# Patient Record
Sex: FEMALE | Race: WHITE | NOT HISPANIC OR LATINO | URBAN - METROPOLITAN AREA
[De-identification: names, ages, dates, MRNs, and addresses within clinical notes are randomized per-mention and may not be internally consistent; named-entity substitution may affect disease eponyms.]

---

## 2017-06-04 ENCOUNTER — INPATIENT (INPATIENT)
Facility: HOSPITAL | Age: 57
LOS: 23 days | Discharge: HOME HEALTH | DRG: 464 | End: 2017-06-28
Attending: ORTHOPAEDIC SURGERY | Admitting: SURGERY
Payer: COMMERCIAL

## 2017-06-04 ENCOUNTER — TRANSCRIPTION ENCOUNTER (OUTPATIENT)
Age: 57
End: 2017-06-04

## 2017-06-04 VITALS
HEART RATE: 93 BPM | SYSTOLIC BLOOD PRESSURE: 122 MMHG | RESPIRATION RATE: 22 BRPM | DIASTOLIC BLOOD PRESSURE: 88 MMHG | TEMPERATURE: 98 F | OXYGEN SATURATION: 100 %

## 2017-06-04 DIAGNOSIS — E11.9 TYPE 2 DIABETES MELLITUS WITHOUT COMPLICATIONS: ICD-10-CM

## 2017-06-04 DIAGNOSIS — I10 ESSENTIAL (PRIMARY) HYPERTENSION: ICD-10-CM

## 2017-06-04 DIAGNOSIS — N18.4 CHRONIC KIDNEY DISEASE, STAGE 4 (SEVERE): ICD-10-CM

## 2017-06-04 DIAGNOSIS — S41.111A LACERATION WITHOUT FOREIGN BODY OF RIGHT UPPER ARM, INITIAL ENCOUNTER: ICD-10-CM

## 2017-06-04 DIAGNOSIS — S52.032C: ICD-10-CM
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
## 2017-06-04 LAB
ALBUMIN SERPL ELPH-MCNC: 3.7 G/DL — SIGNIFICANT CHANGE UP (ref 3.3–5)
ALBUMIN SERPL ELPH-MCNC: 4 G/DL — SIGNIFICANT CHANGE UP (ref 3.3–5)
ALP SERPL-CCNC: 78 U/L — SIGNIFICANT CHANGE UP (ref 40–120)
ALP SERPL-CCNC: 82 U/L — SIGNIFICANT CHANGE UP (ref 40–120)
ALT FLD-CCNC: 20 U/L RC — SIGNIFICANT CHANGE UP (ref 10–45)
ALT FLD-CCNC: 24 U/L RC — SIGNIFICANT CHANGE UP (ref 10–45)
AMYLASE P1 CFR SERPL: 70 U/L — SIGNIFICANT CHANGE UP (ref 25–125)
ANION GAP SERPL CALC-SCNC: 14 MMOL/L — SIGNIFICANT CHANGE UP (ref 5–17)
ANION GAP SERPL CALC-SCNC: 15 MMOL/L — SIGNIFICANT CHANGE UP (ref 5–17)
ANION GAP SERPL CALC-SCNC: 16 MMOL/L — SIGNIFICANT CHANGE UP (ref 5–17)
APPEARANCE UR: CLEAR — SIGNIFICANT CHANGE UP
APTT BLD: 29.3 SEC — SIGNIFICANT CHANGE UP (ref 27.5–37.4)
AST SERPL-CCNC: 28 U/L — SIGNIFICANT CHANGE UP (ref 10–40)
AST SERPL-CCNC: 28 U/L — SIGNIFICANT CHANGE UP (ref 10–40)
BASE EXCESS BLDV CALC-SCNC: -2.7 MMOL/L — LOW (ref -2–2)
BASOPHILS # BLD AUTO: 0 K/UL — SIGNIFICANT CHANGE UP (ref 0–0.2)
BASOPHILS NFR BLD AUTO: 0.2 % — SIGNIFICANT CHANGE UP (ref 0–2)
BILIRUB SERPL-MCNC: 0.3 MG/DL — SIGNIFICANT CHANGE UP (ref 0.2–1.2)
BILIRUB SERPL-MCNC: 0.3 MG/DL — SIGNIFICANT CHANGE UP (ref 0.2–1.2)
BILIRUB UR-MCNC: NEGATIVE — SIGNIFICANT CHANGE UP
BLD GP AB SCN SERPL QL: POSITIVE — SIGNIFICANT CHANGE UP
BUN SERPL-MCNC: 30 MG/DL — HIGH (ref 7–23)
BUN SERPL-MCNC: 32 MG/DL — HIGH (ref 7–23)
BUN SERPL-MCNC: 32 MG/DL — HIGH (ref 7–23)
CALCIUM SERPL-MCNC: 8.4 MG/DL — SIGNIFICANT CHANGE UP (ref 8.4–10.5)
CALCIUM SERPL-MCNC: 8.7 MG/DL — SIGNIFICANT CHANGE UP (ref 8.4–10.5)
CALCIUM SERPL-MCNC: 9 MG/DL — SIGNIFICANT CHANGE UP (ref 8.4–10.5)
CHLORIDE SERPL-SCNC: 101 MMOL/L — SIGNIFICANT CHANGE UP (ref 96–108)
CHLORIDE SERPL-SCNC: 102 MMOL/L — SIGNIFICANT CHANGE UP (ref 96–108)
CHLORIDE SERPL-SCNC: 102 MMOL/L — SIGNIFICANT CHANGE UP (ref 96–108)
CK SERPL-CCNC: 310 U/L — HIGH (ref 25–170)
CK SERPL-CCNC: 354 U/L — HIGH (ref 25–170)
CK SERPL-CCNC: 400 U/L — HIGH (ref 25–170)
CO2 BLDV-SCNC: 25 MMOL/L — SIGNIFICANT CHANGE UP (ref 22–30)
CO2 SERPL-SCNC: 20 MMOL/L — LOW (ref 22–31)
CO2 SERPL-SCNC: 22 MMOL/L — SIGNIFICANT CHANGE UP (ref 22–31)
CO2 SERPL-SCNC: 22 MMOL/L — SIGNIFICANT CHANGE UP (ref 22–31)
COLOR SPEC: SIGNIFICANT CHANGE UP
CREAT SERPL-MCNC: 1.9 MG/DL — HIGH (ref 0.5–1.3)
CREAT SERPL-MCNC: 1.95 MG/DL — HIGH (ref 0.5–1.3)
CREAT SERPL-MCNC: 2.17 MG/DL — HIGH (ref 0.5–1.3)
DAT POLY-SP REAG RBC QL: NEGATIVE — SIGNIFICANT CHANGE UP
DIFF PNL FLD: ABNORMAL
EOSINOPHIL # BLD AUTO: 0.4 K/UL — SIGNIFICANT CHANGE UP (ref 0–0.5)
EOSINOPHIL NFR BLD AUTO: 3 % — SIGNIFICANT CHANGE UP (ref 0–6)
ETHANOL SERPL-MCNC: SIGNIFICANT CHANGE UP MG/DL (ref 0–10)
GAS PNL BLDA: SIGNIFICANT CHANGE UP
GAS PNL BLDV: SIGNIFICANT CHANGE UP
GLUCOSE SERPL-MCNC: 243 MG/DL — HIGH (ref 70–99)
GLUCOSE SERPL-MCNC: 388 MG/DL — HIGH (ref 70–99)
GLUCOSE SERPL-MCNC: 460 MG/DL — CRITICAL HIGH (ref 70–99)
GLUCOSE UR QL: >1000
HBA1C BLD-MCNC: 10.3 % — HIGH (ref 4–5.6)
HCG SERPL-ACNC: <2 MIU/ML — SIGNIFICANT CHANGE UP
HCO3 BLDV-SCNC: 24 MMOL/L — SIGNIFICANT CHANGE UP (ref 21–29)
HCT VFR BLD CALC: 32.6 % — LOW (ref 34.5–45)
HCT VFR BLD CALC: 34.8 % — SIGNIFICANT CHANGE UP (ref 34.5–45)
HGB BLD-MCNC: 10.9 G/DL — LOW (ref 11.5–15.5)
HGB BLD-MCNC: 11.7 G/DL — SIGNIFICANT CHANGE UP (ref 11.5–15.5)
HOROWITZ INDEX BLDV+IHG-RTO: SIGNIFICANT CHANGE UP
INR BLD: 1.04 RATIO — SIGNIFICANT CHANGE UP (ref 0.88–1.16)
INR BLD: 1.11 RATIO — SIGNIFICANT CHANGE UP (ref 0.88–1.16)
KETONES UR-MCNC: NEGATIVE — SIGNIFICANT CHANGE UP
LEUKOCYTE ESTERASE UR-ACNC: NEGATIVE — SIGNIFICANT CHANGE UP
LIDOCAIN IGE QN: 97 U/L — HIGH (ref 7–60)
LYMPHOCYTES # BLD AUTO: 29.4 % — SIGNIFICANT CHANGE UP (ref 13–44)
LYMPHOCYTES # BLD AUTO: 3.8 K/UL — HIGH (ref 1–3.3)
MAGNESIUM SERPL-MCNC: 1.9 MG/DL — SIGNIFICANT CHANGE UP (ref 1.6–2.6)
MAGNESIUM SERPL-MCNC: 2.1 MG/DL — SIGNIFICANT CHANGE UP (ref 1.6–2.6)
MCHC RBC-ENTMCNC: 27.1 PG — SIGNIFICANT CHANGE UP (ref 27–34)
MCHC RBC-ENTMCNC: 27.4 PG — SIGNIFICANT CHANGE UP (ref 27–34)
MCHC RBC-ENTMCNC: 33.5 GM/DL — SIGNIFICANT CHANGE UP (ref 32–36)
MCHC RBC-ENTMCNC: 33.7 GM/DL — SIGNIFICANT CHANGE UP (ref 32–36)
MCV RBC AUTO: 81 FL — SIGNIFICANT CHANGE UP (ref 80–100)
MCV RBC AUTO: 81.4 FL — SIGNIFICANT CHANGE UP (ref 80–100)
MONOCYTES # BLD AUTO: 0.6 K/UL — SIGNIFICANT CHANGE UP (ref 0–0.9)
MONOCYTES NFR BLD AUTO: 4.5 % — SIGNIFICANT CHANGE UP (ref 2–14)
MYOGLOBIN SERPL-MCNC: 327 NG/ML — HIGH (ref 9–82)
MYOGLOBIN SERPL-MCNC: 566 NG/ML — HIGH (ref 9–82)
NEUTROPHILS # BLD AUTO: 8.1 K/UL — HIGH (ref 1.8–7.4)
NEUTROPHILS NFR BLD AUTO: 62.9 % — SIGNIFICANT CHANGE UP (ref 43–77)
NITRITE UR-MCNC: NEGATIVE — SIGNIFICANT CHANGE UP
PCO2 BLDV: 50 MMHG — SIGNIFICANT CHANGE UP (ref 35–50)
PH BLDV: 7.3 — LOW (ref 7.35–7.45)
PH UR: 6 — SIGNIFICANT CHANGE UP (ref 5–8)
PHOSPHATE SERPL-MCNC: 3 MG/DL — SIGNIFICANT CHANGE UP (ref 2.5–4.5)
PHOSPHATE SERPL-MCNC: 3.9 MG/DL — SIGNIFICANT CHANGE UP (ref 2.5–4.5)
PLATELET # BLD AUTO: 303 K/UL — SIGNIFICANT CHANGE UP (ref 150–400)
PLATELET # BLD AUTO: 347 K/UL — SIGNIFICANT CHANGE UP (ref 150–400)
PO2 BLDV: 35 MMHG — SIGNIFICANT CHANGE UP (ref 25–45)
POTASSIUM SERPL-MCNC: 4.2 MMOL/L — SIGNIFICANT CHANGE UP (ref 3.5–5.3)
POTASSIUM SERPL-MCNC: 4.3 MMOL/L — SIGNIFICANT CHANGE UP (ref 3.5–5.3)
POTASSIUM SERPL-MCNC: 4.5 MMOL/L — SIGNIFICANT CHANGE UP (ref 3.5–5.3)
POTASSIUM SERPL-SCNC: 4.2 MMOL/L — SIGNIFICANT CHANGE UP (ref 3.5–5.3)
POTASSIUM SERPL-SCNC: 4.3 MMOL/L — SIGNIFICANT CHANGE UP (ref 3.5–5.3)
POTASSIUM SERPL-SCNC: 4.5 MMOL/L — SIGNIFICANT CHANGE UP (ref 3.5–5.3)
PROT SERPL-MCNC: 6.6 G/DL — SIGNIFICANT CHANGE UP (ref 6–8.3)
PROT SERPL-MCNC: 6.8 G/DL — SIGNIFICANT CHANGE UP (ref 6–8.3)
PROT UR-MCNC: SIGNIFICANT CHANGE UP
PROTHROM AB SERPL-ACNC: 11.2 SEC — SIGNIFICANT CHANGE UP (ref 9.8–12.7)
PROTHROM AB SERPL-ACNC: 12 SEC — SIGNIFICANT CHANGE UP (ref 9.8–12.7)
RBC # BLD: 4.02 M/UL — SIGNIFICANT CHANGE UP (ref 3.8–5.2)
RBC # BLD: 4.27 M/UL — SIGNIFICANT CHANGE UP (ref 3.8–5.2)
RBC # FLD: 14.5 % — SIGNIFICANT CHANGE UP (ref 10.3–14.5)
RBC # FLD: 14.6 % — HIGH (ref 10.3–14.5)
RH IG SCN BLD-IMP: NEGATIVE — SIGNIFICANT CHANGE UP
RH IG SCN BLD-IMP: NEGATIVE — SIGNIFICANT CHANGE UP
SAO2 % BLDV: 56 % — LOW (ref 67–88)
SODIUM SERPL-SCNC: 137 MMOL/L — SIGNIFICANT CHANGE UP (ref 135–145)
SODIUM SERPL-SCNC: 138 MMOL/L — SIGNIFICANT CHANGE UP (ref 135–145)
SODIUM SERPL-SCNC: 139 MMOL/L — SIGNIFICANT CHANGE UP (ref 135–145)
SP GR SPEC: >1.03 — HIGH (ref 1.01–1.02)
UROBILINOGEN FLD QL: NEGATIVE — SIGNIFICANT CHANGE UP
WBC # BLD: 12.8 K/UL — HIGH (ref 3.8–10.5)
WBC # BLD: 17.8 K/UL — HIGH (ref 3.8–10.5)
WBC # FLD AUTO: 12.8 K/UL — HIGH (ref 3.8–10.5)
WBC # FLD AUTO: 17.8 K/UL — HIGH (ref 3.8–10.5)

## 2017-06-04 PROCEDURE — 70450 CT HEAD/BRAIN W/O DYE: CPT | Mod: 26

## 2017-06-04 PROCEDURE — 99254 IP/OBS CNSLTJ NEW/EST MOD 60: CPT | Mod: GC

## 2017-06-04 PROCEDURE — 86077 PHYS BLOOD BANK SERV XMATCH: CPT

## 2017-06-04 PROCEDURE — 73200 CT UPPER EXTREMITY W/O DYE: CPT | Mod: 26,RT

## 2017-06-04 PROCEDURE — 99285 EMERGENCY DEPT VISIT HI MDM: CPT | Mod: 25

## 2017-06-04 PROCEDURE — 20103 EXPL PENTRG WOUND EXTREMITY: CPT

## 2017-06-04 PROCEDURE — 99223 1ST HOSP IP/OBS HIGH 75: CPT | Mod: 57,25,AI

## 2017-06-04 PROCEDURE — 71010: CPT | Mod: 26

## 2017-06-04 PROCEDURE — 35761: CPT | Mod: GC

## 2017-06-04 PROCEDURE — 72125 CT NECK SPINE W/O DYE: CPT | Mod: 26

## 2017-06-04 PROCEDURE — 99053 MED SERV 10PM-8AM 24 HR FAC: CPT

## 2017-06-04 PROCEDURE — 72170 X-RAY EXAM OF PELVIS: CPT | Mod: 26

## 2017-06-04 PROCEDURE — 73090 X-RAY EXAM OF FOREARM: CPT | Mod: 26,LT

## 2017-06-04 PROCEDURE — 73030 X-RAY EXAM OF SHOULDER: CPT | Mod: 26,RT

## 2017-06-04 PROCEDURE — 73060 X-RAY EXAM OF HUMERUS: CPT | Mod: 26,RT

## 2017-06-04 PROCEDURE — 71260 CT THORAX DX C+: CPT | Mod: 26

## 2017-06-04 PROCEDURE — 12005 RPR S/N/A/GEN/TRK12.6-20.0CM: CPT | Mod: 59

## 2017-06-04 PROCEDURE — 74177 CT ABD & PELVIS W/CONTRAST: CPT | Mod: 26

## 2017-06-04 PROCEDURE — 73070 X-RAY EXAM OF ELBOW: CPT | Mod: 26,RT

## 2017-06-04 PROCEDURE — 76376 3D RENDER W/INTRP POSTPROCES: CPT | Mod: 26

## 2017-06-04 PROCEDURE — 99291 CRITICAL CARE FIRST HOUR: CPT

## 2017-06-04 PROCEDURE — 73130 X-RAY EXAM OF HAND: CPT | Mod: 26,RT

## 2017-06-04 RX ORDER — SODIUM CHLORIDE 9 MG/ML
1000 INJECTION INTRAMUSCULAR; INTRAVENOUS; SUBCUTANEOUS ONCE
Qty: 0 | Refills: 0 | Status: COMPLETED | OUTPATIENT
Start: 2017-06-04 | End: 2017-06-04

## 2017-06-04 RX ORDER — INSULIN LISPRO 100/ML
VIAL (ML) SUBCUTANEOUS EVERY 6 HOURS
Qty: 0 | Refills: 0 | Status: DISCONTINUED | OUTPATIENT
Start: 2017-06-04 | End: 2017-06-04

## 2017-06-04 RX ORDER — SODIUM CHLORIDE 9 MG/ML
1000 INJECTION, SOLUTION INTRAVENOUS
Qty: 0 | Refills: 0 | Status: DISCONTINUED | OUTPATIENT
Start: 2017-06-04 | End: 2017-06-04

## 2017-06-04 RX ORDER — CEFAZOLIN SODIUM 1 G
2000 VIAL (EA) INJECTION ONCE
Qty: 0 | Refills: 0 | Status: COMPLETED | OUTPATIENT
Start: 2017-06-04 | End: 2017-06-04

## 2017-06-04 RX ORDER — INSULIN HUMAN 100 [IU]/ML
1 INJECTION, SOLUTION SUBCUTANEOUS
Qty: 100 | Refills: 0 | Status: DISCONTINUED | OUTPATIENT
Start: 2017-06-04 | End: 2017-06-06

## 2017-06-04 RX ORDER — ATORVASTATIN CALCIUM 80 MG/1
40 TABLET, FILM COATED ORAL AT BEDTIME
Qty: 0 | Refills: 0 | Status: DISCONTINUED | OUTPATIENT
Start: 2017-06-04 | End: 2017-06-06

## 2017-06-04 RX ORDER — OXYCODONE HYDROCHLORIDE 5 MG/1
5 TABLET ORAL EVERY 4 HOURS
Qty: 0 | Refills: 0 | Status: DISCONTINUED | OUTPATIENT
Start: 2017-06-04 | End: 2017-06-06

## 2017-06-04 RX ORDER — HYDROMORPHONE HYDROCHLORIDE 2 MG/ML
0.5 INJECTION INTRAMUSCULAR; INTRAVENOUS; SUBCUTANEOUS
Qty: 0 | Refills: 0 | Status: DISCONTINUED | OUTPATIENT
Start: 2017-06-04 | End: 2017-06-04

## 2017-06-04 RX ORDER — CEFAZOLIN SODIUM 1 G
2000 VIAL (EA) INJECTION EVERY 8 HOURS
Qty: 0 | Refills: 0 | Status: DISCONTINUED | OUTPATIENT
Start: 2017-06-04 | End: 2017-06-04

## 2017-06-04 RX ORDER — METOCLOPRAMIDE HCL 10 MG
10 TABLET ORAL ONCE
Qty: 0 | Refills: 0 | Status: COMPLETED | OUTPATIENT
Start: 2017-06-04 | End: 2017-06-04

## 2017-06-04 RX ORDER — HEPARIN SODIUM 5000 [USP'U]/ML
5000 INJECTION INTRAVENOUS; SUBCUTANEOUS EVERY 8 HOURS
Qty: 0 | Refills: 0 | Status: DISCONTINUED | OUTPATIENT
Start: 2017-06-04 | End: 2017-06-05

## 2017-06-04 RX ORDER — OXYCODONE HYDROCHLORIDE 5 MG/1
10 TABLET ORAL EVERY 4 HOURS
Qty: 0 | Refills: 0 | Status: DISCONTINUED | OUTPATIENT
Start: 2017-06-04 | End: 2017-06-06

## 2017-06-04 RX ORDER — SODIUM CHLORIDE 9 MG/ML
1000 INJECTION, SOLUTION INTRAVENOUS
Qty: 0 | Refills: 0 | Status: DISCONTINUED | OUTPATIENT
Start: 2017-06-04 | End: 2017-06-05

## 2017-06-04 RX ORDER — HYDROMORPHONE HYDROCHLORIDE 2 MG/ML
1 INJECTION INTRAMUSCULAR; INTRAVENOUS; SUBCUTANEOUS
Qty: 0 | Refills: 0 | Status: DISCONTINUED | OUTPATIENT
Start: 2017-06-09 | End: 2017-06-12

## 2017-06-04 RX ORDER — ONDANSETRON 8 MG/1
4 TABLET, FILM COATED ORAL ONCE
Qty: 0 | Refills: 0 | Status: COMPLETED | OUTPATIENT
Start: 2017-06-04 | End: 2017-06-04

## 2017-06-04 RX ORDER — ACETAMINOPHEN 500 MG
1000 TABLET ORAL ONCE
Qty: 0 | Refills: 0 | Status: COMPLETED | OUTPATIENT
Start: 2017-06-04 | End: 2017-06-04

## 2017-06-04 RX ORDER — ONDANSETRON 8 MG/1
4 TABLET, FILM COATED ORAL ONCE
Qty: 0 | Refills: 0 | Status: DISCONTINUED | OUTPATIENT
Start: 2017-06-09 | End: 2017-06-12

## 2017-06-04 RX ORDER — HYDROMORPHONE HYDROCHLORIDE 2 MG/ML
0.5 INJECTION INTRAMUSCULAR; INTRAVENOUS; SUBCUTANEOUS
Qty: 0 | Refills: 0 | Status: DISCONTINUED | OUTPATIENT
Start: 2017-06-04 | End: 2017-06-06

## 2017-06-04 RX ORDER — ACETAMINOPHEN 500 MG
650 TABLET ORAL EVERY 6 HOURS
Qty: 0 | Refills: 0 | Status: DISCONTINUED | OUTPATIENT
Start: 2017-06-04 | End: 2017-06-04

## 2017-06-04 RX ORDER — PIPERACILLIN AND TAZOBACTAM 4; .5 G/20ML; G/20ML
3.38 INJECTION, POWDER, LYOPHILIZED, FOR SOLUTION INTRAVENOUS EVERY 12 HOURS
Qty: 0 | Refills: 0 | Status: DISCONTINUED | OUTPATIENT
Start: 2017-06-04 | End: 2017-06-05

## 2017-06-04 RX ORDER — HYDROMORPHONE HYDROCHLORIDE 2 MG/ML
0.25 INJECTION INTRAMUSCULAR; INTRAVENOUS; SUBCUTANEOUS ONCE
Qty: 0 | Refills: 0 | Status: DISCONTINUED | OUTPATIENT
Start: 2017-06-04 | End: 2017-06-04

## 2017-06-04 RX ORDER — TETANUS TOXOID, REDUCED DIPHTHERIA TOXOID AND ACELLULAR PERTUSSIS VACCINE, ADSORBED 5; 2.5; 8; 8; 2.5 [IU]/.5ML; [IU]/.5ML; UG/.5ML; UG/.5ML; UG/.5ML
0.5 SUSPENSION INTRAMUSCULAR ONCE
Qty: 0 | Refills: 0 | Status: COMPLETED | OUTPATIENT
Start: 2017-06-04 | End: 2017-06-04

## 2017-06-04 RX ORDER — PANTOPRAZOLE SODIUM 20 MG/1
40 TABLET, DELAYED RELEASE ORAL
Qty: 0 | Refills: 0 | Status: DISCONTINUED | OUTPATIENT
Start: 2017-06-04 | End: 2017-06-06

## 2017-06-04 RX ORDER — FENTANYL CITRATE 50 UG/ML
50 INJECTION INTRAVENOUS ONCE
Qty: 0 | Refills: 0 | Status: DISCONTINUED | OUTPATIENT
Start: 2017-06-04 | End: 2017-06-04

## 2017-06-04 RX ORDER — DOCUSATE SODIUM 100 MG
100 CAPSULE ORAL THREE TIMES A DAY
Qty: 0 | Refills: 0 | Status: DISCONTINUED | OUTPATIENT
Start: 2017-06-04 | End: 2017-06-06

## 2017-06-04 RX ORDER — SENNA PLUS 8.6 MG/1
2 TABLET ORAL AT BEDTIME
Qty: 0 | Refills: 0 | Status: DISCONTINUED | OUTPATIENT
Start: 2017-06-04 | End: 2017-06-06

## 2017-06-04 RX ORDER — PIPERACILLIN AND TAZOBACTAM 4; .5 G/20ML; G/20ML
3.38 INJECTION, POWDER, LYOPHILIZED, FOR SOLUTION INTRAVENOUS ONCE
Qty: 0 | Refills: 0 | Status: COMPLETED | OUTPATIENT
Start: 2017-06-04 | End: 2017-06-04

## 2017-06-04 RX ORDER — MAGNESIUM SULFATE 500 MG/ML
2 VIAL (ML) INJECTION ONCE
Qty: 0 | Refills: 0 | Status: COMPLETED | OUTPATIENT
Start: 2017-06-04 | End: 2017-06-04

## 2017-06-04 RX ORDER — ACETAMINOPHEN 500 MG
1000 TABLET ORAL ONCE
Qty: 0 | Refills: 0 | Status: COMPLETED | OUTPATIENT
Start: 2017-06-05 | End: 2017-06-05

## 2017-06-04 RX ORDER — HYDROMORPHONE HYDROCHLORIDE 2 MG/ML
0.5 INJECTION INTRAMUSCULAR; INTRAVENOUS; SUBCUTANEOUS
Qty: 0 | Refills: 0 | Status: DISCONTINUED | OUTPATIENT
Start: 2017-06-09 | End: 2017-06-12

## 2017-06-04 RX ORDER — INSULIN HUMAN 100 [IU]/ML
10 INJECTION, SOLUTION SUBCUTANEOUS ONCE
Qty: 0 | Refills: 0 | Status: COMPLETED | OUTPATIENT
Start: 2017-06-04 | End: 2017-06-04

## 2017-06-04 RX ADMIN — Medication 1000 MILLIGRAM(S): at 12:30

## 2017-06-04 RX ADMIN — PANTOPRAZOLE SODIUM 40 MILLIGRAM(S): 20 TABLET, DELAYED RELEASE ORAL at 13:19

## 2017-06-04 RX ADMIN — Medication 100 MILLIGRAM(S): at 22:18

## 2017-06-04 RX ADMIN — PIPERACILLIN AND TAZOBACTAM 200 GRAM(S): 4; .5 INJECTION, POWDER, LYOPHILIZED, FOR SOLUTION INTRAVENOUS at 17:40

## 2017-06-04 RX ADMIN — HYDROMORPHONE HYDROCHLORIDE 0.5 MILLIGRAM(S): 2 INJECTION INTRAMUSCULAR; INTRAVENOUS; SUBCUTANEOUS at 15:00

## 2017-06-04 RX ADMIN — HEPARIN SODIUM 5000 UNIT(S): 5000 INJECTION INTRAVENOUS; SUBCUTANEOUS at 07:20

## 2017-06-04 RX ADMIN — Medication 50 GRAM(S): at 16:00

## 2017-06-04 RX ADMIN — SODIUM CHLORIDE 75 MILLILITER(S): 9 INJECTION, SOLUTION INTRAVENOUS at 05:58

## 2017-06-04 RX ADMIN — SENNA PLUS 2 TABLET(S): 8.6 TABLET ORAL at 22:18

## 2017-06-04 RX ADMIN — HYDROMORPHONE HYDROCHLORIDE 0.5 MILLIGRAM(S): 2 INJECTION INTRAMUSCULAR; INTRAVENOUS; SUBCUTANEOUS at 10:00

## 2017-06-04 RX ADMIN — FENTANYL CITRATE 50 MICROGRAM(S): 50 INJECTION INTRAVENOUS at 01:03

## 2017-06-04 RX ADMIN — HYDROMORPHONE HYDROCHLORIDE 0.5 MILLIGRAM(S): 2 INJECTION INTRAMUSCULAR; INTRAVENOUS; SUBCUTANEOUS at 06:26

## 2017-06-04 RX ADMIN — HYDROMORPHONE HYDROCHLORIDE 0.25 MILLIGRAM(S): 2 INJECTION INTRAMUSCULAR; INTRAVENOUS; SUBCUTANEOUS at 15:45

## 2017-06-04 RX ADMIN — PIPERACILLIN AND TAZOBACTAM 25 GRAM(S): 4; .5 INJECTION, POWDER, LYOPHILIZED, FOR SOLUTION INTRAVENOUS at 22:18

## 2017-06-04 RX ADMIN — ONDANSETRON 4 MILLIGRAM(S): 8 TABLET, FILM COATED ORAL at 12:30

## 2017-06-04 RX ADMIN — HYDROMORPHONE HYDROCHLORIDE 0.5 MILLIGRAM(S): 2 INJECTION INTRAMUSCULAR; INTRAVENOUS; SUBCUTANEOUS at 13:45

## 2017-06-04 RX ADMIN — Medication 10 MILLIGRAM(S): at 06:02

## 2017-06-04 RX ADMIN — ATORVASTATIN CALCIUM 40 MILLIGRAM(S): 80 TABLET, FILM COATED ORAL at 22:18

## 2017-06-04 RX ADMIN — HYDROMORPHONE HYDROCHLORIDE 0.5 MILLIGRAM(S): 2 INJECTION INTRAMUSCULAR; INTRAVENOUS; SUBCUTANEOUS at 06:45

## 2017-06-04 RX ADMIN — TETANUS TOXOID, REDUCED DIPHTHERIA TOXOID AND ACELLULAR PERTUSSIS VACCINE, ADSORBED 0.5 MILLILITER(S): 5; 2.5; 8; 8; 2.5 SUSPENSION INTRAMUSCULAR at 01:10

## 2017-06-04 RX ADMIN — HEPARIN SODIUM 5000 UNIT(S): 5000 INJECTION INTRAVENOUS; SUBCUTANEOUS at 22:18

## 2017-06-04 RX ADMIN — Medication 400 MILLIGRAM(S): at 12:15

## 2017-06-04 RX ADMIN — HEPARIN SODIUM 5000 UNIT(S): 5000 INJECTION INTRAVENOUS; SUBCUTANEOUS at 13:18

## 2017-06-04 RX ADMIN — HYDROMORPHONE HYDROCHLORIDE 0.25 MILLIGRAM(S): 2 INJECTION INTRAMUSCULAR; INTRAVENOUS; SUBCUTANEOUS at 16:00

## 2017-06-04 RX ADMIN — HYDROMORPHONE HYDROCHLORIDE 0.25 MILLIGRAM(S): 2 INJECTION INTRAMUSCULAR; INTRAVENOUS; SUBCUTANEOUS at 07:15

## 2017-06-04 RX ADMIN — Medication 1000 MILLIGRAM(S): at 06:30

## 2017-06-04 RX ADMIN — HYDROMORPHONE HYDROCHLORIDE 0.25 MILLIGRAM(S): 2 INJECTION INTRAMUSCULAR; INTRAVENOUS; SUBCUTANEOUS at 07:20

## 2017-06-04 RX ADMIN — INSULIN HUMAN 1 UNIT(S)/HR: 100 INJECTION, SOLUTION SUBCUTANEOUS at 06:48

## 2017-06-04 RX ADMIN — SODIUM CHLORIDE 2000 MILLILITER(S): 9 INJECTION INTRAMUSCULAR; INTRAVENOUS; SUBCUTANEOUS at 01:00

## 2017-06-04 RX ADMIN — Medication 400 MILLIGRAM(S): at 17:30

## 2017-06-04 RX ADMIN — INSULIN HUMAN 10 UNIT(S): 100 INJECTION, SOLUTION SUBCUTANEOUS at 06:48

## 2017-06-04 RX ADMIN — Medication 100 MILLIGRAM(S): at 01:20

## 2017-06-04 RX ADMIN — ONDANSETRON 4 MILLIGRAM(S): 8 TABLET, FILM COATED ORAL at 05:40

## 2017-06-04 RX ADMIN — Medication 100 MILLIGRAM(S): at 15:30

## 2017-06-04 RX ADMIN — Medication 100 MILLIGRAM(S): at 09:05

## 2017-06-04 RX ADMIN — Medication 400 MILLIGRAM(S): at 06:02

## 2017-06-04 RX ADMIN — Medication 10 MILLIGRAM(S): at 16:19

## 2017-06-04 RX ADMIN — Medication 1000 MILLIGRAM(S): at 17:45

## 2017-06-04 RX ADMIN — HYDROMORPHONE HYDROCHLORIDE 0.5 MILLIGRAM(S): 2 INJECTION INTRAMUSCULAR; INTRAVENOUS; SUBCUTANEOUS at 10:15

## 2017-06-04 NOTE — BRIEF OPERATIVE NOTE - POST-OP DX
Humeral head fracture, right, open, initial encounter  06/04/2017    Emilie Stoll  Laceration of scalp, initial encounter  06/04/2017    Emilie Stoll  Open wound of upper arm, right, initial encounter  06/04/2017    Emilie Stoll  Ulnar artery injury  06/04/2017    Emilie Stoll Laceration of scalp, initial encounter  06/04/2017    Emilie Stoll  Open wound of upper arm, right, initial encounter  06/04/2017    Active  Emilie Ramirez  Other open displaced fracture of distal end of right humerus, initial encounter  06/04/2017    Emilie Stoll  Ulnar artery injury  06/04/2017    Emilie Stoll
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
## 2017-06-04 NOTE — ED PROVIDER NOTE - MUSCULOSKELETAL, MLM
Spine appears normal, no spine ttp. R arm with large wound open to upper arm, oozing blood, pulsatile artery w/o bleeding noted despite tourniquet. tourniquet removed by surg. sensory grossly intact to fingers and can move fingers 2+ radial pulse.
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
## 2017-06-04 NOTE — BRIEF OPERATIVE NOTE - PROCEDURE
Repair, laceration, scalp  06/04/2017    Active  [id number]  Debridement of right upper extremity  06/04/2017    Active  [id number] Debridement of right upper extremity  06/04/2017    Active  Emilie Ramirez  Repair, laceration, scalp  06/04/2017    Active  Emilie Ramirez Application of arm splint  06/04/2017    Active  [id number]  Debridement of right upper extremity  06/04/2017    Active  Emilie Ramirez  Repair, laceration, scalp  06/04/2017    Active  Emilie Ramirez

## 2017-06-04 NOTE — H&P ADULT. - EXTREMITIES COMMENTS
R upper extremity with deformity of humerus and elbow, deep lacerations over the arm with obvious tissue loss and exposed bone. Hand appears viable with good motor and sensory exam.

## 2017-06-04 NOTE — ED PROVIDER NOTE - HEAD, MLM
Head is traumatic. very large laceration that goes to skull. btwn eyebrows to mid post L side. c collar in place.
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
## 2017-06-04 NOTE — H&P ADULT. - PMH
Essential hypertension    Type 2 diabetes mellitus without complication, with long-term current use of insulin
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
## 2017-06-04 NOTE — ED PROVIDER NOTE - MEDICAL DECISION MAKING DETAILS
to OR for washout. pan-scan. type and screen. labs. likely posterior / interior shoulder dislocation to be treated by ortho.

## 2017-06-05 LAB
ANION GAP SERPL CALC-SCNC: 12 MMOL/L — SIGNIFICANT CHANGE UP (ref 5–17)
BLD GP AB SCN SERPL QL: POSITIVE — SIGNIFICANT CHANGE UP
BUN SERPL-MCNC: 20 MG/DL — SIGNIFICANT CHANGE UP (ref 7–23)
CALCIUM SERPL-MCNC: 8.1 MG/DL — LOW (ref 8.4–10.5)
CHLORIDE SERPL-SCNC: 101 MMOL/L — SIGNIFICANT CHANGE UP (ref 96–108)
CO2 SERPL-SCNC: 24 MMOL/L — SIGNIFICANT CHANGE UP (ref 22–31)
CREAT SERPL-MCNC: 1.63 MG/DL — HIGH (ref 0.5–1.3)
GLUCOSE SERPL-MCNC: 155 MG/DL — HIGH (ref 70–99)
HCT VFR BLD CALC: 23.9 % — LOW (ref 34.5–45)
HCT VFR BLD CALC: 24.4 % — LOW (ref 34.5–45)
HCT VFR BLD CALC: 26.4 % — LOW (ref 34.5–45)
HGB BLD-MCNC: 8.1 G/DL — LOW (ref 11.5–15.5)
HGB BLD-MCNC: 8.1 G/DL — LOW (ref 11.5–15.5)
HGB BLD-MCNC: 8.7 G/DL — LOW (ref 11.5–15.5)
MAGNESIUM SERPL-MCNC: 2.2 MG/DL — SIGNIFICANT CHANGE UP (ref 1.6–2.6)
MCHC RBC-ENTMCNC: 26.6 PG — LOW (ref 27–34)
MCHC RBC-ENTMCNC: 27 PG — SIGNIFICANT CHANGE UP (ref 27–34)
MCHC RBC-ENTMCNC: 27.3 PG — SIGNIFICANT CHANGE UP (ref 27–34)
MCHC RBC-ENTMCNC: 32.8 GM/DL — SIGNIFICANT CHANGE UP (ref 32–36)
MCHC RBC-ENTMCNC: 33.3 GM/DL — SIGNIFICANT CHANGE UP (ref 32–36)
MCHC RBC-ENTMCNC: 33.8 GM/DL — SIGNIFICANT CHANGE UP (ref 32–36)
MCV RBC AUTO: 80.9 FL — SIGNIFICANT CHANGE UP (ref 80–100)
MCV RBC AUTO: 81 FL — SIGNIFICANT CHANGE UP (ref 80–100)
MCV RBC AUTO: 81 FL — SIGNIFICANT CHANGE UP (ref 80–100)
PHOSPHATE SERPL-MCNC: 3.2 MG/DL — SIGNIFICANT CHANGE UP (ref 2.5–4.5)
PLATELET # BLD AUTO: 240 K/UL — SIGNIFICANT CHANGE UP (ref 150–400)
PLATELET # BLD AUTO: 244 K/UL — SIGNIFICANT CHANGE UP (ref 150–400)
PLATELET # BLD AUTO: 276 K/UL — SIGNIFICANT CHANGE UP (ref 150–400)
POTASSIUM SERPL-MCNC: 3.5 MMOL/L — SIGNIFICANT CHANGE UP (ref 3.5–5.3)
POTASSIUM SERPL-SCNC: 3.5 MMOL/L — SIGNIFICANT CHANGE UP (ref 3.5–5.3)
RBC # BLD: 2.95 M/UL — LOW (ref 3.8–5.2)
RBC # BLD: 3.02 M/UL — LOW (ref 3.8–5.2)
RBC # BLD: 3.26 M/UL — LOW (ref 3.8–5.2)
RBC # FLD: 14.6 % — HIGH (ref 10.3–14.5)
RBC # FLD: 14.7 % — HIGH (ref 10.3–14.5)
RBC # FLD: 14.7 % — HIGH (ref 10.3–14.5)
RH IG SCN BLD-IMP: NEGATIVE — SIGNIFICANT CHANGE UP
SODIUM SERPL-SCNC: 137 MMOL/L — SIGNIFICANT CHANGE UP (ref 135–145)
WBC # BLD: 11.2 K/UL — HIGH (ref 3.8–10.5)
WBC # BLD: 11.2 K/UL — HIGH (ref 3.8–10.5)
WBC # BLD: 12.2 K/UL — HIGH (ref 3.8–10.5)
WBC # FLD AUTO: 11.2 K/UL — HIGH (ref 3.8–10.5)
WBC # FLD AUTO: 11.2 K/UL — HIGH (ref 3.8–10.5)
WBC # FLD AUTO: 12.2 K/UL — HIGH (ref 3.8–10.5)

## 2017-06-05 PROCEDURE — 71010: CPT | Mod: 26

## 2017-06-05 PROCEDURE — 99291 CRITICAL CARE FIRST HOUR: CPT

## 2017-06-05 RX ORDER — ENOXAPARIN SODIUM 100 MG/ML
40 INJECTION SUBCUTANEOUS EVERY 24 HOURS
Qty: 0 | Refills: 0 | Status: DISCONTINUED | OUTPATIENT
Start: 2017-06-05 | End: 2017-06-06

## 2017-06-05 RX ORDER — PIPERACILLIN AND TAZOBACTAM 4; .5 G/20ML; G/20ML
3.38 INJECTION, POWDER, LYOPHILIZED, FOR SOLUTION INTRAVENOUS EVERY 8 HOURS
Qty: 0 | Refills: 0 | Status: DISCONTINUED | OUTPATIENT
Start: 2017-06-05 | End: 2017-06-06

## 2017-06-05 RX ORDER — SODIUM CHLORIDE 9 MG/ML
1000 INJECTION, SOLUTION INTRAVENOUS
Qty: 0 | Refills: 0 | Status: DISCONTINUED | OUTPATIENT
Start: 2017-06-06 | End: 2017-06-06

## 2017-06-05 RX ORDER — POTASSIUM CHLORIDE 20 MEQ
10 PACKET (EA) ORAL
Qty: 0 | Refills: 0 | Status: COMPLETED | OUTPATIENT
Start: 2017-06-05 | End: 2017-06-05

## 2017-06-05 RX ORDER — DEXTROSE 50 % IN WATER 50 %
25 SYRINGE (ML) INTRAVENOUS ONCE
Qty: 0 | Refills: 0 | Status: COMPLETED | OUTPATIENT
Start: 2017-06-05 | End: 2017-06-05

## 2017-06-05 RX ORDER — ACETAMINOPHEN 500 MG
1000 TABLET ORAL ONCE
Qty: 0 | Refills: 0 | Status: COMPLETED | OUTPATIENT
Start: 2017-06-05 | End: 2017-06-05

## 2017-06-05 RX ADMIN — Medication 100 MILLIGRAM(S): at 13:15

## 2017-06-05 RX ADMIN — SENNA PLUS 2 TABLET(S): 8.6 TABLET ORAL at 21:18

## 2017-06-05 RX ADMIN — OXYCODONE HYDROCHLORIDE 5 MILLIGRAM(S): 5 TABLET ORAL at 11:15

## 2017-06-05 RX ADMIN — OXYCODONE HYDROCHLORIDE 5 MILLIGRAM(S): 5 TABLET ORAL at 11:00

## 2017-06-05 RX ADMIN — Medication 400 MILLIGRAM(S): at 11:00

## 2017-06-05 RX ADMIN — OXYCODONE HYDROCHLORIDE 10 MILLIGRAM(S): 5 TABLET ORAL at 18:30

## 2017-06-05 RX ADMIN — Medication 100 MILLIGRAM(S): at 21:18

## 2017-06-05 RX ADMIN — Medication 100 MILLIEQUIVALENT(S): at 07:30

## 2017-06-05 RX ADMIN — OXYCODONE HYDROCHLORIDE 10 MILLIGRAM(S): 5 TABLET ORAL at 18:00

## 2017-06-05 RX ADMIN — Medication 100 MILLIGRAM(S): at 05:15

## 2017-06-05 RX ADMIN — Medication 400 MILLIGRAM(S): at 03:30

## 2017-06-05 RX ADMIN — OXYCODONE HYDROCHLORIDE 5 MILLIGRAM(S): 5 TABLET ORAL at 15:00

## 2017-06-05 RX ADMIN — Medication 100 MILLIEQUIVALENT(S): at 05:14

## 2017-06-05 RX ADMIN — HEPARIN SODIUM 5000 UNIT(S): 5000 INJECTION INTRAVENOUS; SUBCUTANEOUS at 06:22

## 2017-06-05 RX ADMIN — HYDROMORPHONE HYDROCHLORIDE 0.5 MILLIGRAM(S): 2 INJECTION INTRAMUSCULAR; INTRAVENOUS; SUBCUTANEOUS at 09:15

## 2017-06-05 RX ADMIN — OXYCODONE HYDROCHLORIDE 10 MILLIGRAM(S): 5 TABLET ORAL at 01:01

## 2017-06-05 RX ADMIN — Medication 1000 MILLIGRAM(S): at 03:45

## 2017-06-05 RX ADMIN — ENOXAPARIN SODIUM 40 MILLIGRAM(S): 100 INJECTION SUBCUTANEOUS at 12:32

## 2017-06-05 RX ADMIN — PIPERACILLIN AND TAZOBACTAM 25 GRAM(S): 4; .5 INJECTION, POWDER, LYOPHILIZED, FOR SOLUTION INTRAVENOUS at 05:15

## 2017-06-05 RX ADMIN — PANTOPRAZOLE SODIUM 40 MILLIGRAM(S): 20 TABLET, DELAYED RELEASE ORAL at 06:22

## 2017-06-05 RX ADMIN — PIPERACILLIN AND TAZOBACTAM 25 GRAM(S): 4; .5 INJECTION, POWDER, LYOPHILIZED, FOR SOLUTION INTRAVENOUS at 21:18

## 2017-06-05 RX ADMIN — OXYCODONE HYDROCHLORIDE 10 MILLIGRAM(S): 5 TABLET ORAL at 00:01

## 2017-06-05 RX ADMIN — OXYCODONE HYDROCHLORIDE 5 MILLIGRAM(S): 5 TABLET ORAL at 15:30

## 2017-06-05 RX ADMIN — Medication 100 MILLIEQUIVALENT(S): at 06:05

## 2017-06-05 RX ADMIN — HYDROMORPHONE HYDROCHLORIDE 0.5 MILLIGRAM(S): 2 INJECTION INTRAMUSCULAR; INTRAVENOUS; SUBCUTANEOUS at 09:30

## 2017-06-05 RX ADMIN — Medication 1000 MILLIGRAM(S): at 11:15

## 2017-06-05 RX ADMIN — PIPERACILLIN AND TAZOBACTAM 25 GRAM(S): 4; .5 INJECTION, POWDER, LYOPHILIZED, FOR SOLUTION INTRAVENOUS at 13:15

## 2017-06-05 RX ADMIN — Medication 25 MILLILITER(S): at 02:50

## 2017-06-05 RX ADMIN — ATORVASTATIN CALCIUM 40 MILLIGRAM(S): 80 TABLET, FILM COATED ORAL at 21:18

## 2017-06-05 RX ADMIN — INSULIN HUMAN 1 UNIT(S)/HR: 100 INJECTION, SOLUTION SUBCUTANEOUS at 07:30
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
## 2017-06-05 NOTE — DIETITIAN INITIAL EVALUATION ADULT. - NS AS NUTRI DX KNOWLEDGE BELIEFS
Food - and nutrition - related knowledge deficit/Limited adherence to nutrition - related recommendations

## 2017-06-05 NOTE — DIETITIAN INITIAL EVALUATION ADULT. - NS AS NUTRI INTERV COLLABORAT
Nutrition Alert sticker for Morbid Obesity placed in chart./Collaboration with other nutrition professionals

## 2017-06-05 NOTE — DIETITIAN INITIAL EVALUATION ADULT. - ORAL INTAKE PTA
Pt reports she ate well at home PTA with no nutrition issues, no GI distress or wt fluctuations. Pt takes no nutrition supplements, reports NKFA./good

## 2017-06-05 NOTE — DIETITIAN INITIAL EVALUATION ADULT. - PHYSICAL APPEARANCE
well nourished/morbid obesity BMI 43.4 Kg/m2. Nutrition Alert sticker for Morbid Obesity placed in chart./obese

## 2017-06-05 NOTE — DIETITIAN INITIAL EVALUATION ADULT. - ADHERENCE
Pt reports she manages DM2 with insulin, and checks fingersticks daily with typical values 140-150mg/dL (question accuracy given HbA1c=10.3%). Pt reports she avoids sweets, soda and juice, and drinks only seltzer. However pt reports high intake of carb foods including Italian dishes. Per chart review, pt reports daily use of marijuana and drinks 1-2 alcoholic beverages daily./fair

## 2017-06-05 NOTE — DIETITIAN INITIAL EVALUATION ADULT. - NS AS NUTRI INTERV ED CONTENT
Nutrition relationship to health/disease/reviewed Consistent Carbohydrate diet and recommendations for balanced meals; RD will follow up with further diet education when pt is more receptive

## 2017-06-05 NOTE — DIETITIAN INITIAL EVALUATION ADULT. - ENERGY NEEDS
Estimated energy needs: 0301-9640 tad/day (11-14Kcal/Kg based on admit wt 101.8Kg)    ht: 5 feet 0 inches, wt: 222 pounds, BMI: 43.4 Kg/m2, IBW: 100 pounds (+/- 10%), 222% IBW. Edema: 2+ R hand, head (6/4); Skin: no pressure injuries.

## 2017-06-06 LAB
ANION GAP SERPL CALC-SCNC: 12 MMOL/L — SIGNIFICANT CHANGE UP (ref 5–17)
ANION GAP SERPL CALC-SCNC: 16 MMOL/L — SIGNIFICANT CHANGE UP (ref 5–17)
APTT BLD: 30.6 SEC — SIGNIFICANT CHANGE UP (ref 27.5–37.4)
BUN SERPL-MCNC: 17 MG/DL — SIGNIFICANT CHANGE UP (ref 7–23)
BUN SERPL-MCNC: 18 MG/DL — SIGNIFICANT CHANGE UP (ref 7–23)
CALCIUM SERPL-MCNC: 8.6 MG/DL — SIGNIFICANT CHANGE UP (ref 8.4–10.5)
CALCIUM SERPL-MCNC: 8.6 MG/DL — SIGNIFICANT CHANGE UP (ref 8.4–10.5)
CHLORIDE SERPL-SCNC: 101 MMOL/L — SIGNIFICANT CHANGE UP (ref 96–108)
CHLORIDE SERPL-SCNC: 102 MMOL/L — SIGNIFICANT CHANGE UP (ref 96–108)
CO2 SERPL-SCNC: 21 MMOL/L — LOW (ref 22–31)
CO2 SERPL-SCNC: 24 MMOL/L — SIGNIFICANT CHANGE UP (ref 22–31)
CREAT SERPL-MCNC: 1.5 MG/DL — HIGH (ref 0.5–1.3)
CREAT SERPL-MCNC: 1.6 MG/DL — HIGH (ref 0.5–1.3)
GAS PNL BLDA: SIGNIFICANT CHANGE UP
GLUCOSE SERPL-MCNC: 127 MG/DL — HIGH (ref 70–99)
GLUCOSE SERPL-MCNC: 223 MG/DL — HIGH (ref 70–99)
HCT VFR BLD CALC: 23.7 % — LOW (ref 34.5–45)
HCT VFR BLD CALC: 29.8 % — LOW (ref 34.5–45)
HCT VFR BLD CALC: 35.5 % — SIGNIFICANT CHANGE UP (ref 34.5–45)
HGB BLD-MCNC: 11.8 G/DL — SIGNIFICANT CHANGE UP (ref 11.5–15.5)
HGB BLD-MCNC: 7.9 G/DL — LOW (ref 11.5–15.5)
HGB BLD-MCNC: 9.9 G/DL — LOW (ref 11.5–15.5)
INR BLD: 1.05 RATIO — SIGNIFICANT CHANGE UP (ref 0.88–1.16)
MAGNESIUM SERPL-MCNC: 2 MG/DL — SIGNIFICANT CHANGE UP (ref 1.6–2.6)
MAGNESIUM SERPL-MCNC: 2 MG/DL — SIGNIFICANT CHANGE UP (ref 1.6–2.6)
MCHC RBC-ENTMCNC: 27.2 PG — SIGNIFICANT CHANGE UP (ref 27–34)
MCHC RBC-ENTMCNC: 27.4 PG — SIGNIFICANT CHANGE UP (ref 27–34)
MCHC RBC-ENTMCNC: 30.3 PG — SIGNIFICANT CHANGE UP (ref 27–34)
MCHC RBC-ENTMCNC: 33.2 GM/DL — SIGNIFICANT CHANGE UP (ref 32–36)
MCHC RBC-ENTMCNC: 33.3 GM/DL — SIGNIFICANT CHANGE UP (ref 32–36)
MCHC RBC-ENTMCNC: 33.4 GM/DL — SIGNIFICANT CHANGE UP (ref 32–36)
MCV RBC AUTO: 81.4 FL — SIGNIFICANT CHANGE UP (ref 80–100)
MCV RBC AUTO: 82.6 FL — SIGNIFICANT CHANGE UP (ref 80–100)
MCV RBC AUTO: 90.9 FL — SIGNIFICANT CHANGE UP (ref 80–100)
PHOSPHATE SERPL-MCNC: 2.7 MG/DL — SIGNIFICANT CHANGE UP (ref 2.5–4.5)
PHOSPHATE SERPL-MCNC: 4.2 MG/DL — SIGNIFICANT CHANGE UP (ref 2.5–4.5)
PLATELET # BLD AUTO: 238 K/UL — SIGNIFICANT CHANGE UP (ref 150–400)
PLATELET # BLD AUTO: 270 K/UL — SIGNIFICANT CHANGE UP (ref 150–400)
PLATELET # BLD AUTO: 93 K/UL — LOW (ref 150–400)
POTASSIUM SERPL-MCNC: 4.1 MMOL/L — SIGNIFICANT CHANGE UP (ref 3.5–5.3)
POTASSIUM SERPL-MCNC: 4.7 MMOL/L — SIGNIFICANT CHANGE UP (ref 3.5–5.3)
POTASSIUM SERPL-SCNC: 4.1 MMOL/L — SIGNIFICANT CHANGE UP (ref 3.5–5.3)
POTASSIUM SERPL-SCNC: 4.7 MMOL/L — SIGNIFICANT CHANGE UP (ref 3.5–5.3)
PROTHROM AB SERPL-ACNC: 11.3 SEC — SIGNIFICANT CHANGE UP (ref 9.8–12.7)
RBC # BLD: 2.91 M/UL — LOW (ref 3.8–5.2)
RBC # BLD: 3.6 M/UL — LOW (ref 3.8–5.2)
RBC # BLD: 3.9 M/UL — SIGNIFICANT CHANGE UP (ref 3.8–5.2)
RBC # FLD: 14.3 % — SIGNIFICANT CHANGE UP (ref 10.3–14.5)
RBC # FLD: 14.4 % — SIGNIFICANT CHANGE UP (ref 10.3–14.5)
RBC # FLD: 16.8 % — HIGH (ref 10.3–14.5)
SODIUM SERPL-SCNC: 138 MMOL/L — SIGNIFICANT CHANGE UP (ref 135–145)
SODIUM SERPL-SCNC: 138 MMOL/L — SIGNIFICANT CHANGE UP (ref 135–145)
WBC # BLD: 10.8 K/UL — HIGH (ref 3.8–10.5)
WBC # BLD: 5.8 K/UL — SIGNIFICANT CHANGE UP (ref 3.8–10.5)
WBC # BLD: 9.5 K/UL — SIGNIFICANT CHANGE UP (ref 3.8–10.5)
WBC # FLD AUTO: 10.8 K/UL — HIGH (ref 3.8–10.5)
WBC # FLD AUTO: 5.8 K/UL — SIGNIFICANT CHANGE UP (ref 3.8–10.5)
WBC # FLD AUTO: 9.5 K/UL — SIGNIFICANT CHANGE UP (ref 3.8–10.5)

## 2017-06-06 PROCEDURE — 73080 X-RAY EXAM OF ELBOW: CPT | Mod: 26,RT

## 2017-06-06 PROCEDURE — 99233 SBSQ HOSP IP/OBS HIGH 50: CPT | Mod: GC

## 2017-06-06 PROCEDURE — 73070 X-RAY EXAM OF ELBOW: CPT | Mod: 26,59,RT

## 2017-06-06 RX ORDER — HYDROMORPHONE HYDROCHLORIDE 2 MG/ML
0.5 INJECTION INTRAMUSCULAR; INTRAVENOUS; SUBCUTANEOUS
Qty: 0 | Refills: 0 | Status: DISCONTINUED | OUTPATIENT
Start: 2017-06-06 | End: 2017-06-07

## 2017-06-06 RX ORDER — PANTOPRAZOLE SODIUM 20 MG/1
40 TABLET, DELAYED RELEASE ORAL
Qty: 0 | Refills: 0 | Status: DISCONTINUED | OUTPATIENT
Start: 2017-06-06 | End: 2017-06-09

## 2017-06-06 RX ORDER — SENNA PLUS 8.6 MG/1
2 TABLET ORAL AT BEDTIME
Qty: 0 | Refills: 0 | Status: DISCONTINUED | OUTPATIENT
Start: 2017-06-06 | End: 2017-06-09

## 2017-06-06 RX ORDER — PIPERACILLIN AND TAZOBACTAM 4; .5 G/20ML; G/20ML
3.38 INJECTION, POWDER, LYOPHILIZED, FOR SOLUTION INTRAVENOUS EVERY 8 HOURS
Qty: 0 | Refills: 0 | Status: DISCONTINUED | OUTPATIENT
Start: 2017-06-06 | End: 2017-06-07

## 2017-06-06 RX ORDER — PIPERACILLIN AND TAZOBACTAM 4; .5 G/20ML; G/20ML
3.38 INJECTION, POWDER, LYOPHILIZED, FOR SOLUTION INTRAVENOUS EVERY 8 HOURS
Qty: 0 | Refills: 0 | Status: DISCONTINUED | OUTPATIENT
Start: 2017-06-06 | End: 2017-06-06

## 2017-06-06 RX ORDER — INSULIN HUMAN 100 [IU]/ML
1 INJECTION, SOLUTION SUBCUTANEOUS
Qty: 100 | Refills: 0 | Status: DISCONTINUED | OUTPATIENT
Start: 2017-06-06 | End: 2017-06-07

## 2017-06-06 RX ORDER — DOCUSATE SODIUM 100 MG
100 CAPSULE ORAL THREE TIMES A DAY
Qty: 0 | Refills: 0 | Status: DISCONTINUED | OUTPATIENT
Start: 2017-06-06 | End: 2017-06-09

## 2017-06-06 RX ORDER — OXYCODONE HYDROCHLORIDE 5 MG/1
5 TABLET ORAL EVERY 4 HOURS
Qty: 0 | Refills: 0 | Status: DISCONTINUED | OUTPATIENT
Start: 2017-06-06 | End: 2017-06-09

## 2017-06-06 RX ORDER — ENOXAPARIN SODIUM 100 MG/ML
40 INJECTION SUBCUTANEOUS EVERY 24 HOURS
Qty: 0 | Refills: 0 | Status: DISCONTINUED | OUTPATIENT
Start: 2017-06-06 | End: 2017-06-06

## 2017-06-06 RX ORDER — ATORVASTATIN CALCIUM 80 MG/1
40 TABLET, FILM COATED ORAL AT BEDTIME
Qty: 0 | Refills: 0 | Status: DISCONTINUED | OUTPATIENT
Start: 2017-06-06 | End: 2017-06-09

## 2017-06-06 RX ORDER — OXYCODONE HYDROCHLORIDE 5 MG/1
10 TABLET ORAL EVERY 4 HOURS
Qty: 0 | Refills: 0 | Status: DISCONTINUED | OUTPATIENT
Start: 2017-06-06 | End: 2017-06-09

## 2017-06-06 RX ORDER — ENOXAPARIN SODIUM 100 MG/ML
40 INJECTION SUBCUTANEOUS EVERY 24 HOURS
Qty: 0 | Refills: 0 | Status: COMPLETED | OUTPATIENT
Start: 2018-05-06 | End: 2017-06-08

## 2017-06-06 RX ADMIN — PIPERACILLIN AND TAZOBACTAM 25 GRAM(S): 4; .5 INJECTION, POWDER, LYOPHILIZED, FOR SOLUTION INTRAVENOUS at 06:08

## 2017-06-06 RX ADMIN — OXYCODONE HYDROCHLORIDE 10 MILLIGRAM(S): 5 TABLET ORAL at 23:46

## 2017-06-06 RX ADMIN — INSULIN HUMAN 1 UNIT(S)/HR: 100 INJECTION, SOLUTION SUBCUTANEOUS at 23:33

## 2017-06-06 RX ADMIN — INSULIN HUMAN 1 UNIT(S)/HR: 100 INJECTION, SOLUTION SUBCUTANEOUS at 23:35

## 2017-06-06 RX ADMIN — HYDROMORPHONE HYDROCHLORIDE 0.5 MILLIGRAM(S): 2 INJECTION INTRAMUSCULAR; INTRAVENOUS; SUBCUTANEOUS at 21:30

## 2017-06-06 RX ADMIN — ATORVASTATIN CALCIUM 40 MILLIGRAM(S): 80 TABLET, FILM COATED ORAL at 23:32

## 2017-06-06 RX ADMIN — HYDROMORPHONE HYDROCHLORIDE 0.5 MILLIGRAM(S): 2 INJECTION INTRAMUSCULAR; INTRAVENOUS; SUBCUTANEOUS at 11:15

## 2017-06-06 RX ADMIN — HYDROMORPHONE HYDROCHLORIDE 0.5 MILLIGRAM(S): 2 INJECTION INTRAMUSCULAR; INTRAVENOUS; SUBCUTANEOUS at 06:15

## 2017-06-06 RX ADMIN — Medication 63.75 MILLIMOLE(S): at 04:17

## 2017-06-06 RX ADMIN — SENNA PLUS 2 TABLET(S): 8.6 TABLET ORAL at 23:32

## 2017-06-06 RX ADMIN — OXYCODONE HYDROCHLORIDE 10 MILLIGRAM(S): 5 TABLET ORAL at 02:16

## 2017-06-06 RX ADMIN — PANTOPRAZOLE SODIUM 40 MILLIGRAM(S): 20 TABLET, DELAYED RELEASE ORAL at 06:08

## 2017-06-06 RX ADMIN — HYDROMORPHONE HYDROCHLORIDE 0.5 MILLIGRAM(S): 2 INJECTION INTRAMUSCULAR; INTRAVENOUS; SUBCUTANEOUS at 11:01

## 2017-06-06 RX ADMIN — Medication 100 MILLIGRAM(S): at 05:33

## 2017-06-06 RX ADMIN — HYDROMORPHONE HYDROCHLORIDE 0.5 MILLIGRAM(S): 2 INJECTION INTRAMUSCULAR; INTRAVENOUS; SUBCUTANEOUS at 06:30

## 2017-06-06 RX ADMIN — HYDROMORPHONE HYDROCHLORIDE 0.5 MILLIGRAM(S): 2 INJECTION INTRAMUSCULAR; INTRAVENOUS; SUBCUTANEOUS at 21:15

## 2017-06-06 RX ADMIN — PIPERACILLIN AND TAZOBACTAM 25 GRAM(S): 4; .5 INJECTION, POWDER, LYOPHILIZED, FOR SOLUTION INTRAVENOUS at 13:28

## 2017-06-06 RX ADMIN — OXYCODONE HYDROCHLORIDE 10 MILLIGRAM(S): 5 TABLET ORAL at 01:16

## 2017-06-06 RX ADMIN — Medication 100 MILLIGRAM(S): at 23:33

## 2017-06-07 ENCOUNTER — TRANSCRIPTION ENCOUNTER (OUTPATIENT)
Age: 57
End: 2017-06-07

## 2017-06-07 LAB
ANION GAP SERPL CALC-SCNC: 15 MMOL/L — SIGNIFICANT CHANGE UP (ref 5–17)
APTT BLD: 30.1 SEC — SIGNIFICANT CHANGE UP (ref 27.5–37.4)
BUN SERPL-MCNC: 19 MG/DL — SIGNIFICANT CHANGE UP (ref 7–23)
CA-I BLD-SCNC: 1.12 MMOL/L — SIGNIFICANT CHANGE UP (ref 1.05–1.34)
CALCIUM SERPL-MCNC: 8.4 MG/DL — SIGNIFICANT CHANGE UP (ref 8.4–10.5)
CHLORIDE SERPL-SCNC: 101 MMOL/L — SIGNIFICANT CHANGE UP (ref 96–108)
CO2 SERPL-SCNC: 21 MMOL/L — LOW (ref 22–31)
CREAT SERPL-MCNC: 1.57 MG/DL — HIGH (ref 0.5–1.3)
GLUCOSE SERPL-MCNC: 192 MG/DL — HIGH (ref 70–99)
HCT VFR BLD CALC: 27.7 % — LOW (ref 34.5–45)
HGB BLD-MCNC: 9.1 G/DL — LOW (ref 11.5–15.5)
INR BLD: 1.04 RATIO — SIGNIFICANT CHANGE UP (ref 0.88–1.16)
MAGNESIUM SERPL-MCNC: 1.9 MG/DL — SIGNIFICANT CHANGE UP (ref 1.6–2.6)
MCHC RBC-ENTMCNC: 27.1 PG — SIGNIFICANT CHANGE UP (ref 27–34)
MCHC RBC-ENTMCNC: 32.8 GM/DL — SIGNIFICANT CHANGE UP (ref 32–36)
MCV RBC AUTO: 82.8 FL — SIGNIFICANT CHANGE UP (ref 80–100)
PHOSPHATE SERPL-MCNC: 4.6 MG/DL — HIGH (ref 2.5–4.5)
PLATELET # BLD AUTO: 271 K/UL — SIGNIFICANT CHANGE UP (ref 150–400)
POTASSIUM SERPL-MCNC: 4.5 MMOL/L — SIGNIFICANT CHANGE UP (ref 3.5–5.3)
POTASSIUM SERPL-SCNC: 4.5 MMOL/L — SIGNIFICANT CHANGE UP (ref 3.5–5.3)
PROTHROM AB SERPL-ACNC: 11.4 SEC — SIGNIFICANT CHANGE UP (ref 9.8–12.7)
RBC # BLD: 3.35 M/UL — LOW (ref 3.8–5.2)
RBC # FLD: 14.4 % — SIGNIFICANT CHANGE UP (ref 10.3–14.5)
SODIUM SERPL-SCNC: 137 MMOL/L — SIGNIFICANT CHANGE UP (ref 135–145)
WBC # BLD: 11.9 K/UL — HIGH (ref 3.8–10.5)
WBC # FLD AUTO: 11.9 K/UL — HIGH (ref 3.8–10.5)

## 2017-06-07 PROCEDURE — 99233 SBSQ HOSP IP/OBS HIGH 50: CPT | Mod: GC

## 2017-06-07 PROCEDURE — 71010: CPT | Mod: 26

## 2017-06-07 PROCEDURE — 88305 TISSUE EXAM BY PATHOLOGIST: CPT | Mod: 26

## 2017-06-07 PROCEDURE — 99232 SBSQ HOSP IP/OBS MODERATE 35: CPT

## 2017-06-07 RX ORDER — BISOPROLOL FUMARATE 10 MG/1
5 TABLET, FILM COATED ORAL DAILY
Qty: 0 | Refills: 0 | Status: DISCONTINUED | OUTPATIENT
Start: 2017-06-07 | End: 2017-06-09

## 2017-06-07 RX ORDER — ONDANSETRON 8 MG/1
4 TABLET, FILM COATED ORAL EVERY 6 HOURS
Qty: 0 | Refills: 0 | Status: DISCONTINUED | OUTPATIENT
Start: 2017-06-07 | End: 2017-06-09

## 2017-06-07 RX ORDER — NALOXONE HYDROCHLORIDE 4 MG/.1ML
0.1 SPRAY NASAL
Qty: 0 | Refills: 0 | Status: DISCONTINUED | OUTPATIENT
Start: 2017-06-07 | End: 2017-06-09

## 2017-06-07 RX ORDER — INSULIN GLARGINE 100 [IU]/ML
35 INJECTION, SOLUTION SUBCUTANEOUS
Qty: 0 | Refills: 0 | Status: DISCONTINUED | OUTPATIENT
Start: 2017-06-07 | End: 2017-06-08

## 2017-06-07 RX ORDER — HYDROMORPHONE HYDROCHLORIDE 2 MG/ML
0.5 INJECTION INTRAMUSCULAR; INTRAVENOUS; SUBCUTANEOUS
Qty: 0 | Refills: 0 | Status: DISCONTINUED | OUTPATIENT
Start: 2017-06-07 | End: 2017-06-09

## 2017-06-07 RX ORDER — INSULIN LISPRO 100/ML
10 VIAL (ML) SUBCUTANEOUS
Qty: 0 | Refills: 0 | Status: DISCONTINUED | OUTPATIENT
Start: 2017-06-07 | End: 2017-06-08

## 2017-06-07 RX ORDER — INSULIN LISPRO 100/ML
VIAL (ML) SUBCUTANEOUS
Qty: 0 | Refills: 0 | Status: DISCONTINUED | OUTPATIENT
Start: 2017-06-07 | End: 2017-06-09

## 2017-06-07 RX ORDER — PIPERACILLIN AND TAZOBACTAM 4; .5 G/20ML; G/20ML
3.38 INJECTION, POWDER, LYOPHILIZED, FOR SOLUTION INTRAVENOUS EVERY 8 HOURS
Qty: 0 | Refills: 0 | Status: DISCONTINUED | OUTPATIENT
Start: 2017-06-07 | End: 2017-06-08

## 2017-06-07 RX ORDER — INSULIN LISPRO 100/ML
VIAL (ML) SUBCUTANEOUS AT BEDTIME
Qty: 0 | Refills: 0 | Status: DISCONTINUED | OUTPATIENT
Start: 2017-06-07 | End: 2017-06-09

## 2017-06-07 RX ORDER — ACETAMINOPHEN 500 MG
650 TABLET ORAL EVERY 6 HOURS
Qty: 0 | Refills: 0 | Status: DISCONTINUED | OUTPATIENT
Start: 2017-06-07 | End: 2017-06-09

## 2017-06-07 RX ORDER — HYDROMORPHONE HYDROCHLORIDE 2 MG/ML
30 INJECTION INTRAMUSCULAR; INTRAVENOUS; SUBCUTANEOUS
Qty: 0 | Refills: 0 | Status: DISCONTINUED | OUTPATIENT
Start: 2017-06-07 | End: 2017-06-09

## 2017-06-07 RX ORDER — MAGNESIUM SULFATE 500 MG/ML
2 VIAL (ML) INJECTION ONCE
Qty: 0 | Refills: 0 | Status: COMPLETED | OUTPATIENT
Start: 2017-06-07 | End: 2017-06-07

## 2017-06-07 RX ADMIN — Medication 100 MILLIGRAM(S): at 22:00

## 2017-06-07 RX ADMIN — OXYCODONE HYDROCHLORIDE 5 MILLIGRAM(S): 5 TABLET ORAL at 04:55

## 2017-06-07 RX ADMIN — Medication 100 MILLIGRAM(S): at 13:18

## 2017-06-07 RX ADMIN — OXYCODONE HYDROCHLORIDE 10 MILLIGRAM(S): 5 TABLET ORAL at 00:15

## 2017-06-07 RX ADMIN — HYDROMORPHONE HYDROCHLORIDE 0.5 MILLIGRAM(S): 2 INJECTION INTRAMUSCULAR; INTRAVENOUS; SUBCUTANEOUS at 12:05

## 2017-06-07 RX ADMIN — OXYCODONE HYDROCHLORIDE 10 MILLIGRAM(S): 5 TABLET ORAL at 13:17

## 2017-06-07 RX ADMIN — HYDROMORPHONE HYDROCHLORIDE 0.5 MILLIGRAM(S): 2 INJECTION INTRAMUSCULAR; INTRAVENOUS; SUBCUTANEOUS at 03:12

## 2017-06-07 RX ADMIN — HYDROMORPHONE HYDROCHLORIDE 0.5 MILLIGRAM(S): 2 INJECTION INTRAMUSCULAR; INTRAVENOUS; SUBCUTANEOUS at 06:40

## 2017-06-07 RX ADMIN — Medication 50 GRAM(S): at 05:36

## 2017-06-07 RX ADMIN — ATORVASTATIN CALCIUM 40 MILLIGRAM(S): 80 TABLET, FILM COATED ORAL at 23:39

## 2017-06-07 RX ADMIN — OXYCODONE HYDROCHLORIDE 10 MILLIGRAM(S): 5 TABLET ORAL at 05:25

## 2017-06-07 RX ADMIN — OXYCODONE HYDROCHLORIDE 10 MILLIGRAM(S): 5 TABLET ORAL at 09:15

## 2017-06-07 RX ADMIN — Medication 100 MILLIGRAM(S): at 05:37

## 2017-06-07 RX ADMIN — PIPERACILLIN AND TAZOBACTAM 25 GRAM(S): 4; .5 INJECTION, POWDER, LYOPHILIZED, FOR SOLUTION INTRAVENOUS at 05:37

## 2017-06-07 RX ADMIN — Medication 6: at 21:11

## 2017-06-07 RX ADMIN — HYDROMORPHONE HYDROCHLORIDE 30 MILLILITER(S): 2 INJECTION INTRAMUSCULAR; INTRAVENOUS; SUBCUTANEOUS at 15:49

## 2017-06-07 RX ADMIN — INSULIN GLARGINE 35 UNIT(S): 100 INJECTION, SOLUTION SUBCUTANEOUS at 18:06

## 2017-06-07 RX ADMIN — SENNA PLUS 2 TABLET(S): 8.6 TABLET ORAL at 23:38

## 2017-06-07 RX ADMIN — PIPERACILLIN AND TAZOBACTAM 25 GRAM(S): 4; .5 INJECTION, POWDER, LYOPHILIZED, FOR SOLUTION INTRAVENOUS at 22:00

## 2017-06-07 RX ADMIN — Medication 10 UNIT(S): at 18:09

## 2017-06-07 RX ADMIN — HYDROMORPHONE HYDROCHLORIDE 0.5 MILLIGRAM(S): 2 INJECTION INTRAMUSCULAR; INTRAVENOUS; SUBCUTANEOUS at 02:48

## 2017-06-07 RX ADMIN — HYDROMORPHONE HYDROCHLORIDE 0.5 MILLIGRAM(S): 2 INJECTION INTRAMUSCULAR; INTRAVENOUS; SUBCUTANEOUS at 11:48

## 2017-06-07 RX ADMIN — BISOPROLOL FUMARATE 5 MILLIGRAM(S): 10 TABLET, FILM COATED ORAL at 23:41

## 2017-06-07 RX ADMIN — OXYCODONE HYDROCHLORIDE 10 MILLIGRAM(S): 5 TABLET ORAL at 08:47

## 2017-06-07 RX ADMIN — HYDROMORPHONE HYDROCHLORIDE 30 MILLILITER(S): 2 INJECTION INTRAMUSCULAR; INTRAVENOUS; SUBCUTANEOUS at 19:22

## 2017-06-07 RX ADMIN — PANTOPRAZOLE SODIUM 40 MILLIGRAM(S): 20 TABLET, DELAYED RELEASE ORAL at 06:40

## 2017-06-07 RX ADMIN — PIPERACILLIN AND TAZOBACTAM 25 GRAM(S): 4; .5 INJECTION, POWDER, LYOPHILIZED, FOR SOLUTION INTRAVENOUS at 15:13

## 2017-06-07 RX ADMIN — HYDROMORPHONE HYDROCHLORIDE 0.5 MILLIGRAM(S): 2 INJECTION INTRAMUSCULAR; INTRAVENOUS; SUBCUTANEOUS at 06:43

## 2017-06-07 RX ADMIN — OXYCODONE HYDROCHLORIDE 10 MILLIGRAM(S): 5 TABLET ORAL at 13:32
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
## 2017-06-07 NOTE — PHYSICAL THERAPY INITIAL EVALUATION ADULT - ADDITIONAL COMMENTS
Pt lives with spouse and 2 sons in private home with 5 stairs to enter (+) HR, full flight to basement and full flight to bedroom (+) HR. Pt does not own any ADs, states family can provide assist as needed.

## 2017-06-07 NOTE — PHYSICAL THERAPY INITIAL EVALUATION ADULT - PASSIVE RANGE OF MOTION EXAMINATION, REHAB EVAL
deficits as listed below/Left UE Passive ROM was WFL (within functional limits)/Decr. R shoulder flex to ~35 deg, R shoulder abd to 80 deg./bilateral lower extremity Passive ROM was WFL (within functional limits)
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
## 2017-06-07 NOTE — PHYSICAL THERAPY INITIAL EVALUATION ADULT - DISCHARGE DISPOSITION, PT EVAL
home w/ outpatient services/Home with outpatient PT at MD discretion for R UE strengthening/ROM. No AD recommendation. Supervision/assist with mobility skills at this time (pt states family can provide).
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
## 2017-06-07 NOTE — PROGRESS NOTE ADULT - SUBJECTIVE AND OBJECTIVE BOX
Pt seen & examined. No acute events. S/p ex-fix and washout by ortho. Exploration and tagging of ulnar nerve by PRS.    ICU Vital Signs Last 24 Hrs  T(C): 36.3, Max: 37.1 (06-06 @ 11:00)  T(F): 97.3, Max: 98.8 (06-06 @ 11:00)  HR: 90 (75 - 97)  BP: 159/71 (108/56 - 174/81)  BP(mean): 102 (72 - 117)  ABP: --  ABP(mean): --  RR: 12 (12 - 18)  SpO2: 97% (97% - 100%)    I&O's Summary  I & Os for 24h ending 06 Jun 2017 07:00  =============================================  IN: 2338 ml / OUT: 2860 ml / NET: -522 ml    I & Os for current day (as of 07 Jun 2017 06:49)  =============================================  IN: 1477 ml / OUT: 1620 ml / NET: -143 ml    Exam:  SANJAY VYAS  Ex-fix in place  Median & radial nerve fxn and SLT intact.  Ulnar nerve fxn and SLT absent.   VAC in place, holding sxn    Plan  - ex-fix per ortho  - recommend OT for wrist/finger ROM  - strict elevation  - will d/w ortho for second stage
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
## 2017-06-07 NOTE — PROGRESS NOTE ADULT - SUBJECTIVE AND OBJECTIVE BOX
[facility] SICU Progress Note    24 HOUR EVENTS: OR last night for right humerus/radius washout with external-fixation and placement of wound vac.    SUBJECTIVE/ROS:  No acute issues    CODE STATUS:  Full code    ALLERGIES:  No Known Allergies    VITAL SIGNS:  ICU Vital Signs Last 24 Hrs  T(C): 36.8, Max: 37.1 (06-06 @ 21:00)  T(F): 98.2, Max: 98.8 (06-06 @ 21:00)  HR: 104 (82 - 104)  BP: 146/77 (127/90 - 174/81)  BP(mean): 104 (89 - 117)  ABP: --  ABP(mean): --  RR: 23 (12 - 24)  SpO2: 99% (96% - 100%)      NEUROLOGY:  HYDROmorphone  Injectable 0.5milliGRAM(s) IV Push every 3 hours PRN Severe Breakthrough Pain  oxyCODONE IR 5milliGRAM(s) Oral every 4 hours PRN Moderate Pain (4 - 6)  oxyCODONE IR 10milliGRAM(s) Oral every 4 hours PRN Severe Pain (7 - 10)  acetaminophen   Tablet. 650milliGRAM(s) Oral every 6 hours PRN Mild Pain (1 - 3)  HYDROmorphone PCA (1 mG/mL) 30milliLiter(s) PCA Continuous PCA Continuous  HYDROmorphone PCA (1 mG/mL) Rescue Clinician Bolus 0.5milliGRAM(s) IV Push every 15 minutes PRN for Pain Scale GREATER THAN 6  ondansetron Injectable 4milliGRAM(s) IV Push every 6 hours PRN Nausea    Exam: AAOx3, NAD  [ X ] Adequacy of sedation and pain control has been assessed and adjusted  Comments:    RESPIRATORY:    Exam: Non-labored, CTA BL    ABG - ( 06 Jun 2017 21:35 )  pH: 7.36  /  pCO2: 41    /  pO2: 92    / HCO3: 23    / Base Excess: -1.9  /  SaO2: 98      Lactate: x        CARDIOVASCULAR  bisoprolol   Tablet 5milliGRAM(s) Oral daily      Exam: RRR    Cardiac Rhythm: NSR  ECHO:  Comments:    GI:  pantoprazole    Tablet 40milliGRAM(s) Oral before breakfast  docusate sodium 100milliGRAM(s) Oral three times a day  senna 2Tablet(s) Oral at bedtime    Exam: Soft, NTND  Diet:Reg  Comments:    :    I&O's Detail  I & Os for 24h ending 06-07 @ 07:00  =============================================  IN:    dextrose 5% + lactated ringers.: 600 ml    Oral Fluid: 600 ml    Packed Red Blood Cells: 350 ml    IV PiggyBack: 200 ml    Solution: 50 ml    insulin Infusion: 38 ml    insulin Infusion: 28 ml    Total IN: 1866 ml  ---------------------------------------------  OUT:    Indwelling Catheter - Urethral: 1670 ml    Total OUT: 1670 ml  ---------------------------------------------  Total NET: 196 ml    I & Os for current day (as of 06-07 @ 16:30)  =============================================  IN:    Solution: 50 ml    insulin Infusion: 29 ml    Total IN: 79 ml  ---------------------------------------------  OUT:    Indwelling Catheter - Urethral: 585 ml    Total OUT: 585 ml  ---------------------------------------------  Total NET: -506 ml      06-07    137  |  101  |  19  ----------------------------<  192<H>  4.5   |  21<L>  |  1.57<H>    Ca    8.4      07 Jun 2017 03:09  Phos  4.6     06-07  Mg     1.9     06-07      Wilde catheter indication:     HEMATOLOGIC:    [ X ] DVT Prophylaxis: Lovenox                        9.1    11.9  )-----------( 271      ( 07 Jun 2017 03:09 )             27.7     PT/INR - ( 07 Jun 2017 03:09 )   PT: 11.4 sec;   INR: 1.04 ratio         PTT - ( 07 Jun 2017 03:09 )  PTT:30.1 sec  Transfusions:	[ ] PRBC	[ ] Platelets	[ ] FFP		[ ] Cryoprecipitate  Comments:    INFECTIOUS DISEASE:  piperacillin/tazobactam IVPB. 3.375Gram(s) IV Intermittent every 8 hours    RECENT CULTURES:      ENDOCRINE:  insulin Infusion 1Unit(s)/Hr IV Continuous <Continuous>  atorvastatin 40milliGRAM(s) Oral at bedtime    CAPILLARY BLOOD GLUCOSE  210 (07 Jun 2017 15:00)  199 (07 Jun 2017 14:00)  237 (07 Jun 2017 13:00)  296 (07 Jun 2017 12:00)  259 (07 Jun 2017 11:00)  255 (07 Jun 2017 10:00)  152 (07 Jun 2017 09:00)  175 (07 Jun 2017 08:00)  153 (07 Jun 2017 07:00)  157 (07 Jun 2017 06:00)  161 (07 Jun 2017 05:00)  173 (07 Jun 2017 04:00)  173 (07 Jun 2017 03:00)  205 (07 Jun 2017 02:00)  193 (07 Jun 2017 01:00)  221 (07 Jun 2017 00:00)  233 (06 Jun 2017 23:00)  221 (06 Jun 2017 22:00)  216 (06 Jun 2017 21:00)      PATIENT CARE ACCESS DEVICES:  [ X ] Peripheral IV  [ ] Central Venous Line	[ ] R	[ ] L	[ ] IJ	[ ] Fem	[ ] SC	Placed:   [ ] Arterial Line		[ ] R	[ ] L	[ ] Fem	[ ] Rad	[ ] Ax	Placed:   [ ] PICC:					[ ] Mediport  [ X ] Urinary Catheter   [ X ] Necessity of urinary, arterial, and venous catheters discussed    OTHER MEDICATIONS:  naloxone Injectable 0.1milliGRAM(s) IV Push every 3 minutes PRN      IMAGING STUDIES: CXR: IMPRESSION:  No acute pulmonary disease.

## 2017-06-07 NOTE — PHYSICAL THERAPY INITIAL EVALUATION ADULT - GENERAL OBSERVATIONS, REHAB EVAL
Pt received semi-supine in bed, (+) external fixator R UE supported on foam, singleton, wound VAC, IVs, B venodyne sleeves, SICU monitoring. Pt alert and agreeable to PT eval. Pt's spouse and sons at bedside.
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
## 2017-06-07 NOTE — PHYSICAL THERAPY INITIAL EVALUATION ADULT - PLANNED THERAPY INTERVENTIONS, PT EVAL
bed mobility training/balance training/postural re-education/strengthening/gait training/transfer training/ROM

## 2017-06-08 DIAGNOSIS — S01.01XA LACERATION WITHOUT FOREIGN BODY OF SCALP, INITIAL ENCOUNTER: ICD-10-CM

## 2017-06-08 DIAGNOSIS — E78.5 HYPERLIPIDEMIA, UNSPECIFIED: ICD-10-CM

## 2017-06-08 LAB
ANION GAP SERPL CALC-SCNC: 13 MMOL/L — SIGNIFICANT CHANGE UP (ref 5–17)
BLD GP AB SCN SERPL QL: POSITIVE — SIGNIFICANT CHANGE UP
BUN SERPL-MCNC: 18 MG/DL — SIGNIFICANT CHANGE UP (ref 7–23)
CA-I BLD-SCNC: 1.13 MMOL/L — SIGNIFICANT CHANGE UP (ref 1.12–1.3)
CALCIUM SERPL-MCNC: 8.7 MG/DL — SIGNIFICANT CHANGE UP (ref 8.4–10.5)
CHLORIDE SERPL-SCNC: 97 MMOL/L — SIGNIFICANT CHANGE UP (ref 96–108)
CO2 SERPL-SCNC: 24 MMOL/L — SIGNIFICANT CHANGE UP (ref 22–31)
CREAT SERPL-MCNC: 1.5 MG/DL — HIGH (ref 0.5–1.3)
DAT POLY-SP REAG RBC QL: NEGATIVE — SIGNIFICANT CHANGE UP
GLUCOSE SERPL-MCNC: 174 MG/DL — HIGH (ref 70–99)
HCT VFR BLD CALC: 26.8 % — LOW (ref 34.5–45)
HGB BLD-MCNC: 8.8 G/DL — LOW (ref 11.5–15.5)
MAGNESIUM SERPL-MCNC: 2 MG/DL — SIGNIFICANT CHANGE UP (ref 1.6–2.6)
MCHC RBC-ENTMCNC: 27 PG — SIGNIFICANT CHANGE UP (ref 27–34)
MCHC RBC-ENTMCNC: 32.9 GM/DL — SIGNIFICANT CHANGE UP (ref 32–36)
MCV RBC AUTO: 82.2 FL — SIGNIFICANT CHANGE UP (ref 80–100)
PHOSPHATE SERPL-MCNC: 3.7 MG/DL — SIGNIFICANT CHANGE UP (ref 2.5–4.5)
PLATELET # BLD AUTO: 287 K/UL — SIGNIFICANT CHANGE UP (ref 150–400)
POTASSIUM SERPL-MCNC: 4.1 MMOL/L — SIGNIFICANT CHANGE UP (ref 3.5–5.3)
POTASSIUM SERPL-SCNC: 4.1 MMOL/L — SIGNIFICANT CHANGE UP (ref 3.5–5.3)
RBC # BLD: 3.26 M/UL — LOW (ref 3.8–5.2)
RBC # FLD: 14.6 % — HIGH (ref 10.3–14.5)
RH IG SCN BLD-IMP: NEGATIVE — SIGNIFICANT CHANGE UP
SODIUM SERPL-SCNC: 134 MMOL/L — LOW (ref 135–145)
WBC # BLD: 12.1 K/UL — HIGH (ref 3.8–10.5)
WBC # FLD AUTO: 12.1 K/UL — HIGH (ref 3.8–10.5)

## 2017-06-08 PROCEDURE — 86077 PHYS BLOOD BANK SERV XMATCH: CPT

## 2017-06-08 PROCEDURE — 99233 SBSQ HOSP IP/OBS HIGH 50: CPT | Mod: GC

## 2017-06-08 PROCEDURE — 71010: CPT | Mod: 26

## 2017-06-08 PROCEDURE — 99232 SBSQ HOSP IP/OBS MODERATE 35: CPT | Mod: GC

## 2017-06-08 RX ORDER — INSULIN LISPRO 100/ML
14 VIAL (ML) SUBCUTANEOUS
Qty: 0 | Refills: 0 | Status: DISCONTINUED | OUTPATIENT
Start: 2017-06-08 | End: 2017-06-09

## 2017-06-08 RX ORDER — INSULIN GLARGINE 100 [IU]/ML
35 INJECTION, SOLUTION SUBCUTANEOUS ONCE
Qty: 0 | Refills: 0 | Status: DISCONTINUED | OUTPATIENT
Start: 2017-06-08 | End: 2017-06-08

## 2017-06-08 RX ORDER — FAMOTIDINE 10 MG/ML
20 INJECTION INTRAVENOUS ONCE
Qty: 0 | Refills: 0 | Status: COMPLETED | OUTPATIENT
Start: 2017-06-08 | End: 2017-06-08

## 2017-06-08 RX ORDER — ENOXAPARIN SODIUM 100 MG/ML
40 INJECTION SUBCUTANEOUS ONCE
Qty: 0 | Refills: 0 | Status: COMPLETED | OUTPATIENT
Start: 2017-06-08 | End: 2017-06-08

## 2017-06-08 RX ORDER — PIPERACILLIN AND TAZOBACTAM 4; .5 G/20ML; G/20ML
3.38 INJECTION, POWDER, LYOPHILIZED, FOR SOLUTION INTRAVENOUS EVERY 8 HOURS
Qty: 0 | Refills: 0 | Status: DISCONTINUED | OUTPATIENT
Start: 2017-06-08 | End: 2017-06-09

## 2017-06-08 RX ORDER — SODIUM CHLORIDE 9 MG/ML
1000 INJECTION INTRAMUSCULAR; INTRAVENOUS; SUBCUTANEOUS
Qty: 0 | Refills: 0 | Status: DISCONTINUED | OUTPATIENT
Start: 2017-06-08 | End: 2017-06-09

## 2017-06-08 RX ORDER — CALCIUM CARBONATE 500(1250)
2 TABLET ORAL ONCE
Qty: 0 | Refills: 0 | Status: COMPLETED | OUTPATIENT
Start: 2017-06-08 | End: 2017-06-08

## 2017-06-08 RX ORDER — INSULIN GLARGINE 100 [IU]/ML
28 INJECTION, SOLUTION SUBCUTANEOUS ONCE
Qty: 0 | Refills: 0 | Status: COMPLETED | OUTPATIENT
Start: 2017-06-08 | End: 2017-06-08

## 2017-06-08 RX ORDER — ONDANSETRON 8 MG/1
4 TABLET, FILM COATED ORAL ONCE
Qty: 0 | Refills: 0 | Status: COMPLETED | OUTPATIENT
Start: 2017-06-08 | End: 2017-06-08

## 2017-06-08 RX ADMIN — PIPERACILLIN AND TAZOBACTAM 25 GRAM(S): 4; .5 INJECTION, POWDER, LYOPHILIZED, FOR SOLUTION INTRAVENOUS at 13:22

## 2017-06-08 RX ADMIN — PANTOPRAZOLE SODIUM 40 MILLIGRAM(S): 20 TABLET, DELAYED RELEASE ORAL at 06:44

## 2017-06-08 RX ADMIN — Medication 100 MILLIGRAM(S): at 13:22

## 2017-06-08 RX ADMIN — PIPERACILLIN AND TAZOBACTAM 25 GRAM(S): 4; .5 INJECTION, POWDER, LYOPHILIZED, FOR SOLUTION INTRAVENOUS at 21:28

## 2017-06-08 RX ADMIN — Medication 4: at 21:56

## 2017-06-08 RX ADMIN — HYDROMORPHONE HYDROCHLORIDE 30 MILLILITER(S): 2 INJECTION INTRAMUSCULAR; INTRAVENOUS; SUBCUTANEOUS at 19:07

## 2017-06-08 RX ADMIN — Medication 10 UNIT(S): at 09:43

## 2017-06-08 RX ADMIN — INSULIN GLARGINE 35 UNIT(S): 100 INJECTION, SOLUTION SUBCUTANEOUS at 06:41

## 2017-06-08 RX ADMIN — Medication 4: at 17:18

## 2017-06-08 RX ADMIN — INSULIN GLARGINE 28 UNIT(S): 100 INJECTION, SOLUTION SUBCUTANEOUS at 17:34

## 2017-06-08 RX ADMIN — ATORVASTATIN CALCIUM 40 MILLIGRAM(S): 80 TABLET, FILM COATED ORAL at 21:28

## 2017-06-08 RX ADMIN — Medication 2 TABLET(S): at 17:17

## 2017-06-08 RX ADMIN — ONDANSETRON 4 MILLIGRAM(S): 8 TABLET, FILM COATED ORAL at 06:53

## 2017-06-08 RX ADMIN — Medication 100 MILLIGRAM(S): at 21:28

## 2017-06-08 RX ADMIN — Medication 100 MILLIGRAM(S): at 06:42

## 2017-06-08 RX ADMIN — PIPERACILLIN AND TAZOBACTAM 25 GRAM(S): 4; .5 INJECTION, POWDER, LYOPHILIZED, FOR SOLUTION INTRAVENOUS at 06:44

## 2017-06-08 RX ADMIN — Medication 2: at 06:43

## 2017-06-08 RX ADMIN — ENOXAPARIN SODIUM 40 MILLIGRAM(S): 100 INJECTION SUBCUTANEOUS at 08:45

## 2017-06-08 RX ADMIN — SODIUM CHLORIDE 20 MILLILITER(S): 9 INJECTION INTRAMUSCULAR; INTRAVENOUS; SUBCUTANEOUS at 18:12

## 2017-06-08 RX ADMIN — Medication 10 UNIT(S): at 13:23

## 2017-06-08 RX ADMIN — Medication 14 UNIT(S): at 17:17

## 2017-06-08 RX ADMIN — SENNA PLUS 2 TABLET(S): 8.6 TABLET ORAL at 21:28

## 2017-06-08 RX ADMIN — HYDROMORPHONE HYDROCHLORIDE 30 MILLILITER(S): 2 INJECTION INTRAMUSCULAR; INTRAVENOUS; SUBCUTANEOUS at 07:17

## 2017-06-08 RX ADMIN — BISOPROLOL FUMARATE 5 MILLIGRAM(S): 10 TABLET, FILM COATED ORAL at 06:41

## 2017-06-08 RX ADMIN — Medication 4: at 13:23

## 2017-06-08 RX ADMIN — FAMOTIDINE 20 MILLIGRAM(S): 10 INJECTION INTRAVENOUS at 22:25
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
## 2017-06-08 NOTE — PROGRESS NOTE ADULT - SUBJECTIVE AND OBJECTIVE BOX
S: Patient feels improved this morning, with decreased pain of right upper extremity.  Insulin drip transition to subcutaneous insulin.      VITALS:    T(C): 37.4, Max: 37.4 (06-08 @ 03:00)  HR: 82 (82 - 105)  BP: 108/59 (108/59 - 166/79)  BP(mean): 80 (77 - 113)  RR: 17 (14 - 24)  SpO2: 96% (92% - 100%)  CAPILLARY BLOOD GLUCOSE  185 (08 Jun 2017 06:00)  238 (07 Jun 2017 23:00)  246 (07 Jun 2017 20:00)  171 (07 Jun 2017 18:00)  171 (07 Jun 2017 17:00)  215 (07 Jun 2017 16:00)  210 (07 Jun 2017 15:00)  199 (07 Jun 2017 14:00)  237 (07 Jun 2017 13:00)  296 (07 Jun 2017 12:00)  259 (07 Jun 2017 11:00)  255 (07 Jun 2017 10:00)  152 (07 Jun 2017 09:00)  175 (07 Jun 2017 08:00)    I & Os for current day (as of 06-08 @ 07:35)  =============================================  IN:    IV PiggyBack: 100 ml    insulin Infusion: 93.5 ml    Solution: 50 ml    Total IN: 243.5 ml  ---------------------------------------------  OUT:    Indwelling Catheter - Urethral: 2820 ml    VAC (Vacuum Assisted Closure) System: 250 ml    Total OUT: 3070 ml  ---------------------------------------------  Total NET: -2826.5 ml    General: NAD, Lying in bed comfortably  Neuro: A+Ox3  HEENT: Scalp lac with sutures in place, no active bleeding, minimally tender.    Extremities: Right upper extremity with ex-fix in place, elevated.  Distal motor+sensory grossly intact, weakness with  strength.  Wound vac in place.

## 2017-06-08 NOTE — PROGRESS NOTE ADULT - SUBJECTIVE AND OBJECTIVE BOX
[facility] SICU Progress Note    24 HOUR EVENTS: No acute issues overnight. Restarted b-blocker yesterday. Transitioned from insulin gtt to subcutaneous.    SUBJECTIVE/ROS: No significant complaints. Pain controlled.    CODE STATUS:  Full code    ALLERGIES:  No Known Allergies      VITAL SIGNS:  ICU Vital Signs Last 24 Hrs  T(C): 37.4, Max: 37.4 (06-08 @ 03:00)  T(F): 99.3, Max: 99.3 (06-08 @ 03:00)  HR: 82 (82 - 105)  BP: 108/59 (108/59 - 166/79)  BP(mean): 80 (77 - 113)  ABP: --  ABP(mean): --  RR: 17 (14 - 24)  SpO2: 96% (92% - 100%)      NEUROLOGY:  oxyCODONE IR 5milliGRAM(s) Oral every 4 hours PRN Moderate Pain (4 - 6)  oxyCODONE IR 10milliGRAM(s) Oral every 4 hours PRN Severe Pain (7 - 10)  acetaminophen   Tablet. 650milliGRAM(s) Oral every 6 hours PRN Mild Pain (1 - 3)  HYDROmorphone PCA (1 mG/mL) 30milliLiter(s) PCA Continuous PCA Continuous  HYDROmorphone PCA (1 mG/mL) Rescue Clinician Bolus 0.5milliGRAM(s) IV Push every 15 minutes PRN for Pain Scale GREATER THAN 6  ondansetron Injectable 4milliGRAM(s) IV Push every 6 hours PRN Nausea    Exam: AAOx4, NAD  [X] Adequacy of sedation and pain control has been assessed and adjusted  Comments:    RESPIRATORY:    Exam: Non-labored  Mechanical Ventilation:   ABG - ( 06 Jun 2017 21:35 )  pH: 7.36  /  pCO2: 41    /  pO2: 92    / HCO3: 23    / Base Excess: -1.9  /  SaO2: 98      Lactate: x          CARDIOVASCULAR  bisoprolol   Tablet 5milliGRAM(s) Oral daily      Exam: RRR    Cardiac Rhythm:  ECHO:  Comments:    GI:  pantoprazole    Tablet 40milliGRAM(s) Oral before breakfast  docusate sodium 100milliGRAM(s) Oral three times a day  senna 2Tablet(s) Oral at bedtime    Exam: Soft, NTND  Diet: CC Regular  Comments:    :    I&O's Detail    I & Os for current day (as of 06-08 @ 07:14)  =============================================  IN:    IV PiggyBack: 100 ml    insulin Infusion: 93.5 ml    Solution: 50 ml    Total IN: 243.5 ml  ---------------------------------------------  OUT:    Indwelling Catheter - Urethral: 2820 ml    VAC (Vacuum Assisted Closure) System: 250 ml    Total OUT: 3070 ml  ---------------------------------------------  Total NET: -2826.5 ml      06-08    134<L>  |  97  |  18  ----------------------------<  174<H>  4.1   |  24  |  1.50<H>    Ca    8.7      08 Jun 2017 03:24  Phos  3.7     06-08  Mg     2.0     06-08      Wilde catheter indication:    HEMATOLOGIC:  enoxaparin Injectable 40milliGRAM(s) SubCutaneous once    [ ] DVT Prophylaxis: Lovenox                        8.8    12.1  )-----------( 287      ( 08 Jun 2017 03:24 )             26.8     PT/INR - ( 07 Jun 2017 03:09 )   PT: 11.4 sec;   INR: 1.04 ratio         PTT - ( 07 Jun 2017 03:09 )  PTT:30.1 sec  Transfusions:	[ ] PRBC	[ ] Platelets	[ ] FFP		[ ] Cryoprecipitate  Comments:    INFECTIOUS DISEASE:  piperacillin/tazobactam IVPB. 3.375Gram(s) IV Intermittent every 8 hours    RECENT CULTURES:      ENDOCRINE:  atorvastatin 40milliGRAM(s) Oral at bedtime  insulin lispro Injectable (HumaLOG) 10Unit(s) SubCutaneous three times a day before meals  insulin glargine Injectable (LANTUS) 35Unit(s) SubCutaneous <User Schedule>  insulin lispro (HumaLOG) corrective regimen sliding scale  SubCutaneous three times a day before meals  insulin lispro (HumaLOG) corrective regimen sliding scale  SubCutaneous at bedtime    CAPILLARY BLOOD GLUCOSE  185 (08 Jun 2017 06:00)  238 (07 Jun 2017 23:00)  246 (07 Jun 2017 20:00)  171 (07 Jun 2017 18:00)  171 (07 Jun 2017 17:00)  215 (07 Jun 2017 16:00)  210 (07 Jun 2017 15:00)  199 (07 Jun 2017 14:00)  237 (07 Jun 2017 13:00)  296 (07 Jun 2017 12:00)  259 (07 Jun 2017 11:00)  255 (07 Jun 2017 10:00)  152 (07 Jun 2017 09:00)  175 (07 Jun 2017 08:00)      PATIENT CARE ACCESS DEVICES:  [X ] Peripheral IV  [ ] Central Venous Line	[ ] R	[ ] L	[ ] IJ	[ ] Fem	[ ] SC	Placed:   [ ] Arterial Line		[ ] R	[ ] L	[ ] Fem	[ ] Rad	[ ] Ax	Placed:   [ ] PICC:					[ ] Mediport  [ ] Urinary Catheter, Date Placed:   [ ] Necessity of urinary, arterial, and venous catheters discussed    OTHER MEDICATIONS:  naloxone Injectable 0.1milliGRAM(s) IV Push every 3 minutes PRN      IMAGING STUDIES: [facility] SICU Progress Note    24 HOUR EVENTS: No acute issues overnight. Restarted b-blocker yesterday. Transitioned from insulin gtt to subcutaneous.    SUBJECTIVE/ROS: No significant complaints. Pain controlled.    CODE STATUS:  Full code    ALLERGIES:  No Known Allergies      VITAL SIGNS:  ICU Vital Signs Last 24 Hrs  T(C): 37.4, Max: 37.4 (06-08 @ 03:00)  T(F): 99.3, Max: 99.3 (06-08 @ 03:00)  HR: 82 (82 - 105)  BP: 108/59 (108/59 - 166/79)  BP(mean): 80 (77 - 113)  ABP: --  ABP(mean): --  RR: 17 (14 - 24)  SpO2: 96% (92% - 100%)      NEUROLOGY:  oxyCODONE IR 5milliGRAM(s) Oral every 4 hours PRN Moderate Pain (4 - 6)  oxyCODONE IR 10milliGRAM(s) Oral every 4 hours PRN Severe Pain (7 - 10)  acetaminophen   Tablet. 650milliGRAM(s) Oral every 6 hours PRN Mild Pain (1 - 3)  HYDROmorphone PCA (1 mG/mL) 30milliLiter(s) PCA Continuous PCA Continuous  HYDROmorphone PCA (1 mG/mL) Rescue Clinician Bolus 0.5milliGRAM(s) IV Push every 15 minutes PRN for Pain Scale GREATER THAN 6  ondansetron Injectable 4milliGRAM(s) IV Push every 6 hours PRN Nausea    Exam: AAOx4, NAD  [X] Adequacy of sedation and pain control has been assessed and adjusted  Comments:    RESPIRATORY:    Exam: Non-labored  Mechanical Ventilation:   ABG - ( 06 Jun 2017 21:35 )  pH: 7.36  /  pCO2: 41    /  pO2: 92    / HCO3: 23    / Base Excess: -1.9  /  SaO2: 98      Lactate: x          CARDIOVASCULAR  bisoprolol   Tablet 5milliGRAM(s) Oral daily      Exam: RRR    Cardiac Rhythm:  ECHO:  Comments:    GI:  pantoprazole    Tablet 40milliGRAM(s) Oral before breakfast  docusate sodium 100milliGRAM(s) Oral three times a day  senna 2Tablet(s) Oral at bedtime    Exam: Soft, NTND  Diet: CC Regular  Comments:    :    I&O's Detail    I & Os for current day (as of 06-08 @ 07:14)  =============================================  IN:    IV PiggyBack: 100 ml    insulin Infusion: 93.5 ml    Solution: 50 ml    Total IN: 243.5 ml  ---------------------------------------------  OUT:    Indwelling Catheter - Urethral: 2820 ml    VAC (Vacuum Assisted Closure) System: 250 ml    Total OUT: 3070 ml  ---------------------------------------------  Total NET: -2826.5 ml      06-08    134<L>  |  97  |  18  ----------------------------<  174<H>  4.1   |  24  |  1.50<H>    Ca    8.7      08 Jun 2017 03:24  Phos  3.7     06-08  Mg     2.0     06-08      Wilde catheter indication:    HEMATOLOGIC:  enoxaparin Injectable 40milliGRAM(s) SubCutaneous once    [ ] DVT Prophylaxis: Lovenox                        8.8    12.1  )-----------( 287      ( 08 Jun 2017 03:24 )             26.8     PT/INR - ( 07 Jun 2017 03:09 )   PT: 11.4 sec;   INR: 1.04 ratio         PTT - ( 07 Jun 2017 03:09 )  PTT:30.1 sec  Transfusions:	[ ] PRBC	[ ] Platelets	[ ] FFP		[ ] Cryoprecipitate  Comments:    INFECTIOUS DISEASE:  piperacillin/tazobactam IVPB. 3.375Gram(s) IV Intermittent every 8 hours    RECENT CULTURES:      ENDOCRINE:  atorvastatin 40milliGRAM(s) Oral at bedtime  insulin lispro Injectable (HumaLOG) 10Unit(s) SubCutaneous three times a day before meals  insulin glargine Injectable (LANTUS) 35Unit(s) SubCutaneous <User Schedule>  insulin lispro (HumaLOG) corrective regimen sliding scale  SubCutaneous three times a day before meals  insulin lispro (HumaLOG) corrective regimen sliding scale  SubCutaneous at bedtime    CAPILLARY BLOOD GLUCOSE  185 (08 Jun 2017 06:00)  238 (07 Jun 2017 23:00)  246 (07 Jun 2017 20:00)  171 (07 Jun 2017 18:00)  171 (07 Jun 2017 17:00)  215 (07 Jun 2017 16:00)  210 (07 Jun 2017 15:00)  199 (07 Jun 2017 14:00)  237 (07 Jun 2017 13:00)  296 (07 Jun 2017 12:00)  259 (07 Jun 2017 11:00)  255 (07 Jun 2017 10:00)  152 (07 Jun 2017 09:00)  175 (07 Jun 2017 08:00)      PATIENT CARE ACCESS DEVICES:  [X ] Peripheral IV  [ ] Central Venous Line	[ ] R	[ ] L	[ ] IJ	[ ] Fem	[ ] SC	Placed:   [ ] Arterial Line		[ ] R	[ ] L	[ ] Fem	[ ] Rad	[ ] Ax	Placed:   [ ] PICC:					[ ] Mediport  [ ] Urinary Catheter, Date Placed:   [ ] Necessity of urinary, arterial, and venous catheters discussed    OTHER MEDICATIONS:  naloxone Injectable 0.1milliGRAM(s) IV Push every 3 minutes PRN      IMAGING STUDIES:  x-ray 6/6/17  Interval placement of an external fixator. Large soft tissue defect is   again noted. Several of the foreign bodies have been removed. There may   be some residual foreign bodies in the soft tissues of the dorsal forearm.

## 2017-06-08 NOTE — PROGRESS NOTE ADULT - PROBLEM SELECTOR PLAN 1
scheduled for washout  No contraindication to scheduled procedure  pain meds as needed  exfix as per ortho  continue zosyn

## 2017-06-08 NOTE — PROGRESS NOTE ADULT - PROBLEM SELECTOR PLAN 2
continue to trend glucose levels  adjust insulin as needed
BP stable. Goal is <140/80. Continue Bisoprolol 5mg QD. Consider ACE/ARB for nephro-protection if renal function is at baseline and patient is well hydrated. Cr 1.50 today.
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
## 2017-06-08 NOTE — PROGRESS NOTE ADULT - SUBJECTIVE AND OBJECTIVE BOX
Day _2/1__ of Anesthesia Pain Management Service    SUBJECTIVE:    Pain Scale Score	At rest: ___ 	With Activity: ___ 	[ x] Refer to charted pain scores    THERAPY:    [ ] IV PCA Morphine		[ ] 5 mg/mL	[ ] 1 mg/mL  [x ] IV PCA Hydromorphone	[ ] 5 mg/mL	[x ] 1 mg/mL  [ ] IV PCA Fentanyl		[ ] 50 micrograms/mL    Demand dose  .2  lockout  6  (minutes)       MEDICATIONS  (STANDING):  pantoprazole    Tablet 40milliGRAM(s) Oral before breakfast  atorvastatin 40milliGRAM(s) Oral at bedtime  docusate sodium 100milliGRAM(s) Oral three times a day  senna 2Tablet(s) Oral at bedtime  piperacillin/tazobactam IVPB. 3.375Gram(s) IV Intermittent every 8 hours  bisoprolol   Tablet 5milliGRAM(s) Oral daily  HYDROmorphone PCA (1 mG/mL) 30milliLiter(s) PCA Continuous PCA Continuous  insulin lispro Injectable (HumaLOG) 10Unit(s) SubCutaneous three times a day before meals  insulin glargine Injectable (LANTUS) 35Unit(s) SubCutaneous <User Schedule>  insulin lispro (HumaLOG) corrective regimen sliding scale  SubCutaneous three times a day before meals  insulin lispro (HumaLOG) corrective regimen sliding scale  SubCutaneous at bedtime    MEDICATIONS  (PRN):  oxyCODONE IR 5milliGRAM(s) Oral every 4 hours PRN Moderate Pain (4 - 6)  oxyCODONE IR 10milliGRAM(s) Oral every 4 hours PRN Severe Pain (7 - 10)  acetaminophen   Tablet. 650milliGRAM(s) Oral every 6 hours PRN Mild Pain (1 - 3)  HYDROmorphone PCA (1 mG/mL) Rescue Clinician Bolus 0.5milliGRAM(s) IV Push every 15 minutes PRN for Pain Scale GREATER THAN 6  naloxone Injectable 0.1milliGRAM(s) IV Push every 3 minutes PRN For ANY of the following changes in patient status:  A. RR LESS THAN 10 breaths per minute, B. Oxygen saturation LESS THAN 90%, C. Sedation score of 6  ondansetron Injectable 4milliGRAM(s) IV Push every 6 hours PRN Nausea      OBJECTIVE:    Sedation Score:	[x ] Alert	[ ] Drowsy 	[ ] Arousable	[ ] Asleep	[ ] Unresponsive    Side Effects:	[ x] None	[ ] Nausea	[ ] Vomiting	[ ] Pruritus  		[ ] Other:    Vital Signs Last 24 Hrs  T(C): 37.1, Max: 37.4 (06-08 @ 03:00)  T(F): 98.8, Max: 99.3 (06-08 @ 03:00)  HR: 73 (73 - 105)  BP: 111/56 (108/59 - 150/78)  BP(mean): 81 (77 - 107)  RR: 18 (14 - 24)  SpO2: 98% (92% - 100%)    ASSESSMENT/ PLAN    Therapy to  be:	[ ] Continue   [ ] Discontinued   [ ] Change to prn Analgesics    Documentation and Verification of current medications:   [X] Done	[ ] Not done, not elligible    Comments:

## 2017-06-08 NOTE — PROGRESS NOTE ADULT - SUBJECTIVE AND OBJECTIVE BOX
Patient is a 56y old  Female who presents with a chief complaint of R arm near amputation (04 Jun 2017 07:15)      INTERVAL HPI/OVERNIGHT EVENTS:  T(C): 37.2, Max: 37.4 (06-08 @ 03:00)  HR: 87 (73 - 97)  BP: 124/65 (108/59 - 139/63)  RR: 22 (14 - 26)  SpO2: 99% (92% - 100%)  Wt(kg): --  I&O's Summary  I & Os for 24h ending 08 Jun 2017 07:00  =============================================  IN: 243.5 ml / OUT: 3070 ml / NET: -2826.5 ml    I & Os for current day (as of 08 Jun 2017 20:27)  =============================================  IN: 1170 ml / OUT: 1100 ml / NET: 70 ml      PAST MEDICAL & SURGICAL HISTORY:  Essential hypertension  Type 2 diabetes mellitus without complication, with long-term current use of insulin  No significant past surgical history  No significant past surgical history          LABS:                        8.8    12.1  )-----------( 287      ( 08 Jun 2017 03:24 )             26.8     06-08    134<L>  |  97  |  18  ----------------------------<  174<H>  4.1   |  24  |  1.50<H>    Ca    8.7      08 Jun 2017 03:24  Phos  3.7     06-08  Mg     2.0     06-08      PT/INR - ( 07 Jun 2017 03:09 )   PT: 11.4 sec;   INR: 1.04 ratio         PTT - ( 07 Jun 2017 03:09 )  PTT:30.1 sec    CAPILLARY BLOOD GLUCOSE  229 (08 Jun 2017 16:00)  232 (08 Jun 2017 11:00)  185 (08 Jun 2017 06:00)  238 (07 Jun 2017 23:00)    ABG - ( 06 Jun 2017 21:35 )  pH: 7.36  /  pCO2: 41    /  pO2: 92    / HCO3: 23    / Base Excess: -1.9  /  SaO2: 98                      Radiology reports:    MEDICATIONS  (STANDING):  pantoprazole    Tablet 40milliGRAM(s) Oral before breakfast  atorvastatin 40milliGRAM(s) Oral at bedtime  docusate sodium 100milliGRAM(s) Oral three times a day  senna 2Tablet(s) Oral at bedtime  bisoprolol   Tablet 5milliGRAM(s) Oral daily  HYDROmorphone PCA (1 mG/mL) 30milliLiter(s) PCA Continuous PCA Continuous  insulin lispro (HumaLOG) corrective regimen sliding scale  SubCutaneous three times a day before meals  insulin lispro (HumaLOG) corrective regimen sliding scale  SubCutaneous at bedtime  sodium chloride 0.9%. 1000milliLiter(s) IV Continuous <Continuous>  piperacillin/tazobactam IVPB. 3.375Gram(s) IV Intermittent every 8 hours  insulin lispro Injectable (HumaLOG) 14Unit(s) SubCutaneous three times a day before meals    MEDICATIONS  (PRN):  oxyCODONE IR 5milliGRAM(s) Oral every 4 hours PRN Moderate Pain (4 - 6)  oxyCODONE IR 10milliGRAM(s) Oral every 4 hours PRN Severe Pain (7 - 10)  acetaminophen   Tablet. 650milliGRAM(s) Oral every 6 hours PRN Mild Pain (1 - 3)  HYDROmorphone PCA (1 mG/mL) Rescue Clinician Bolus 0.5milliGRAM(s) IV Push every 15 minutes PRN for Pain Scale GREATER THAN 6  naloxone Injectable 0.1milliGRAM(s) IV Push every 3 minutes PRN For ANY of the following changes in patient status:  A. RR LESS THAN 10 breaths per minute, B. Oxygen saturation LESS THAN 90%, C. Sedation score of 6  ondansetron Injectable 4milliGRAM(s) IV Push every 6 hours PRN Nausea            PHYSICAL EXAM:  GENERAL: NAD, well-groomed, well-developed  HEAD:  Atraumatic, Normocephalic  EYES: EOMI, PERRLA, conjunctiva and sclera clear  ENMT: No tonsillar erythema, exudates, or enlargement; Moist mucous membranes, Good dentition, No lesions  NECK: Supple, No JVD, Normal thyroid  NERVOUS SYSTEM:  Alert & Oriented X3, Good concentration; Motor Strength 5/5 B/L upper and lower extremities; DTRs 2+ intact and symmetric  CHEST/LUNG: Clear to percussion bilaterally; No rales, rhonchi, wheezing, or rubs  HEART: Regular rate and rhythm; No murmurs, rubs, or gallops  ABDOMEN: Soft, Nontender, Nondistended; Bowel sounds present  EXTREMITIES:  2+ Peripheral Pulses, No clubbing, cyanosis, or edema right arm in external fixation  LYMPH: No lymphadenopathy noted  SKIN: No rashes or lesions

## 2017-06-08 NOTE — PROGRESS NOTE ADULT - ATTENDING COMMENTS
Agree with assessment and plan as above by Dr. Andrade. Evaluating this 57 y/o F for uncontrolled Type 2 DM awaiting OR tomorrow. Given glucose of  174 on BMP this morning without BID Lantus in effect would recommend Lantus 28 tonight and hold on am dose of Lantus tomorrow as pt. will be NPO for the OR. Plan to resume Lantus 35 BID once eating post-op. If hyperglycemic may consider insulin gtt temporarily right after surgery. Would increase humalog to 13 units qac, to be held if NPO. Goal glucose 100-180. Plan to d/c on basal bolus. Agree with assessment and plan as above by Dr. Andrade. Evaluating this 55 y/o F for uncontrolled Type 2 DM awaiting OR tomorrow. Given glucose of  174 on BMP this morning without BID Lantus in effect would recommend Lantus 28 tonight and hold on am dose of Lantus tomorrow as pt. will be NPO for the OR. Plan to resume Lantus 35 BID once eating post-op. If hyperglycemic may consider insulin gtt temporarily right after surgery. Would increase humalog to 14 units qac, to be held if NPO. Goal glucose 100-180. Plan to d/c on basal bolus.

## 2017-06-08 NOTE — PROGRESS NOTE ADULT - SUBJECTIVE AND OBJECTIVE BOX
Pt seen & examined. No acute events. S/p ex-fix and washout by ortho. Doing well overnight. No Issues.   ICU Vital Signs Last 24 Hrs  T(C): 36.3, Max: 37.1 (06-06 @ 11:00)  T(F): 97.3, Max: 98.8 (06-06 @ 11:00)  HR: 90 (75 - 97)  BP: 159/71 (108/56 - 174/81)  BP(mean): 102 (72 - 117)  ABP: --  ABP(mean): --  RR: 12 (12 - 18)  SpO2: 97% (97% - 100%)      Exam:  RUE   Ex-fix in place  Median & radial nerve fxn and SLT intact.  Ulnar nerve fxn and SLT absent.   VAC in place, holding sxn  compartments soft    56yFemale s/p I&D and Vac Placement  - Pain control  - DVT ppx  - NWB  - Elevation/Ice  - Plan for return to OR for I&D, washout and soft tissue repair on 6/9 with Dr. Maloney

## 2017-06-08 NOTE — PROGRESS NOTE ADULT - SUBJECTIVE AND OBJECTIVE BOX
Pain Management Attending Addendum    SUBJECTIVE:    Therapy:	  [x ] IV PCA	   [ ] Epidural           [ ] s/p Spinal Opoid              [ ] Postpartum infusion	  [ ] Patient controlled regional anesthesia (PCRA)    [ ] prn Analgesics    OBJECTIVE:   [ x] No new signs     [ ] Other:    Side Effects:  [x ] None			[ ] Other:    Assessment of Catheter Site:		[ ] Intact		[ ] Other:    ASSESSMENT/PLAN  [x ] Continue current therapy    [ ] Therapy changed to:    [ ] IV PCA       [ ] Epidural     [ ] prn Analgesics     [ ] post partum infusion    Comments:

## 2017-06-08 NOTE — PROGRESS NOTE ADULT - ATTENDING COMMENTS
Right arm ex-fix but wound still open. C/w Zosyn for Grade IIIc open fracture.  CKD cr improving.  Anemia stable. DM started lantus yesterday with improved glucose control. D/C Andry.

## 2017-06-08 NOTE — PROGRESS NOTE ADULT - SUBJECTIVE AND OBJECTIVE BOX
Patient is a 56y old  Female who presents with a chief complaint of R arm near amputation (04 Jun 2017 07:15)      SUBJECTIVE / OVERNIGHT EVENTS:   Feels better.  Denies CP/SOB/Palpitation/HA.    MEDICATIONS  (STANDING):  pantoprazole    Tablet 40milliGRAM(s) Oral before breakfast  atorvastatin 40milliGRAM(s) Oral at bedtime  docusate sodium 100milliGRAM(s) Oral three times a day  senna 2Tablet(s) Oral at bedtime  piperacillin/tazobactam IVPB. 3.375Gram(s) IV Intermittent every 8 hours  bisoprolol   Tablet 5milliGRAM(s) Oral daily  HYDROmorphone PCA (1 mG/mL) 30milliLiter(s) PCA Continuous PCA Continuous  insulin lispro Injectable (HumaLOG) 10Unit(s) SubCutaneous three times a day before meals  insulin glargine Injectable (LANTUS) 35Unit(s) SubCutaneous <User Schedule>  insulin lispro (HumaLOG) corrective regimen sliding scale  SubCutaneous three times a day before meals  insulin lispro (HumaLOG) corrective regimen sliding scale  SubCutaneous at bedtime  sodium chloride 0.9%. 1000milliLiter(s) IV Continuous <Continuous>    MEDICATIONS  (PRN):  oxyCODONE IR 5milliGRAM(s) Oral every 4 hours PRN Moderate Pain (4 - 6)  oxyCODONE IR 10milliGRAM(s) Oral every 4 hours PRN Severe Pain (7 - 10)  acetaminophen   Tablet. 650milliGRAM(s) Oral every 6 hours PRN Mild Pain (1 - 3)  HYDROmorphone PCA (1 mG/mL) Rescue Clinician Bolus 0.5milliGRAM(s) IV Push every 15 minutes PRN for Pain Scale GREATER THAN 6  naloxone Injectable 0.1milliGRAM(s) IV Push every 3 minutes PRN For ANY of the following changes in patient status:  A. RR LESS THAN 10 breaths per minute, B. Oxygen saturation LESS THAN 90%, C. Sedation score of 6  ondansetron Injectable 4milliGRAM(s) IV Push every 6 hours PRN Nausea      Vital Signs Last 24 Hrs  T(C): 37, Max: 37.4 (06-08 @ 03:00)  HR: 81 (73 - 105)  BP: 109/63 (108/59 - 150/78)  RR: 26 (14 - 26)  SpO2: 99% (92% - 100%)  Wt(kg): --  CAPILLARY BLOOD GLUCOSE  232 (08 Jun 2017 11:00)  185 (08 Jun 2017 06:00)  238 (07 Jun 2017 23:00)  246 (07 Jun 2017 20:00)  171 (07 Jun 2017 18:00)  171 (07 Jun 2017 17:00)  215 (07 Jun 2017 16:00)  210 (07 Jun 2017 15:00)    I&O's Summary  I & Os for 24h ending 08 Jun 2017 07:00  =============================================  IN: 243.5 ml / OUT: 3070 ml / NET: -2826.5 ml    I & Os for current day (as of 08 Jun 2017 14:22)  =============================================  IN: 830 ml / OUT: 800 ml / NET: 30 ml      PHYSICAL EXAM:  GENERAL: NAD, well-developed  HEAD:  Atraumatic, Normocephalic  EYES: EOMI, PERRLA, conjunctiva and sclera clear  NECK: Supple, No JVD  CHEST/LUNG: Clear to auscultation bilaterally; No wheeze  HEART: Regular rate and rhythm; No murmurs, rubs, or gallops  ABDOMEN: Soft, Nontender, Nondistended; Bowel sounds present  EXTREMITIES:  Rt elbow ext hardware. Ambulates.  PSYCH: AAOx3  NEUROLOGY: non-focal  SKIN: No rashes or lesions    LABS:                        8.8    12.1  )-----------( 287      ( 08 Jun 2017 03:24 )             26.8     06-08    134<L>  |  97  |  18  ----------------------------<  174<H>  4.1   |  24  |  1.50<H>    Ca    8.7      08 Jun 2017 03:24  Phos  3.7     06-08  Mg     2.0     06-08      PT/INR - ( 07 Jun 2017 03:09 )   PT: 11.4 sec;   INR: 1.04 ratio         PTT - ( 07 Jun 2017 03:09 )  PTT:30.1 sec        CAPILLARY BLOOD GLUCOSE  232 (08 Jun 2017 11:00)  185 (08 Jun 2017 06:00)  238 (07 Jun 2017 23:00)  246 (07 Jun 2017 20:00)  171 (07 Jun 2017 18:00)  171 (07 Jun 2017 17:00)  215 (07 Jun 2017 16:00)  210 (07 Jun 2017 15:00)                RADIOLOGY & ADDITIONAL TESTS:    Imaging Personally Reviewed:    Consultant(s) Notes Reviewed:      Care Discussed with Consultants/Other Providers: Patient is a 56y old  Female who presents with a chief complaint of R arm near amputation.       SUBJECTIVE / OVERNIGHT EVENTS:   Feels better.  Denies CP/SOB/Palpitation/HA.  Ambulates.     MEDICATIONS  (STANDING):  pantoprazole    Tablet 40milliGRAM(s) Oral before breakfast  atorvastatin 40milliGRAM(s) Oral at bedtime  docusate sodium 100milliGRAM(s) Oral three times a day  senna 2Tablet(s) Oral at bedtime  piperacillin/tazobactam IVPB. 3.375Gram(s) IV Intermittent every 8 hours  bisoprolol   Tablet 5milliGRAM(s) Oral daily  HYDROmorphone PCA (1 mG/mL) 30milliLiter(s) PCA Continuous PCA Continuous  insulin lispro Injectable (HumaLOG) 10Unit(s) SubCutaneous three times a day before meals  insulin glargine Injectable (LANTUS) 35Unit(s) SubCutaneous <User Schedule>  insulin lispro (HumaLOG) corrective regimen sliding scale  SubCutaneous three times a day before meals  insulin lispro (HumaLOG) corrective regimen sliding scale  SubCutaneous at bedtime  sodium chloride 0.9%. 1000milliLiter(s) IV Continuous <Continuous>    MEDICATIONS  (PRN):  oxyCODONE IR 5milliGRAM(s) Oral every 4 hours PRN Moderate Pain (4 - 6)  oxyCODONE IR 10milliGRAM(s) Oral every 4 hours PRN Severe Pain (7 - 10)  acetaminophen   Tablet. 650milliGRAM(s) Oral every 6 hours PRN Mild Pain (1 - 3)  HYDROmorphone PCA (1 mG/mL) Rescue Clinician Bolus 0.5milliGRAM(s) IV Push every 15 minutes PRN for Pain Scale GREATER THAN 6  naloxone Injectable 0.1milliGRAM(s) IV Push every 3 minutes PRN For ANY of the following changes in patient status:  A. RR LESS THAN 10 breaths per minute, B. Oxygen saturation LESS THAN 90%, C. Sedation score of 6  ondansetron Injectable 4milliGRAM(s) IV Push every 6 hours PRN Nausea      Vital Signs Last 24 Hrs  T(C): 37, Max: 37.4 (06-08 @ 03:00)  HR: 81 (73 - 105)  BP: 109/63 (108/59 - 150/78)  RR: 26 (14 - 26)  SpO2: 99% (92% - 100%)  Wt(kg): --  CAPILLARY BLOOD GLUCOSE  232 (08 Jun 2017 11:00)  185 (08 Jun 2017 06:00)  238 (07 Jun 2017 23:00)  246 (07 Jun 2017 20:00)  171 (07 Jun 2017 18:00)  171 (07 Jun 2017 17:00)  215 (07 Jun 2017 16:00)  210 (07 Jun 2017 15:00)    I&O's Summary  I & Os for 24h ending 08 Jun 2017 07:00  =============================================  IN: 243.5 ml / OUT: 3070 ml / NET: -2826.5 ml    I & Os for current day (as of 08 Jun 2017 14:22)  =============================================  IN: 830 ml / OUT: 800 ml / NET: 30 ml      PHYSICAL EXAM:  GENERAL: NAD, well-developed  HEAD:  Atraumatic, Normocephalic  EYES: EOMI, PERRLA, conjunctiva and sclera clear  NECK: Supple, No JVD  CHEST/LUNG: Clear to auscultation bilaterally; No wheeze  HEART: Regular rate and rhythm; No murmurs, rubs, or gallops  ABDOMEN: Soft, Nontender, Nondistended; Bowel sounds present  EXTREMITIES:  Rt elbow ext hardware. Ambulates.  PSYCH: AAOx3  NEUROLOGY: non-focal  SKIN: No rashes or lesions    LABS:                        8.8    12.1  )-----------( 287      ( 08 Jun 2017 03:24 )             26.8     06-08    134<L>  |  97  |  18  ----------------------------<  174<H>  4.1   |  24  |  1.50<H>    Ca    8.7      08 Jun 2017 03:24  Phos  3.7     06-08  Mg     2.0     06-08      PT/INR - ( 07 Jun 2017 03:09 )   PT: 11.4 sec;   INR: 1.04 ratio         PTT - ( 07 Jun 2017 03:09 )  PTT:30.1 sec        CAPILLARY BLOOD GLUCOSE  232 (08 Jun 2017 11:00)  185 (08 Jun 2017 06:00)  238 (07 Jun 2017 23:00)  246 (07 Jun 2017 20:00)  171 (07 Jun 2017 18:00)  171 (07 Jun 2017 17:00)  215 (07 Jun 2017 16:00)  210 (07 Jun 2017 15:00)                RADIOLOGY & ADDITIONAL TESTS:    Imaging Personally Reviewed:    Consultant(s) Notes Reviewed:      Care Discussed with Consultants/Other Providers:

## 2017-06-08 NOTE — PROGRESS NOTE ADULT - PROBLEM SELECTOR PLAN 1
-Test BG ac and hs  -Continue Lantus 35u BID but recommend Lantus 28u tonight only while NPO  -Recommend Humalog 14u TID AC, continue moderate SSI AC and HS  -Consider antibiotic infusion in NS instead of D5  -Will adjust regime as needed. Plan discussed with pt/staff and team -Test BG ac and hs  -Continue Lantus 35u BID starting tomorrow night but recommend Lantus 28u tonight only while NPO. No Lantus tomorrow morning while NPO, recommend Humalog mod SSi q6hrs while NPO.   -Recommend Humalog 14u TID AC, continue moderate SSI AC and HS  -Consider antibiotic infusion in NS instead of D5  -Will adjust regime as needed. Plan discussed with pt/staff and team

## 2017-06-09 LAB
ANION GAP SERPL CALC-SCNC: 12 MMOL/L — SIGNIFICANT CHANGE UP (ref 5–17)
APTT BLD: 29.6 SEC — SIGNIFICANT CHANGE UP (ref 27.5–37.4)
BUN SERPL-MCNC: 21 MG/DL — SIGNIFICANT CHANGE UP (ref 7–23)
CALCIUM SERPL-MCNC: 8.9 MG/DL — SIGNIFICANT CHANGE UP (ref 8.4–10.5)
CHLORIDE SERPL-SCNC: 100 MMOL/L — SIGNIFICANT CHANGE UP (ref 96–108)
CO2 SERPL-SCNC: 25 MMOL/L — SIGNIFICANT CHANGE UP (ref 22–31)
CREAT SERPL-MCNC: 1.36 MG/DL — HIGH (ref 0.5–1.3)
GLUCOSE SERPL-MCNC: 161 MG/DL — HIGH (ref 70–99)
HCT VFR BLD CALC: 25.3 % — LOW (ref 34.5–45)
HGB BLD-MCNC: 8.5 G/DL — LOW (ref 11.5–15.5)
INR BLD: 1.03 RATIO — SIGNIFICANT CHANGE UP (ref 0.88–1.16)
MAGNESIUM SERPL-MCNC: 2.1 MG/DL — SIGNIFICANT CHANGE UP (ref 1.6–2.6)
MCHC RBC-ENTMCNC: 27.9 PG — SIGNIFICANT CHANGE UP (ref 27–34)
MCHC RBC-ENTMCNC: 33.8 GM/DL — SIGNIFICANT CHANGE UP (ref 32–36)
MCV RBC AUTO: 82.7 FL — SIGNIFICANT CHANGE UP (ref 80–100)
PHOSPHATE SERPL-MCNC: 3 MG/DL — SIGNIFICANT CHANGE UP (ref 2.5–4.5)
PLATELET # BLD AUTO: 289 K/UL — SIGNIFICANT CHANGE UP (ref 150–400)
POTASSIUM SERPL-MCNC: 4 MMOL/L — SIGNIFICANT CHANGE UP (ref 3.5–5.3)
POTASSIUM SERPL-SCNC: 4 MMOL/L — SIGNIFICANT CHANGE UP (ref 3.5–5.3)
PROTHROM AB SERPL-ACNC: 11.2 SEC — SIGNIFICANT CHANGE UP (ref 9.8–12.7)
RBC # BLD: 3.06 M/UL — LOW (ref 3.8–5.2)
RBC # FLD: 15.2 % — HIGH (ref 10.3–14.5)
SODIUM SERPL-SCNC: 137 MMOL/L — SIGNIFICANT CHANGE UP (ref 135–145)
WBC # BLD: 10.3 K/UL — SIGNIFICANT CHANGE UP (ref 3.8–10.5)
WBC # FLD AUTO: 10.3 K/UL — SIGNIFICANT CHANGE UP (ref 3.8–10.5)

## 2017-06-09 PROCEDURE — 88304 TISSUE EXAM BY PATHOLOGIST: CPT | Mod: 26

## 2017-06-09 PROCEDURE — 99232 SBSQ HOSP IP/OBS MODERATE 35: CPT

## 2017-06-09 PROCEDURE — 73200 CT UPPER EXTREMITY W/O DYE: CPT | Mod: 26,50

## 2017-06-09 PROCEDURE — 73080 X-RAY EXAM OF ELBOW: CPT | Mod: 26,RT

## 2017-06-09 PROCEDURE — 73206 CT ANGIO UPR EXTRM W/O&W/DYE: CPT | Mod: 26,RT

## 2017-06-09 RX ORDER — SODIUM CHLORIDE 9 MG/ML
1000 INJECTION, SOLUTION INTRAVENOUS
Qty: 0 | Refills: 0 | Status: DISCONTINUED | OUTPATIENT
Start: 2017-06-09 | End: 2017-06-13

## 2017-06-09 RX ORDER — ENOXAPARIN SODIUM 100 MG/ML
40 INJECTION SUBCUTANEOUS EVERY 24 HOURS
Qty: 0 | Refills: 0 | Status: COMPLETED | OUTPATIENT
Start: 2017-06-09 | End: 2017-06-12

## 2017-06-09 RX ORDER — INSULIN LISPRO 100/ML
VIAL (ML) SUBCUTANEOUS
Qty: 0 | Refills: 0 | Status: DISCONTINUED | OUTPATIENT
Start: 2017-06-09 | End: 2017-06-13

## 2017-06-09 RX ORDER — HYDROMORPHONE HYDROCHLORIDE 2 MG/ML
0.5 INJECTION INTRAMUSCULAR; INTRAVENOUS; SUBCUTANEOUS
Qty: 0 | Refills: 0 | Status: DISCONTINUED | OUTPATIENT
Start: 2017-06-09 | End: 2017-06-09

## 2017-06-09 RX ORDER — SENNA PLUS 8.6 MG/1
2 TABLET ORAL AT BEDTIME
Qty: 0 | Refills: 0 | Status: DISCONTINUED | OUTPATIENT
Start: 2017-06-09 | End: 2017-06-13

## 2017-06-09 RX ORDER — ONDANSETRON 8 MG/1
4 TABLET, FILM COATED ORAL EVERY 6 HOURS
Qty: 0 | Refills: 0 | Status: DISCONTINUED | OUTPATIENT
Start: 2017-06-09 | End: 2017-06-13

## 2017-06-09 RX ORDER — PIPERACILLIN AND TAZOBACTAM 4; .5 G/20ML; G/20ML
3.38 INJECTION, POWDER, LYOPHILIZED, FOR SOLUTION INTRAVENOUS EVERY 8 HOURS
Qty: 0 | Refills: 0 | Status: DISCONTINUED | OUTPATIENT
Start: 2017-06-09 | End: 2017-06-13

## 2017-06-09 RX ORDER — ATORVASTATIN CALCIUM 80 MG/1
40 TABLET, FILM COATED ORAL AT BEDTIME
Qty: 0 | Refills: 0 | Status: DISCONTINUED | OUTPATIENT
Start: 2017-06-09 | End: 2017-06-13

## 2017-06-09 RX ORDER — HYDROMORPHONE HYDROCHLORIDE 2 MG/ML
30 INJECTION INTRAMUSCULAR; INTRAVENOUS; SUBCUTANEOUS
Qty: 0 | Refills: 0 | Status: DISCONTINUED | OUTPATIENT
Start: 2017-06-09 | End: 2017-06-12

## 2017-06-09 RX ORDER — OXYCODONE HYDROCHLORIDE 5 MG/1
10 TABLET ORAL EVERY 4 HOURS
Qty: 0 | Refills: 0 | Status: DISCONTINUED | OUTPATIENT
Start: 2017-06-09 | End: 2017-06-09

## 2017-06-09 RX ORDER — INSULIN LISPRO 100/ML
VIAL (ML) SUBCUTANEOUS AT BEDTIME
Qty: 0 | Refills: 0 | Status: DISCONTINUED | OUTPATIENT
Start: 2017-06-09 | End: 2017-06-13

## 2017-06-09 RX ORDER — CALCIUM CARBONATE 500(1250)
1 TABLET ORAL ONCE
Qty: 0 | Refills: 0 | Status: COMPLETED | OUTPATIENT
Start: 2017-06-09 | End: 2017-06-11

## 2017-06-09 RX ORDER — DEXTROSE 50 % IN WATER 50 %
1 SYRINGE (ML) INTRAVENOUS ONCE
Qty: 0 | Refills: 0 | Status: DISCONTINUED | OUTPATIENT
Start: 2017-06-09 | End: 2017-06-13

## 2017-06-09 RX ORDER — PANTOPRAZOLE SODIUM 20 MG/1
40 TABLET, DELAYED RELEASE ORAL
Qty: 0 | Refills: 0 | Status: DISCONTINUED | OUTPATIENT
Start: 2017-06-09 | End: 2017-06-13

## 2017-06-09 RX ORDER — PIPERACILLIN AND TAZOBACTAM 4; .5 G/20ML; G/20ML
3.38 INJECTION, POWDER, LYOPHILIZED, FOR SOLUTION INTRAVENOUS EVERY 8 HOURS
Qty: 0 | Refills: 0 | Status: DISCONTINUED | OUTPATIENT
Start: 2017-06-09 | End: 2017-06-09

## 2017-06-09 RX ORDER — INSULIN LISPRO 100/ML
VIAL (ML) SUBCUTANEOUS AT BEDTIME
Qty: 0 | Refills: 0 | Status: DISCONTINUED | OUTPATIENT
Start: 2017-06-09 | End: 2017-06-09

## 2017-06-09 RX ORDER — INSULIN LISPRO 100/ML
20 VIAL (ML) SUBCUTANEOUS
Qty: 0 | Refills: 0 | Status: DISCONTINUED | OUTPATIENT
Start: 2017-06-09 | End: 2017-06-13

## 2017-06-09 RX ORDER — HYDROMORPHONE HYDROCHLORIDE 2 MG/ML
1 INJECTION INTRAMUSCULAR; INTRAVENOUS; SUBCUTANEOUS
Qty: 0 | Refills: 0 | Status: DISCONTINUED | OUTPATIENT
Start: 2017-06-09 | End: 2017-06-09

## 2017-06-09 RX ORDER — GLUCAGON INJECTION, SOLUTION 0.5 MG/.1ML
1 INJECTION, SOLUTION SUBCUTANEOUS ONCE
Qty: 0 | Refills: 0 | Status: DISCONTINUED | OUTPATIENT
Start: 2017-06-09 | End: 2017-06-13

## 2017-06-09 RX ORDER — CALCIUM CARBONATE 500(1250)
1 TABLET ORAL ONCE
Qty: 0 | Refills: 0 | Status: DISCONTINUED | OUTPATIENT
Start: 2017-06-09 | End: 2017-06-09

## 2017-06-09 RX ORDER — DEXTROSE 50 % IN WATER 50 %
25 SYRINGE (ML) INTRAVENOUS ONCE
Qty: 0 | Refills: 0 | Status: DISCONTINUED | OUTPATIENT
Start: 2017-06-09 | End: 2017-06-13

## 2017-06-09 RX ORDER — HYDROMORPHONE HYDROCHLORIDE 2 MG/ML
0.5 INJECTION INTRAMUSCULAR; INTRAVENOUS; SUBCUTANEOUS
Qty: 0 | Refills: 0 | Status: DISCONTINUED | OUTPATIENT
Start: 2017-06-09 | End: 2017-06-12

## 2017-06-09 RX ORDER — DOCUSATE SODIUM 100 MG
100 CAPSULE ORAL THREE TIMES A DAY
Qty: 0 | Refills: 0 | Status: DISCONTINUED | OUTPATIENT
Start: 2017-06-09 | End: 2017-06-13

## 2017-06-09 RX ORDER — SODIUM CHLORIDE 9 MG/ML
1000 INJECTION INTRAMUSCULAR; INTRAVENOUS; SUBCUTANEOUS
Qty: 0 | Refills: 0 | Status: DISCONTINUED | OUTPATIENT
Start: 2017-06-09 | End: 2017-06-13

## 2017-06-09 RX ORDER — OXYCODONE HYDROCHLORIDE 5 MG/1
5 TABLET ORAL EVERY 4 HOURS
Qty: 0 | Refills: 0 | Status: DISCONTINUED | OUTPATIENT
Start: 2017-06-09 | End: 2017-06-09

## 2017-06-09 RX ORDER — INSULIN GLARGINE 100 [IU]/ML
35 INJECTION, SOLUTION SUBCUTANEOUS
Qty: 0 | Refills: 0 | Status: DISCONTINUED | OUTPATIENT
Start: 2017-06-09 | End: 2017-06-12

## 2017-06-09 RX ORDER — DEXTROSE 50 % IN WATER 50 %
12.5 SYRINGE (ML) INTRAVENOUS ONCE
Qty: 0 | Refills: 0 | Status: DISCONTINUED | OUTPATIENT
Start: 2017-06-09 | End: 2017-06-13

## 2017-06-09 RX ORDER — NALOXONE HYDROCHLORIDE 4 MG/.1ML
0.1 SPRAY NASAL
Qty: 0 | Refills: 0 | Status: DISCONTINUED | OUTPATIENT
Start: 2017-06-09 | End: 2017-06-12

## 2017-06-09 RX ADMIN — Medication 100 MILLIGRAM(S): at 05:31

## 2017-06-09 RX ADMIN — SENNA PLUS 2 TABLET(S): 8.6 TABLET ORAL at 21:53

## 2017-06-09 RX ADMIN — Medication 2: at 05:34

## 2017-06-09 RX ADMIN — HYDROMORPHONE HYDROCHLORIDE 0.5 MILLIGRAM(S): 2 INJECTION INTRAMUSCULAR; INTRAVENOUS; SUBCUTANEOUS at 15:16

## 2017-06-09 RX ADMIN — HYDROMORPHONE HYDROCHLORIDE 30 MILLILITER(S): 2 INJECTION INTRAMUSCULAR; INTRAVENOUS; SUBCUTANEOUS at 19:34

## 2017-06-09 RX ADMIN — HYDROMORPHONE HYDROCHLORIDE 30 MILLILITER(S): 2 INJECTION INTRAMUSCULAR; INTRAVENOUS; SUBCUTANEOUS at 22:02

## 2017-06-09 RX ADMIN — Medication 4: at 16:30

## 2017-06-09 RX ADMIN — HYDROMORPHONE HYDROCHLORIDE 0.5 MILLIGRAM(S): 2 INJECTION INTRAMUSCULAR; INTRAVENOUS; SUBCUTANEOUS at 15:15

## 2017-06-09 RX ADMIN — HYDROMORPHONE HYDROCHLORIDE 30 MILLILITER(S): 2 INJECTION INTRAMUSCULAR; INTRAVENOUS; SUBCUTANEOUS at 07:23

## 2017-06-09 RX ADMIN — SODIUM CHLORIDE 80 MILLILITER(S): 9 INJECTION INTRAMUSCULAR; INTRAVENOUS; SUBCUTANEOUS at 15:09

## 2017-06-09 RX ADMIN — HYDROMORPHONE HYDROCHLORIDE 30 MILLILITER(S): 2 INJECTION INTRAMUSCULAR; INTRAVENOUS; SUBCUTANEOUS at 15:48

## 2017-06-09 RX ADMIN — PANTOPRAZOLE SODIUM 40 MILLIGRAM(S): 20 TABLET, DELAYED RELEASE ORAL at 05:32

## 2017-06-09 RX ADMIN — HYDROMORPHONE HYDROCHLORIDE 30 MILLILITER(S): 2 INJECTION INTRAMUSCULAR; INTRAVENOUS; SUBCUTANEOUS at 16:57

## 2017-06-09 RX ADMIN — BISOPROLOL FUMARATE 5 MILLIGRAM(S): 10 TABLET, FILM COATED ORAL at 05:31

## 2017-06-09 RX ADMIN — HYDROMORPHONE HYDROCHLORIDE 30 MILLILITER(S): 2 INJECTION INTRAMUSCULAR; INTRAVENOUS; SUBCUTANEOUS at 16:38

## 2017-06-09 RX ADMIN — PIPERACILLIN AND TAZOBACTAM 25 GRAM(S): 4; .5 INJECTION, POWDER, LYOPHILIZED, FOR SOLUTION INTRAVENOUS at 05:31

## 2017-06-09 RX ADMIN — Medication 100 MILLIGRAM(S): at 21:53

## 2017-06-09 RX ADMIN — HYDROMORPHONE HYDROCHLORIDE 0.5 MILLIGRAM(S): 2 INJECTION INTRAMUSCULAR; INTRAVENOUS; SUBCUTANEOUS at 14:50

## 2017-06-09 RX ADMIN — INSULIN GLARGINE 35 UNIT(S): 100 INJECTION, SOLUTION SUBCUTANEOUS at 18:30

## 2017-06-09 RX ADMIN — HYDROMORPHONE HYDROCHLORIDE 0.5 MILLIGRAM(S): 2 INJECTION INTRAMUSCULAR; INTRAVENOUS; SUBCUTANEOUS at 15:05

## 2017-06-09 RX ADMIN — ATORVASTATIN CALCIUM 40 MILLIGRAM(S): 80 TABLET, FILM COATED ORAL at 21:53

## 2017-06-09 NOTE — BRIEF OPERATIVE NOTE - PROCEDURE
Wound VAC placement  06/09/2017    Active  RSTOCKTON  Debridement, wound  06/09/2017    Active  RSTOCKTON  External fixator device (invasive)  06/09/2017  adjustment of external fixator  Active  RSTOCKTON  Debridement of right upper extremity  06/04/2017    Active  Emilie Ramirez

## 2017-06-09 NOTE — PROGRESS NOTE ADULT - SUBJECTIVE AND OBJECTIVE BOX
Plastic Surgery Progress Note    S: Patient seen and examined.  doing well.  plan for OR with ortho today.     Vital Signs Last 24 Hrs  T(C): 36.7, Max: 37.2 (06-08 @ 19:00)  T(F): 98.1, Max: 98.9 (06-08 @ 19:00)  HR: 74 (71 - 87)  BP: 107/61 (103/57 - 126/60)  BP(mean): 78 (75 - 87)  RR: 38 (14 - 38)  SpO2: 100% (94% - 100%)  I&O's Detail    I & Os for current day (as of 09 Jun 2017 07:11)  =============================================  IN:    Oral Fluid: 900 ml    sodium chloride 0.9%.: 440 ml    Solution: 175 ml    Solution: 50 ml    Total IN: 1565 ml  ---------------------------------------------  OUT:    Voided: 1900 ml    Indwelling Catheter - Urethral: 600 ml    VAC (Vacuum Assisted Closure) System: 40 ml    Total OUT: 2540 ml  ---------------------------------------------  Total NET: -975 ml      Physical Exam:  General: Awake and alert, NAD  RUE: ex-fix in place, vac in place.  hand warm.

## 2017-06-09 NOTE — PROGRESS NOTE ADULT - SUBJECTIVE AND OBJECTIVE BOX
Orthopedic Surgery Progress note  S; Pain controlled.  No nausea, vomiting, fevers, chills, dizziness, lightheadedness, or chest pain.      O:              Vitals: T(C): 36.7, Max: 37.2 (06-08 @ 19:00)  HR: 77 (71 - 87)  BP: 126/60 (103/57 - 126/60)  RR: 14 (14 - 26)  SpO2: 100% (94% - 100%)  Wt(kg): --                        8.5    10.3  )-----------( 289      ( 09 Jun 2017 02:34 )             25.3                         8.8    12.1  )-----------( 287      ( 08 Jun 2017 03:24 )             26.8       06-09    137  |  100  |  21  ----------------------------<  161<H>  4.0   |  25  |  1.36<H>    Ca    8.9      09 Jun 2017 02:34  Phos  3.0     06-09  Mg     2.1     06-09      I/O :  I & Os for 24h ending 06-08 @ 07:00  =============================================  IN:    Solution: 100 ml    insulin Infusion: 93.5 ml    Solution: 50 ml    Total IN: 243.5 ml  ---------------------------------------------  OUT:    Indwelling Catheter - Urethral: 2820 ml    VAC (Vacuum Assisted Closure) System: 250 ml    Total OUT: 3070 ml  ---------------------------------------------  Total NET: -2826.5 ml    I & Os for current day (as of 06-09 @ 06:32)  =============================================  IN:    Oral Fluid: 900 ml    sodium chloride 0.9%.: 420 ml    Solution: 150 ml    Solution: 50 ml    Total IN: 1520 ml  ---------------------------------------------  OUT:    Voided: 1900 ml    Indwelling Catheter - Urethral: 600 ml    Total OUT: 2500 ml  ---------------------------------------------  Total NET: -980 ml          Gen: Saniya  RUE: Ex-fix in place.  Vac in place good seal no leak.    Decreased sensation in ulnar nerve distribution, median/ulnar intact  Finger flexion/Extension/Abduction intact.
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
## 2017-06-09 NOTE — PROGRESS NOTE ADULT - SUBJECTIVE AND OBJECTIVE BOX
Day 3/2 of Anesthesia Pain Management Service    SUBJECTIVE:    Pain Scale Score	At rest: ___ 	With Activity: ___ 	[X ] Refer to charted pain scores    THERAPY:    [ ] IV PCA Morphine		[ ] 5 mg/mL	[ ] 1 mg/mL  [X ] IV PCA Hydromorphone	[ ] 5 mg/mL	[X ] 1 mg/mL  [ ] IV PCA Fentanyl		[ ] 50 micrograms/mL    Demand dose  0.2mg  lockout   6 (minutes) 0 Continuous Rate        MEDICATIONS  (STANDING):  pantoprazole    Tablet 40milliGRAM(s) Oral before breakfast  atorvastatin 40milliGRAM(s) Oral at bedtime  docusate sodium 100milliGRAM(s) Oral three times a day  senna 2Tablet(s) Oral at bedtime  bisoprolol   Tablet 5milliGRAM(s) Oral daily  HYDROmorphone PCA (1 mG/mL) 30milliLiter(s) PCA Continuous PCA Continuous  insulin lispro (HumaLOG) corrective regimen sliding scale  SubCutaneous three times a day before meals  insulin lispro (HumaLOG) corrective regimen sliding scale  SubCutaneous at bedtime  sodium chloride 0.9%. 1000milliLiter(s) IV Continuous <Continuous>  piperacillin/tazobactam IVPB. 3.375Gram(s) IV Intermittent every 8 hours  insulin lispro Injectable (HumaLOG) 14Unit(s) SubCutaneous three times a day before meals  calcium carbonate 500 mG (Tums) Chewable 1Tablet(s) Chew once    MEDICATIONS  (PRN):  oxyCODONE IR 5milliGRAM(s) Oral every 4 hours PRN Moderate Pain (4 - 6)  oxyCODONE IR 10milliGRAM(s) Oral every 4 hours PRN Severe Pain (7 - 10)  acetaminophen   Tablet. 650milliGRAM(s) Oral every 6 hours PRN Mild Pain (1 - 3)  HYDROmorphone PCA (1 mG/mL) Rescue Clinician Bolus 0.5milliGRAM(s) IV Push every 15 minutes PRN for Pain Scale GREATER THAN 6  naloxone Injectable 0.1milliGRAM(s) IV Push every 3 minutes PRN For ANY of the following changes in patient status:  A. RR LESS THAN 10 breaths per minute, B. Oxygen saturation LESS THAN 90%, C. Sedation score of 6  ondansetron Injectable 4milliGRAM(s) IV Push every 6 hours PRN Nausea      OBJECTIVE:    Sedation Score:	[x ] Alert	[ ] Drowsy 	[ ] Arousable	[ ] Asleep	[ ] Unresponsive    Side Effects:	[x ] None	[ ] Nausea	[ ] Vomiting	[ ] Pruritus  		[ ] Other:    Vital Signs Last 24 Hrs  T(C): 36.7, Max: 37.2 (06-08 @ 19:00)  T(F): 98.1, Max: 98.9 (06-08 @ 19:00)  HR: 64 (64 - 87)  BP: 101/51 (101/51 - 126/60)  BP(mean): 73 (73 - 87)  RR: 14 (14 - 38)  SpO2: 98% (94% - 100%)    ASSESSMENT/ PLAN    Therapy to  be:	[x ] Continue   [ ] Discontinued   [ ] Change to prn Analgesics    Documentation and Verification of current medications:   [X] Done	[ ] Not done, not elligible    Comments:
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
## 2017-06-09 NOTE — CHART NOTE - NSCHARTNOTEFT_GEN_A_CORE
No complaints; Denies SOB/CP/N/V; Pain controlled    T(C): 36.6  T(F): 97.9  HR: 72  BP: 111/69  RR: 16  SpO2: 96% 2L NC    Physical Exam  Gen: NAD    RUE:   Ex-fix & sutures CDI  Sensation/R/U/M/AIN/PIN grossly intact  + Radial pulse                           8.5    10.3  )-----------( 289      ( 09 Jun 2017 02:34 )             25.3     137  |  100  |  21  ----------------------------<  161<H>  4.0   |  25  |  1.36<H>      A/P: 56y Female s/p R Wound VAC change, I&D & adjustment of external fixator; Stable  -Pain control; Ice prn; Elevate  -DVT ppx; IS  -Am labs  -PT eval: NWB in sling/immobilizer  -Dispo planning    Mariangel Nunez PA-C  Pager [phone] No complaints; Denies SOB/CP/N/V; Pain controlled    T(C): 36.6  T(F): 97.9  HR: 72  BP: 111/69  RR: 16  SpO2: 96% 2L NC    Physical Exam  Gen: NAD    RUE:   Ex-fix & sutures CDI  Sensation/R/U/M/AIN/PIN grossly intact  + Radial pulse                           8.5    10.3  )-----------( 289      ( 09 Jun 2017 02:34 )             25.3     137  |  100  |  21  ----------------------------<  161<H>  4.0   |  25  |  1.36<H>      A/P: 56y Female s/p R Wound VAC change, I&D & adjustment of external fixator; Stable  - Pain control; Ice prn; Elevate  - DVT ppx; IS  - Am labs  - PT eval: NWB in sling/immobilizer  - Consider 1U PRBC if sx    - Dispo planning    Mariangel Nunez PA-C  Pager [phone]
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
## 2017-06-09 NOTE — PROGRESS NOTE ADULT - PROBLEM SELECTOR PLAN 1
-Test BG ac and hs  -Re-start lantus 35 units bid (6pm and 6am). 70% of home dose.  -Increase Humalog 20 units ac meals (requiring 18 units last night with dinner and f/u bg 306)  -Re-start Humalog moderate correction scales ac and hs  -Consider antibiotic infusion in NS instead of D5  -Will adjust regime as needed. Plan discussed with pt/staff and team  Beeper [phone] or
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
## 2017-06-09 NOTE — PROGRESS NOTE ADULT - SUBJECTIVE AND OBJECTIVE BOX
Patient is a 56y old  Female who presents with a chief complaint of R arm near amputation (04 Jun 2017 07:15)      SUBJECTIVE / OVERNIGHT EVENTS:   S/p Sx; Debridement of Rt arm wound and adjustment of Ext fixator with wound vac.   Denies CP/SOB/Palpitation/HA.    MEDICATIONS  (STANDING):  pantoprazole    Tablet 40milliGRAM(s) Oral before breakfast  atorvastatin 40milliGRAM(s) Oral at bedtime  docusate sodium 100milliGRAM(s) Oral three times a day  senna 2Tablet(s) Oral at bedtime  calcium carbonate 500 mG (Tums) Chewable 1Tablet(s) Chew once  insulin lispro (HumaLOG) corrective regimen sliding scale  SubCutaneous three times a day before meals  dextrose 5%. 1000milliLiter(s) IV Continuous <Continuous>  dextrose 50% Injectable 12.5Gram(s) IV Push once  dextrose 50% Injectable 25Gram(s) IV Push once  dextrose 50% Injectable 25Gram(s) IV Push once  enoxaparin Injectable 40milliGRAM(s) SubCutaneous every 24 hours  HYDROmorphone PCA (1 mG/mL) 30milliLiter(s) PCA Continuous PCA Continuous  sodium chloride 0.9%. 1000milliLiter(s) IV Continuous <Continuous>  piperacillin/tazobactam IVPB. 3.375Gram(s) IV Intermittent every 8 hours  insulin glargine Injectable (LANTUS) 35Unit(s) SubCutaneous two times a day  insulin lispro Injectable (HumaLOG) 20Unit(s) SubCutaneous three times a day before meals  insulin lispro (HumaLOG) corrective regimen sliding scale  SubCutaneous at bedtime    MEDICATIONS  (PRN):  HYDROmorphone  Injectable 0.5milliGRAM(s) IV Push every 10 minutes PRN Moderate Pain  HYDROmorphone  Injectable 1milliGRAM(s) IV Push every 10 minutes PRN Severe Pain (7 - 10)  ondansetron Injectable 4milliGRAM(s) IV Push once PRN Nausea and/or Vomiting  ondansetron Injectable 4milliGRAM(s) IV Push every 6 hours PRN Nausea  dextrose Gel 1Dose(s) Oral once PRN Blood Glucose LESS THAN 70 milliGRAM(s)/deciliter  glucagon  Injectable 1milliGRAM(s) IntraMuscular once PRN Glucose LESS THAN 70 milligrams/deciliter  HYDROmorphone PCA (1 mG/mL) Rescue Clinician Bolus 0.5milliGRAM(s) IV Push every 15 minutes PRN for Pain Scale GREATER THAN 6  naloxone Injectable 0.1milliGRAM(s) IV Push every 3 minutes PRN For ANY of the following changes in patient status:  A. RR LESS THAN 10 breaths per minute, B. Oxygen saturation LESS THAN 90%, C. Sedation score of 6      Vital Signs Last 24 Hrs  T(C): 36.7, Max: 37.1 (06-08 @ 23:00)  HR: 69 (63 - 78)  BP: 122/78 (101/51 - 146/64)  RR: 16 (14 - 38)  SpO2: 95% (95% - 100%)  Wt(kg): --  CAPILLARY BLOOD GLUCOSE  229 (09 Jun 2017 16:00)  161 (09 Jun 2017 05:39)  306 (08 Jun 2017 22:00)    I&O's Summary  I & Os for 24h ending 09 Jun 2017 07:00  =============================================  IN: 1565 ml / OUT: 2540 ml / NET: -975 ml    I & Os for current day (as of 09 Jun 2017 21:43)  =============================================  IN: 285 ml / OUT: 400 ml / NET: -115 ml      PHYSICAL EXAM:  GENERAL: NAD, well-developed  HEAD:  Atraumatic, Normocephalic  EYES: EOMI, PERRLA, conjunctiva and sclera clear  NECK: Supple, No JVD  CHEST/LUNG: Clear to auscultation bilaterally; No wheeze  HEART: Regular rate and rhythm; No murmurs, rubs, or gallops  ABDOMEN: Soft, Nontender, Nondistended; Bowel sounds present  EXTREMITIES:  Rt arm external fixator.   PSYCH: AAOx3  NEUROLOGY: non-focal  SKIN: No rashes or lesions    LABS:                        8.5    10.3  )-----------( 289      ( 09 Jun 2017 02:34 )             25.3     06-09    137  |  100  |  21  ----------------------------<  161<H>  4.0   |  25  |  1.36<H>    Ca    8.9      09 Jun 2017 02:34  Phos  3.0     06-09  Mg     2.1     06-09      PT/INR - ( 09 Jun 2017 02:34 )   PT: 11.2 sec;   INR: 1.03 ratio         PTT - ( 09 Jun 2017 02:34 )  PTT:29.6 sec        CAPILLARY BLOOD GLUCOSE  229 (09 Jun 2017 16:00)  161 (09 Jun 2017 05:39)  306 (08 Jun 2017 22:00)                RADIOLOGY & ADDITIONAL TESTS:    Imaging Personally Reviewed:    Consultant(s) Notes Reviewed:      Care Discussed with Consultants/Other Providers:

## 2017-06-09 NOTE — PROGRESS NOTE ADULT - ASSESSMENT
Plan:  -OR with ortho today for continued debridement / vac change  -plastics following for eventual soft tissue coverage and potential nerve / tendon transfers  -appreciate SICU care

## 2017-06-09 NOTE — PROGRESS NOTE ADULT - SUBJECTIVE AND OBJECTIVE BOX
Pain Management Attending Addendum    SUBJECTIVE:    Therapy:	  [x ] IV PCA	   [ ] Epidural           [ ] s/p Spinal Opoid              [ ] Postpartum infusion	  [ ] Patient controlled regional anesthesia (PCRA)    [ ] prn Analgesics    OBJECTIVE:   [x ] No new signs     [ ] Other:    Side Effects:  [x ] None			[ ] Other:    Assessment of Catheter Site:		[ ] Intact		[ ] Other:    ASSESSMENT/PLAN  [x ] Continue current therapy    [ ] Therapy changed to:    [ ] IV PCA       [ ] Epidural     [ ] prn Analgesics     [ ] post partum infusion    Comments:
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
## 2017-06-10 DIAGNOSIS — S52.021B DISPLACED FRACTURE OF OLECRANON PROCESS WITHOUT INTRAARTICULAR EXTENSION OF RIGHT ULNA, INITIAL ENCOUNTER FOR OPEN FRACTURE TYPE I OR II: ICD-10-CM

## 2017-06-10 PROCEDURE — 99231 SBSQ HOSP IP/OBS SF/LOW 25: CPT

## 2017-06-10 RX ADMIN — Medication 20 UNIT(S): at 12:55

## 2017-06-10 RX ADMIN — HYDROMORPHONE HYDROCHLORIDE 0.5 MILLIGRAM(S): 2 INJECTION INTRAMUSCULAR; INTRAVENOUS; SUBCUTANEOUS at 00:08

## 2017-06-10 RX ADMIN — PIPERACILLIN AND TAZOBACTAM 25 GRAM(S): 4; .5 INJECTION, POWDER, LYOPHILIZED, FOR SOLUTION INTRAVENOUS at 06:21

## 2017-06-10 RX ADMIN — INSULIN GLARGINE 35 UNIT(S): 100 INJECTION, SOLUTION SUBCUTANEOUS at 20:12

## 2017-06-10 RX ADMIN — HYDROMORPHONE HYDROCHLORIDE 30 MILLILITER(S): 2 INJECTION INTRAMUSCULAR; INTRAVENOUS; SUBCUTANEOUS at 22:14

## 2017-06-10 RX ADMIN — ATORVASTATIN CALCIUM 40 MILLIGRAM(S): 80 TABLET, FILM COATED ORAL at 22:17

## 2017-06-10 RX ADMIN — HYDROMORPHONE HYDROCHLORIDE 30 MILLILITER(S): 2 INJECTION INTRAMUSCULAR; INTRAVENOUS; SUBCUTANEOUS at 14:05

## 2017-06-10 RX ADMIN — ONDANSETRON 4 MILLIGRAM(S): 8 TABLET, FILM COATED ORAL at 22:18

## 2017-06-10 RX ADMIN — ONDANSETRON 4 MILLIGRAM(S): 8 TABLET, FILM COATED ORAL at 14:03

## 2017-06-10 RX ADMIN — Medication 4: at 13:02

## 2017-06-10 RX ADMIN — INSULIN GLARGINE 35 UNIT(S): 100 INJECTION, SOLUTION SUBCUTANEOUS at 07:48

## 2017-06-10 RX ADMIN — PIPERACILLIN AND TAZOBACTAM 25 GRAM(S): 4; .5 INJECTION, POWDER, LYOPHILIZED, FOR SOLUTION INTRAVENOUS at 23:46

## 2017-06-10 RX ADMIN — HYDROMORPHONE HYDROCHLORIDE 30 MILLILITER(S): 2 INJECTION INTRAMUSCULAR; INTRAVENOUS; SUBCUTANEOUS at 07:29

## 2017-06-10 RX ADMIN — Medication 100 MILLIGRAM(S): at 06:29

## 2017-06-10 RX ADMIN — Medication 100 MILLIGRAM(S): at 14:03

## 2017-06-10 RX ADMIN — Medication 20 UNIT(S): at 18:14

## 2017-06-10 RX ADMIN — PIPERACILLIN AND TAZOBACTAM 25 GRAM(S): 4; .5 INJECTION, POWDER, LYOPHILIZED, FOR SOLUTION INTRAVENOUS at 15:14

## 2017-06-10 RX ADMIN — ENOXAPARIN SODIUM 40 MILLIGRAM(S): 100 INJECTION SUBCUTANEOUS at 06:20

## 2017-06-10 RX ADMIN — PANTOPRAZOLE SODIUM 40 MILLIGRAM(S): 20 TABLET, DELAYED RELEASE ORAL at 06:32

## 2017-06-10 RX ADMIN — SENNA PLUS 2 TABLET(S): 8.6 TABLET ORAL at 22:17

## 2017-06-10 RX ADMIN — HYDROMORPHONE HYDROCHLORIDE 30 MILLILITER(S): 2 INJECTION INTRAMUSCULAR; INTRAVENOUS; SUBCUTANEOUS at 19:04

## 2017-06-10 RX ADMIN — HYDROMORPHONE HYDROCHLORIDE 30 MILLILITER(S): 2 INJECTION INTRAMUSCULAR; INTRAVENOUS; SUBCUTANEOUS at 10:25

## 2017-06-10 RX ADMIN — PIPERACILLIN AND TAZOBACTAM 25 GRAM(S): 4; .5 INJECTION, POWDER, LYOPHILIZED, FOR SOLUTION INTRAVENOUS at 00:06

## 2017-06-10 RX ADMIN — Medication 20 UNIT(S): at 08:13

## 2017-06-10 RX ADMIN — HYDROMORPHONE HYDROCHLORIDE 30 MILLILITER(S): 2 INJECTION INTRAMUSCULAR; INTRAVENOUS; SUBCUTANEOUS at 06:13

## 2017-06-10 RX ADMIN — HYDROMORPHONE HYDROCHLORIDE 30 MILLILITER(S): 2 INJECTION INTRAMUSCULAR; INTRAVENOUS; SUBCUTANEOUS at 18:14

## 2017-06-10 RX ADMIN — Medication 100 MILLIGRAM(S): at 22:17

## 2017-06-10 RX ADMIN — HYDROMORPHONE HYDROCHLORIDE 30 MILLILITER(S): 2 INJECTION INTRAMUSCULAR; INTRAVENOUS; SUBCUTANEOUS at 02:04

## 2017-06-10 NOTE — PROGRESS NOTE ADULT - ASSESSMENT
Plan:  -plastics following for eventual soft tissue coverage and potential nerve / tendon transfers  -F/u CTA for preop planning 56F w/ RUE wound, ulnar nerve transection, and elbow fractures/p ex-fix  -Eventual soft tissue coverage w/ free flap in future  -F/u CTA official read  -Pain control  -OOB/DVT ppx  -IV abx

## 2017-06-10 NOTE — PROGRESS NOTE ADULT - SUBJECTIVE AND OBJECTIVE BOX
Post op Day [1 ]    Patient resting without complaints.  No chest pain, SOB, N/V.    T(C): 36.4, Max: 36.7 (06-09 @ 20:00)  HR: 80 (63 - 98)  BP: 111/67 (100/64 - 146/64)  RR: 16 (16 - 16)  SpO2: 100% (95% - 100%)  Wt(kg): --    Exam:  Alert and Oriented, No Acute Distress  CARDS: +S1/S2, RRR  PULM: CTAB  ABD: soft benign            Wilde Yes [ ]  No [ ]    RUE:   Ex-fix & sutures CDI  Pin site dressings c/d  (+) swelling  VAC in place  Sensation/R/U/M/AIN/PIN grossly intact  + Radial pulse     Xray:     EXAM:  ELBOW RIGHT (3 VIEWS)                            PROCEDURE DATE:  06/09/2017    IMPRESSION:     Intraoperative fixation of the right elbow with residual radiopaque   foreign bodies noted in the dorsal forearm.                          8.5    10.3  )-----------( 289      ( 09 Jun 2017 02:34 )             25.3    06-09    137  |  100  |  21  ----------------------------<  161<H>  4.0   |  25  |  1.36<H>    Ca    8.9      09 Jun 2017 02:34  Phos  3.0     06-09  Mg     2.1     06-09
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
## 2017-06-10 NOTE — PROGRESS NOTE ADULT - SUBJECTIVE AND OBJECTIVE BOX
RICARDO GILES  56y  Female      Patient is a 56y old  Female who presents with a chief complaint of R arm near amputation (04 Jun 2017 07:15)  pt.seen and examined.covering .chart reviewed.pt.denies any acute pain in rt.arm.no sob,no cp,no fever    REVIEW OF SYSTEMS:as above      INTERVAL HPI/OVERNIGHT EVENTS:  T(C): 36.4, Max: 36.7 (06-10 @ 00:00)  HR: 96 (63 - 98)  BP: 115/72 (95/68 - 120/81)  RR: 18 (16 - 18)  SpO2: 98% (95% - 100%)  Wt(kg): --  I&O's Summary  I & Os for 24h ending 10 Domenico 2017 07:00  =============================================  IN: 1875 ml / OUT: 1250 ml / NET: 625 ml    I & Os for current day (as of 10 Domenico 2017 21:21)  =============================================  IN: 2140 ml / OUT: 100 ml / NET: 2040 ml    T(C): 36.4, Max: 36.7 (06-10 @ 00:00)  HR: 96 (63 - 98)  BP: 115/72 (95/68 - 120/81)  RR: 18 (16 - 18)  SpO2: 98% (95% - 100%)  Wt(kg): --Vital Signs Last 24 Hrs  T(C): 36.4, Max: 36.7 (06-10 @ 00:00)  T(F): 97.6, Max: 98.1 (06-10 @ 00:00)  HR: 96 (63 - 98)  BP: 115/72 (95/68 - 120/81)  BP(mean): --  RR: 18 (16 - 18)  SpO2: 98% (95% - 100%)    LABS:                        8.5    10.3  )-----------( 289      ( 09 Jun 2017 02:34 )             25.3     06-09    137  |  100  |  21  ----------------------------<  161<H>  4.0   |  25  |  1.36<H>    Ca    8.9      09 Jun 2017 02:34  Phos  3.0     06-09  Mg     2.1     06-09      PT/INR - ( 09 Jun 2017 02:34 )   PT: 11.2 sec;   INR: 1.03 ratio         PTT - ( 09 Jun 2017 02:34 )  PTT:29.6 sec    CAPILLARY BLOOD GLUCOSE  115 (10 Domenico 2017 17:31)  203 (10 Domenico 2017 12:01)  135 (10 Domenico 2017 07:56)  245 (09 Jun 2017 22:24)            PAST MEDICAL & SURGICAL HISTORY:  Essential hypertension  Type 2 diabetes mellitus without complication, with long-term current use of insulin  No significant past surgical history  No significant past surgical history      MEDICATIONS  (STANDING):  pantoprazole    Tablet 40milliGRAM(s) Oral before breakfast  atorvastatin 40milliGRAM(s) Oral at bedtime  docusate sodium 100milliGRAM(s) Oral three times a day  senna 2Tablet(s) Oral at bedtime  calcium carbonate 500 mG (Tums) Chewable 1Tablet(s) Chew once  insulin lispro (HumaLOG) corrective regimen sliding scale  SubCutaneous three times a day before meals  dextrose 5%. 1000milliLiter(s) IV Continuous <Continuous>  dextrose 50% Injectable 12.5Gram(s) IV Push once  dextrose 50% Injectable 25Gram(s) IV Push once  dextrose 50% Injectable 25Gram(s) IV Push once  enoxaparin Injectable 40milliGRAM(s) SubCutaneous every 24 hours  HYDROmorphone PCA (1 mG/mL) 30milliLiter(s) PCA Continuous PCA Continuous  sodium chloride 0.9%. 1000milliLiter(s) IV Continuous <Continuous>  piperacillin/tazobactam IVPB. 3.375Gram(s) IV Intermittent every 8 hours  insulin glargine Injectable (LANTUS) 35Unit(s) SubCutaneous two times a day  insulin lispro Injectable (HumaLOG) 20Unit(s) SubCutaneous three times a day before meals  insulin lispro (HumaLOG) corrective regimen sliding scale  SubCutaneous at bedtime    MEDICATIONS  (PRN):  HYDROmorphone  Injectable 0.5milliGRAM(s) IV Push every 10 minutes PRN Moderate Pain  HYDROmorphone  Injectable 1milliGRAM(s) IV Push every 10 minutes PRN Severe Pain (7 - 10)  ondansetron Injectable 4milliGRAM(s) IV Push once PRN Nausea and/or Vomiting  ondansetron Injectable 4milliGRAM(s) IV Push every 6 hours PRN Nausea  dextrose Gel 1Dose(s) Oral once PRN Blood Glucose LESS THAN 70 milliGRAM(s)/deciliter  glucagon  Injectable 1milliGRAM(s) IntraMuscular once PRN Glucose LESS THAN 70 milligrams/deciliter  HYDROmorphone PCA (1 mG/mL) Rescue Clinician Bolus 0.5milliGRAM(s) IV Push every 15 minutes PRN for Pain Scale GREATER THAN 6  naloxone Injectable 0.1milliGRAM(s) IV Push every 3 minutes PRN For ANY of the following changes in patient status:  A. RR LESS THAN 10 breaths per minute, B. Oxygen saturation LESS THAN 90%, C. Sedation score of 6        RADIOLOGY & ADDITIONAL TESTS:    Imaging Personally Reviewed:  [ ] YES  [ ] NO    Consultant(s) Notes Reviewed:  [ ] YES  [ ] NO    PHYSICAL EXAM:  GENERAL: NAD, well-groomed, well-developed  HEAD:  Atraumatic, Normocephalic  EYES: EOMI, PERRLA, conjunctiva and sclera clear  ENMT: No tonsillar erythema, exudates, or enlargement; Moist mucous membranes, Good dentition, No lesions  NECK: Supple, No JVD, Normal thyroid  NERVOUS SYSTEM:  Alert & Oriented X3, Good concentration; Motor Strength 5/5 B/L upper and lower extremities; DTRs 2+ intact and symmetric  CHEST/LUNG: Clear to percussion bilaterally; No rales, rhonchi, wheezing, or rubs  HEART: Regular rate and rhythm; No murmurs, rubs, or gallops  ABDOMEN: Soft, Nontender, Nondistended; Bowel sounds present  EXTREMITIES: RUE-partial amputation,debridement  LYMPH: No lymphadenopathy noted  SKIN: No rashes or lesions    Care Discussed with Consultants/Other Providers [ ] YES  [ ] NO    I:   Social/Family:   Discharge planning:     Code Status: [] Full Code [] DNR [] DNI [] Goals of Care:   Disposition: [] ICU [] Stroke Unit [] RCU []PCU []Floor [] Discharge Home         REAL [email or web address].FACP

## 2017-06-10 NOTE — PROGRESS NOTE ADULT - SUBJECTIVE AND OBJECTIVE BOX
Day _1__ of Anesthesia Pain Management Service    SUBJECTIVE:    Pain Scale Score	At rest: _2__ 	With Activity: __2_ 	[ ] Refer to charted pain scores    THERAPY:    [ ] IV PCA Morphine		[ ] 5 mg/mL	[ ] 1 mg/mL  [ x] IV PCA Hydromorphone	[ ] 5 mg/mL	[ ] 1 mg/mL  [ ] IV PCA Fentanyl		[ ] 50 micrograms/mL    Demand dose  .2  lockout  6  (minutes) Continuous Rate 0   Total:     Daily      MEDICATIONS  (STANDING):  pantoprazole    Tablet 40milliGRAM(s) Oral before breakfast  atorvastatin 40milliGRAM(s) Oral at bedtime  docusate sodium 100milliGRAM(s) Oral three times a day  senna 2Tablet(s) Oral at bedtime  calcium carbonate 500 mG (Tums) Chewable 1Tablet(s) Chew once  insulin lispro (HumaLOG) corrective regimen sliding scale  SubCutaneous three times a day before meals  dextrose 5%. 1000milliLiter(s) IV Continuous <Continuous>  dextrose 50% Injectable 12.5Gram(s) IV Push once  dextrose 50% Injectable 25Gram(s) IV Push once  dextrose 50% Injectable 25Gram(s) IV Push once  enoxaparin Injectable 40milliGRAM(s) SubCutaneous every 24 hours  HYDROmorphone PCA (1 mG/mL) 30milliLiter(s) PCA Continuous PCA Continuous  sodium chloride 0.9%. 1000milliLiter(s) IV Continuous <Continuous>  piperacillin/tazobactam IVPB. 3.375Gram(s) IV Intermittent every 8 hours  insulin glargine Injectable (LANTUS) 35Unit(s) SubCutaneous two times a day  insulin lispro Injectable (HumaLOG) 20Unit(s) SubCutaneous three times a day before meals  insulin lispro (HumaLOG) corrective regimen sliding scale  SubCutaneous at bedtime    MEDICATIONS  (PRN):  HYDROmorphone  Injectable 0.5milliGRAM(s) IV Push every 10 minutes PRN Moderate Pain  HYDROmorphone  Injectable 1milliGRAM(s) IV Push every 10 minutes PRN Severe Pain (7 - 10)  ondansetron Injectable 4milliGRAM(s) IV Push once PRN Nausea and/or Vomiting  ondansetron Injectable 4milliGRAM(s) IV Push every 6 hours PRN Nausea  dextrose Gel 1Dose(s) Oral once PRN Blood Glucose LESS THAN 70 milliGRAM(s)/deciliter  glucagon  Injectable 1milliGRAM(s) IntraMuscular once PRN Glucose LESS THAN 70 milligrams/deciliter  HYDROmorphone PCA (1 mG/mL) Rescue Clinician Bolus 0.5milliGRAM(s) IV Push every 15 minutes PRN for Pain Scale GREATER THAN 6  naloxone Injectable 0.1milliGRAM(s) IV Push every 3 minutes PRN For ANY of the following changes in patient status:  A. RR LESS THAN 10 breaths per minute, B. Oxygen saturation LESS THAN 90%, C. Sedation score of 6      OBJECTIVE:    Sedation Score:	[ x] Alert	[ ] Drowsy 	[ ] Arousable	[ ] Asleep	[ ] Unresponsive    Side Effects:	[ x] None	[ ] Nausea	[ ] Vomiting	[ ] Pruritus  		[ ] Other:    Vital Signs Last 24 Hrs  T(C): 36.4, Max: 36.7 (06-09 @ 20:00)  T(F): 97.6, Max: 98.1 (06-09 @ 20:00)  HR: 80 (63 - 98)  BP: 111/67 (100/64 - 146/64)  BP(mean): 90 (88 - 97)  RR: 16 (16 - 16)  SpO2: 100% (95% - 100%)    ASSESSMENT/ PLAN    Therapy to  be:	[ x] Continue   [ ] Discontinued   [ ] Change to prn Analgesics    Documentation and Verification of current medications:   [X] Done	[ ] Not done, not elligible    Comments:

## 2017-06-10 NOTE — PROGRESS NOTE ADULT - ASSESSMENT
Assessment: s/p I&D  w/ VAC placement    Plan: ice / elevate           ck labs            Cont VAC           f/u CT elbow            OT:  HAMILTON GRACE            will follow w/ you

## 2017-06-10 NOTE — PROGRESS NOTE ADULT - SUBJECTIVE AND OBJECTIVE BOX
Plastic Surgery Progress Note    S: Patient seen and examined.  doing well.  plan for OR with ortho today.     Vital Signs Last 24 Hrs  T(C): 36.4, Max: 36.7 (06-09 @ 20:00)  T(F): 97.6, Max: 98.1 (06-09 @ 20:00)  HR: 80 (63 - 98)  BP: 111/67 (100/64 - 146/64)  BP(mean): 90 (73 - 97)  RR: 16 (14 - 16)  SpO2: 100% (95% - 100%)    I&O's Detail    I & Os for current day (as of 10 Domenico 2017 07:15)  =============================================  IN:    Oral Fluid: 240 ml    sodium chloride 0.9%.: 240 ml    Solution: 25 ml    sodium chloride 0.9%: 20 ml    Total IN: 525 ml  ---------------------------------------------  OUT:    Voided: 1200 ml    Total OUT: 1200 ml  ---------------------------------------------  Total NET: -675 ml        Physical Exam:  General: Awake and alert, NAD  RUE: ex-fix in place, vac in place.  hand warm. Plastic Surgery Progress Note    S: Pt doing well; no acute events overnight; tolerated procedure well. Pain well controlled; no PONV.    Vital Signs Last 24 Hrs  T(C): 36.4, Max: 36.7 (06-09 @ 20:00)  T(F): 97.6, Max: 98.1 (06-09 @ 20:00)  HR: 80 (63 - 98)  BP: 111/67 (100/64 - 146/64)  BP(mean): 90 (88 - 97)  RR: 16 (16 - 16)  SpO2: 100% (95% - 100%)    I&O's Detail    I & Os for current day (as of 10 Domenico 2017 08:08)  =============================================  IN:    sodium chloride 0.9%.: 1040 ml    Oral Fluid: 790 ml    Solution: 25 ml    sodium chloride 0.9%: 20 ml    Total IN: 1875 ml  ---------------------------------------------  OUT:    Voided: 1200 ml    Total OUT: 1200 ml  ---------------------------------------------  Total NET: 675 ml        Physical Exam:  General: Awake and alert, NAD  RUE: ex-fix in place, vac in place; hand is warm, well-perfused; motor/sensory exam unchanged (complete absence in ulnar m/s nerve distribution).

## 2017-06-10 NOTE — CHART NOTE - NSCHARTNOTEFT_GEN_A_CORE
ATP SURGERY    STATUS POST:  Adjustment of external fixator by ortho    POST OPERATIVE DAY 0      SUBJECTIVE:   Reports some numbness in fourth and fifth finger of R hand, unchanged since before surgery. Required rescue bolus from PCA. Tolerating diet.     OBJECTIVE:   T(C): 36.7, Max: 37.1 (06-09 @ 01:44)  HR: 98 (63 - 98)  BP: 120/52 (101/51 - 146/64)  RR: 16 (14 - 38)  SpO2: 95% (95% - 100%)  Wt(kg): --      I&O's Detail  I & Os for 24h ending 09 Jun 2017 07:00  =============================================  IN:    Oral Fluid: 900 ml    sodium chloride 0.9%: 440 ml    Solution: 175 ml    Solution: 50 ml    Total IN: 1565 ml  ---------------------------------------------  OUT:    Voided: 1900 ml    Indwelling Catheter - Urethral: 600 ml    VAC (Vacuum Assisted Closure) System: 40 ml    Total OUT: 2540 ml  ---------------------------------------------  Total NET: -975 ml    I & Os for current day (as of 10 Domenico 2017 01:10)  =============================================  IN:    Oral Fluid: 240 ml    sodium chloride 0.9%.: 240 ml    Solution: 25 ml    sodium chloride 0.9%: 20 ml    Total IN: 525 ml  ---------------------------------------------  OUT:    Voided: 400 ml    Total OUT: 400 ml  ---------------------------------------------  Total NET: 125 ml      Physical exam:  General: NAD  R external fixator in place  Fingers warm, motor function grossly intact     MEDICATIONS  (STANDING):  pantoprazole    Tablet 40milliGRAM(s) Oral before breakfast  atorvastatin 40milliGRAM(s) Oral at bedtime  docusate sodium 100milliGRAM(s) Oral three times a day  senna 2Tablet(s) Oral at bedtime  calcium carbonate 500 mG (Tums) Chewable 1Tablet(s) Chew once  insulin lispro (HumaLOG) corrective regimen sliding scale  SubCutaneous three times a day before meals  dextrose 5%. 1000milliLiter(s) IV Continuous <Continuous>  dextrose 50% Injectable 12.5Gram(s) IV Push once  dextrose 50% Injectable 25Gram(s) IV Push once  dextrose 50% Injectable 25Gram(s) IV Push once  enoxaparin Injectable 40milliGRAM(s) SubCutaneous every 24 hours  HYDROmorphone PCA (1 mG/mL) 30milliLiter(s) PCA Continuous PCA Continuous  sodium chloride 0.9%. 1000milliLiter(s) IV Continuous <Continuous>  piperacillin/tazobactam IVPB. 3.375Gram(s) IV Intermittent every 8 hours  insulin glargine Injectable (LANTUS) 35Unit(s) SubCutaneous two times a day  insulin lispro Injectable (HumaLOG) 20Unit(s) SubCutaneous three times a day before meals  insulin lispro (HumaLOG) corrective regimen sliding scale  SubCutaneous at bedtime    MEDICATIONS  (PRN):  HYDROmorphone  Injectable 0.5milliGRAM(s) IV Push every 10 minutes PRN Moderate Pain  HYDROmorphone  Injectable 1milliGRAM(s) IV Push every 10 minutes PRN Severe Pain (7 - 10)  ondansetron Injectable 4milliGRAM(s) IV Push once PRN Nausea and/or Vomiting  ondansetron Injectable 4milliGRAM(s) IV Push every 6 hours PRN Nausea  dextrose Gel 1Dose(s) Oral once PRN Blood Glucose LESS THAN 70 milliGRAM(s)/deciliter  glucagon  Injectable 1milliGRAM(s) IntraMuscular once PRN Glucose LESS THAN 70 milligrams/deciliter  HYDROmorphone PCA (1 mG/mL) Rescue Clinician Bolus 0.5milliGRAM(s) IV Push every 15 minutes PRN for Pain Scale GREATER THAN 6  naloxone Injectable 0.1milliGRAM(s) IV Push every 3 minutes PRN For ANY of the following changes in patient status:  A. RR LESS THAN 10 breaths per minute, B. Oxygen saturation LESS THAN 90%, C. Sedation score of 6      LABS:                        8.5    10.3  )-----------( 289      ( 09 Jun 2017 02:34 )             25.3     06-09    137  |  100  |  21  ----------------------------<  161<H>  4.0   |  25  |  1.36<H>    Ca    8.9      09 Jun 2017 02:34  Phos  3.0     06-09  Mg     2.1     06-09      PT/INR - ( 09 Jun 2017 02:34 )   PT: 11.2 sec;   INR: 1.03 ratio         PTT - ( 09 Jun 2017 02:34 )  PTT:29.6 sec      Assessment/Plan: 56y Female R proximal humeral head fracture s/p adjustment of RUE external fixator   - Pain control  - C/w regular diet  - C/w Zosyn  - AM labs ATP SURGERY    STATUS POST:  Adjustment of external fixator by ortho    POST OPERATIVE DAY 0      SUBJECTIVE:   Reports some numbness in fourth and fifth finger of R hand, unchanged since before surgery. Required rescue bolus from PCA. Tolerating diet.     OBJECTIVE:   T(C): 36.7, Max: 37.1 (06-09 @ 01:44)  HR: 98 (63 - 98)  BP: 120/52 (101/51 - 146/64)  RR: 16 (14 - 38)  SpO2: 95% (95% - 100%)  Wt(kg): --      I&O's Detail  I & Os for 24h ending 09 Jun 2017 07:00  =============================================  IN:    Oral Fluid: 900 ml    sodium chloride 0.9%: 440 ml    Solution: 175 ml    Solution: 50 ml    Total IN: 1565 ml  ---------------------------------------------  OUT:    Voided: 1900 ml    Indwelling Catheter - Urethral: 600 ml    VAC (Vacuum Assisted Closure) System: 40 ml    Total OUT: 2540 ml  ---------------------------------------------  Total NET: -975 ml    I & Os for current day (as of 10 Domenico 2017 01:10)  =============================================  IN:    Oral Fluid: 240 ml    sodium chloride 0.9%.: 240 ml    Solution: 25 ml    sodium chloride 0.9%: 20 ml    Total IN: 525 ml  ---------------------------------------------  OUT:    Voided: 400 ml    Total OUT: 400 ml  ---------------------------------------------  Total NET: 125 ml      Physical exam:  General: NAD  R external fixator in place  Fingers warm, motor function grossly intact     MEDICATIONS  (STANDING):  pantoprazole    Tablet 40milliGRAM(s) Oral before breakfast  atorvastatin 40milliGRAM(s) Oral at bedtime  docusate sodium 100milliGRAM(s) Oral three times a day  senna 2Tablet(s) Oral at bedtime  calcium carbonate 500 mG (Tums) Chewable 1Tablet(s) Chew once  insulin lispro (HumaLOG) corrective regimen sliding scale  SubCutaneous three times a day before meals  dextrose 5%. 1000milliLiter(s) IV Continuous <Continuous>  dextrose 50% Injectable 12.5Gram(s) IV Push once  dextrose 50% Injectable 25Gram(s) IV Push once  dextrose 50% Injectable 25Gram(s) IV Push once  enoxaparin Injectable 40milliGRAM(s) SubCutaneous every 24 hours  HYDROmorphone PCA (1 mG/mL) 30milliLiter(s) PCA Continuous PCA Continuous  sodium chloride 0.9%. 1000milliLiter(s) IV Continuous <Continuous>  piperacillin/tazobactam IVPB. 3.375Gram(s) IV Intermittent every 8 hours  insulin glargine Injectable (LANTUS) 35Unit(s) SubCutaneous two times a day  insulin lispro Injectable (HumaLOG) 20Unit(s) SubCutaneous three times a day before meals  insulin lispro (HumaLOG) corrective regimen sliding scale  SubCutaneous at bedtime    MEDICATIONS  (PRN):  HYDROmorphone  Injectable 0.5milliGRAM(s) IV Push every 10 minutes PRN Moderate Pain  HYDROmorphone  Injectable 1milliGRAM(s) IV Push every 10 minutes PRN Severe Pain (7 - 10)  ondansetron Injectable 4milliGRAM(s) IV Push once PRN Nausea and/or Vomiting  ondansetron Injectable 4milliGRAM(s) IV Push every 6 hours PRN Nausea  dextrose Gel 1Dose(s) Oral once PRN Blood Glucose LESS THAN 70 milliGRAM(s)/deciliter  glucagon  Injectable 1milliGRAM(s) IntraMuscular once PRN Glucose LESS THAN 70 milligrams/deciliter  HYDROmorphone PCA (1 mG/mL) Rescue Clinician Bolus 0.5milliGRAM(s) IV Push every 15 minutes PRN for Pain Scale GREATER THAN 6  naloxone Injectable 0.1milliGRAM(s) IV Push every 3 minutes PRN For ANY of the following changes in patient status:  A. RR LESS THAN 10 breaths per minute, B. Oxygen saturation LESS THAN 90%, C. Sedation score of 6      LABS:                        8.5    10.3  )-----------( 289      ( 09 Jun 2017 02:34 )             25.3     06-09    137  |  100  |  21  ----------------------------<  161<H>  4.0   |  25  |  1.36<H>    Ca    8.9      09 Jun 2017 02:34  Phos  3.0     06-09  Mg     2.1     06-09      PT/INR - ( 09 Jun 2017 02:34 )   PT: 11.2 sec;   INR: 1.03 ratio         PTT - ( 09 Jun 2017 02:34 )  PTT:29.6 sec      Assessment/Plan: 56y Female R proximal humeral head fracture s/p adjustment of RUE external fixator   - Pain control  - C/w regular diet  - C/w Zosyn  - F/u RUE CTA. F/u ortho plan.  - AM labs

## 2017-06-10 NOTE — PROGRESS NOTE ADULT - ATTENDING COMMENTS
Patient seen and examined.  In no distress. Pain controlled with meds.  Non-toxic appearing.  Scalp wound with out signs of infection.  Right arm pin sites clean without signs of infection.  VAC intact and functional.  Still on antibiotics given Grade IIIB injury and no soft tissue coverage of bony injuries yet.  Soft tissue coverage likely requiring complex free flap combined with orthopedic reconstruction, may not be able to have complete soft tissue coverage within one week of injury given complexity of injury.  Care discussed with orthopedic team by myself.

## 2017-06-10 NOTE — PROGRESS NOTE ADULT - SUBJECTIVE AND OBJECTIVE BOX
ATP SURGERY      SUBJECTIVE:       OBJECTIVE:   T(C): 36.5, Max: 36.7 (06-09 @ 07:00)  HR: 63 (63 - 98)  BP: 100/64 (100/64 - 146/64)  RR: 16 (14 - 38)  SpO2: 96% (95% - 100%)  Wt(kg): --      I&O's Detail  I & Os for 24h ending 09 Jun 2017 07:00  =============================================  IN:    Oral Fluid: 900 ml    sodium chloride 0.9%: 440 ml    Solution: 175 ml    Solution: 50 ml    Total IN: 1565 ml  ---------------------------------------------  OUT:    Voided: 1900 ml    Indwelling Catheter - Urethral: 600 ml    VAC (Vacuum Assisted Closure) System: 40 ml    Total OUT: 2540 ml  ---------------------------------------------  Total NET: -975 ml    I & Os for current day (as of 10 Domenico 2017 05:11)  =============================================  IN:    Oral Fluid: 240 ml    sodium chloride 0.9%.: 240 ml    Solution: 25 ml    sodium chloride 0.9%: 20 ml    Total IN: 525 ml  ---------------------------------------------  OUT:    Voided: 400 ml    Total OUT: 400 ml  ---------------------------------------------  Total NET: 125 ml      Physical exam:  General:      MEDICATIONS  (STANDING):  pantoprazole    Tablet 40milliGRAM(s) Oral before breakfast  atorvastatin 40milliGRAM(s) Oral at bedtime  docusate sodium 100milliGRAM(s) Oral three times a day  senna 2Tablet(s) Oral at bedtime  calcium carbonate 500 mG (Tums) Chewable 1Tablet(s) Chew once  insulin lispro (HumaLOG) corrective regimen sliding scale  SubCutaneous three times a day before meals  dextrose 5%. 1000milliLiter(s) IV Continuous <Continuous>  dextrose 50% Injectable 12.5Gram(s) IV Push once  dextrose 50% Injectable 25Gram(s) IV Push once  dextrose 50% Injectable 25Gram(s) IV Push once  enoxaparin Injectable 40milliGRAM(s) SubCutaneous every 24 hours  HYDROmorphone PCA (1 mG/mL) 30milliLiter(s) PCA Continuous PCA Continuous  sodium chloride 0.9%. 1000milliLiter(s) IV Continuous <Continuous>  piperacillin/tazobactam IVPB. 3.375Gram(s) IV Intermittent every 8 hours  insulin glargine Injectable (LANTUS) 35Unit(s) SubCutaneous two times a day  insulin lispro Injectable (HumaLOG) 20Unit(s) SubCutaneous three times a day before meals  insulin lispro (HumaLOG) corrective regimen sliding scale  SubCutaneous at bedtime    MEDICATIONS  (PRN):  HYDROmorphone  Injectable 0.5milliGRAM(s) IV Push every 10 minutes PRN Moderate Pain  HYDROmorphone  Injectable 1milliGRAM(s) IV Push every 10 minutes PRN Severe Pain (7 - 10)  ondansetron Injectable 4milliGRAM(s) IV Push once PRN Nausea and/or Vomiting  ondansetron Injectable 4milliGRAM(s) IV Push every 6 hours PRN Nausea  dextrose Gel 1Dose(s) Oral once PRN Blood Glucose LESS THAN 70 milliGRAM(s)/deciliter  glucagon  Injectable 1milliGRAM(s) IntraMuscular once PRN Glucose LESS THAN 70 milligrams/deciliter  HYDROmorphone PCA (1 mG/mL) Rescue Clinician Bolus 0.5milliGRAM(s) IV Push every 15 minutes PRN for Pain Scale GREATER THAN 6  naloxone Injectable 0.1milliGRAM(s) IV Push every 3 minutes PRN For ANY of the following changes in patient status:  A. RR LESS THAN 10 breaths per minute, B. Oxygen saturation LESS THAN 90%, C. Sedation score ATP SURGERY      SUBJECTIVE:   denies n/v/f/c, denies SOB or CP     OBJECTIVE:   T(C): 36.5, Max: 36.7 (06-09 @ 07:00)  HR: 63 (63 - 98)  BP: 100/64 (100/64 - 146/64)  RR: 16 (14 - 38)  SpO2: 96% (95% - 100%)  Wt(kg): --    General: NAD, Lying in bed comfortably  Neuro: A+Ox3  HEENT: Scalp lac with sutures in place, no active bleeding, minimally tender.    Extremities: Right upper extremity with ex-fix in place, elevated.  Distal motor+sensory grossly intact, weakness with  strength.  Wound vac in place.        I&O's Detail  I & Os for 24h ending 09 Jun 2017 07:00  =============================================  IN:    Oral Fluid: 900 ml    sodium chloride 0.9%: 440 ml    Solution: 175 ml    Solution: 50 ml    Total IN: 1565 ml  ---------------------------------------------  OUT:    Voided: 1900 ml    Indwelling Catheter - Urethral: 600 ml    VAC (Vacuum Assisted Closure) System: 40 ml    Total OUT: 2540 ml  ---------------------------------------------  Total NET: -975 ml    I & Os for current day (as of 10 Domenico 2017 05:11)  =============================================  IN:    Oral Fluid: 240 ml    sodium chloride 0.9%.: 240 ml    Solution: 25 ml    sodium chloride 0.9%: 20 ml    Total IN: 525 ml  ---------------------------------------------  OUT:    Voided: 400 ml    Total OUT: 400 ml  ---------------------------------------------  Total NET: 125 ml      Physical exam:  General:      MEDICATIONS  (STANDING):  pantoprazole    Tablet 40milliGRAM(s) Oral before breakfast  atorvastatin 40milliGRAM(s) Oral at bedtime  docusate sodium 100milliGRAM(s) Oral three times a day  senna 2Tablet(s) Oral at bedtime  calcium carbonate 500 mG (Tums) Chewable 1Tablet(s) Chew once  insulin lispro (HumaLOG) corrective regimen sliding scale  SubCutaneous three times a day before meals  dextrose 5%. 1000milliLiter(s) IV Continuous <Continuous>  dextrose 50% Injectable 12.5Gram(s) IV Push once  dextrose 50% Injectable 25Gram(s) IV Push once  dextrose 50% Injectable 25Gram(s) IV Push once  enoxaparin Injectable 40milliGRAM(s) SubCutaneous every 24 hours  HYDROmorphone PCA (1 mG/mL) 30milliLiter(s) PCA Continuous PCA Continuous  sodium chloride 0.9%. 1000milliLiter(s) IV Continuous <Continuous>  piperacillin/tazobactam IVPB. 3.375Gram(s) IV Intermittent every 8 hours  insulin glargine Injectable (LANTUS) 35Unit(s) SubCutaneous two times a day  insulin lispro Injectable (HumaLOG) 20Unit(s) SubCutaneous three times a day before meals  insulin lispro (HumaLOG) corrective regimen sliding scale  SubCutaneous at bedtime    MEDICATIONS  (PRN):  HYDROmorphone  Injectable 0.5milliGRAM(s) IV Push every 10 minutes PRN Moderate Pain  HYDROmorphone  Injectable 1milliGRAM(s) IV Push every 10 minutes PRN Severe Pain (7 - 10)  ondansetron Injectable 4milliGRAM(s) IV Push once PRN Nausea and/or Vomiting  ondansetron Injectable 4milliGRAM(s) IV Push every 6 hours PRN Nausea  dextrose Gel 1Dose(s) Oral once PRN Blood Glucose LESS THAN 70 milliGRAM(s)/deciliter  glucagon  Injectable 1milliGRAM(s) IntraMuscular once PRN Glucose LESS THAN 70 milligrams/deciliter  HYDROmorphone PCA (1 mG/mL) Rescue Clinician Bolus 0.5milliGRAM(s) IV Push every 15 minutes PRN for Pain Scale GREATER THAN 6  naloxone Injectable 0.1milliGRAM(s) IV Push every 3 minutes PRN For ANY of the following changes in patient status:  A. RR LESS THAN 10 breaths per minute, B. Oxygen saturation LESS THAN 90%, C. Sedation score

## 2017-06-11 DIAGNOSIS — S42.401B UNSPECIFIED FRACTURE OF LOWER END OF RIGHT HUMERUS, INITIAL ENCOUNTER FOR OPEN FRACTURE: ICD-10-CM

## 2017-06-11 LAB
ANION GAP SERPL CALC-SCNC: 14 MMOL/L — SIGNIFICANT CHANGE UP (ref 5–17)
BUN SERPL-MCNC: 16 MG/DL — SIGNIFICANT CHANGE UP (ref 7–23)
CALCIUM SERPL-MCNC: 9.1 MG/DL — SIGNIFICANT CHANGE UP (ref 8.4–10.5)
CHLORIDE SERPL-SCNC: 100 MMOL/L — SIGNIFICANT CHANGE UP (ref 96–108)
CO2 SERPL-SCNC: 25 MMOL/L — SIGNIFICANT CHANGE UP (ref 22–31)
CREAT SERPL-MCNC: 1.31 MG/DL — HIGH (ref 0.5–1.3)
GLUCOSE SERPL-MCNC: 112 MG/DL — HIGH (ref 70–99)
HCT VFR BLD CALC: 25.2 % — LOW (ref 34.5–45)
HGB BLD-MCNC: 8.6 G/DL — LOW (ref 11.5–15.5)
MCHC RBC-ENTMCNC: 28.5 PG — SIGNIFICANT CHANGE UP (ref 27–34)
MCHC RBC-ENTMCNC: 34.3 GM/DL — SIGNIFICANT CHANGE UP (ref 32–36)
MCV RBC AUTO: 83 FL — SIGNIFICANT CHANGE UP (ref 80–100)
PLATELET # BLD AUTO: 432 K/UL — HIGH (ref 150–400)
POTASSIUM SERPL-MCNC: 4.2 MMOL/L — SIGNIFICANT CHANGE UP (ref 3.5–5.3)
POTASSIUM SERPL-SCNC: 4.2 MMOL/L — SIGNIFICANT CHANGE UP (ref 3.5–5.3)
RBC # BLD: 3.03 M/UL — LOW (ref 3.8–5.2)
RBC # FLD: 15.3 % — HIGH (ref 10.3–14.5)
SODIUM SERPL-SCNC: 139 MMOL/L — SIGNIFICANT CHANGE UP (ref 135–145)
WBC # BLD: 11.3 K/UL — HIGH (ref 3.8–10.5)
WBC # FLD AUTO: 11.3 K/UL — HIGH (ref 3.8–10.5)

## 2017-06-11 RX ORDER — SODIUM CHLORIDE 9 MG/ML
1 INJECTION INTRAMUSCULAR; INTRAVENOUS; SUBCUTANEOUS
Qty: 0 | Refills: 0 | Status: DISCONTINUED | OUTPATIENT
Start: 2017-06-11 | End: 2017-06-11

## 2017-06-11 RX ADMIN — Medication 1 TABLET(S): at 05:55

## 2017-06-11 RX ADMIN — SODIUM CHLORIDE 80 MILLILITER(S): 9 INJECTION INTRAMUSCULAR; INTRAVENOUS; SUBCUTANEOUS at 23:19

## 2017-06-11 RX ADMIN — INSULIN GLARGINE 35 UNIT(S): 100 INJECTION, SOLUTION SUBCUTANEOUS at 05:56

## 2017-06-11 RX ADMIN — HYDROMORPHONE HYDROCHLORIDE 30 MILLILITER(S): 2 INJECTION INTRAMUSCULAR; INTRAVENOUS; SUBCUTANEOUS at 22:18

## 2017-06-11 RX ADMIN — PIPERACILLIN AND TAZOBACTAM 25 GRAM(S): 4; .5 INJECTION, POWDER, LYOPHILIZED, FOR SOLUTION INTRAVENOUS at 06:09

## 2017-06-11 RX ADMIN — Medication 2: at 18:48

## 2017-06-11 RX ADMIN — Medication 20 UNIT(S): at 12:42

## 2017-06-11 RX ADMIN — HYDROMORPHONE HYDROCHLORIDE 30 MILLILITER(S): 2 INJECTION INTRAMUSCULAR; INTRAVENOUS; SUBCUTANEOUS at 10:01

## 2017-06-11 RX ADMIN — Medication 100 MILLIGRAM(S): at 13:19

## 2017-06-11 RX ADMIN — ATORVASTATIN CALCIUM 40 MILLIGRAM(S): 80 TABLET, FILM COATED ORAL at 23:13

## 2017-06-11 RX ADMIN — HYDROMORPHONE HYDROCHLORIDE 30 MILLILITER(S): 2 INJECTION INTRAMUSCULAR; INTRAVENOUS; SUBCUTANEOUS at 19:25

## 2017-06-11 RX ADMIN — HYDROMORPHONE HYDROCHLORIDE 30 MILLILITER(S): 2 INJECTION INTRAMUSCULAR; INTRAVENOUS; SUBCUTANEOUS at 14:18

## 2017-06-11 RX ADMIN — SENNA PLUS 2 TABLET(S): 8.6 TABLET ORAL at 23:13

## 2017-06-11 RX ADMIN — Medication 20 UNIT(S): at 18:48

## 2017-06-11 RX ADMIN — HYDROMORPHONE HYDROCHLORIDE 30 MILLILITER(S): 2 INJECTION INTRAMUSCULAR; INTRAVENOUS; SUBCUTANEOUS at 06:06

## 2017-06-11 RX ADMIN — Medication 20 UNIT(S): at 09:02

## 2017-06-11 RX ADMIN — ENOXAPARIN SODIUM 40 MILLIGRAM(S): 100 INJECTION SUBCUTANEOUS at 05:56

## 2017-06-11 RX ADMIN — Medication 100 MILLIGRAM(S): at 23:13

## 2017-06-11 RX ADMIN — HYDROMORPHONE HYDROCHLORIDE 30 MILLILITER(S): 2 INJECTION INTRAMUSCULAR; INTRAVENOUS; SUBCUTANEOUS at 01:59

## 2017-06-11 RX ADMIN — HYDROMORPHONE HYDROCHLORIDE 30 MILLILITER(S): 2 INJECTION INTRAMUSCULAR; INTRAVENOUS; SUBCUTANEOUS at 07:16

## 2017-06-11 RX ADMIN — INSULIN GLARGINE 35 UNIT(S): 100 INJECTION, SOLUTION SUBCUTANEOUS at 19:30

## 2017-06-11 RX ADMIN — ONDANSETRON 4 MILLIGRAM(S): 8 TABLET, FILM COATED ORAL at 22:14

## 2017-06-11 RX ADMIN — PIPERACILLIN AND TAZOBACTAM 25 GRAM(S): 4; .5 INJECTION, POWDER, LYOPHILIZED, FOR SOLUTION INTRAVENOUS at 23:18

## 2017-06-11 RX ADMIN — HYDROMORPHONE HYDROCHLORIDE 30 MILLILITER(S): 2 INJECTION INTRAMUSCULAR; INTRAVENOUS; SUBCUTANEOUS at 18:17

## 2017-06-11 RX ADMIN — PANTOPRAZOLE SODIUM 40 MILLIGRAM(S): 20 TABLET, DELAYED RELEASE ORAL at 05:55

## 2017-06-11 RX ADMIN — PIPERACILLIN AND TAZOBACTAM 25 GRAM(S): 4; .5 INJECTION, POWDER, LYOPHILIZED, FOR SOLUTION INTRAVENOUS at 14:16

## 2017-06-11 RX ADMIN — Medication 4: at 12:46

## 2017-06-11 NOTE — PROGRESS NOTE ADULT - SUBJECTIVE AND OBJECTIVE BOX
Day __5/2_ of Anesthesia Pain Management Service    SUBJECTIVE:    Pain Scale Score	At rest: _2__ 	With Activity: __2_ 	[ ] Refer to charted pain scores    THERAPY:          IV PCA Morphine		[ ] 5 mg/mL	[ ] 1 mg/mL       x IV PCA Hydromorphone	   . 5 mg/mL	    1 mg/mL        IV PCA Fentanyl		[ ] 50 micrograms/mL    Demand dose    lockout    (minutes) Continuous Rate    Total:     Daily      MEDICATIONS  (STANDING):  pantoprazole    Tablet 40milliGRAM(s) Oral before breakfast  atorvastatin 40milliGRAM(s) Oral at bedtime  docusate sodium 100milliGRAM(s) Oral three times a day  senna 2Tablet(s) Oral at bedtime  insulin lispro (HumaLOG) corrective regimen sliding scale  SubCutaneous three times a day before meals  dextrose 5%. 1000milliLiter(s) IV Continuous <Continuous>  dextrose 50% Injectable 12.5Gram(s) IV Push once  dextrose 50% Injectable 25Gram(s) IV Push once  dextrose 50% Injectable 25Gram(s) IV Push once  enoxaparin Injectable 40milliGRAM(s) SubCutaneous every 24 hours  HYDROmorphone PCA (1 mG/mL) 30milliLiter(s) PCA Continuous PCA Continuous  sodium chloride 0.9%. 1000milliLiter(s) IV Continuous <Continuous>  piperacillin/tazobactam IVPB. 3.375Gram(s) IV Intermittent every 8 hours  insulin glargine Injectable (LANTUS) 35Unit(s) SubCutaneous two times a day  insulin lispro Injectable (HumaLOG) 20Unit(s) SubCutaneous three times a day before meals  insulin lispro (HumaLOG) corrective regimen sliding scale  SubCutaneous at bedtime    MEDICATIONS  (PRN):  HYDROmorphone  Injectable 0.5milliGRAM(s) IV Push every 10 minutes PRN Moderate Pain  HYDROmorphone  Injectable 1milliGRAM(s) IV Push every 10 minutes PRN Severe Pain (7 - 10)  ondansetron Injectable 4milliGRAM(s) IV Push once PRN Nausea and/or Vomiting  ondansetron Injectable 4milliGRAM(s) IV Push every 6 hours PRN Nausea  dextrose Gel 1Dose(s) Oral once PRN Blood Glucose LESS THAN 70 milliGRAM(s)/deciliter  glucagon  Injectable 1milliGRAM(s) IntraMuscular once PRN Glucose LESS THAN 70 milligrams/deciliter  HYDROmorphone PCA (1 mG/mL) Rescue Clinician Bolus 0.5milliGRAM(s) IV Push every 15 minutes PRN for Pain Scale GREATER THAN 6  naloxone Injectable 0.1milliGRAM(s) IV Push every 3 minutes PRN For ANY of the following changes in patient status:  A. RR LESS THAN 10 breaths per minute, B. Oxygen saturation LESS THAN 90%, C. Sedation score of 6      OBJECTIVE:    Sedation Score:	Alert   x      Drowsy 	[ ] Arousable	[ ] Asleep	[ ] Unresponsive    Side Effects:	xNone	[ ] Nausea	[ ] Vomiting	[ ] Pruritus  		[ ] Other:    Vital Signs Last 24 Hrs  T(C): 36.9, Max: 36.9 (06-11 @ 02:01)  T(F): 98.4, Max: 98.5 (06-11 @ 02:01)  HR: 75 (71 - 96)  BP: 133/76 (95/68 - 133/76)  BP(mean): --  RR: 18 (18 - 18)  SpO2: 95% (95% - 99%)    ASSESSMENT/ PLAN    Therapy to  be:	Continue x      Discontinued       Change to prn Analgesics    Documentation and Verification of current medications:   [X] Done	[ ] Not done, not elligible    Comments:
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
## 2017-06-11 NOTE — PROGRESS NOTE ADULT - ASSESSMENT
s/p exfix of Right Distal humerus/olecranon/radial head fracture.  S/p ID with vac changes x2. VSS. NAD

## 2017-06-11 NOTE — PROGRESS NOTE ADULT - SUBJECTIVE AND OBJECTIVE BOX
RICARDO GILES  56y  Female      Patient is a 56y old  Female who presents with a chief complaint of R arm near amputation (04 Jun 2017 07:15)  pt.has no new c/o,pain managable,no fever,no sob,no cp    REVIEW OF SYSTEMS:  as above    INTERVAL HPI/OVERNIGHT EVENTS:  T(C): 36.9, Max: 37.2 (06-11 @ 09:06)  HR: 88 (75 - 88)  BP: 125/75 (116/75 - 137/79)  RR: 18 (18 - 18)  SpO2: 98% (95% - 99%)  Wt(kg): --  I&O's Summary  I & Os for 24h ending 11 Jun 2017 07:00  =============================================  IN: 2910 ml / OUT: 750 ml / NET: 2160 ml    I & Os for current day (as of 11 Jun 2017 21:40)  =============================================  IN: 700 ml / OUT: 1400 ml / NET: -700 ml    T(C): 36.9, Max: 37.2 (06-11 @ 09:06)  HR: 88 (75 - 88)  BP: 125/75 (116/75 - 137/79)  RR: 18 (18 - 18)  SpO2: 98% (95% - 99%)  Wt(kg): --Vital Signs Last 24 Hrs  T(C): 36.9, Max: 37.2 (06-11 @ 09:06)  T(F): 98.4, Max: 98.9 (06-11 @ 09:06)  HR: 88 (75 - 88)  BP: 125/75 (116/75 - 137/79)  BP(mean): --  RR: 18 (18 - 18)  SpO2: 98% (95% - 99%)    LABS:                        8.6    11.3  )-----------( 432      ( 11 Jun 2017 16:00 )             25.2     06-11    139  |  100  |  16  ----------------------------<  112<H>  4.2   |  25  |  1.31<H>    Ca    9.1      11 Jun 2017 16:00          CAPILLARY BLOOD GLUCOSE  158 (11 Jun 2017 18:22)  201 (11 Jun 2017 12:35)  130 (11 Jun 2017 08:08)  120 (10 Domenico 2017 22:21)            PAST MEDICAL & SURGICAL HISTORY:  Essential hypertension  Type 2 diabetes mellitus without complication, with long-term current use of insulin  No significant past surgical history  No significant past surgical history      MEDICATIONS  (STANDING):  pantoprazole    Tablet 40milliGRAM(s) Oral before breakfast  atorvastatin 40milliGRAM(s) Oral at bedtime  docusate sodium 100milliGRAM(s) Oral three times a day  senna 2Tablet(s) Oral at bedtime  insulin lispro (HumaLOG) corrective regimen sliding scale  SubCutaneous three times a day before meals  dextrose 5%. 1000milliLiter(s) IV Continuous <Continuous>  dextrose 50% Injectable 12.5Gram(s) IV Push once  dextrose 50% Injectable 25Gram(s) IV Push once  dextrose 50% Injectable 25Gram(s) IV Push once  enoxaparin Injectable 40milliGRAM(s) SubCutaneous every 24 hours  HYDROmorphone PCA (1 mG/mL) 30milliLiter(s) PCA Continuous PCA Continuous  sodium chloride 0.9%. 1000milliLiter(s) IV Continuous <Continuous>  piperacillin/tazobactam IVPB. 3.375Gram(s) IV Intermittent every 8 hours  insulin glargine Injectable (LANTUS) 35Unit(s) SubCutaneous two times a day  insulin lispro Injectable (HumaLOG) 20Unit(s) SubCutaneous three times a day before meals  insulin lispro (HumaLOG) corrective regimen sliding scale  SubCutaneous at bedtime    MEDICATIONS  (PRN):  HYDROmorphone  Injectable 0.5milliGRAM(s) IV Push every 10 minutes PRN Moderate Pain  HYDROmorphone  Injectable 1milliGRAM(s) IV Push every 10 minutes PRN Severe Pain (7 - 10)  ondansetron Injectable 4milliGRAM(s) IV Push once PRN Nausea and/or Vomiting  ondansetron Injectable 4milliGRAM(s) IV Push every 6 hours PRN Nausea  dextrose Gel 1Dose(s) Oral once PRN Blood Glucose LESS THAN 70 milliGRAM(s)/deciliter  glucagon  Injectable 1milliGRAM(s) IntraMuscular once PRN Glucose LESS THAN 70 milligrams/deciliter  HYDROmorphone PCA (1 mG/mL) Rescue Clinician Bolus 0.5milliGRAM(s) IV Push every 15 minutes PRN for Pain Scale GREATER THAN 6  naloxone Injectable 0.1milliGRAM(s) IV Push every 3 minutes PRN For ANY of the following changes in patient status:  A. RR LESS THAN 10 breaths per minute, B. Oxygen saturation LESS THAN 90%, C. Sedation score of 6        RADIOLOGY & ADDITIONAL TESTS:    Imaging Personally Reviewed:  [ ] YES  [ ] NO    Consultant(s) Notes Reviewed:  [ ] YES  [ ] NO    PHYSICAL EXAM:  GENERAL: NAD, well-groomed, well-developed  HEAD:  Atraumatic, Normocephalic  EYES: EOMI, PERRLA, conjunctiva and sclera clear  ENMT: No tonsillar erythema, exudates, or enlargement; Moist mucous membranes, Good dentition, No lesions  NECK: Supple, No JVD, Normal thyroid  NERVOUS SYSTEM:  Alert & Oriented X3, Good concentration; Motor Strength 5/5 B/L upper and lower extremities; DTRs 2+ intact and symmetric  CHEST/LUNG: Clear to percussion bilaterally; No rales, rhonchi, wheezing, or rubs  HEART: Regular rate and rhythm; No murmurs, rubs, or gallops  ABDOMEN: Soft, Nontender, Nondistended; Bowel sounds present  EXTREMITIES:  2+ Peripheral Pulses, No clubbing, cyanosis, or edema.RUE-partial amputation,debridement  LYMPH: No lymphadenopathy noted  SKIN: No rashes or lesions    Care Discussed with Consultants/Other Providers [ ] YES  [ ] NO    :     Code Status: [] Full Code [] DNR [] DNI [] Goals of Care:   Disposition: [] ICU [] Stroke Unit [] RCU []PCU []Floor [] Discharge Home         MAXX CasianoP

## 2017-06-11 NOTE — PROGRESS NOTE ADULT - SUBJECTIVE AND OBJECTIVE BOX
ATP SURGERY      SUBJECTIVE:   denies n/v/f/c, denies SOB or CP.     OBJECTIVE:   T(C): 36.5, Max: 36.7 (06-09 @ 07:00)  HR: 63 (63 - 98)  BP: 100/64 (100/64 - 146/64)  RR: 16 (14 - 38)  SpO2: 96% (95% - 100%)  Wt(kg): --    General: NAD, Lying in bed comfortably  Neuro: A+Ox3  HEENT: Scalp lac with sutures in place, no active bleeding, minimally tender.    Extremities: Right upper extremity with ex-fix in place, elevated.  Distal motor+sensory grossly intact, weakness with  strength.  Wound vac in place. Strong radial pulse.       Vital Signs Last 24 Hrs  T(C): 36.8, Max: 37.2 (06-11 @ 09:06)  T(F): 98.3, Max: 98.9 (06-11 @ 09:06)  HR: 80 (71 - 96)  BP: 127/67 (102/70 - 137/79)  BP(mean): --  RR: 18 (18 - 18)  SpO2: 98% (95% - 99%)    I&O's Detail  I & Os for 24h ending 11 Jun 2017 07:00  =============================================  IN:    Oral Fluid: 1530 ml    sodium chloride 0.9%.: 1180 ml    Solution: 200 ml    Total IN: 2910 ml  ---------------------------------------------  OUT:    Voided: 600 ml    VAC (Vacuum Assisted Closure) System: 150 ml    Total OUT: 750 ml  ---------------------------------------------  Total NET: 2160 ml    I & Os for current day (as of 11 Jun 2017 12:36)  =============================================  IN:    Oral Fluid: 600 ml    Total IN: 600 ml  ---------------------------------------------  OUT:    Voided: 600 ml    Total OUT: 600 ml  ---------------------------------------------  Total NET: 0 ml
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
## 2017-06-11 NOTE — PROGRESS NOTE ADULT - SUBJECTIVE AND OBJECTIVE BOX
Post op Day [2]    Patient resting without complaints.  No chest pain, SOB, N/V.    T(C): 37.2, Max: 37.2 (06-11 @ 09:06)  HR: 80 (71 - 96)  BP: 137/79 (102/70 - 137/79)  RR: 18 (18 - 18)  SpO2: 95% (95% - 99%)  Wt(kg): --    Exam:  Alert and Oriented, No Acute Distress  Right Upper extremity in external fixator.  Sites clean, dry and intact.  Wrist and digits pink, warm, mobile with brisk cap refill. SILT.  Compartments soft.  Lower Extremities:  Calves Soft, Non-tender bilaterally

## 2017-06-11 NOTE — PROGRESS NOTE ADULT - SUBJECTIVE AND OBJECTIVE BOX
Plastic Surgery Progress Note    S: Pt doing well; no acute events overnight;resting comfortably this AM. Complains of "stomach ache", has not had BM.    Vital Signs Last 24 Hrs  T(C): 36.9, Max: 36.9 (06-11 @ 02:01)  T(F): 98.4, Max: 98.5 (06-11 @ 02:01)  HR: 75 (71 - 96)  BP: 133/76 (95/68 - 133/76)  BP(mean): --  RR: 18 (18 - 18)  SpO2: 95% (95% - 99%)    I&O's Detail    I & Os for current day (as of 11 Jun 2017 07:49)  =============================================  IN:    Oral Fluid: 1530 ml    sodium chloride 0.9%.: 1180 ml    Solution: 200 ml    Total IN: 2910 ml  ---------------------------------------------  OUT:    Voided: 600 ml    VAC (Vacuum Assisted Closure) System: 150 ml    Total OUT: 750 ml  ---------------------------------------------  Total NET: 2160 ml        Physical Exam:  General: Awake and alert, NAD  RUE: ex-fix in place, vac in place; hand is warm, well-perfused Plastic Surgery Progress Note    S: Pt doing well; no acute events overnight;resting comfortably this AM. Complains of "stomach ache", has not had BM, + flatus    Vital Signs Last 24 Hrs  T(C): 36.9, Max: 36.9 (06-11 @ 02:01)  T(F): 98.4, Max: 98.5 (06-11 @ 02:01)  HR: 75 (71 - 96)  BP: 133/76 (95/68 - 133/76)  BP(mean): --  RR: 18 (18 - 18)  SpO2: 95% (95% - 99%)    I&O's Detail    I & Os for current day (as of 11 Jun 2017 07:49)  =============================================  IN:    Oral Fluid: 1530 ml    sodium chloride 0.9%.: 1180 ml    Solution: 200 ml    Total IN: 2910 ml  ---------------------------------------------  OUT:    Voided: 600 ml    VAC (Vacuum Assisted Closure) System: 150 ml    Total OUT: 750 ml  ---------------------------------------------  Total NET: 2160 ml        Physical Exam:  General: Awake and alert, NAD  RUE: ex-fix in place, vac in place; hand is warm, well-perfused

## 2017-06-11 NOTE — PROGRESS NOTE ADULT - PROBLEM SELECTOR PLAN 1
NWB in exfix  IS  DVT PPx  Pain Control  Continue Current Tx.  Plan per primary team  awaiting surgical plan per Dr. Maloney  50/50 pincare to sites twice daily, no vac changes per Dr. Maloney.
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
## 2017-06-12 LAB
ANION GAP SERPL CALC-SCNC: 14 MMOL/L — SIGNIFICANT CHANGE UP (ref 5–17)
BUN SERPL-MCNC: 14 MG/DL — SIGNIFICANT CHANGE UP (ref 7–23)
CALCIUM SERPL-MCNC: 8.9 MG/DL — SIGNIFICANT CHANGE UP (ref 8.4–10.5)
CHLORIDE SERPL-SCNC: 99 MMOL/L — SIGNIFICANT CHANGE UP (ref 96–108)
CO2 SERPL-SCNC: 25 MMOL/L — SIGNIFICANT CHANGE UP (ref 22–31)
CREAT SERPL-MCNC: 1.22 MG/DL — SIGNIFICANT CHANGE UP (ref 0.5–1.3)
GLUCOSE SERPL-MCNC: 180 MG/DL — HIGH (ref 70–99)
HCT VFR BLD CALC: 24.9 % — LOW (ref 34.5–45)
HGB BLD-MCNC: 8.2 G/DL — LOW (ref 11.5–15.5)
MAGNESIUM SERPL-MCNC: 1.7 MG/DL — SIGNIFICANT CHANGE UP (ref 1.6–2.6)
MCHC RBC-ENTMCNC: 27.4 PG — SIGNIFICANT CHANGE UP (ref 27–34)
MCHC RBC-ENTMCNC: 32.8 GM/DL — SIGNIFICANT CHANGE UP (ref 32–36)
MCV RBC AUTO: 83.5 FL — SIGNIFICANT CHANGE UP (ref 80–100)
PHOSPHATE SERPL-MCNC: 2.7 MG/DL — SIGNIFICANT CHANGE UP (ref 2.5–4.5)
PLATELET # BLD AUTO: 430 K/UL — HIGH (ref 150–400)
POTASSIUM SERPL-MCNC: 4 MMOL/L — SIGNIFICANT CHANGE UP (ref 3.5–5.3)
POTASSIUM SERPL-SCNC: 4 MMOL/L — SIGNIFICANT CHANGE UP (ref 3.5–5.3)
RBC # BLD: 2.99 M/UL — LOW (ref 3.8–5.2)
RBC # FLD: 15.6 % — HIGH (ref 10.3–14.5)
SODIUM SERPL-SCNC: 138 MMOL/L — SIGNIFICANT CHANGE UP (ref 135–145)
WBC # BLD: 9.8 K/UL — SIGNIFICANT CHANGE UP (ref 3.8–10.5)
WBC # FLD AUTO: 9.8 K/UL — SIGNIFICANT CHANGE UP (ref 3.8–10.5)

## 2017-06-12 PROCEDURE — 99232 SBSQ HOSP IP/OBS MODERATE 35: CPT

## 2017-06-12 RX ORDER — DOCUSATE SODIUM 100 MG
100 CAPSULE ORAL THREE TIMES A DAY
Qty: 0 | Refills: 0 | Status: DISCONTINUED | OUTPATIENT
Start: 2017-06-12 | End: 2017-06-12

## 2017-06-12 RX ORDER — INSULIN LISPRO 100/ML
10 VIAL (ML) SUBCUTANEOUS ONCE
Qty: 0 | Refills: 0 | Status: COMPLETED | OUTPATIENT
Start: 2017-06-12 | End: 2017-06-12

## 2017-06-12 RX ORDER — HYDROMORPHONE HYDROCHLORIDE 2 MG/ML
1 INJECTION INTRAMUSCULAR; INTRAVENOUS; SUBCUTANEOUS EVERY 4 HOURS
Qty: 0 | Refills: 0 | Status: DISCONTINUED | OUTPATIENT
Start: 2017-06-12 | End: 2017-06-13

## 2017-06-12 RX ORDER — HYDROMORPHONE HYDROCHLORIDE 2 MG/ML
2 INJECTION INTRAMUSCULAR; INTRAVENOUS; SUBCUTANEOUS
Qty: 0 | Refills: 0 | Status: DISCONTINUED | OUTPATIENT
Start: 2017-06-12 | End: 2017-06-13

## 2017-06-12 RX ORDER — HYDROMORPHONE HYDROCHLORIDE 2 MG/ML
4 INJECTION INTRAMUSCULAR; INTRAVENOUS; SUBCUTANEOUS EVERY 4 HOURS
Qty: 0 | Refills: 0 | Status: DISCONTINUED | OUTPATIENT
Start: 2017-06-12 | End: 2017-06-13

## 2017-06-12 RX ORDER — OXYCODONE HYDROCHLORIDE 5 MG/1
5 TABLET ORAL
Qty: 0 | Refills: 0 | Status: DISCONTINUED | OUTPATIENT
Start: 2017-06-12 | End: 2017-06-12

## 2017-06-12 RX ORDER — POLYETHYLENE GLYCOL 3350 17 G/17G
17 POWDER, FOR SOLUTION ORAL DAILY
Qty: 0 | Refills: 0 | Status: DISCONTINUED | OUTPATIENT
Start: 2017-06-12 | End: 2017-06-13

## 2017-06-12 RX ORDER — OXYCODONE HYDROCHLORIDE 5 MG/1
10 TABLET ORAL EVERY 4 HOURS
Qty: 0 | Refills: 0 | Status: DISCONTINUED | OUTPATIENT
Start: 2017-06-12 | End: 2017-06-12

## 2017-06-12 RX ORDER — INSULIN GLARGINE 100 [IU]/ML
28 INJECTION, SOLUTION SUBCUTANEOUS ONCE
Qty: 0 | Refills: 0 | Status: COMPLETED | OUTPATIENT
Start: 2017-06-12 | End: 2017-06-12

## 2017-06-12 RX ADMIN — Medication 2: at 08:49

## 2017-06-12 RX ADMIN — ATORVASTATIN CALCIUM 40 MILLIGRAM(S): 80 TABLET, FILM COATED ORAL at 22:12

## 2017-06-12 RX ADMIN — HYDROMORPHONE HYDROCHLORIDE 4 MILLIGRAM(S): 2 INJECTION INTRAMUSCULAR; INTRAVENOUS; SUBCUTANEOUS at 15:10

## 2017-06-12 RX ADMIN — HYDROMORPHONE HYDROCHLORIDE 1 MILLIGRAM(S): 2 INJECTION INTRAMUSCULAR; INTRAVENOUS; SUBCUTANEOUS at 16:15

## 2017-06-12 RX ADMIN — HYDROMORPHONE HYDROCHLORIDE 4 MILLIGRAM(S): 2 INJECTION INTRAMUSCULAR; INTRAVENOUS; SUBCUTANEOUS at 21:30

## 2017-06-12 RX ADMIN — PIPERACILLIN AND TAZOBACTAM 25 GRAM(S): 4; .5 INJECTION, POWDER, LYOPHILIZED, FOR SOLUTION INTRAVENOUS at 15:58

## 2017-06-12 RX ADMIN — Medication 100 MILLIGRAM(S): at 06:19

## 2017-06-12 RX ADMIN — Medication 20 UNIT(S): at 08:50

## 2017-06-12 RX ADMIN — HYDROMORPHONE HYDROCHLORIDE 30 MILLILITER(S): 2 INJECTION INTRAMUSCULAR; INTRAVENOUS; SUBCUTANEOUS at 06:19

## 2017-06-12 RX ADMIN — SENNA PLUS 2 TABLET(S): 8.6 TABLET ORAL at 22:12

## 2017-06-12 RX ADMIN — OXYCODONE HYDROCHLORIDE 10 MILLIGRAM(S): 5 TABLET ORAL at 11:33

## 2017-06-12 RX ADMIN — Medication 20 UNIT(S): at 13:22

## 2017-06-12 RX ADMIN — ENOXAPARIN SODIUM 40 MILLIGRAM(S): 100 INJECTION SUBCUTANEOUS at 06:27

## 2017-06-12 RX ADMIN — HYDROMORPHONE HYDROCHLORIDE 1 MILLIGRAM(S): 2 INJECTION INTRAMUSCULAR; INTRAVENOUS; SUBCUTANEOUS at 22:55

## 2017-06-12 RX ADMIN — PANTOPRAZOLE SODIUM 40 MILLIGRAM(S): 20 TABLET, DELAYED RELEASE ORAL at 06:19

## 2017-06-12 RX ADMIN — HYDROMORPHONE HYDROCHLORIDE 30 MILLILITER(S): 2 INJECTION INTRAMUSCULAR; INTRAVENOUS; SUBCUTANEOUS at 02:30

## 2017-06-12 RX ADMIN — Medication 100 MILLIGRAM(S): at 22:12

## 2017-06-12 RX ADMIN — HYDROMORPHONE HYDROCHLORIDE 1 MILLIGRAM(S): 2 INJECTION INTRAMUSCULAR; INTRAVENOUS; SUBCUTANEOUS at 23:00

## 2017-06-12 RX ADMIN — HYDROMORPHONE HYDROCHLORIDE 30 MILLILITER(S): 2 INJECTION INTRAMUSCULAR; INTRAVENOUS; SUBCUTANEOUS at 07:55

## 2017-06-12 RX ADMIN — Medication 10 UNIT(S): at 18:54

## 2017-06-12 RX ADMIN — HYDROMORPHONE HYDROCHLORIDE 4 MILLIGRAM(S): 2 INJECTION INTRAMUSCULAR; INTRAVENOUS; SUBCUTANEOUS at 14:38

## 2017-06-12 RX ADMIN — HYDROMORPHONE HYDROCHLORIDE 1 MILLIGRAM(S): 2 INJECTION INTRAMUSCULAR; INTRAVENOUS; SUBCUTANEOUS at 15:58

## 2017-06-12 RX ADMIN — HYDROMORPHONE HYDROCHLORIDE 4 MILLIGRAM(S): 2 INJECTION INTRAMUSCULAR; INTRAVENOUS; SUBCUTANEOUS at 20:52

## 2017-06-12 RX ADMIN — PIPERACILLIN AND TAZOBACTAM 25 GRAM(S): 4; .5 INJECTION, POWDER, LYOPHILIZED, FOR SOLUTION INTRAVENOUS at 22:56

## 2017-06-12 RX ADMIN — Medication 100 MILLIGRAM(S): at 13:24

## 2017-06-12 RX ADMIN — INSULIN GLARGINE 35 UNIT(S): 100 INJECTION, SOLUTION SUBCUTANEOUS at 08:46

## 2017-06-12 RX ADMIN — PIPERACILLIN AND TAZOBACTAM 25 GRAM(S): 4; .5 INJECTION, POWDER, LYOPHILIZED, FOR SOLUTION INTRAVENOUS at 08:54

## 2017-06-12 RX ADMIN — Medication 2: at 13:22

## 2017-06-12 RX ADMIN — POLYETHYLENE GLYCOL 3350 17 GRAM(S): 17 POWDER, FOR SOLUTION ORAL at 06:27

## 2017-06-12 RX ADMIN — OXYCODONE HYDROCHLORIDE 10 MILLIGRAM(S): 5 TABLET ORAL at 12:00

## 2017-06-12 RX ADMIN — INSULIN GLARGINE 28 UNIT(S): 100 INJECTION, SOLUTION SUBCUTANEOUS at 22:12

## 2017-06-12 NOTE — PROGRESS NOTE ADULT - SUBJECTIVE AND OBJECTIVE BOX
Day __6/3 of Anesthesia Pain Management Service    SUBJECTIVE:    Pain Scale Score	At rest: ___ 	With Activity: ___ 	[x ] Refer to charted pain scores    THERAPY:    [ ] IV PCA Morphine		[ ] 5 mg/mL	[ ] 1 mg/mL  [x ] IV PCA Hydromorphone	[ ] 5 mg/mL	[x ] 1 mg/mL  [ ] IV PCA Fentanyl		[ ] 50 micrograms/mL    Demand dose  .2  lockout  6  (minutes)       MEDICATIONS  (STANDING):  pantoprazole    Tablet 40milliGRAM(s) Oral before breakfast  atorvastatin 40milliGRAM(s) Oral at bedtime  docusate sodium 100milliGRAM(s) Oral three times a day  senna 2Tablet(s) Oral at bedtime  insulin lispro (HumaLOG) corrective regimen sliding scale  SubCutaneous three times a day before meals  dextrose 5%. 1000milliLiter(s) IV Continuous <Continuous>  dextrose 50% Injectable 12.5Gram(s) IV Push once  dextrose 50% Injectable 25Gram(s) IV Push once  dextrose 50% Injectable 25Gram(s) IV Push once  sodium chloride 0.9%. 1000milliLiter(s) IV Continuous <Continuous>  piperacillin/tazobactam IVPB. 3.375Gram(s) IV Intermittent every 8 hours  insulin glargine Injectable (LANTUS) 35Unit(s) SubCutaneous two times a day  insulin lispro Injectable (HumaLOG) 20Unit(s) SubCutaneous three times a day before meals  insulin lispro (HumaLOG) corrective regimen sliding scale  SubCutaneous at bedtime  polyethylene glycol 3350 17Gram(s) Oral daily    MEDICATIONS  (PRN):  ondansetron Injectable 4milliGRAM(s) IV Push every 6 hours PRN Nausea  dextrose Gel 1Dose(s) Oral once PRN Blood Glucose LESS THAN 70 milliGRAM(s)/deciliter  glucagon  Injectable 1milliGRAM(s) IntraMuscular once PRN Glucose LESS THAN 70 milligrams/deciliter  oxyCODONE IR 5milliGRAM(s) Oral every 3 hours PRN Moderate Pain (4 - 6)  oxyCODONE IR 10milliGRAM(s) Oral every 4 hours PRN Severe Pain (7 - 10)      OBJECTIVE:    Sedation Score:	[x ] Alert	[ ] Drowsy 	[ ] Arousable	[ ] Asleep	[ ] Unresponsive    Side Effects:	[x ] None	[ ] Nausea	[ ] Vomiting	[ ] Pruritus  		[ ] Other:    Vital Signs Last 24 Hrs  T(C): 36.8, Max: 37.3 (06-12 @ 05:52)  T(F): 98.2, Max: 99.1 (06-12 @ 05:52)  HR: 78 (78 - 88)  BP: 132/76 (125/75 - 150/89)  BP(mean): --  RR: 18 (18 - 18)  SpO2: 96% (96% - 99%)    ASSESSMENT/ PLAN    Therapy to  be:	[ ] Continue   [x ] Discontinued   [x ] Change to prn Analgesics    Documentation and Verification of current medications:   [X] Done	[ ] Not done, not elligible    Comments:

## 2017-06-12 NOTE — PROGRESS NOTE ADULT - SUBJECTIVE AND OBJECTIVE BOX
Ortho Preop Note    Diagnosis: R elbow Grade 2B open fracture  Surgeon: Haider  Procedure: I&D, antibiotic cement spacer, wound vac    Labs:                         8.6    11.3  )-----------( 432      ( 11 Jun 2017 16:00 )             25.2     06-11    139  |  100  |  16  ----------------------------<  112<H>  4.2   |  25  |  1.31<H>    Ca    9.1      11 Jun 2017 16:00      T&S: Done  UA: Prev neg  ECG: in chart  CXR: done  Consent: done    Plan:  - NPOpMN, IVF when NPO  - Hold chemical DVT prophylaxis/anticoagulation pMN  - Pain control  - IS  - Plan for OR tomorrow    Cassandra Reza PGY2  [phone]
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
## 2017-06-12 NOTE — CHART NOTE - NSCHARTNOTEFT_GEN_A_CORE
Source: Patient [X]    Family [ ]     other [X]:chart review, RD initial note    Diet : Consistent CHO with evening snack      Patient reports [X] nausea  [ ] vomiting [ ] diarrhea [ ] constipation  [ ]chewing problems [ ] swallowing issues  [X] other: Nausea medically managed     PO intake:  < 50% [ ] 50-75% [X] % [ ]  other :     Source for PO intake [X] Patient [ ] family [ ] chart [ ] staff [ ] other     Enteral /Parenteral Nutrition: N/A      Current Weight: Weight (kg): 100.8 (06-09 @ 01:44)  % Weight Change: N/A    Pertinent Medications: MEDICATIONS  (STANDING):  pantoprazole    Tablet 40milliGRAM(s) Oral before breakfast  atorvastatin 40milliGRAM(s) Oral at bedtime  docusate sodium 100milliGRAM(s) Oral three times a day  senna 2Tablet(s) Oral at bedtime  insulin lispro (HumaLOG) corrective regimen sliding scale  SubCutaneous three times a day before meals  dextrose 5%. 1000milliLiter(s) IV Continuous <Continuous>  dextrose 50% Injectable 12.5Gram(s) IV Push once  dextrose 50% Injectable 25Gram(s) IV Push once  dextrose 50% Injectable 25Gram(s) IV Push once  sodium chloride 0.9%. 1000milliLiter(s) IV Continuous <Continuous>  piperacillin/tazobactam IVPB. 3.375Gram(s) IV Intermittent every 8 hours  insulin glargine Injectable (LANTUS) 35Unit(s) SubCutaneous two times a day  insulin lispro Injectable (HumaLOG) 20Unit(s) SubCutaneous three times a day before meals  insulin lispro (HumaLOG) corrective regimen sliding scale  SubCutaneous at bedtime  polyethylene glycol 3350 17Gram(s) Oral daily    MEDICATIONS  (PRN):  ondansetron Injectable 4milliGRAM(s) IV Push every 6 hours PRN Nausea  dextrose Gel 1Dose(s) Oral once PRN Blood Glucose LESS THAN 70 milliGRAM(s)/deciliter  glucagon  Injectable 1milliGRAM(s) IntraMuscular once PRN Glucose LESS THAN 70 milligrams/deciliter  HYDROmorphone   Tablet 2milliGRAM(s) Oral every 3 hours PRN Moderate Pain (4 - 6)  HYDROmorphone   Tablet 4milliGRAM(s) Oral every 4 hours PRN Severe Pain (7 - 10)  HYDROmorphone  Injectable 1milliGRAM(s) IV Push every 4 hours PRN breakthrough pain    Pertinent Labs:  06-12 Na138 mmol/L Glu 180 mg/dL<H> K+ 4.0 mmol/L Cr  1.22 mg/dL BUN 14 mg/dL Phos 2.7 mg/dL Alb n/a   PAB n/a         Skin: no pressure injuries  Edema: +2 R hand  BM: None since admission - pt started on bowel regimen. Pt aware of prunes offered in house and that prunes count towards CHO allotment.     Estimated Needs:   [X] no change since previous assessment  [ ] recalculated:       Previous Nutrition Diagnosis:     [ ] Inadequate Energy Intake [ ]Inadequate Oral Intake [ ] Excessive Energy Intake     [ ] Underweight [ ] Increased Nutrient Needs [ ] Overweight/Obesity     [ ] Altered GI Function [ ] Unintended Weight Loss [X] Food & Nutrition Related Knowledge Deficit [ ] Malnutrition          Nutrition Diagnosis is [X] ongoing  [ ] resolved [ ] not applicable - addressed, pt with no follow up questions regarding consistent CHO diet education provided.         New Nutrition Diagnosis: [X] not applicable    [ ] Inadequate Protein Energy Intake [ ]Inadequate Oral Intake [ ] Excessive Energy Intake     [ ] Underweight [ ] Increased Nutrient Needs [ ] Overweight/Obesity     [ ] Altered GI Function [ ] Unintended Weight Loss [ ] Food & Nutrition Related Knowledge Deficit[ ] Limited Adherence to nutrition related recommendations [ ] Malnutrition  [ ] other: Free text       Related to:      As evidenced by:      Interventions:     Recommend    [ ] Change Diet To:    [ ] Nutrition Supplement    [ ] Nutrition Support    [X] Other: Continue with Consistent CHO with evening snack diet.       Monitoring and Evaluation:     [X] PO intake [X] Tolerance to diet prescription [X] weights [X] follow up per protocol    [X] other: Pt may benefit from diet education review prior to discharge. At this time, pt remains acute with plans for OR visit tomorrow. Source: Patient [X]    Family [ ]     other [X]:chart review, RD initial note    Diet : Consistent CHO with evening snack      Patient reports [X] nausea  [ ] vomiting [ ] diarrhea [ ] constipation  [ ]chewing problems [ ] swallowing issues  [X] other: Nausea medically managed     PO intake:  < 50% [ ] 50-75% [X] % [ ]  other :     Source for PO intake [X] Patient [ ] family [ ] chart [ ] staff [ ] other     Enteral /Parenteral Nutrition: N/A      Current Weight: Weight (kg): 100.8 (06-09 @ 01:44)  % Weight Change: N/A    Pertinent Medications: MEDICATIONS  (STANDING):  pantoprazole    Tablet 40milliGRAM(s) Oral before breakfast  atorvastatin 40milliGRAM(s) Oral at bedtime  docusate sodium 100milliGRAM(s) Oral three times a day  senna 2Tablet(s) Oral at bedtime  insulin lispro (HumaLOG) corrective regimen sliding scale  SubCutaneous three times a day before meals  dextrose 5%. 1000milliLiter(s) IV Continuous <Continuous>  dextrose 50% Injectable 12.5Gram(s) IV Push once  dextrose 50% Injectable 25Gram(s) IV Push once  dextrose 50% Injectable 25Gram(s) IV Push once  sodium chloride 0.9%. 1000milliLiter(s) IV Continuous <Continuous>  piperacillin/tazobactam IVPB. 3.375Gram(s) IV Intermittent every 8 hours  insulin glargine Injectable (LANTUS) 35Unit(s) SubCutaneous two times a day  insulin lispro Injectable (HumaLOG) 20Unit(s) SubCutaneous three times a day before meals  insulin lispro (HumaLOG) corrective regimen sliding scale  SubCutaneous at bedtime  polyethylene glycol 3350 17Gram(s) Oral daily    MEDICATIONS  (PRN):  ondansetron Injectable 4milliGRAM(s) IV Push every 6 hours PRN Nausea  dextrose Gel 1Dose(s) Oral once PRN Blood Glucose LESS THAN 70 milliGRAM(s)/deciliter  glucagon  Injectable 1milliGRAM(s) IntraMuscular once PRN Glucose LESS THAN 70 milligrams/deciliter  HYDROmorphone   Tablet 2milliGRAM(s) Oral every 3 hours PRN Moderate Pain (4 - 6)  HYDROmorphone   Tablet 4milliGRAM(s) Oral every 4 hours PRN Severe Pain (7 - 10)  HYDROmorphone  Injectable 1milliGRAM(s) IV Push every 4 hours PRN breakthrough pain    Pertinent Labs:  06-12 Na138 mmol/L Glu 180 mg/dL<H> K+ 4.0 mmol/L Cr  1.22 mg/dL BUN 14 mg/dL Phos 2.7 mg/dL Alb n/a   PAB n/a         Skin: no pressure injuries  Edema: +2 R hand  BM: None since admission - pt started on bowel regimen. Pt aware of prunes offered in house and that prunes count towards CHO allotment.     Estimated Needs:   [X] no change since previous assessment  [ ] recalculated:       Previous Nutrition Diagnosis:     [ ] Inadequate Energy Intake [ ]Inadequate Oral Intake [ ] Excessive Energy Intake     [ ] Underweight [ ] Increased Nutrient Needs [ ] Overweight/Obesity     [ ] Altered GI Function [ ] Unintended Weight Loss [X] Food & Nutrition Related Knowledge Deficit [ ] Malnutrition          Nutrition Diagnosis is [X] ongoing  [ ] resolved [ ] not applicable - addressed, pt with no follow up questions regarding consistent CHO diet education provided.         New Nutrition Diagnosis: [X] not applicable    [ ] Inadequate Protein Energy Intake [ ]Inadequate Oral Intake [ ] Excessive Energy Intake     [ ] Underweight [ ] Increased Nutrient Needs [ ] Overweight/Obesity     [ ] Altered GI Function [ ] Unintended Weight Loss [ ] Food & Nutrition Related Knowledge Deficit[ ] Limited Adherence to nutrition related recommendations [ ] Malnutrition  [ ] other: Free text       Related to:      As evidenced by:      Interventions:     Recommend    [ ] Change Diet To:    [ ] Nutrition Supplement    [ ] Nutrition Support    [X] Other: Continue with Consistent CHO with evening snack diet.       Monitoring and Evaluation:     [X] PO intake [X] Tolerance to diet prescription [X] weights [X] follow up per protocol    [X] other: Pt may benefit from diet education review prior to discharge. At this time, pt remains acute with plans for OR visit tomorrow.    Sherita Hawley MBA RDN CDN Pager [phone] Source: Patient [X]    Family [ ]     other [X]:chart review, RD initial note    Diet : Consistent CHO with evening snack      Patient reports [X] nausea  [ ] vomiting [ ] diarrhea [ ] constipation  [ ]chewing problems [ ] swallowing issues  [X] other: Nausea medically managed     PO intake:  < 50% [ ] 50-75% [X] % [ ]  other :     Source for PO intake [X] Patient [ ] family [ ] chart [ ] staff [ ] other     Enteral /Parenteral Nutrition: N/A      Current Weight: Weight (kg): 100.8 (06-09 @ 01:44)  % Weight Change: N/A    Pertinent Medications: MEDICATIONS  (STANDING):  pantoprazole    Tablet 40milliGRAM(s) Oral before breakfast  atorvastatin 40milliGRAM(s) Oral at bedtime  docusate sodium 100milliGRAM(s) Oral three times a day  senna 2Tablet(s) Oral at bedtime  insulin lispro (HumaLOG) corrective regimen sliding scale  SubCutaneous three times a day before meals  dextrose 5%. 1000milliLiter(s) IV Continuous <Continuous>  dextrose 50% Injectable 12.5Gram(s) IV Push once  dextrose 50% Injectable 25Gram(s) IV Push once  dextrose 50% Injectable 25Gram(s) IV Push once  sodium chloride 0.9%. 1000milliLiter(s) IV Continuous <Continuous>  piperacillin/tazobactam IVPB. 3.375Gram(s) IV Intermittent every 8 hours  insulin glargine Injectable (LANTUS) 35Unit(s) SubCutaneous two times a day  insulin lispro Injectable (HumaLOG) 20Unit(s) SubCutaneous three times a day before meals  insulin lispro (HumaLOG) corrective regimen sliding scale  SubCutaneous at bedtime  polyethylene glycol 3350 17Gram(s) Oral daily    MEDICATIONS  (PRN):  ondansetron Injectable 4milliGRAM(s) IV Push every 6 hours PRN Nausea  dextrose Gel 1Dose(s) Oral once PRN Blood Glucose LESS THAN 70 milliGRAM(s)/deciliter  glucagon  Injectable 1milliGRAM(s) IntraMuscular once PRN Glucose LESS THAN 70 milligrams/deciliter  HYDROmorphone   Tablet 2milliGRAM(s) Oral every 3 hours PRN Moderate Pain (4 - 6)  HYDROmorphone   Tablet 4milliGRAM(s) Oral every 4 hours PRN Severe Pain (7 - 10)  HYDROmorphone  Injectable 1milliGRAM(s) IV Push every 4 hours PRN breakthrough pain    Pertinent Labs:  06-12 Na138 mmol/L Glu 180 mg/dL<H> K+ 4.0 mmol/L Cr  1.22 mg/dL BUN 14 mg/dL Phos 2.7 mg/dL Finger sticks 130-201 (6/11-12)      Skin: no pressure injuries  Edema: +2 R hand  BM: None since admission - pt started on bowel regimen. Pt aware of prunes offered in house and that prunes count towards CHO allotment.     Estimated Needs:   [X] no change since previous assessment  [ ] recalculated:       Previous Nutrition Diagnosis:     [ ] Inadequate Energy Intake [ ]Inadequate Oral Intake [ ] Excessive Energy Intake     [ ] Underweight [ ] Increased Nutrient Needs [ ] Overweight/Obesity     [ ] Altered GI Function [ ] Unintended Weight Loss [X] Food & Nutrition Related Knowledge Deficit [ ] Malnutrition          Nutrition Diagnosis is [X] ongoing  [ ] resolved [ ] not applicable - addressed, pt with no follow up questions regarding consistent CHO diet education provided.         New Nutrition Diagnosis: [X] not applicable    [ ] Inadequate Protein Energy Intake [ ]Inadequate Oral Intake [ ] Excessive Energy Intake     [ ] Underweight [ ] Increased Nutrient Needs [ ] Overweight/Obesity     [ ] Altered GI Function [ ] Unintended Weight Loss [ ] Food & Nutrition Related Knowledge Deficit[ ] Limited Adherence to nutrition related recommendations [ ] Malnutrition  [ ] other: Free text       Related to:      As evidenced by:      Interventions:     Recommend    [ ] Change Diet To:    [ ] Nutrition Supplement    [ ] Nutrition Support    [X] Other: Continue with Consistent CHO with evening snack diet.       Monitoring and Evaluation:     [X] PO intake [X] Tolerance to diet prescription [X] weights [X] follow up per protocol    [X] other: Pt may benefit from diet education review prior to discharge. At this time, pt remains acute with plans for OR visit tomorrow.    Sherita Hawley MBA RDN CDN Pager [phone]
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
## 2017-06-12 NOTE — PROGRESS NOTE ADULT - SUBJECTIVE AND OBJECTIVE BOX
ATP SURGERY      SUBJECTIVE:   denies n/v/f/c, denies SOB or CP.     OBJECTIVE:     General: NAD, Lying in bed comfortably  Neuro: A+Ox3  HEENT: Scalp lac with sutures in place, no active bleeding, minimally tender.    Extremities: Right upper extremity with ex-fix in place, elevated.  Distal motor+sensory grossly intact, weakness with  strength.  Wound vac in place. Strong radial pulse.     MEDICATIONS  (STANDING):  pantoprazole    Tablet 40milliGRAM(s) Oral before breakfast  atorvastatin 40milliGRAM(s) Oral at bedtime  docusate sodium 100milliGRAM(s) Oral three times a day  senna 2Tablet(s) Oral at bedtime  insulin lispro (HumaLOG) corrective regimen sliding scale  SubCutaneous three times a day before meals  dextrose 5%. 1000milliLiter(s) IV Continuous <Continuous>  dextrose 50% Injectable 12.5Gram(s) IV Push once  dextrose 50% Injectable 25Gram(s) IV Push once  dextrose 50% Injectable 25Gram(s) IV Push once  enoxaparin Injectable 40milliGRAM(s) SubCutaneous every 24 hours  HYDROmorphone PCA (1 mG/mL) 30milliLiter(s) PCA Continuous PCA Continuous  sodium chloride 0.9%. 1000milliLiter(s) IV Continuous <Continuous>  piperacillin/tazobactam IVPB. 3.375Gram(s) IV Intermittent every 8 hours  insulin glargine Injectable (LANTUS) 35Unit(s) SubCutaneous two times a day  insulin lispro Injectable (HumaLOG) 20Unit(s) SubCutaneous three times a day before meals  insulin lispro (HumaLOG) corrective regimen sliding scale  SubCutaneous at bedtime    MEDICATIONS  (PRN):  HYDROmorphone  Injectable 0.5milliGRAM(s) IV Push every 10 minutes PRN Moderate Pain  HYDROmorphone  Injectable 1milliGRAM(s) IV Push every 10 minutes PRN Severe Pain (7 - 10)  ondansetron Injectable 4milliGRAM(s) IV Push once PRN Nausea and/or Vomiting  ondansetron Injectable 4milliGRAM(s) IV Push every 6 hours PRN Nausea  dextrose Gel 1Dose(s) Oral once PRN Blood Glucose LESS THAN 70 milliGRAM(s)/deciliter  glucagon  Injectable 1milliGRAM(s) IntraMuscular once PRN Glucose LESS THAN 70 milligrams/deciliter  HYDROmorphone PCA (1 mG/mL) Rescue Clinician Bolus 0.5milliGRAM(s) IV Push every 15 minutes PRN for Pain Scale GREATER THAN 6  naloxone Injectable 0.1milliGRAM(s) IV Push every 3 minutes PRN For ANY of the following changes in patient status:  A. RR LESS THAN 10 breaths per minute, B. Oxygen saturation LESS THAN 90%, C. Sedation score of 6 ATP SURGERY      SUBJECTIVE:   denies n/v/f/c, denies SOB or CP.     OBJECTIVE:     General: NAD, Lying in bed comfortably  Neuro: A+Ox3  HEENT: Scalp lac with sutures in place, no active bleeding, minimally tender.    Extremities: Right upper extremity with ex-fix in place, elevated.  Distal motor+sensory grossly intact, weakness with  strength.  Wound vac in place. Strong radial pulse.     MEDICATIONS  (STANDING):  pantoprazole    Tablet 40milliGRAM(s) Oral before breakfast  atorvastatin 40milliGRAM(s) Oral at bedtime  docusate sodium 100milliGRAM(s) Oral three times a day  senna 2Tablet(s) Oral at bedtime  insulin lispro (HumaLOG) corrective regimen sliding scale  SubCutaneous three times a day before meals  dextrose 5%. 1000milliLiter(s) IV Continuous <Continuous>  dextrose 50% Injectable 12.5Gram(s) IV Push once  dextrose 50% Injectable 25Gram(s) IV Push once  dextrose 50% Injectable 25Gram(s) IV Push once  HYDROmorphone PCA (1 mG/mL) 30milliLiter(s) PCA Continuous PCA Continuous  sodium chloride 0.9%. 1000milliLiter(s) IV Continuous <Continuous>  piperacillin/tazobactam IVPB. 3.375Gram(s) IV Intermittent every 8 hours  insulin glargine Injectable (LANTUS) 35Unit(s) SubCutaneous two times a day  insulin lispro Injectable (HumaLOG) 20Unit(s) SubCutaneous three times a day before meals  insulin lispro (HumaLOG) corrective regimen sliding scale  SubCutaneous at bedtime  polyethylene glycol 3350 17Gram(s) Oral daily    MEDICATIONS  (PRN):  HYDROmorphone  Injectable 0.5milliGRAM(s) IV Push every 10 minutes PRN Moderate Pain  HYDROmorphone  Injectable 1milliGRAM(s) IV Push every 10 minutes PRN Severe Pain (7 - 10)  ondansetron Injectable 4milliGRAM(s) IV Push once PRN Nausea and/or Vomiting  ondansetron Injectable 4milliGRAM(s) IV Push every 6 hours PRN Nausea  dextrose Gel 1Dose(s) Oral once PRN Blood Glucose LESS THAN 70 milliGRAM(s)/deciliter  glucagon  Injectable 1milliGRAM(s) IntraMuscular once PRN Glucose LESS THAN 70 milligrams/deciliter  HYDROmorphone PCA (1 mG/mL) Rescue Clinician Bolus 0.5milliGRAM(s) IV Push every 15 minutes PRN for Pain Scale GREATER THAN 6  naloxone Injectable 0.1milliGRAM(s) IV Push every 3 minutes PRN For ANY of the following changes in patient status:  A. RR LESS THAN 10 breaths per minute, B. Oxygen saturation LESS THAN 90%, C. Sedation score of 6      Vital Signs Last 24 Hrs  T(C): 37.3, Max: 37.3 (06-12 @ 05:52)  T(F): 99.1, Max: 99.1 (06-12 @ 05:52)  HR: 86 (80 - 88)  BP: 150/89 (125/75 - 150/89)  BP(mean): --  RR: 18 (18 - 18)  SpO2: 97% (95% - 99%)    I&O's Detail    I & Os for current day (as of 12 Jun 2017 08:20)  =============================================  IN:    Oral Fluid: 600 ml    Solution: 300 ml    sodium chloride 0.9%.: 240 ml    Total IN: 1140 ml  ---------------------------------------------  OUT:    Voided: 2000 ml    VAC (Vacuum Assisted Closure) System: 40 ml    Total OUT: 2040 ml  ---------------------------------------------  Total NET: -900 ml      Daily     Daily     LABS:                        8.6    11.3  )-----------( 432      ( 11 Jun 2017 16:00 )             25.2     06-11    139  |  100  |  16  ----------------------------<  112<H>  4.2   |  25  |  1.31<H>    Ca    9.1      11 Jun 2017 16:00

## 2017-06-12 NOTE — PROGRESS NOTE ADULT - SUBJECTIVE AND OBJECTIVE BOX
Resting in bed.  VSS    Denies sig pain    PE:  VAC in place, no air leak  Hand well perfused, mild swelling  ROM limited due to swelling  Persistent paresthesias throughout ulnar distribution    s/p multiple washouts  Case disc'd with ortho along with ortho plan for abx spacer followed by possible eventual prosthesis.  Apparently will likely require greater soft tissue coverage than currently available locally.  Will review options re: flap and AIN transfer

## 2017-06-12 NOTE — PROGRESS NOTE ADULT - ASSESSMENT
56F w/ RUE wound, ulnar nerve transection, and elbow fractures/p ex-fix  -Eventual soft tissue coverage w/ free flap in future, likely next week  -Pain control  -OOB/DVT ppx, encourage ambulation  -consider suppository or enema for comfort  -IV abx
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
## 2017-06-12 NOTE — PROGRESS NOTE ADULT - SUBJECTIVE AND OBJECTIVE BOX
Patient is a 56y old  Female who presents with a chief complaint of R arm near amputation (04 Jun 2017 07:15)      SUBJECTIVE / OVERNIGHT EVENTS:   Feels better.  Denies CP/SOB/Palpitation/HA. Pain stable on meds.    MEDICATIONS  (STANDING):  pantoprazole    Tablet 40milliGRAM(s) Oral before breakfast  atorvastatin 40milliGRAM(s) Oral at bedtime  docusate sodium 100milliGRAM(s) Oral three times a day  senna 2Tablet(s) Oral at bedtime  insulin lispro (HumaLOG) corrective regimen sliding scale  SubCutaneous three times a day before meals  dextrose 5%. 1000milliLiter(s) IV Continuous <Continuous>  dextrose 50% Injectable 12.5Gram(s) IV Push once  dextrose 50% Injectable 25Gram(s) IV Push once  dextrose 50% Injectable 25Gram(s) IV Push once  sodium chloride 0.9%. 1000milliLiter(s) IV Continuous <Continuous>  piperacillin/tazobactam IVPB. 3.375Gram(s) IV Intermittent every 8 hours  insulin glargine Injectable (LANTUS) 35Unit(s) SubCutaneous two times a day  insulin lispro Injectable (HumaLOG) 20Unit(s) SubCutaneous three times a day before meals  insulin lispro (HumaLOG) corrective regimen sliding scale  SubCutaneous at bedtime  polyethylene glycol 3350 17Gram(s) Oral daily    MEDICATIONS  (PRN):  ondansetron Injectable 4milliGRAM(s) IV Push every 6 hours PRN Nausea  dextrose Gel 1Dose(s) Oral once PRN Blood Glucose LESS THAN 70 milliGRAM(s)/deciliter  glucagon  Injectable 1milliGRAM(s) IntraMuscular once PRN Glucose LESS THAN 70 milligrams/deciliter  HYDROmorphone   Tablet 2milliGRAM(s) Oral every 3 hours PRN Moderate Pain (4 - 6)  HYDROmorphone   Tablet 4milliGRAM(s) Oral every 4 hours PRN Severe Pain (7 - 10)  HYDROmorphone  Injectable 1milliGRAM(s) IV Push every 4 hours PRN breakthrough pain      Vital Signs Last 24 Hrs  T(C): 36.9, Max: 37.3 (06-12 @ 05:52)  HR: 80 (78 - 88)  BP: 138/72 (125/75 - 150/89)  RR: 18 (18 - 18)  SpO2: 96% (96% - 99%)  Wt(kg): --  CAPILLARY BLOOD GLUCOSE  174 (12 Jun 2017 13:18)  165 (12 Jun 2017 08:34)  166 (11 Jun 2017 22:32)  158 (11 Jun 2017 18:22)    I&O's Summary  I & Os for 24h ending 12 Jun 2017 07:00  =============================================  IN: 1140 ml / OUT: 2040 ml / NET: -900 ml    I & Os for current day (as of 12 Jun 2017 14:41)  =============================================  IN: 660 ml / OUT: 640 ml / NET: 20 ml      PHYSICAL EXAM:  GENERAL: NAD, well-developed  HEAD:  Atraumatic, Normocephalic  EYES: EOMI, PERRLA, conjunctiva and sclera clear  NECK: Supple, No JVD  CHEST/LUNG: Clear to auscultation bilaterally; No wheeze  HEART: Regular rate and rhythm; No murmurs, rubs, or gallops  ABDOMEN: Soft, Nontender, Nondistended; Bowel sounds present  EXTREMITIES:  2+ Peripheral Pulses, No clubbing, cyanosis, or edema  PSYCH: AAOx3  NEUROLOGY: non-focal  SKIN: No rashes or lesions    LABS:                        8.2    9.8   )-----------( 430      ( 12 Jun 2017 10:43 )             24.9     06-12    138  |  99  |  14  ----------------------------<  180<H>  4.0   |  25  |  1.22    Ca    8.9      12 Jun 2017 10:41  Phos  2.7     06-12  Mg     1.7     06-12              CAPILLARY BLOOD GLUCOSE  174 (12 Jun 2017 13:18)  165 (12 Jun 2017 08:34)  166 (11 Jun 2017 22:32)  158 (11 Jun 2017 18:22)                RADIOLOGY & ADDITIONAL TESTS:    Imaging Personally Reviewed:    Consultant(s) Notes Reviewed:      Care Discussed with Consultants/Other Providers:

## 2017-06-12 NOTE — PROGRESS NOTE ADULT - SUBJECTIVE AND OBJECTIVE BOX
No acute events overnight. Pain controlled.     PE:  Vital Signs Last 24 Hrs  T(C): 37.3, Max: 37.3 (06-12 @ 05:52)  T(F): 99.1, Max: 99.1 (06-12 @ 05:52)  HR: 86 (80 - 88)  BP: 150/89 (125/75 - 150/89)  BP(mean): --  RR: 18 (18 - 18)  SpO2: 97% (95% - 99%)      Gen: No acute distress  RUE: ex fix in place  motor AIN/PIN intact  SILT M/R  no Ulna motor or sens  fingers wwp    Labs:                        8.6    11.3  )-----------( 432      ( 11 Jun 2017 16:00 )             25.2   06-11    139  |  100  |  16  ----------------------------<  112<H>  4.2   |  25  |  1.31<H>    Assessment/Plan:  56yFemale s/p I and D, ex fix, (6/6), Vac change (6/9)  -pain control  -NWB RUE  -pain control  -oob  -dvt ppx  -50/50 pin care   -OR planning    Fidencio Retana MD Orthopaedic Surgery  Team pager [phone]

## 2017-06-12 NOTE — PROGRESS NOTE ADULT - SUBJECTIVE AND OBJECTIVE BOX
Plastic Surgery Progress Note    S: Pt doing well; no acute events overnight.    ICU Vital Signs Last 24 Hrs  T(C): 37.3, Max: 37.3 (06-12 @ 05:52)  T(F): 99.1, Max: 99.1 (06-12 @ 05:52)  HR: 86 (80 - 88)  BP: 150/89 (125/75 - 150/89)  BP(mean): --  ABP: --  ABP(mean): --  RR: 18 (18 - 18)  SpO2: 97% (95% - 99%)      I & Os for current day (as of 12 Jun 2017 06:37)  =============================================  IN:    Oral Fluid: 600 ml    Solution: 100 ml    Solution: 50 ml    Total IN: 750 ml  ---------------------------------------------  OUT:    Voided: 1400 ml    Total OUT: 1400 ml  ---------------------------------------------  Total NET: -650 ml        Physical Exam:  General: Sleeping comfortably  RUE: ex-fix in place, vac in place; hand is warm, well-perfused

## 2017-06-13 LAB
ANION GAP SERPL CALC-SCNC: 13 MMOL/L — SIGNIFICANT CHANGE UP (ref 5–17)
APTT BLD: 33 SEC — SIGNIFICANT CHANGE UP (ref 27.5–37.4)
BLD GP AB SCN SERPL QL: POSITIVE — SIGNIFICANT CHANGE UP
BUN SERPL-MCNC: 12 MG/DL — SIGNIFICANT CHANGE UP (ref 7–23)
CALCIUM SERPL-MCNC: 8.9 MG/DL — SIGNIFICANT CHANGE UP (ref 8.4–10.5)
CHLORIDE SERPL-SCNC: 101 MMOL/L — SIGNIFICANT CHANGE UP (ref 96–108)
CO2 SERPL-SCNC: 25 MMOL/L — SIGNIFICANT CHANGE UP (ref 22–31)
CREAT SERPL-MCNC: 1.17 MG/DL — SIGNIFICANT CHANGE UP (ref 0.5–1.3)
GLUCOSE SERPL-MCNC: 147 MG/DL — HIGH (ref 70–99)
HCT VFR BLD CALC: 24.4 % — LOW (ref 34.5–45)
HGB BLD-MCNC: 7.9 G/DL — LOW (ref 11.5–15.5)
INR BLD: 1.07 RATIO — SIGNIFICANT CHANGE UP (ref 0.88–1.16)
MCHC RBC-ENTMCNC: 26.7 PG — LOW (ref 27–34)
MCHC RBC-ENTMCNC: 32.2 GM/DL — SIGNIFICANT CHANGE UP (ref 32–36)
MCV RBC AUTO: 82.7 FL — SIGNIFICANT CHANGE UP (ref 80–100)
PLATELET # BLD AUTO: 448 K/UL — HIGH (ref 150–400)
POTASSIUM SERPL-MCNC: 3.9 MMOL/L — SIGNIFICANT CHANGE UP (ref 3.5–5.3)
POTASSIUM SERPL-SCNC: 3.9 MMOL/L — SIGNIFICANT CHANGE UP (ref 3.5–5.3)
PROTHROM AB SERPL-ACNC: 11.7 SEC — SIGNIFICANT CHANGE UP (ref 9.8–12.7)
RBC # BLD: 2.95 M/UL — LOW (ref 3.8–5.2)
RBC # FLD: 15.2 % — HIGH (ref 10.3–14.5)
RH IG SCN BLD-IMP: NEGATIVE — SIGNIFICANT CHANGE UP
SODIUM SERPL-SCNC: 139 MMOL/L — SIGNIFICANT CHANGE UP (ref 135–145)
SURGICAL PATHOLOGY STUDY: SIGNIFICANT CHANGE UP
WBC # BLD: 9.8 K/UL — SIGNIFICANT CHANGE UP (ref 3.8–10.5)
WBC # FLD AUTO: 9.8 K/UL — SIGNIFICANT CHANGE UP (ref 3.8–10.5)

## 2017-06-13 PROCEDURE — 73080 X-RAY EXAM OF ELBOW: CPT | Mod: 26,RT

## 2017-06-13 RX ORDER — POLYETHYLENE GLYCOL 3350 17 G/17G
17 POWDER, FOR SOLUTION ORAL DAILY
Qty: 0 | Refills: 0 | Status: DISCONTINUED | OUTPATIENT
Start: 2017-06-13 | End: 2017-06-16

## 2017-06-13 RX ORDER — INSULIN LISPRO 100/ML
VIAL (ML) SUBCUTANEOUS AT BEDTIME
Qty: 0 | Refills: 0 | Status: DISCONTINUED | OUTPATIENT
Start: 2017-06-13 | End: 2017-06-16

## 2017-06-13 RX ORDER — ENOXAPARIN SODIUM 100 MG/ML
40 INJECTION SUBCUTANEOUS EVERY 24 HOURS
Qty: 0 | Refills: 0 | Status: DISCONTINUED | OUTPATIENT
Start: 2017-06-13 | End: 2017-06-15

## 2017-06-13 RX ORDER — GLUCAGON INJECTION, SOLUTION 0.5 MG/.1ML
1 INJECTION, SOLUTION SUBCUTANEOUS ONCE
Qty: 0 | Refills: 0 | Status: DISCONTINUED | OUTPATIENT
Start: 2017-06-13 | End: 2017-06-16

## 2017-06-13 RX ORDER — INSULIN GLARGINE 100 [IU]/ML
35 INJECTION, SOLUTION SUBCUTANEOUS
Qty: 0 | Refills: 0 | Status: DISCONTINUED | OUTPATIENT
Start: 2017-06-13 | End: 2017-06-16

## 2017-06-13 RX ORDER — CALCIUM CARBONATE 500(1250)
1 TABLET ORAL THREE TIMES A DAY
Qty: 0 | Refills: 0 | Status: DISCONTINUED | OUTPATIENT
Start: 2017-06-13 | End: 2017-06-16

## 2017-06-13 RX ORDER — HYDROMORPHONE HYDROCHLORIDE 2 MG/ML
2 INJECTION INTRAMUSCULAR; INTRAVENOUS; SUBCUTANEOUS
Qty: 0 | Refills: 0 | Status: DISCONTINUED | OUTPATIENT
Start: 2017-06-13 | End: 2017-06-16

## 2017-06-13 RX ORDER — DEXTROSE 50 % IN WATER 50 %
25 SYRINGE (ML) INTRAVENOUS ONCE
Qty: 0 | Refills: 0 | Status: DISCONTINUED | OUTPATIENT
Start: 2017-06-13 | End: 2017-06-16

## 2017-06-13 RX ORDER — ATORVASTATIN CALCIUM 80 MG/1
40 TABLET, FILM COATED ORAL AT BEDTIME
Qty: 0 | Refills: 0 | Status: DISCONTINUED | OUTPATIENT
Start: 2017-06-13 | End: 2017-06-16

## 2017-06-13 RX ORDER — PIPERACILLIN AND TAZOBACTAM 4; .5 G/20ML; G/20ML
3.38 INJECTION, POWDER, LYOPHILIZED, FOR SOLUTION INTRAVENOUS EVERY 8 HOURS
Qty: 0 | Refills: 0 | Status: DISCONTINUED | OUTPATIENT
Start: 2017-06-13 | End: 2017-06-15

## 2017-06-13 RX ORDER — PANTOPRAZOLE SODIUM 20 MG/1
40 TABLET, DELAYED RELEASE ORAL
Qty: 0 | Refills: 0 | Status: DISCONTINUED | OUTPATIENT
Start: 2017-06-13 | End: 2017-06-16

## 2017-06-13 RX ORDER — INSULIN LISPRO 100/ML
VIAL (ML) SUBCUTANEOUS
Qty: 0 | Refills: 0 | Status: DISCONTINUED | OUTPATIENT
Start: 2017-06-13 | End: 2017-06-16

## 2017-06-13 RX ORDER — SENNA PLUS 8.6 MG/1
2 TABLET ORAL AT BEDTIME
Qty: 0 | Refills: 0 | Status: DISCONTINUED | OUTPATIENT
Start: 2017-06-13 | End: 2017-06-16

## 2017-06-13 RX ORDER — HYDROMORPHONE HYDROCHLORIDE 2 MG/ML
1 INJECTION INTRAMUSCULAR; INTRAVENOUS; SUBCUTANEOUS EVERY 4 HOURS
Qty: 0 | Refills: 0 | Status: DISCONTINUED | OUTPATIENT
Start: 2017-06-13 | End: 2017-06-16

## 2017-06-13 RX ORDER — DEXTROSE 50 % IN WATER 50 %
12.5 SYRINGE (ML) INTRAVENOUS ONCE
Qty: 0 | Refills: 0 | Status: DISCONTINUED | OUTPATIENT
Start: 2017-06-13 | End: 2017-06-16

## 2017-06-13 RX ORDER — HYDROMORPHONE HYDROCHLORIDE 2 MG/ML
0.5 INJECTION INTRAMUSCULAR; INTRAVENOUS; SUBCUTANEOUS ONCE
Qty: 0 | Refills: 0 | Status: DISCONTINUED | OUTPATIENT
Start: 2017-06-13 | End: 2017-06-13

## 2017-06-13 RX ORDER — ACETAMINOPHEN 500 MG
1000 TABLET ORAL ONCE
Qty: 0 | Refills: 0 | Status: COMPLETED | OUTPATIENT
Start: 2017-06-13 | End: 2017-06-13

## 2017-06-13 RX ORDER — ATENOLOL 25 MG/1
50 TABLET ORAL DAILY
Qty: 0 | Refills: 0 | Status: DISCONTINUED | OUTPATIENT
Start: 2017-06-13 | End: 2017-06-16

## 2017-06-13 RX ORDER — ONDANSETRON 8 MG/1
4 TABLET, FILM COATED ORAL EVERY 6 HOURS
Qty: 0 | Refills: 0 | Status: DISCONTINUED | OUTPATIENT
Start: 2017-06-13 | End: 2017-06-16

## 2017-06-13 RX ORDER — HYDROMORPHONE HYDROCHLORIDE 2 MG/ML
4 INJECTION INTRAMUSCULAR; INTRAVENOUS; SUBCUTANEOUS EVERY 4 HOURS
Qty: 0 | Refills: 0 | Status: DISCONTINUED | OUTPATIENT
Start: 2017-06-13 | End: 2017-06-16

## 2017-06-13 RX ORDER — SODIUM CHLORIDE 9 MG/ML
1000 INJECTION, SOLUTION INTRAVENOUS
Qty: 0 | Refills: 0 | Status: DISCONTINUED | OUTPATIENT
Start: 2017-06-13 | End: 2017-06-13

## 2017-06-13 RX ORDER — DOCUSATE SODIUM 100 MG
100 CAPSULE ORAL THREE TIMES A DAY
Qty: 0 | Refills: 0 | Status: DISCONTINUED | OUTPATIENT
Start: 2017-06-13 | End: 2017-06-16

## 2017-06-13 RX ORDER — INSULIN LISPRO 100/ML
20 VIAL (ML) SUBCUTANEOUS
Qty: 0 | Refills: 0 | Status: DISCONTINUED | OUTPATIENT
Start: 2017-06-13 | End: 2017-06-16

## 2017-06-13 RX ORDER — SODIUM CHLORIDE 9 MG/ML
1000 INJECTION INTRAMUSCULAR; INTRAVENOUS; SUBCUTANEOUS
Qty: 0 | Refills: 0 | Status: DISCONTINUED | OUTPATIENT
Start: 2017-06-13 | End: 2017-06-15

## 2017-06-13 RX ORDER — SODIUM CHLORIDE 9 MG/ML
1000 INJECTION, SOLUTION INTRAVENOUS
Qty: 0 | Refills: 0 | Status: DISCONTINUED | OUTPATIENT
Start: 2017-06-13 | End: 2017-06-16

## 2017-06-13 RX ORDER — DEXTROSE 50 % IN WATER 50 %
1 SYRINGE (ML) INTRAVENOUS ONCE
Qty: 0 | Refills: 0 | Status: DISCONTINUED | OUTPATIENT
Start: 2017-06-13 | End: 2017-06-16

## 2017-06-13 RX ORDER — HYDROMORPHONE HYDROCHLORIDE 2 MG/ML
0.5 INJECTION INTRAMUSCULAR; INTRAVENOUS; SUBCUTANEOUS
Qty: 0 | Refills: 0 | Status: DISCONTINUED | OUTPATIENT
Start: 2017-06-13 | End: 2017-06-16

## 2017-06-13 RX ADMIN — Medication 100 MILLIGRAM(S): at 23:24

## 2017-06-13 RX ADMIN — HYDROMORPHONE HYDROCHLORIDE 0.5 MILLIGRAM(S): 2 INJECTION INTRAMUSCULAR; INTRAVENOUS; SUBCUTANEOUS at 17:55

## 2017-06-13 RX ADMIN — HYDROMORPHONE HYDROCHLORIDE 4 MILLIGRAM(S): 2 INJECTION INTRAMUSCULAR; INTRAVENOUS; SUBCUTANEOUS at 03:22

## 2017-06-13 RX ADMIN — HYDROMORPHONE HYDROCHLORIDE 4 MILLIGRAM(S): 2 INJECTION INTRAMUSCULAR; INTRAVENOUS; SUBCUTANEOUS at 22:40

## 2017-06-13 RX ADMIN — HYDROMORPHONE HYDROCHLORIDE 4 MILLIGRAM(S): 2 INJECTION INTRAMUSCULAR; INTRAVENOUS; SUBCUTANEOUS at 06:54

## 2017-06-13 RX ADMIN — ONDANSETRON 4 MILLIGRAM(S): 8 TABLET, FILM COATED ORAL at 07:07

## 2017-06-13 RX ADMIN — HYDROMORPHONE HYDROCHLORIDE 4 MILLIGRAM(S): 2 INJECTION INTRAMUSCULAR; INTRAVENOUS; SUBCUTANEOUS at 22:14

## 2017-06-13 RX ADMIN — HYDROMORPHONE HYDROCHLORIDE 4 MILLIGRAM(S): 2 INJECTION INTRAMUSCULAR; INTRAVENOUS; SUBCUTANEOUS at 03:21

## 2017-06-13 RX ADMIN — Medication: at 18:12

## 2017-06-13 RX ADMIN — HYDROMORPHONE HYDROCHLORIDE 0.5 MILLIGRAM(S): 2 INJECTION INTRAMUSCULAR; INTRAVENOUS; SUBCUTANEOUS at 17:31

## 2017-06-13 RX ADMIN — INSULIN GLARGINE 35 UNIT(S): 100 INJECTION, SOLUTION SUBCUTANEOUS at 23:24

## 2017-06-13 RX ADMIN — ATORVASTATIN CALCIUM 40 MILLIGRAM(S): 80 TABLET, FILM COATED ORAL at 23:24

## 2017-06-13 RX ADMIN — HYDROMORPHONE HYDROCHLORIDE 4 MILLIGRAM(S): 2 INJECTION INTRAMUSCULAR; INTRAVENOUS; SUBCUTANEOUS at 07:30

## 2017-06-13 RX ADMIN — Medication 2: at 08:20

## 2017-06-13 RX ADMIN — HYDROMORPHONE HYDROCHLORIDE 1 MILLIGRAM(S): 2 INJECTION INTRAMUSCULAR; INTRAVENOUS; SUBCUTANEOUS at 23:23

## 2017-06-13 RX ADMIN — HYDROMORPHONE HYDROCHLORIDE 0.5 MILLIGRAM(S): 2 INJECTION INTRAMUSCULAR; INTRAVENOUS; SUBCUTANEOUS at 18:00

## 2017-06-13 RX ADMIN — PIPERACILLIN AND TAZOBACTAM 25 GRAM(S): 4; .5 INJECTION, POWDER, LYOPHILIZED, FOR SOLUTION INTRAVENOUS at 23:23

## 2017-06-13 RX ADMIN — PIPERACILLIN AND TAZOBACTAM 25 GRAM(S): 4; .5 INJECTION, POWDER, LYOPHILIZED, FOR SOLUTION INTRAVENOUS at 08:22

## 2017-06-13 RX ADMIN — SENNA PLUS 2 TABLET(S): 8.6 TABLET ORAL at 23:24

## 2017-06-13 RX ADMIN — SODIUM CHLORIDE 80 MILLILITER(S): 9 INJECTION INTRAMUSCULAR; INTRAVENOUS; SUBCUTANEOUS at 18:45

## 2017-06-13 RX ADMIN — Medication 400 MILLIGRAM(S): at 18:46
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
## 2017-06-13 NOTE — CHART NOTE - NSCHARTNOTEFT_GEN_A_CORE
Patient is a 56y old  Female who presents with a chief complaint of R arm near amputation (04 Jun 2017 07:15)    Resting without complaints.  No Chest Pain, SOB, N/V.    T(C): 36.9, Max: 37 (06-13 @ 10:25)  HR: 81 (75 - 96)  BP: 163/70 (120/75 - 163/70)  RR: 14 (14 - 20)  SpO2: 97% (96% - 100%)    Exam:   Alert and Oriented; No Acute Distress  RUE: dressing C/D/I. Sensation grossly intact to light touch. External fixation proximally.  decreased movement at 5th finger. Brisk capillary refill <2 sec.  Fingers pink and warm.  (+) Distal pulses. Posey block in place.  Calves soft, non-tender bilaterally                        7.9    9.8   )-----------( 448      ( 13 Jun 2017 07:10 )             24.4    06-13    139  |  101  |  12  ----------------------------<  147<H>  3.9   |  25  |  1.17      Patient is a 56y old  Female who presents with a chief complaint of R arm near amputation (04 Jun 2017 07:15) now   s/p incision and drainage, Vac placement, cement spacer.  Pt stable.    Plan:  - Pain Control   - DVT ppx  - NWB RUE  - Venodynes/IS  - Continue current tx    Christiano Esteves PA-C  Orthopedic Surgery  [phone]  [id number]

## 2017-06-13 NOTE — PROGRESS NOTE ADULT - SUBJECTIVE AND OBJECTIVE BOX
Pt comfortable, no complaints.  No acute events overnight    T(C): 36.7, Max: 36.9 (06-12 @ 14:00)  HR: 82 (77 - 84)  BP: 132/76 (129/72 - 138/72)  RR: 18 (18 - 18)  SpO2: 98% (96% - 99%)  Wt(kg): --    PHYSICAL EXAM:  NAD, awake and alert  RUE in ex-fix, hardware intact, dressing C/D/I  Compartments soft  No sensation in ulnar distribution, SILT m/r distr  AIN/PIN/radial n motor grossly intact, no intrinsics  WW    LABS:                        8.2    9.8   )-----------( 430      ( 12 Jun 2017 10:43 )             24.9     06-12    138  |  99  |  14  ----------------------------<  180<H>  4.0   |  25  |  1.22    Ca    8.9      12 Jun 2017 10:41  Phos  2.7     06-12  Mg     1.7     06-12
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
## 2017-06-13 NOTE — BRIEF OPERATIVE NOTE - OPERATION/FINDINGS
irrigation and debridement of right arm  placement of antibiotic spacer molded by [employer]  wound vac placement

## 2017-06-14 LAB
ANION GAP SERPL CALC-SCNC: 14 MMOL/L — SIGNIFICANT CHANGE UP (ref 5–17)
BUN SERPL-MCNC: 12 MG/DL — SIGNIFICANT CHANGE UP (ref 7–23)
CALCIUM SERPL-MCNC: 8.8 MG/DL — SIGNIFICANT CHANGE UP (ref 8.4–10.5)
CHLORIDE SERPL-SCNC: 100 MMOL/L — SIGNIFICANT CHANGE UP (ref 96–108)
CO2 SERPL-SCNC: 25 MMOL/L — SIGNIFICANT CHANGE UP (ref 22–31)
CREAT SERPL-MCNC: 1.08 MG/DL — SIGNIFICANT CHANGE UP (ref 0.5–1.3)
GLUCOSE SERPL-MCNC: 147 MG/DL — HIGH (ref 70–99)
HCT VFR BLD CALC: 26.2 % — LOW (ref 34.5–45)
HGB BLD-MCNC: 8.5 G/DL — LOW (ref 11.5–15.5)
MCHC RBC-ENTMCNC: 27 PG — SIGNIFICANT CHANGE UP (ref 27–34)
MCHC RBC-ENTMCNC: 32.5 GM/DL — SIGNIFICANT CHANGE UP (ref 32–36)
MCV RBC AUTO: 83.1 FL — SIGNIFICANT CHANGE UP (ref 80–100)
PLATELET # BLD AUTO: 540 K/UL — HIGH (ref 150–400)
POTASSIUM SERPL-MCNC: 3.9 MMOL/L — SIGNIFICANT CHANGE UP (ref 3.5–5.3)
POTASSIUM SERPL-SCNC: 3.9 MMOL/L — SIGNIFICANT CHANGE UP (ref 3.5–5.3)
RBC # BLD: 3.15 M/UL — LOW (ref 3.8–5.2)
RBC # FLD: 15.5 % — HIGH (ref 10.3–14.5)
SODIUM SERPL-SCNC: 139 MMOL/L — SIGNIFICANT CHANGE UP (ref 135–145)
WBC # BLD: 13.5 K/UL — HIGH (ref 3.8–10.5)
WBC # FLD AUTO: 13.5 K/UL — HIGH (ref 3.8–10.5)

## 2017-06-14 PROCEDURE — 99232 SBSQ HOSP IP/OBS MODERATE 35: CPT

## 2017-06-14 RX ADMIN — SODIUM CHLORIDE 80 MILLILITER(S): 9 INJECTION INTRAMUSCULAR; INTRAVENOUS; SUBCUTANEOUS at 22:00

## 2017-06-14 RX ADMIN — ENOXAPARIN SODIUM 40 MILLIGRAM(S): 100 INJECTION SUBCUTANEOUS at 07:15

## 2017-06-14 RX ADMIN — PANTOPRAZOLE SODIUM 40 MILLIGRAM(S): 20 TABLET, DELAYED RELEASE ORAL at 07:15

## 2017-06-14 RX ADMIN — POLYETHYLENE GLYCOL 3350 17 GRAM(S): 17 POWDER, FOR SOLUTION ORAL at 12:47

## 2017-06-14 RX ADMIN — HYDROMORPHONE HYDROCHLORIDE 1 MILLIGRAM(S): 2 INJECTION INTRAMUSCULAR; INTRAVENOUS; SUBCUTANEOUS at 00:45

## 2017-06-14 RX ADMIN — HYDROMORPHONE HYDROCHLORIDE 1 MILLIGRAM(S): 2 INJECTION INTRAMUSCULAR; INTRAVENOUS; SUBCUTANEOUS at 20:35

## 2017-06-14 RX ADMIN — HYDROMORPHONE HYDROCHLORIDE 4 MILLIGRAM(S): 2 INJECTION INTRAMUSCULAR; INTRAVENOUS; SUBCUTANEOUS at 13:17

## 2017-06-14 RX ADMIN — HYDROMORPHONE HYDROCHLORIDE 1 MILLIGRAM(S): 2 INJECTION INTRAMUSCULAR; INTRAVENOUS; SUBCUTANEOUS at 14:49

## 2017-06-14 RX ADMIN — SENNA PLUS 2 TABLET(S): 8.6 TABLET ORAL at 21:59

## 2017-06-14 RX ADMIN — HYDROMORPHONE HYDROCHLORIDE 4 MILLIGRAM(S): 2 INJECTION INTRAMUSCULAR; INTRAVENOUS; SUBCUTANEOUS at 12:47

## 2017-06-14 RX ADMIN — ATORVASTATIN CALCIUM 40 MILLIGRAM(S): 80 TABLET, FILM COATED ORAL at 21:58

## 2017-06-14 RX ADMIN — HYDROMORPHONE HYDROCHLORIDE 4 MILLIGRAM(S): 2 INJECTION INTRAMUSCULAR; INTRAVENOUS; SUBCUTANEOUS at 03:10

## 2017-06-14 RX ADMIN — Medication 6: at 12:47

## 2017-06-14 RX ADMIN — Medication 2: at 09:05

## 2017-06-14 RX ADMIN — Medication 20 UNIT(S): at 12:47

## 2017-06-14 RX ADMIN — HYDROMORPHONE HYDROCHLORIDE 4 MILLIGRAM(S): 2 INJECTION INTRAMUSCULAR; INTRAVENOUS; SUBCUTANEOUS at 02:40

## 2017-06-14 RX ADMIN — HYDROMORPHONE HYDROCHLORIDE 4 MILLIGRAM(S): 2 INJECTION INTRAMUSCULAR; INTRAVENOUS; SUBCUTANEOUS at 22:30

## 2017-06-14 RX ADMIN — HYDROMORPHONE HYDROCHLORIDE 1 MILLIGRAM(S): 2 INJECTION INTRAMUSCULAR; INTRAVENOUS; SUBCUTANEOUS at 04:32

## 2017-06-14 RX ADMIN — Medication 100 MILLIGRAM(S): at 21:59

## 2017-06-14 RX ADMIN — Medication 20 UNIT(S): at 19:03

## 2017-06-14 RX ADMIN — Medication 30 MILLILITER(S): at 23:40

## 2017-06-14 RX ADMIN — PIPERACILLIN AND TAZOBACTAM 25 GRAM(S): 4; .5 INJECTION, POWDER, LYOPHILIZED, FOR SOLUTION INTRAVENOUS at 13:49

## 2017-06-14 RX ADMIN — Medication 100 MILLIGRAM(S): at 12:47

## 2017-06-14 RX ADMIN — INSULIN GLARGINE 35 UNIT(S): 100 INJECTION, SOLUTION SUBCUTANEOUS at 21:59

## 2017-06-14 RX ADMIN — HYDROMORPHONE HYDROCHLORIDE 4 MILLIGRAM(S): 2 INJECTION INTRAMUSCULAR; INTRAVENOUS; SUBCUTANEOUS at 07:45

## 2017-06-14 RX ADMIN — Medication 100 MILLIGRAM(S): at 07:15

## 2017-06-14 RX ADMIN — ATENOLOL 50 MILLIGRAM(S): 25 TABLET ORAL at 07:15

## 2017-06-14 RX ADMIN — HYDROMORPHONE HYDROCHLORIDE 1 MILLIGRAM(S): 2 INJECTION INTRAMUSCULAR; INTRAVENOUS; SUBCUTANEOUS at 15:19

## 2017-06-14 RX ADMIN — HYDROMORPHONE HYDROCHLORIDE 1 MILLIGRAM(S): 2 INJECTION INTRAMUSCULAR; INTRAVENOUS; SUBCUTANEOUS at 20:17

## 2017-06-14 RX ADMIN — HYDROMORPHONE HYDROCHLORIDE 4 MILLIGRAM(S): 2 INJECTION INTRAMUSCULAR; INTRAVENOUS; SUBCUTANEOUS at 17:08

## 2017-06-14 RX ADMIN — HYDROMORPHONE HYDROCHLORIDE 1 MILLIGRAM(S): 2 INJECTION INTRAMUSCULAR; INTRAVENOUS; SUBCUTANEOUS at 09:05

## 2017-06-14 RX ADMIN — HYDROMORPHONE HYDROCHLORIDE 4 MILLIGRAM(S): 2 INJECTION INTRAMUSCULAR; INTRAVENOUS; SUBCUTANEOUS at 16:38

## 2017-06-14 RX ADMIN — HYDROMORPHONE HYDROCHLORIDE 1 MILLIGRAM(S): 2 INJECTION INTRAMUSCULAR; INTRAVENOUS; SUBCUTANEOUS at 09:20

## 2017-06-14 RX ADMIN — Medication 30 MILLILITER(S): at 13:48

## 2017-06-14 RX ADMIN — INSULIN GLARGINE 35 UNIT(S): 100 INJECTION, SOLUTION SUBCUTANEOUS at 09:05

## 2017-06-14 RX ADMIN — HYDROMORPHONE HYDROCHLORIDE 4 MILLIGRAM(S): 2 INJECTION INTRAMUSCULAR; INTRAVENOUS; SUBCUTANEOUS at 07:15

## 2017-06-14 RX ADMIN — HYDROMORPHONE HYDROCHLORIDE 4 MILLIGRAM(S): 2 INJECTION INTRAMUSCULAR; INTRAVENOUS; SUBCUTANEOUS at 21:58

## 2017-06-14 RX ADMIN — HYDROMORPHONE HYDROCHLORIDE 1 MILLIGRAM(S): 2 INJECTION INTRAMUSCULAR; INTRAVENOUS; SUBCUTANEOUS at 05:00

## 2017-06-14 RX ADMIN — PIPERACILLIN AND TAZOBACTAM 25 GRAM(S): 4; .5 INJECTION, POWDER, LYOPHILIZED, FOR SOLUTION INTRAVENOUS at 21:51

## 2017-06-14 RX ADMIN — PIPERACILLIN AND TAZOBACTAM 25 GRAM(S): 4; .5 INJECTION, POWDER, LYOPHILIZED, FOR SOLUTION INTRAVENOUS at 07:14

## 2017-06-14 RX ADMIN — Medication 20 UNIT(S): at 09:05
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
## 2017-06-14 NOTE — PROGRESS NOTE ADULT - ASSESSMENT
Patient is a 56y old  Female who presents with a chief complaint of R arm near amputation.    Rt arm crush injury:           S/p washout and antibiotics spacer yesterday         IV Zosyn.          Ortho/Plastics f/up noted.  DM II :  FSSS/Lantus/Humalog.

## 2017-06-14 NOTE — PROGRESS NOTE ADULT - PROBLEM SELECTOR PLAN 1
-Test BG ac and hs  -Continue lantus 35 units bid (6pm and 6am).   -Continue Humalog 20 units ac meals   -Continue Humalog moderate correction scales ac and hs  -IF NPO GIVE: 28 units of Lantus the night before and hold Lantus in am the day of procedure and change correction scale to moderate dose q6h.  -consider antibiotic on NS instead of D5.  -Plan discussed with pt and team  -Consider antibiotic infusion in NS instead of D5  -Will adjust regime as needed. Plan discussed with pt/staff and team  Beeper [phone] or

## 2017-06-14 NOTE — PROGRESS NOTE ADULT - SUBJECTIVE AND OBJECTIVE BOX
Pt resting comfortable this AM. s/p washout and placement of abx spacer yesterday by ortho team.     Vital Signs Last 24 Hrs  T(C): 36.7, Max: 37.3 (06-13 @ 22:30)  T(F): 98.1, Max: 99.1 (06-13 @ 22:30)  HR: 72 (72 - 96)  BP: 146/75 (120/75 - 163/83)  BP(mean): 115 (103 - 115)  RR: 18 (14 - 20)  SpO2: 97% (95% - 100%)    Gen - NAD  RUE with ex-fix in place, VAC in place without leak  hand is warm, slight swelling.

## 2017-06-14 NOTE — OCCUPATIONAL THERAPY INITIAL EVALUATION ADULT - ADDITIONAL COMMENTS
(Continued) The wound had had a tourniquet which had been applied for 50 mins but was obviously loose since we were able to palpate a distal radial pulse. The patient was able to move her fingers and had good capillary refill.  CT elbow 6/9 1.  Suspected ulnar fracture with mild radial rotation and dislocation Gas within the elbow joint space is nonspecific and may reflect a penetrating nature of the injury or postsurgical change.Marked soft tissue swelling with medial skin defect. Several foci of soft tissue gas likely represent a penetrating type injury. Findings are likely posttraumatic. Cannot exclude infection.The visualized arterial structures are intact.

## 2017-06-14 NOTE — PROGRESS NOTE PEDS - SUBJECTIVE AND OBJECTIVE BOX
Post op Day #1    Patient resting without complaints.  No chest pain, SOB, N/V.    T(C): 36.7, Max: 37.3 (06-13 @ 22:30)  HR: 72 (72 - 96)  BP: 146/75 (120/75 - 163/83)  RR: 18 (14 - 20)  SpO2: 97% (95% - 100%)  Wt(kg): --    Exam:  Alert and Oriented, No Acute Distress              EXT: LUE     Ex-fixator intact     vac intact     hand slightly swollen     sensation grossly intact, decrease sensation over 5th finger     good capillary refill                                             7.9    9.8   )-----------( 448      ( 13 Jun 2017 07:10 )             24.4    06-13    139  |  101  |  12  ----------------------------<  147<H>  3.9   |  25  |  1.17    Ca    8.9      13 Jun 2017 07:10  Phos  2.7     06-12  Mg     1.7     06-12                          Continue Current Tx.

## 2017-06-14 NOTE — PROGRESS NOTE ADULT - SUBJECTIVE AND OBJECTIVE BOX
Patient is a 56y old  Female who presents with a chief complaint of R arm near amputation.      SUBJECTIVE / OVERNIGHT EVENTS:   Feels better.  Denies CP/SOB/Palpitation/HA.    MEDICATIONS  (STANDING):  pantoprazole    Tablet 40milliGRAM(s) Oral before breakfast  atorvastatin 40milliGRAM(s) Oral at bedtime  docusate sodium 100milliGRAM(s) Oral three times a day  senna 2Tablet(s) Oral at bedtime  insulin lispro (HumaLOG) corrective regimen sliding scale  SubCutaneous three times a day before meals  dextrose 5%. 1000milliLiter(s) IV Continuous <Continuous>  dextrose 50% Injectable 12.5Gram(s) IV Push once  dextrose 50% Injectable 25Gram(s) IV Push once  dextrose 50% Injectable 25Gram(s) IV Push once  sodium chloride 0.9%. 1000milliLiter(s) IV Continuous <Continuous>  insulin glargine Injectable (LANTUS) 35Unit(s) SubCutaneous two times a day  insulin lispro Injectable (HumaLOG) 20Unit(s) SubCutaneous three times a day before meals  insulin lispro (HumaLOG) corrective regimen sliding scale  SubCutaneous at bedtime  polyethylene glycol 3350 17Gram(s) Oral daily  enoxaparin Injectable 40milliGRAM(s) SubCutaneous every 24 hours  piperacillin/tazobactam IVPB. 3.375Gram(s) IV Intermittent every 8 hours  ATENolol  Tablet 50milliGRAM(s) Oral daily  hydrochlorothiazide    Capsule 12.5milliGRAM(s) Oral daily    MEDICATIONS  (PRN):  ondansetron Injectable 4milliGRAM(s) IV Push every 6 hours PRN Nausea  dextrose Gel 1Dose(s) Oral once PRN Blood Glucose LESS THAN 70 milliGRAM(s)/deciliter  glucagon  Injectable 1milliGRAM(s) IntraMuscular once PRN Glucose LESS THAN 70 milligrams/deciliter  HYDROmorphone   Tablet 2milliGRAM(s) Oral every 3 hours PRN Moderate Pain (4 - 6)  HYDROmorphone   Tablet 4milliGRAM(s) Oral every 4 hours PRN Severe Pain (7 - 10)  HYDROmorphone  Injectable 1milliGRAM(s) IV Push every 4 hours PRN breakthrough pain  HYDROmorphone  Injectable 0.5milliGRAM(s) IV Push every 10 minutes PRN Severe Pain (7 - 10)  calcium carbonate 500 mG (Tums) Chewable 1Tablet(s) Chew three times a day PRN Heartburn  aluminum hydroxide/magnesium hydroxide/simethicone Suspension 30milliLiter(s) Oral every 6 hours PRN Dyspepsia      Vital Signs Last 24 Hrs  T(C): 36.6, Max: 37.3 (06-13 @ 22:30)  HR: 66 (63 - 96)  BP: 100/63 (100/63 - 163/83)  RR: 18 (14 - 18)  SpO2: 98% (95% - 98%)  Wt(kg): --  CAPILLARY BLOOD GLUCOSE  128 (14 Jun 2017 17:47)  291 (14 Jun 2017 11:49)  187 (14 Jun 2017 08:47)    I&O's Summary  I & Os for 24h ending 14 Jun 2017 07:00  =============================================  IN: 500 ml / OUT: 1400 ml / NET: -900 ml    I & Os for current day (as of 14 Jun 2017 18:07)  =============================================  IN: 500 ml / OUT: 500 ml / NET: 0 ml      PHYSICAL EXAM:  GENERAL: NAD, well-developed  HEAD:  Atraumatic, Normocephalic  EYES: EOMI, PERRLA, conjunctiva and sclera clear  NECK: Supple, No JVD  CHEST/LUNG: Clear to auscultation bilaterally; No wheeze  HEART: Regular rate and rhythm; No murmurs, rubs, or gallops  ABDOMEN: Soft, Nontender, Nondistended; Bowel sounds present  EXTREMITIES:  2+ Peripheral Pulses, No clubbing, cyanosis, or edema  PSYCH: AAOx3  NEUROLOGY: non-focal  SKIN: No rashes or lesions    LABS:                        8.5    13.5  )-----------( 540      ( 14 Jun 2017 09:30 )             26.2     06-14    139  |  100  |  12  ----------------------------<  147<H>  3.9   |  25  |  1.08    Ca    8.8      14 Jun 2017 09:30      PT/INR - ( 13 Jun 2017 07:10 )   PT: 11.7 sec;   INR: 1.07 ratio         PTT - ( 13 Jun 2017 07:10 )  PTT:33.0 sec        CAPILLARY BLOOD GLUCOSE  128 (14 Jun 2017 17:47)  291 (14 Jun 2017 11:49)  187 (14 Jun 2017 08:47)                RADIOLOGY & ADDITIONAL TESTS:    Imaging Personally Reviewed:    Consultant(s) Notes Reviewed:      Care Discussed with Consultants/Other Providers:
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
## 2017-06-14 NOTE — OCCUPATIONAL THERAPY INITIAL EVALUATION ADULT - LIVES WITH, PROFILE
spouse/Lives in house with  and 2 sons, 3 steps to enter 13 steps to 2nd floor (bed/bath/kicthen), 13 steps to basement with additional living area/children

## 2017-06-14 NOTE — PROGRESS NOTE PEDS - ASSESSMENT
S/P repeat I&D with cement spacer and vac placement    Plan      Con't elevation RUE    Pin care      OSANDRA Barron  Supervising PA  Beeper [phone]
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
## 2017-06-14 NOTE — OCCUPATIONAL THERAPY INITIAL EVALUATION ADULT - RANGE OF MOTION EXAMINATION, UPPER EXTREMITY
Left UE Active ROM was WFL (within functional limits)/R elbow NT, R wrist 10* extension limited flexion,

## 2017-06-15 LAB
ANION GAP SERPL CALC-SCNC: 12 MMOL/L — SIGNIFICANT CHANGE UP (ref 5–17)
APTT BLD: 33.4 SEC — SIGNIFICANT CHANGE UP (ref 27.5–37.4)
BLD GP AB SCN SERPL QL: POSITIVE — SIGNIFICANT CHANGE UP
BUN SERPL-MCNC: 14 MG/DL — SIGNIFICANT CHANGE UP (ref 7–23)
CALCIUM SERPL-MCNC: 9 MG/DL — SIGNIFICANT CHANGE UP (ref 8.4–10.5)
CHLORIDE SERPL-SCNC: 100 MMOL/L — SIGNIFICANT CHANGE UP (ref 96–108)
CO2 SERPL-SCNC: 25 MMOL/L — SIGNIFICANT CHANGE UP (ref 22–31)
CREAT SERPL-MCNC: 1.23 MG/DL — SIGNIFICANT CHANGE UP (ref 0.5–1.3)
GLUCOSE SERPL-MCNC: 124 MG/DL — HIGH (ref 70–99)
HCT VFR BLD CALC: 24.2 % — LOW (ref 34.5–45)
HGB BLD-MCNC: 8 G/DL — LOW (ref 11.5–15.5)
INR BLD: 1.07 RATIO — SIGNIFICANT CHANGE UP (ref 0.88–1.16)
MCHC RBC-ENTMCNC: 27.6 PG — SIGNIFICANT CHANGE UP (ref 27–34)
MCHC RBC-ENTMCNC: 33.2 GM/DL — SIGNIFICANT CHANGE UP (ref 32–36)
MCV RBC AUTO: 83.1 FL — SIGNIFICANT CHANGE UP (ref 80–100)
PLATELET # BLD AUTO: 462 K/UL — HIGH (ref 150–400)
POTASSIUM SERPL-MCNC: 4.3 MMOL/L — SIGNIFICANT CHANGE UP (ref 3.5–5.3)
POTASSIUM SERPL-SCNC: 4.3 MMOL/L — SIGNIFICANT CHANGE UP (ref 3.5–5.3)
PROTHROM AB SERPL-ACNC: 11.6 SEC — SIGNIFICANT CHANGE UP (ref 9.8–12.7)
RBC # BLD: 2.91 M/UL — LOW (ref 3.8–5.2)
RBC # FLD: 15.5 % — HIGH (ref 10.3–14.5)
RH IG SCN BLD-IMP: NEGATIVE — SIGNIFICANT CHANGE UP
SODIUM SERPL-SCNC: 137 MMOL/L — SIGNIFICANT CHANGE UP (ref 135–145)
SURGICAL PATHOLOGY STUDY: SIGNIFICANT CHANGE UP
WBC # BLD: 12.6 K/UL — HIGH (ref 3.8–10.5)
WBC # FLD AUTO: 12.6 K/UL — HIGH (ref 3.8–10.5)

## 2017-06-15 RX ORDER — ACETAMINOPHEN 500 MG
1000 TABLET ORAL ONCE
Qty: 0 | Refills: 0 | Status: COMPLETED | OUTPATIENT
Start: 2017-06-15 | End: 2017-06-15

## 2017-06-15 RX ORDER — SODIUM CHLORIDE 9 MG/ML
1000 INJECTION, SOLUTION INTRAVENOUS
Qty: 0 | Refills: 0 | Status: DISCONTINUED | OUTPATIENT
Start: 2017-06-15 | End: 2017-06-16

## 2017-06-15 RX ADMIN — HYDROMORPHONE HYDROCHLORIDE 4 MILLIGRAM(S): 2 INJECTION INTRAMUSCULAR; INTRAVENOUS; SUBCUTANEOUS at 11:23

## 2017-06-15 RX ADMIN — HYDROMORPHONE HYDROCHLORIDE 1 MILLIGRAM(S): 2 INJECTION INTRAMUSCULAR; INTRAVENOUS; SUBCUTANEOUS at 12:40

## 2017-06-15 RX ADMIN — INSULIN GLARGINE 35 UNIT(S): 100 INJECTION, SOLUTION SUBCUTANEOUS at 22:51

## 2017-06-15 RX ADMIN — INSULIN GLARGINE 35 UNIT(S): 100 INJECTION, SOLUTION SUBCUTANEOUS at 08:30

## 2017-06-15 RX ADMIN — ATENOLOL 50 MILLIGRAM(S): 25 TABLET ORAL at 06:22

## 2017-06-15 RX ADMIN — Medication 20 UNIT(S): at 17:18

## 2017-06-15 RX ADMIN — HYDROMORPHONE HYDROCHLORIDE 1 MILLIGRAM(S): 2 INJECTION INTRAMUSCULAR; INTRAVENOUS; SUBCUTANEOUS at 00:20

## 2017-06-15 RX ADMIN — Medication 2: at 17:19

## 2017-06-15 RX ADMIN — Medication 100 MILLIGRAM(S): at 13:16

## 2017-06-15 RX ADMIN — HYDROMORPHONE HYDROCHLORIDE 1 MILLIGRAM(S): 2 INJECTION INTRAMUSCULAR; INTRAVENOUS; SUBCUTANEOUS at 12:25

## 2017-06-15 RX ADMIN — ATORVASTATIN CALCIUM 40 MILLIGRAM(S): 80 TABLET, FILM COATED ORAL at 22:05

## 2017-06-15 RX ADMIN — ENOXAPARIN SODIUM 40 MILLIGRAM(S): 100 INJECTION SUBCUTANEOUS at 06:30

## 2017-06-15 RX ADMIN — HYDROMORPHONE HYDROCHLORIDE 1 MILLIGRAM(S): 2 INJECTION INTRAMUSCULAR; INTRAVENOUS; SUBCUTANEOUS at 00:06

## 2017-06-15 RX ADMIN — HYDROMORPHONE HYDROCHLORIDE 4 MILLIGRAM(S): 2 INJECTION INTRAMUSCULAR; INTRAVENOUS; SUBCUTANEOUS at 22:35

## 2017-06-15 RX ADMIN — Medication 1000 MILLIGRAM(S): at 23:06

## 2017-06-15 RX ADMIN — HYDROMORPHONE HYDROCHLORIDE 4 MILLIGRAM(S): 2 INJECTION INTRAMUSCULAR; INTRAVENOUS; SUBCUTANEOUS at 04:27

## 2017-06-15 RX ADMIN — HYDROMORPHONE HYDROCHLORIDE 4 MILLIGRAM(S): 2 INJECTION INTRAMUSCULAR; INTRAVENOUS; SUBCUTANEOUS at 11:03

## 2017-06-15 RX ADMIN — HYDROMORPHONE HYDROCHLORIDE 4 MILLIGRAM(S): 2 INJECTION INTRAMUSCULAR; INTRAVENOUS; SUBCUTANEOUS at 05:00

## 2017-06-15 RX ADMIN — HYDROMORPHONE HYDROCHLORIDE 1 MILLIGRAM(S): 2 INJECTION INTRAMUSCULAR; INTRAVENOUS; SUBCUTANEOUS at 06:36

## 2017-06-15 RX ADMIN — Medication 400 MILLIGRAM(S): at 22:51

## 2017-06-15 RX ADMIN — Medication 20 UNIT(S): at 08:30

## 2017-06-15 RX ADMIN — HYDROMORPHONE HYDROCHLORIDE 4 MILLIGRAM(S): 2 INJECTION INTRAMUSCULAR; INTRAVENOUS; SUBCUTANEOUS at 22:05

## 2017-06-15 RX ADMIN — SODIUM CHLORIDE 80 MILLILITER(S): 9 INJECTION INTRAMUSCULAR; INTRAVENOUS; SUBCUTANEOUS at 06:23

## 2017-06-15 RX ADMIN — Medication 1000 MILLIGRAM(S): at 13:31

## 2017-06-15 RX ADMIN — Medication 400 MILLIGRAM(S): at 13:16

## 2017-06-15 RX ADMIN — HYDROMORPHONE HYDROCHLORIDE 1 MILLIGRAM(S): 2 INJECTION INTRAMUSCULAR; INTRAVENOUS; SUBCUTANEOUS at 19:26

## 2017-06-15 RX ADMIN — PANTOPRAZOLE SODIUM 40 MILLIGRAM(S): 20 TABLET, DELAYED RELEASE ORAL at 06:26

## 2017-06-15 RX ADMIN — HYDROMORPHONE HYDROCHLORIDE 4 MILLIGRAM(S): 2 INJECTION INTRAMUSCULAR; INTRAVENOUS; SUBCUTANEOUS at 17:18

## 2017-06-15 RX ADMIN — Medication 100 MILLIGRAM(S): at 06:22

## 2017-06-15 RX ADMIN — HYDROMORPHONE HYDROCHLORIDE 1 MILLIGRAM(S): 2 INJECTION INTRAMUSCULAR; INTRAVENOUS; SUBCUTANEOUS at 19:41

## 2017-06-15 RX ADMIN — Medication 20 UNIT(S): at 14:18

## 2017-06-15 RX ADMIN — PIPERACILLIN AND TAZOBACTAM 25 GRAM(S): 4; .5 INJECTION, POWDER, LYOPHILIZED, FOR SOLUTION INTRAVENOUS at 06:22

## 2017-06-15 RX ADMIN — HYDROMORPHONE HYDROCHLORIDE 4 MILLIGRAM(S): 2 INJECTION INTRAMUSCULAR; INTRAVENOUS; SUBCUTANEOUS at 17:48

## 2017-06-15 RX ADMIN — POLYETHYLENE GLYCOL 3350 17 GRAM(S): 17 POWDER, FOR SOLUTION ORAL at 11:03

## 2017-06-15 NOTE — PROGRESS NOTE ADULT - SUBJECTIVE AND OBJECTIVE BOX
Patient is a 56y old  Female who presents with a chief complaint of R arm near amputation (04 Jun 2017 07:15)      SUBJECTIVE / OVERNIGHT EVENTS:   Feels better.  Denies CP/SOB/Palpitation/HA.    MEDICATIONS  (STANDING):  pantoprazole    Tablet 40milliGRAM(s) Oral before breakfast  atorvastatin 40milliGRAM(s) Oral at bedtime  docusate sodium 100milliGRAM(s) Oral three times a day  senna 2Tablet(s) Oral at bedtime  insulin lispro (HumaLOG) corrective regimen sliding scale  SubCutaneous three times a day before meals  dextrose 5%. 1000milliLiter(s) IV Continuous <Continuous>  dextrose 50% Injectable 12.5Gram(s) IV Push once  dextrose 50% Injectable 25Gram(s) IV Push once  dextrose 50% Injectable 25Gram(s) IV Push once  insulin glargine Injectable (LANTUS) 35Unit(s) SubCutaneous two times a day  insulin lispro Injectable (HumaLOG) 20Unit(s) SubCutaneous three times a day before meals  insulin lispro (HumaLOG) corrective regimen sliding scale  SubCutaneous at bedtime  polyethylene glycol 3350 17Gram(s) Oral daily  ATENolol  Tablet 50milliGRAM(s) Oral daily  hydrochlorothiazide    Capsule 12.5milliGRAM(s) Oral daily  lactated ringers. 1000milliLiter(s) IV Continuous <Continuous>    MEDICATIONS  (PRN):  ondansetron Injectable 4milliGRAM(s) IV Push every 6 hours PRN Nausea  dextrose Gel 1Dose(s) Oral once PRN Blood Glucose LESS THAN 70 milliGRAM(s)/deciliter  glucagon  Injectable 1milliGRAM(s) IntraMuscular once PRN Glucose LESS THAN 70 milligrams/deciliter  HYDROmorphone   Tablet 2milliGRAM(s) Oral every 3 hours PRN Moderate Pain (4 - 6)  HYDROmorphone   Tablet 4milliGRAM(s) Oral every 4 hours PRN Severe Pain (7 - 10)  HYDROmorphone  Injectable 1milliGRAM(s) IV Push every 4 hours PRN breakthrough pain  HYDROmorphone  Injectable 0.5milliGRAM(s) IV Push every 10 minutes PRN Severe Pain (7 - 10)  calcium carbonate 500 mG (Tums) Chewable 1Tablet(s) Chew three times a day PRN Heartburn  aluminum hydroxide/magnesium hydroxide/simethicone Suspension 30milliLiter(s) Oral every 6 hours PRN Dyspepsia      Vital Signs Last 24 Hrs  T(C): 36.9, Max: 37 (06-15 @ 07:49)  HR: 70 (62 - 70)  BP: 122/63 (106/72 - 124/60)  RR: 18 (18 - 18)  SpO2: 98% (95% - 98%)  Wt(kg): --  CAPILLARY BLOOD GLUCOSE  150 (15 Domenico 2017 22:00)  160 (15 Domenico 2017 17:10)  145 (15 Domenico 2017 12:02)  146 (15 Domenico 2017 07:49)    I&O's Summary  I & Os for 24h ending 15 Domenico 2017 07:00  =============================================  IN: 1120 ml / OUT: 2200 ml / NET: -1080 ml    I & Os for current day (as of 15 Domenico 2017 23:41)  =============================================  IN: 960 ml / OUT: 600 ml / NET: 360 ml      PHYSICAL EXAM:  GENERAL: NAD, well-developed  HEAD:  Atraumatic, Normocephalic  EYES: EOMI, PERRLA, conjunctiva and sclera clear  NECK: Supple, No JVD  CHEST/LUNG: Clear to auscultation bilaterally; No wheeze  HEART: Regular rate and rhythm; No murmurs, rubs, or gallops  ABDOMEN: Soft, Nontender, Nondistended; Bowel sounds present  EXTREMITIES:  2+ Peripheral Pulses, No clubbing, cyanosis, or edema  PSYCH: AAO. Rt elbow Ext fixator.  NEUROLOGY: non-focal  SKIN: No rashes or lesions    LABS:                        8.0    12.6  )-----------( 462      ( 15 Domenico 2017 13:54 )             24.2     06-15    137  |  100  |  14  ----------------------------<  124<H>  4.3   |  25  |  1.23    Ca    9.0      15 Domenico 2017 13:53      PT/INR - ( 15 Domenico 2017 13:54 )   PT: 11.6 sec;   INR: 1.07 ratio         PTT - ( 15 Domenico 2017 13:54 )  PTT:33.4 sec        CAPILLARY BLOOD GLUCOSE  150 (15 Domenico 2017 22:00)  160 (15 Domenico 2017 17:10)  145 (15 Domenico 2017 12:02)  146 (15 Domenico 2017 07:49)                RADIOLOGY & ADDITIONAL TESTS:    Imaging Personally Reviewed:    Consultant(s) Notes Reviewed:      Care Discussed with Consultants/Other Providers:

## 2017-06-15 NOTE — CHART NOTE - NSCHARTNOTEFT_GEN_A_CORE
Ortho Preop Note    Diagnosis: R grade IIB open elbow fx  Surgeon: Haider  Procedure: R elbow I&D, wound vac    Labs:                         8.5    13.5  )-----------( 540      ( 14 Jun 2017 09:30 )             26.2     06-14    139  |  100  |  12  ----------------------------<  147<H>  3.9   |  25  |  1.08    Ca    8.8      14 Jun 2017 09:30    T&S: Done  UA: done  ECG: in chart  CXR: done  Consent: done    Plan:  - NPOpMN, IVF when NPO  - Hold chemical DVT prophylaxis/anticoagulation pMN  - Pain control  - IS  - OR tomorrow    Cassandra Reza PGY2  [phone]

## 2017-06-15 NOTE — PROGRESS NOTE ADULT - ASSESSMENT
Impression: Stable       Plan: Continue present treatment                 Out of bed, ambulate                  Physical therapy  follow up                  Continue to monitor    Juan Kevin PA-C  Orthopaedic Surgery  Team pager [phone]  [phone]

## 2017-06-15 NOTE — PROGRESS NOTE ADULT - ASSESSMENT
Patient is a 56y old  Female who presents with a chief complaint of R arm near amputation.    Rt arm crush injury:           Possible  washout and I&D tomorrow.         Ortho/Plastics f/up noted.    DM II :  FSSS/Lantus/Humalog.    Anemia:  CBC

## 2017-06-15 NOTE — PROGRESS NOTE ADULT - SUBJECTIVE AND OBJECTIVE BOX
ORTHO  Patient is a 56y old  Female who presents with a chief complaint of R arm near amputation (04 Jun 2017 07:15)    Pt. resting without complaint    VS-  T(C): 36.7, Max: 36.8 (06-14 @ 08:28)  HR: 68 (62 - 82)  BP: 121/71 (100/63 - 143/79)  RR: 18 (18 - 18)  SpO2: 97% (95% - 98%)  Wt(kg): --    M.S.  A & O  Extremity- Right upper vac dressing in place, ex-fix in place, pin site dressings clean and dry  motor AIN/PIN intact  SILT M/R  no Ulna motor or sens  fingers wwp                               8.5    13.5  )-----------( 540      ( 14 Jun 2017 09:30 )             26.2     06-14    139  |  100  |  12  ----------------------------<  147<H>  3.9   |  25  |  1.08    Ca    8.8      14 Jun 2017 09:30
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
## 2017-06-15 NOTE — PROGRESS NOTE ADULT - SUBJECTIVE AND OBJECTIVE BOX
Pt resting comfortable this AM. s/p washout and placement of abx spacer. Doing well.     ICU Vital Signs Last 24 Hrs  T(C): 36.7, Max: 36.8 (06-14 @ 08:28)  T(F): 98.1, Max: 98.3 (06-14 @ 23:46)  HR: 68 (62 - 82)  BP: 121/71 (100/63 - 143/79)  BP(mean): --  ABP: --  ABP(mean): --  RR: 18 (18 - 18)  SpO2: 97% (95% - 98%)  Vital Signs Last 24 Hrs  T(C): 36.7, Max: 37.3 (06-13 @ 22:30)  T(F): 98.1, Max: 99.1 (06-13 @ 22:30)  HR: 72 (72 - 96)  BP: 146/75 (120/75 - 163/83)  BP(mean): 115 (103 - 115)  RR: 18 (14 - 20)  SpO2: 97% (95% - 100%)    Gen - NAD  RUE with ex-fix in place, VAC in place without leak  hand is wwp

## 2017-06-16 LAB
ANION GAP SERPL CALC-SCNC: 11 MMOL/L — SIGNIFICANT CHANGE UP (ref 5–17)
APTT BLD: 30.6 SEC — SIGNIFICANT CHANGE UP (ref 27.5–37.4)
BLD GP AB SCN SERPL QL: POSITIVE — SIGNIFICANT CHANGE UP
BUN SERPL-MCNC: 14 MG/DL — SIGNIFICANT CHANGE UP (ref 7–23)
CALCIUM SERPL-MCNC: 9.3 MG/DL — SIGNIFICANT CHANGE UP (ref 8.4–10.5)
CHLORIDE SERPL-SCNC: 100 MMOL/L — SIGNIFICANT CHANGE UP (ref 96–108)
CO2 SERPL-SCNC: 27 MMOL/L — SIGNIFICANT CHANGE UP (ref 22–31)
CREAT SERPL-MCNC: 1.17 MG/DL — SIGNIFICANT CHANGE UP (ref 0.5–1.3)
GLUCOSE SERPL-MCNC: 98 MG/DL — SIGNIFICANT CHANGE UP (ref 70–99)
HCT VFR BLD CALC: 27.3 % — LOW (ref 34.5–45)
HGB BLD-MCNC: 8.5 G/DL — LOW (ref 11.5–15.5)
INR BLD: 1.03 RATIO — SIGNIFICANT CHANGE UP (ref 0.88–1.16)
MCHC RBC-ENTMCNC: 25.8 PG — LOW (ref 27–34)
MCHC RBC-ENTMCNC: 31.3 GM/DL — LOW (ref 32–36)
MCV RBC AUTO: 82.5 FL — SIGNIFICANT CHANGE UP (ref 80–100)
PLATELET # BLD AUTO: 450 K/UL — HIGH (ref 150–400)
POTASSIUM SERPL-MCNC: 3.9 MMOL/L — SIGNIFICANT CHANGE UP (ref 3.5–5.3)
POTASSIUM SERPL-SCNC: 3.9 MMOL/L — SIGNIFICANT CHANGE UP (ref 3.5–5.3)
PROTHROM AB SERPL-ACNC: 11.1 SEC — SIGNIFICANT CHANGE UP (ref 9.8–12.7)
RBC # BLD: 3.3 M/UL — LOW (ref 3.8–5.2)
RBC # FLD: 15.4 % — HIGH (ref 10.3–14.5)
RH IG SCN BLD-IMP: NEGATIVE — SIGNIFICANT CHANGE UP
SODIUM SERPL-SCNC: 138 MMOL/L — SIGNIFICANT CHANGE UP (ref 135–145)
WBC # BLD: 9.6 K/UL — SIGNIFICANT CHANGE UP (ref 3.8–10.5)
WBC # FLD AUTO: 9.6 K/UL — SIGNIFICANT CHANGE UP (ref 3.8–10.5)

## 2017-06-16 PROCEDURE — 88309 TISSUE EXAM BY PATHOLOGIST: CPT | Mod: 26

## 2017-06-16 PROCEDURE — 99232 SBSQ HOSP IP/OBS MODERATE 35: CPT

## 2017-06-16 PROCEDURE — 73080 X-RAY EXAM OF ELBOW: CPT | Mod: 26,LT

## 2017-06-16 RX ORDER — ATENOLOL 25 MG/1
50 TABLET ORAL DAILY
Qty: 0 | Refills: 0 | Status: DISCONTINUED | OUTPATIENT
Start: 2017-06-16 | End: 2017-06-18

## 2017-06-16 RX ORDER — DOCUSATE SODIUM 100 MG
100 CAPSULE ORAL THREE TIMES A DAY
Qty: 0 | Refills: 0 | Status: DISCONTINUED | OUTPATIENT
Start: 2017-06-16 | End: 2017-06-16

## 2017-06-16 RX ORDER — INSULIN LISPRO 100/ML
20 VIAL (ML) SUBCUTANEOUS
Qty: 0 | Refills: 0 | Status: DISCONTINUED | OUTPATIENT
Start: 2017-06-16 | End: 2017-06-18

## 2017-06-16 RX ORDER — INSULIN GLARGINE 100 [IU]/ML
35 INJECTION, SOLUTION SUBCUTANEOUS AT BEDTIME
Qty: 0 | Refills: 0 | Status: DISCONTINUED | OUTPATIENT
Start: 2017-06-16 | End: 2017-06-17

## 2017-06-16 RX ORDER — HYDROMORPHONE HYDROCHLORIDE 2 MG/ML
0.5 INJECTION INTRAMUSCULAR; INTRAVENOUS; SUBCUTANEOUS
Qty: 0 | Refills: 0 | Status: DISCONTINUED | OUTPATIENT
Start: 2017-06-16 | End: 2017-06-16

## 2017-06-16 RX ORDER — GLUCAGON INJECTION, SOLUTION 0.5 MG/.1ML
1 INJECTION, SOLUTION SUBCUTANEOUS ONCE
Qty: 0 | Refills: 0 | Status: DISCONTINUED | OUTPATIENT
Start: 2017-06-16 | End: 2017-06-18

## 2017-06-16 RX ORDER — HYDROMORPHONE HYDROCHLORIDE 2 MG/ML
0.5 INJECTION INTRAMUSCULAR; INTRAVENOUS; SUBCUTANEOUS
Qty: 0 | Refills: 0 | Status: DISCONTINUED | OUTPATIENT
Start: 2017-06-16 | End: 2017-06-18

## 2017-06-16 RX ORDER — HYDROMORPHONE HYDROCHLORIDE 2 MG/ML
2 INJECTION INTRAMUSCULAR; INTRAVENOUS; SUBCUTANEOUS
Qty: 0 | Refills: 0 | Status: DISCONTINUED | OUTPATIENT
Start: 2017-06-16 | End: 2017-06-16

## 2017-06-16 RX ORDER — HYDROMORPHONE HYDROCHLORIDE 2 MG/ML
4 INJECTION INTRAMUSCULAR; INTRAVENOUS; SUBCUTANEOUS EVERY 4 HOURS
Qty: 0 | Refills: 0 | Status: DISCONTINUED | OUTPATIENT
Start: 2017-06-16 | End: 2017-06-16

## 2017-06-16 RX ORDER — DEXTROSE 50 % IN WATER 50 %
25 SYRINGE (ML) INTRAVENOUS ONCE
Qty: 0 | Refills: 0 | Status: DISCONTINUED | OUTPATIENT
Start: 2017-06-16 | End: 2017-06-18

## 2017-06-16 RX ORDER — INSULIN GLARGINE 100 [IU]/ML
35 INJECTION, SOLUTION SUBCUTANEOUS EVERY MORNING
Qty: 0 | Refills: 0 | Status: DISCONTINUED | OUTPATIENT
Start: 2017-06-16 | End: 2017-06-17

## 2017-06-16 RX ORDER — FERROUS SULFATE 325(65) MG
325 TABLET ORAL DAILY
Qty: 0 | Refills: 0 | Status: DISCONTINUED | OUTPATIENT
Start: 2017-06-16 | End: 2017-06-18

## 2017-06-16 RX ORDER — ONDANSETRON 8 MG/1
4 TABLET, FILM COATED ORAL ONCE
Qty: 0 | Refills: 0 | Status: DISCONTINUED | OUTPATIENT
Start: 2017-06-16 | End: 2017-06-16

## 2017-06-16 RX ORDER — SODIUM CHLORIDE 9 MG/ML
1000 INJECTION INTRAMUSCULAR; INTRAVENOUS; SUBCUTANEOUS
Qty: 0 | Refills: 0 | Status: DISCONTINUED | OUTPATIENT
Start: 2017-06-16 | End: 2017-06-17

## 2017-06-16 RX ORDER — ATORVASTATIN CALCIUM 80 MG/1
40 TABLET, FILM COATED ORAL AT BEDTIME
Qty: 0 | Refills: 0 | Status: DISCONTINUED | OUTPATIENT
Start: 2017-06-16 | End: 2017-06-18

## 2017-06-16 RX ORDER — ASCORBIC ACID 60 MG
500 TABLET,CHEWABLE ORAL DAILY
Qty: 0 | Refills: 0 | Status: DISCONTINUED | OUTPATIENT
Start: 2017-06-16 | End: 2017-06-18

## 2017-06-16 RX ORDER — DEXTROSE 50 % IN WATER 50 %
12.5 SYRINGE (ML) INTRAVENOUS ONCE
Qty: 0 | Refills: 0 | Status: DISCONTINUED | OUTPATIENT
Start: 2017-06-16 | End: 2017-06-18

## 2017-06-16 RX ORDER — CALCIUM CARBONATE 500(1250)
1 TABLET ORAL THREE TIMES A DAY
Qty: 0 | Refills: 0 | Status: DISCONTINUED | OUTPATIENT
Start: 2017-06-16 | End: 2017-06-18

## 2017-06-16 RX ORDER — SODIUM CHLORIDE 9 MG/ML
1000 INJECTION, SOLUTION INTRAVENOUS
Qty: 0 | Refills: 0 | Status: DISCONTINUED | OUTPATIENT
Start: 2017-06-16 | End: 2017-06-18

## 2017-06-16 RX ORDER — ENOXAPARIN SODIUM 100 MG/ML
40 INJECTION SUBCUTANEOUS EVERY 24 HOURS
Qty: 0 | Refills: 0 | Status: COMPLETED | OUTPATIENT
Start: 2017-06-17 | End: 2017-06-17

## 2017-06-16 RX ORDER — ONDANSETRON 8 MG/1
4 TABLET, FILM COATED ORAL EVERY 6 HOURS
Qty: 0 | Refills: 0 | Status: DISCONTINUED | OUTPATIENT
Start: 2017-06-16 | End: 2017-06-18

## 2017-06-16 RX ORDER — PANTOPRAZOLE SODIUM 20 MG/1
40 TABLET, DELAYED RELEASE ORAL
Qty: 0 | Refills: 0 | Status: DISCONTINUED | OUTPATIENT
Start: 2017-06-16 | End: 2017-06-18

## 2017-06-16 RX ORDER — INSULIN LISPRO 100/ML
VIAL (ML) SUBCUTANEOUS
Qty: 0 | Refills: 0 | Status: DISCONTINUED | OUTPATIENT
Start: 2017-06-16 | End: 2017-06-18

## 2017-06-16 RX ORDER — DEXTROSE 50 % IN WATER 50 %
1 SYRINGE (ML) INTRAVENOUS ONCE
Qty: 0 | Refills: 0 | Status: DISCONTINUED | OUTPATIENT
Start: 2017-06-16 | End: 2017-06-18

## 2017-06-16 RX ORDER — OXYCODONE HYDROCHLORIDE 5 MG/1
5 TABLET ORAL EVERY 4 HOURS
Qty: 0 | Refills: 0 | Status: DISCONTINUED | OUTPATIENT
Start: 2017-06-16 | End: 2017-06-16

## 2017-06-16 RX ORDER — NALOXONE HYDROCHLORIDE 4 MG/.1ML
0.1 SPRAY NASAL
Qty: 0 | Refills: 0 | Status: DISCONTINUED | OUTPATIENT
Start: 2017-06-16 | End: 2017-06-18

## 2017-06-16 RX ORDER — SODIUM CHLORIDE 9 MG/ML
1000 INJECTION, SOLUTION INTRAVENOUS
Qty: 0 | Refills: 0 | Status: DISCONTINUED | OUTPATIENT
Start: 2017-06-16 | End: 2017-06-16

## 2017-06-16 RX ORDER — ACETAMINOPHEN 500 MG
650 TABLET ORAL EVERY 6 HOURS
Qty: 0 | Refills: 0 | Status: DISCONTINUED | OUTPATIENT
Start: 2017-06-16 | End: 2017-06-18

## 2017-06-16 RX ORDER — MAGNESIUM HYDROXIDE 400 MG/1
30 TABLET, CHEWABLE ORAL DAILY
Qty: 0 | Refills: 0 | Status: DISCONTINUED | OUTPATIENT
Start: 2017-06-16 | End: 2017-06-18

## 2017-06-16 RX ORDER — HYDROMORPHONE HYDROCHLORIDE 2 MG/ML
1 INJECTION INTRAMUSCULAR; INTRAVENOUS; SUBCUTANEOUS EVERY 4 HOURS
Qty: 0 | Refills: 0 | Status: DISCONTINUED | OUTPATIENT
Start: 2017-06-16 | End: 2017-06-16

## 2017-06-16 RX ORDER — ONDANSETRON 8 MG/1
4 TABLET, FILM COATED ORAL EVERY 6 HOURS
Qty: 0 | Refills: 0 | Status: DISCONTINUED | OUTPATIENT
Start: 2017-06-16 | End: 2017-06-16

## 2017-06-16 RX ORDER — HYDROMORPHONE HYDROCHLORIDE 2 MG/ML
30 INJECTION INTRAMUSCULAR; INTRAVENOUS; SUBCUTANEOUS
Qty: 0 | Refills: 0 | Status: DISCONTINUED | OUTPATIENT
Start: 2017-06-16 | End: 2017-06-18

## 2017-06-16 RX ORDER — CEFAZOLIN SODIUM 1 G
2000 VIAL (EA) INJECTION EVERY 8 HOURS
Qty: 0 | Refills: 0 | Status: COMPLETED | OUTPATIENT
Start: 2017-06-16 | End: 2017-06-17

## 2017-06-16 RX ORDER — INSULIN LISPRO 100/ML
VIAL (ML) SUBCUTANEOUS AT BEDTIME
Qty: 0 | Refills: 0 | Status: DISCONTINUED | OUTPATIENT
Start: 2017-06-16 | End: 2017-06-18

## 2017-06-16 RX ORDER — POLYETHYLENE GLYCOL 3350 17 G/17G
17 POWDER, FOR SOLUTION ORAL DAILY
Qty: 0 | Refills: 0 | Status: DISCONTINUED | OUTPATIENT
Start: 2017-06-16 | End: 2017-06-18

## 2017-06-16 RX ORDER — FERROUS SULFATE 325(65) MG
325 TABLET ORAL
Qty: 0 | Refills: 0 | Status: DISCONTINUED | OUTPATIENT
Start: 2017-06-16 | End: 2017-06-16

## 2017-06-16 RX ADMIN — HYDROMORPHONE HYDROCHLORIDE 1 MILLIGRAM(S): 2 INJECTION INTRAMUSCULAR; INTRAVENOUS; SUBCUTANEOUS at 02:58

## 2017-06-16 RX ADMIN — ATORVASTATIN CALCIUM 40 MILLIGRAM(S): 80 TABLET, FILM COATED ORAL at 21:27

## 2017-06-16 RX ADMIN — HYDROMORPHONE HYDROCHLORIDE 1 MILLIGRAM(S): 2 INJECTION INTRAMUSCULAR; INTRAVENOUS; SUBCUTANEOUS at 09:16

## 2017-06-16 RX ADMIN — SODIUM CHLORIDE 80 MILLILITER(S): 9 INJECTION INTRAMUSCULAR; INTRAVENOUS; SUBCUTANEOUS at 23:54

## 2017-06-16 RX ADMIN — HYDROMORPHONE HYDROCHLORIDE 30 MILLILITER(S): 2 INJECTION INTRAMUSCULAR; INTRAVENOUS; SUBCUTANEOUS at 18:54

## 2017-06-16 RX ADMIN — HYDROMORPHONE HYDROCHLORIDE 1 MILLIGRAM(S): 2 INJECTION INTRAMUSCULAR; INTRAVENOUS; SUBCUTANEOUS at 09:31

## 2017-06-16 RX ADMIN — HYDROMORPHONE HYDROCHLORIDE 30 MILLILITER(S): 2 INJECTION INTRAMUSCULAR; INTRAVENOUS; SUBCUTANEOUS at 21:28

## 2017-06-16 RX ADMIN — ATENOLOL 50 MILLIGRAM(S): 25 TABLET ORAL at 05:30

## 2017-06-16 RX ADMIN — SODIUM CHLORIDE 75 MILLILITER(S): 9 INJECTION, SOLUTION INTRAVENOUS at 00:31

## 2017-06-16 RX ADMIN — HYDROMORPHONE HYDROCHLORIDE 1 MILLIGRAM(S): 2 INJECTION INTRAMUSCULAR; INTRAVENOUS; SUBCUTANEOUS at 02:43

## 2017-06-16 RX ADMIN — HYDROMORPHONE HYDROCHLORIDE 4 MILLIGRAM(S): 2 INJECTION INTRAMUSCULAR; INTRAVENOUS; SUBCUTANEOUS at 05:30

## 2017-06-16 RX ADMIN — INSULIN GLARGINE 35 UNIT(S): 100 INJECTION, SOLUTION SUBCUTANEOUS at 23:28

## 2017-06-16 RX ADMIN — HYDROMORPHONE HYDROCHLORIDE 4 MILLIGRAM(S): 2 INJECTION INTRAMUSCULAR; INTRAVENOUS; SUBCUTANEOUS at 12:13

## 2017-06-16 RX ADMIN — HYDROMORPHONE HYDROCHLORIDE 4 MILLIGRAM(S): 2 INJECTION INTRAMUSCULAR; INTRAVENOUS; SUBCUTANEOUS at 11:45

## 2017-06-16 RX ADMIN — HYDROMORPHONE HYDROCHLORIDE 4 MILLIGRAM(S): 2 INJECTION INTRAMUSCULAR; INTRAVENOUS; SUBCUTANEOUS at 06:00

## 2017-06-16 NOTE — PROGRESS NOTE ADULT - SUBJECTIVE AND OBJECTIVE BOX
ORTHO  Patient is a 56y old  Female who presents with a chief complaint of R arm near amputation (04 Jun 2017 07:15)    Pt. S&E. No issues overnight. Plan for OR today.    Vital Signs Last 24 Hrs  T(C): 36.4, Max: 37 (06-15 @ 07:49)  T(F): 97.5, Max: 98.6 (06-15 @ 07:49)  HR: 70 (65 - 70)  BP: 136/79 (110/73 - 136/79)  BP(mean): --  RR: 18 (18 - 18)  SpO2: 95% (95% - 98%)    M.S.  A & O  Extremity- Right upper vac dressing in place, ex-fix in place, pin site dressings clean and dry  motor AIN/PIN intact  SILT M/R  no Ulnar Nerve motor or sens  fingers wwp                          8.0    12.6  )-----------( 462      ( 15 Domenico 2017 13:54 )             24.2   06-15    137  |  100  |  14  ----------------------------<  124<H>  4.3   |  25  |  1.23    Ca    9.0      15 Domenico 2017 13:53
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
## 2017-06-16 NOTE — PROGRESS NOTE ADULT - PROBLEM SELECTOR PLAN 1
-Test BG ac and hs  -Continue lantus 35 units bid (6pm and 6am) after procedure  -Continue Humalog 20 units ac meals   -Continue Humalog moderate correction scales ac and hs  -IF NPO GIVE: 28 units of Lantus the night before and hold Lantus in am the day of procedure and change correction scale to moderate dose q6h.  -consider antibiotic infusion on NS instead of D5.  -Plan discussed with pt and team.   -Consider antibiotic infusion in NS instead of D5  -Will adjust regime as needed. Plan discussed with pt/staff and team. Will follow  Beeper [phone] or

## 2017-06-16 NOTE — PROGRESS NOTE ADULT - SUBJECTIVE AND OBJECTIVE BOX
Pt resting comfortable this AM. s/p washout and placement of abx spacer. Doing well.     ICU Vital Signs Last 24 Hrs  T(C): 36.8, Max: 36.9 (06-15 @ 17:10)  T(F): 98.2, Max: 98.5 (06-15 @ 17:10)  HR: 60 (60 - 70)  BP: 136/75 (116/73 - 147/81)  BP(mean): --  ABP: --  ABP(mean): --  RR: 18 (18 - 18)  SpO2: 96% (95% - 99%)    Gen - NAD  RUE with ex-fix in place, VAC in place without leak  hand is wwp

## 2017-06-16 NOTE — CHART NOTE - NSCHARTNOTEFT_GEN_A_CORE
Pt c/p incisional pain on PCA    No Chest Pain, SOB, N/V.    T(C): 36.8, Max: 37 (06-16 @ 15:45)  HR: 74 (60 - 74)  BP: 127/79 (116/73 - 161/86)  RR: 20 (16 - 20)  SpO2: 98% (95% - 100%)      Exam:  Alert and [place], No Acute Distress  Card: +S1/S2, RRR  Pulm: CTAB  Abdomen soft / benign  Wilde  [n ]   EXT RUE        -- Ex-Fix in fix        -- VAC in place        -- n/v without sig changes: decreased sensation along medial and iulnar aspect                                               (+) sensation along radial aspect                                                able to move digits I-III (limited 2/2 pain)                                                 2 + radial pulses                                               cap refil 2-3 secs                      Labs: none      A/P: S/p External fixator device (invasive)  Debridement, wound  Wound VAC placement  Application of arm splint  Debridement of right upper extremity  Repair, laceration, scalp    --Cont PCA  --Ice / elevate  -Chk AM Labs  --Endo note appreciated: Rx renewed  --possible RTOR in 2-3 days  --monitor closely    Gianfranco DURÁN  Orthopedics  B: [phone]  S: [phone] Pt c/p incisional pain on PCA    No Chest Pain, SOB, N/V.    T(C): 36.8, Max: 37 (06-16 @ 15:45)  HR: 74 (60 - 74)  BP: 127/79 (116/73 - 161/86)  RR: 20 (16 - 20)  SpO2: 98% (95% - 100%)      Exam:  Alert and [place], No Acute Distress  Card: +S1/S2, RRR  Pulm: CTAB  Abdomen soft / benign  Wilde  [n ]   EXT RUE        -- Ex-Fix in fix        -- VAC in place        -- n/v without sig changes: decreased sensation along medial and iulnar aspect                                               (+) sensation along radial aspect                                                able to move digits I-III (limited 2/2 pain)                                                 2 + radial pulses                                               cap refil 2-3 secs                      Labs: none      A/P: S/p External fixator device (invasive)  Debridement, wound  Wound VAC placement  Application of arm splint  Debridement of right upper extremity  Repair, laceration, scalp    --Cont PCA  --Ice / elevate  -Chk AM Labs  --Endo note appreciated: Rx renewed  --possible RTOR in 2-3 days  --OOB -> chair: PT/OT: HAMILTON BULLARDE  --monitor closely    Gianfranco DURÁN  Orthopedics  B: [phone]  S: [phone]
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
## 2017-06-16 NOTE — BRIEF OPERATIVE NOTE - PROCEDURE
Debridement, wound  06/09/2017    Active  Khang Elliott  Wound VAC placement  06/09/2017    Active  Khang Elliott  Debridement of right upper extremity  06/04/2017    Active  Emilie Ramirez

## 2017-06-17 DIAGNOSIS — S42.301B UNSPECIFIED FRACTURE OF SHAFT OF HUMERUS, RIGHT ARM, INITIAL ENCOUNTER FOR OPEN FRACTURE: ICD-10-CM

## 2017-06-17 LAB
ANION GAP SERPL CALC-SCNC: 18 MMOL/L — HIGH (ref 5–17)
BASOPHILS # BLD AUTO: 0.01 K/UL — SIGNIFICANT CHANGE UP (ref 0–0.2)
BASOPHILS NFR BLD AUTO: 0.1 % — SIGNIFICANT CHANGE UP (ref 0–2)
BUN SERPL-MCNC: 17 MG/DL — SIGNIFICANT CHANGE UP (ref 7–23)
CALCIUM SERPL-MCNC: 9 MG/DL — SIGNIFICANT CHANGE UP (ref 8.4–10.5)
CHLORIDE SERPL-SCNC: 97 MMOL/L — SIGNIFICANT CHANGE UP (ref 96–108)
CO2 SERPL-SCNC: 22 MMOL/L — SIGNIFICANT CHANGE UP (ref 22–31)
CREAT SERPL-MCNC: 1.27 MG/DL — SIGNIFICANT CHANGE UP (ref 0.5–1.3)
EOSINOPHIL # BLD AUTO: 0.16 K/UL — SIGNIFICANT CHANGE UP (ref 0–0.5)
EOSINOPHIL NFR BLD AUTO: 1.4 % — SIGNIFICANT CHANGE UP (ref 0–6)
GLUCOSE SERPL-MCNC: 129 MG/DL — HIGH (ref 70–99)
HCT VFR BLD CALC: 26.4 % — LOW (ref 34.5–45)
HGB BLD-MCNC: 8.1 G/DL — LOW (ref 11.5–15.5)
IMM GRANULOCYTES NFR BLD AUTO: 0.4 % — SIGNIFICANT CHANGE UP (ref 0–1.5)
LYMPHOCYTES # BLD AUTO: 3.63 K/UL — HIGH (ref 1–3.3)
LYMPHOCYTES # BLD AUTO: 32.5 % — SIGNIFICANT CHANGE UP (ref 13–44)
MCHC RBC-ENTMCNC: 26 PG — LOW (ref 27–34)
MCHC RBC-ENTMCNC: 30.7 GM/DL — LOW (ref 32–36)
MCV RBC AUTO: 84.6 FL — SIGNIFICANT CHANGE UP (ref 80–100)
MONOCYTES # BLD AUTO: 0.75 K/UL — SIGNIFICANT CHANGE UP (ref 0–0.9)
MONOCYTES NFR BLD AUTO: 6.7 % — SIGNIFICANT CHANGE UP (ref 2–14)
NEUTROPHILS # BLD AUTO: 6.58 K/UL — SIGNIFICANT CHANGE UP (ref 1.8–7.4)
NEUTROPHILS NFR BLD AUTO: 58.9 % — SIGNIFICANT CHANGE UP (ref 43–77)
PLATELET # BLD AUTO: 468 K/UL — HIGH (ref 150–400)
POTASSIUM SERPL-MCNC: 4 MMOL/L — SIGNIFICANT CHANGE UP (ref 3.5–5.3)
POTASSIUM SERPL-SCNC: 4 MMOL/L — SIGNIFICANT CHANGE UP (ref 3.5–5.3)
RBC # BLD: 3.12 M/UL — LOW (ref 3.8–5.2)
RBC # FLD: 16.7 % — HIGH (ref 10.3–14.5)
SODIUM SERPL-SCNC: 137 MMOL/L — SIGNIFICANT CHANGE UP (ref 135–145)
WBC # BLD: 11.17 K/UL — HIGH (ref 3.8–10.5)
WBC # FLD AUTO: 11.17 K/UL — HIGH (ref 3.8–10.5)

## 2017-06-17 PROCEDURE — 99232 SBSQ HOSP IP/OBS MODERATE 35: CPT

## 2017-06-17 PROCEDURE — 93010 ELECTROCARDIOGRAM REPORT: CPT

## 2017-06-17 RX ORDER — INSULIN GLARGINE 100 [IU]/ML
28 INJECTION, SOLUTION SUBCUTANEOUS AT BEDTIME
Qty: 0 | Refills: 0 | Status: DISCONTINUED | OUTPATIENT
Start: 2017-06-17 | End: 2017-06-18

## 2017-06-17 RX ORDER — SODIUM CHLORIDE 9 MG/ML
1000 INJECTION INTRAMUSCULAR; INTRAVENOUS; SUBCUTANEOUS
Qty: 0 | Refills: 0 | Status: DISCONTINUED | OUTPATIENT
Start: 2017-06-17 | End: 2017-06-18

## 2017-06-17 RX ORDER — ACETAMINOPHEN 500 MG
1000 TABLET ORAL ONCE
Qty: 0 | Refills: 0 | Status: COMPLETED | OUTPATIENT
Start: 2017-06-17 | End: 2017-06-17

## 2017-06-17 RX ADMIN — Medication 20 UNIT(S): at 13:40

## 2017-06-17 RX ADMIN — Medication 1000 MILLIGRAM(S): at 23:59

## 2017-06-17 RX ADMIN — Medication 100 MILLIGRAM(S): at 00:48

## 2017-06-17 RX ADMIN — HYDROMORPHONE HYDROCHLORIDE 30 MILLILITER(S): 2 INJECTION INTRAMUSCULAR; INTRAVENOUS; SUBCUTANEOUS at 08:04

## 2017-06-17 RX ADMIN — Medication 325 MILLIGRAM(S): at 11:46

## 2017-06-17 RX ADMIN — Medication 100 MILLIGRAM(S): at 08:40

## 2017-06-17 RX ADMIN — HYDROMORPHONE HYDROCHLORIDE 30 MILLILITER(S): 2 INJECTION INTRAMUSCULAR; INTRAVENOUS; SUBCUTANEOUS at 18:23

## 2017-06-17 RX ADMIN — Medication 30 MILLILITER(S): at 16:55

## 2017-06-17 RX ADMIN — Medication 400 MILLIGRAM(S): at 23:44

## 2017-06-17 RX ADMIN — HYDROMORPHONE HYDROCHLORIDE 30 MILLILITER(S): 2 INJECTION INTRAMUSCULAR; INTRAVENOUS; SUBCUTANEOUS at 22:18

## 2017-06-17 RX ADMIN — Medication 1 TABLET(S): at 11:46

## 2017-06-17 RX ADMIN — HYDROMORPHONE HYDROCHLORIDE 30 MILLILITER(S): 2 INJECTION INTRAMUSCULAR; INTRAVENOUS; SUBCUTANEOUS at 02:12

## 2017-06-17 RX ADMIN — HYDROMORPHONE HYDROCHLORIDE 0.5 MILLIGRAM(S): 2 INJECTION INTRAMUSCULAR; INTRAVENOUS; SUBCUTANEOUS at 21:17

## 2017-06-17 RX ADMIN — HYDROMORPHONE HYDROCHLORIDE 0.5 MILLIGRAM(S): 2 INJECTION INTRAMUSCULAR; INTRAVENOUS; SUBCUTANEOUS at 22:43

## 2017-06-17 RX ADMIN — Medication 500 MILLIGRAM(S): at 11:45

## 2017-06-17 RX ADMIN — ATORVASTATIN CALCIUM 40 MILLIGRAM(S): 80 TABLET, FILM COATED ORAL at 21:25

## 2017-06-17 RX ADMIN — POLYETHYLENE GLYCOL 3350 17 GRAM(S): 17 POWDER, FOR SOLUTION ORAL at 11:46

## 2017-06-17 RX ADMIN — Medication 30 MILLILITER(S): at 22:16

## 2017-06-17 RX ADMIN — INSULIN GLARGINE 28 UNIT(S): 100 INJECTION, SOLUTION SUBCUTANEOUS at 21:35

## 2017-06-17 RX ADMIN — PANTOPRAZOLE SODIUM 40 MILLIGRAM(S): 20 TABLET, DELAYED RELEASE ORAL at 05:58

## 2017-06-17 RX ADMIN — ATENOLOL 50 MILLIGRAM(S): 25 TABLET ORAL at 05:57

## 2017-06-17 RX ADMIN — HYDROMORPHONE HYDROCHLORIDE 30 MILLILITER(S): 2 INJECTION INTRAMUSCULAR; INTRAVENOUS; SUBCUTANEOUS at 11:38

## 2017-06-17 RX ADMIN — ENOXAPARIN SODIUM 40 MILLIGRAM(S): 100 INJECTION SUBCUTANEOUS at 11:46

## 2017-06-17 RX ADMIN — INSULIN GLARGINE 35 UNIT(S): 100 INJECTION, SOLUTION SUBCUTANEOUS at 09:42

## 2017-06-17 RX ADMIN — Medication 20 UNIT(S): at 08:43

## 2017-06-17 RX ADMIN — HYDROMORPHONE HYDROCHLORIDE 30 MILLILITER(S): 2 INJECTION INTRAMUSCULAR; INTRAVENOUS; SUBCUTANEOUS at 13:41

## 2017-06-17 RX ADMIN — HYDROMORPHONE HYDROCHLORIDE 30 MILLILITER(S): 2 INJECTION INTRAMUSCULAR; INTRAVENOUS; SUBCUTANEOUS at 06:01

## 2017-06-17 RX ADMIN — HYDROMORPHONE HYDROCHLORIDE 30 MILLILITER(S): 2 INJECTION INTRAMUSCULAR; INTRAVENOUS; SUBCUTANEOUS at 19:23
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
## 2017-06-17 NOTE — PROGRESS NOTE ADULT - PROBLEM SELECTOR PLAN 1
-check FS AC/HS  -Decrease Lantus 28 units for tonight (NPO dose). Dc'd AM lantus as patient will be NPO and won't require high doses of basal insulin. Recommend restarting Lantus 35 units BID when diet restarted after surgery  -c/w Humalog 20 units AC meals  -c/w Humalog low correction scale AC and Low HS scale.  -discussed w/Pt and team.  - pager: [phone]

## 2017-06-17 NOTE — PROGRESS NOTE ADULT - SUBJECTIVE AND OBJECTIVE BOX
Patient is a 56y old  Female who presents with a chief complaint of R arm near amputation (04 Jun 2017 07:15)      SUBJECTIVE / OVERNIGHT EVENTS:   Feels better.  Denies CP/SOB/Palpitation/HA.    MEDICATIONS  (STANDING):  multivitamin 1Tablet(s) Oral daily  pantoprazole    Tablet 40milliGRAM(s) Oral before breakfast  atorvastatin 40milliGRAM(s) Oral at bedtime  polyethylene glycol 3350 17Gram(s) Oral daily  ATENolol  Tablet 50milliGRAM(s) Oral daily  insulin lispro (HumaLOG) corrective regimen sliding scale  SubCutaneous three times a day before meals  insulin lispro (HumaLOG) corrective regimen sliding scale  SubCutaneous at bedtime  dextrose 5%. 1000milliLiter(s) IV Continuous <Continuous>  dextrose 50% Injectable 12.5Gram(s) IV Push once  dextrose 50% Injectable 25Gram(s) IV Push once  dextrose 50% Injectable 25Gram(s) IV Push once  HYDROmorphone PCA (1 mG/mL) 30milliLiter(s) PCA Continuous PCA Continuous  ferrous    sulfate 325milliGRAM(s) Oral daily  ascorbic acid 500milliGRAM(s) Oral daily  insulin lispro Injectable (HumaLOG) 20Unit(s) SubCutaneous three times a day before meals  sodium chloride 0.9%. 1000milliLiter(s) IV Continuous <Continuous>  insulin glargine Injectable (LANTUS) 28Unit(s) SubCutaneous at bedtime    MEDICATIONS  (PRN):  acetaminophen   Tablet 650milliGRAM(s) Oral every 6 hours PRN For Temp greater than 38 C (100.4 F)  ondansetron Injectable 4milliGRAM(s) IV Push every 6 hours PRN Nausea and/or Vomiting  magnesium hydroxide Suspension 30milliLiter(s) Oral daily PRN Constipation  calcium carbonate 500 mG (Tums) Chewable 1Tablet(s) Chew three times a day PRN Heartburn  aluminum hydroxide/magnesium hydroxide/simethicone Suspension 30milliLiter(s) Oral every 6 hours PRN Dyspepsia  dextrose Gel 1Dose(s) Oral once PRN Blood Glucose LESS THAN 70 milliGRAM(s)/deciliter  glucagon  Injectable 1milliGRAM(s) IntraMuscular once PRN Glucose LESS THAN 70 milligrams/deciliter  HYDROmorphone PCA (1 mG/mL) Rescue Clinician Bolus 0.5milliGRAM(s) IV Push every 15 minutes PRN for Pain Scale GREATER THAN 6  naloxone Injectable 0.1milliGRAM(s) IV Push every 3 minutes PRN For ANY of the following changes in patient status:  A. RR LESS THAN 10 breaths per minute, B. Oxygen saturation LESS THAN 90%, C. Sedation score of 6      Vital Signs Last 24 Hrs  T(C): 36.8, Max: 36.8 (06-17 @ 22:07)  HR: 66 (58 - 78)  BP: 101/63 (101/63 - 122/71)  RR: 18 (18 - 18)  SpO2: 97% (96% - 99%)  Wt(kg): --  CAPILLARY BLOOD GLUCOSE  183 (17 Jun 2017 21:28)  93 (17 Jun 2017 17:42)  135 (17 Jun 2017 12:11)  114 (17 Jun 2017 07:42)    I&O's Summary  I & Os for 24h ending 17 Jun 2017 07:00  =============================================  IN: 1290 ml / OUT: 1800 ml / NET: -510 ml    I & Os for current day (as of 17 Jun 2017 23:28)  =============================================  IN: 1220 ml / OUT: 0 ml / NET: 1220 ml      PHYSICAL EXAM:  GENERAL: NAD, well-developed  HEAD:  Atraumatic, Normocephalic  EYES: EOMI, PERRLA, conjunctiva and sclera clear  NECK: Supple, No JVD  CHEST/LUNG: Clear to auscultation bilaterally; No wheeze  HEART: Regular rate and rhythm; No murmurs, rubs, or gallops  ABDOMEN: Soft, Nontender, Nondistended; Bowel sounds present  EXTREMITIES:  2+ Peripheral Pulses, No clubbing, cyanosis, or edema  PSYCH: AAOx3  NEUROLOGY: non-focal  SKIN: No rashes or lesions    LABS:                        8.1    11.17 )-----------( 468      ( 17 Jun 2017 15:12 )             26.4     06-17    137  |  97  |  17  ----------------------------<  129<H>  4.0   |  22  |  1.27    Ca    9.0      17 Jun 2017 15:12      PT/INR - ( 16 Jun 2017 07:15 )   PT: 11.1 sec;   INR: 1.03 ratio         PTT - ( 16 Jun 2017 07:15 )  PTT:30.6 sec        CAPILLARY BLOOD GLUCOSE  183 (17 Jun 2017 21:28)  93 (17 Jun 2017 17:42)  135 (17 Jun 2017 12:11)  114 (17 Jun 2017 07:42)                RADIOLOGY & ADDITIONAL TESTS:    Imaging Personally Reviewed:    Consultant(s) Notes Reviewed:      Care Discussed with Consultants/Other Providers:

## 2017-06-17 NOTE — PROGRESS NOTE ADULT - SUBJECTIVE AND OBJECTIVE BOX
Patient seen and examined. Planning for surgery tomorrow morning with Dr. Garcia for RUE reconstruction with free flap vs pedicled flap vs local flap, poss skin graft, poss nerve repair. Surgical options and planning discussed at length. Consent obtained. Scheduled for 0730 on Sunday.     Vital Signs Last 24 Hrs  T(C): 36.6, Max: 37 (06-16 @ 15:45)  T(F): 97.9, Max: 98.6 (06-16 @ 15:45)  HR: 78 (60 - 78)  BP: 116/72 (102/66 - 161/86)  BP(mean): 106 (106 - 117)  RR: 18 (16 - 20)  SpO2: 98% (95% - 100%)                          8.5    9.6   )-----------( 450      ( 16 Jun 2017 07:15 )             27.3     AM labs pending.     RUE with ex-fix in place, VAC on and holding suction. Hand warm.

## 2017-06-17 NOTE — PROGRESS NOTE ADULT - ASSESSMENT
Patient is a 56y old  Female who presents with a chief complaint of R arm near amputation.    Rt arm crush injury:           Plastic Sx for reconstruction tomorrow.         Ortho/Plastics f/up noted.    DM II :  FSSS/Lantus/Humalog.    Anemia:  Hb stable.

## 2017-06-17 NOTE — PROGRESS NOTE ADULT - ASSESSMENT
Patient s/p I+D with cement spacer of Right elbow.  Awaiting repeat I+D with vac change, possible free flap 6/18.  VSS. NAD

## 2017-06-17 NOTE — PROVIDER CONTACT NOTE (OTHER) - ASSESSMENT
patient with a pre-meal finger stick of 93. patient insulin held as per sliding scale. patient due for pre-meal insulin of 20 units.

## 2017-06-17 NOTE — PROVIDER CONTACT NOTE (OTHER) - ACTION/TREATMENT ORDERED:
MD made aware. MD states to hold 20 units of pre-meal insulin. safety maintained. will cont to monitor patient.

## 2017-06-17 NOTE — PROGRESS NOTE ADULT - SUBJECTIVE AND OBJECTIVE BOX
Patient resting without complaints.  No chest pain, SOB, N/V.    T(C): 36.6, Max: 37 (06-16 @ 15:45)  HR: 72 (60 - 78)  BP: 122/71 (102/66 - 161/86)  RR: 18 (16 - 20)  SpO2: 98% (95% - 100%)  Wt(kg): --    Exam:  Alert and Oriented, No Acute Distress  RLE in exfix with Vac, dsgs c/d/i, vac good seal  compartments soft/compressable  + Radial pulse, digits pink, warm, mobile  SILT  Calves Soft, Non-tender bilaterally    New labs Pending                          8.5    9.6   )-----------( 450      ( 16 Jun 2017 07:15 )             27.3    06-16    138  |  100  |  14  ----------------------------<  98  3.9   |  27  |  1.17    Ca    9.3      16 Jun 2017 07:15
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
## 2017-06-17 NOTE — CHART NOTE - NSCHARTNOTEFT_GEN_A_CORE
PreOp Checklist:    Dx: Grade 3B Right distal humerus/Olecranon/Radial head fx  Procedure: Repeat Incision and Drainage/wound vac change, Possible Free Flap with plastic surgery   Surgeon: Haider(ortho)/Jose(PRS)    H&P: Done    Labs:               8.5    9.6   )-----------( 450      ( 16 Jun 2017 07:15 )             27.3     06-16    138  |  100  |  14  ----------------------------<  98  3.9   |  27  |  1.17    Ca    9.3      16 Jun 2017 07:15    T&S: O (-)  UA: negative (6/4)  CXR: NAPD (6/4)  EKG: Pending    Medical clearance: in chart  consent for surgery: signed in chart  consent for blood transfusion: signed in chart    - NPO p MN  - IVF p MN  - AC Held  - pain control  - venodynes, IS  - OR in the AM

## 2017-06-17 NOTE — PROGRESS NOTE ADULT - SUBJECTIVE AND OBJECTIVE BOX
Day __1_ of Anesthesia Pain Management Service    SUBJECTIVE:    Pain Scale Score	At rest: ___ 	With Activity: ___ 	[ x] Refer to charted pain scores    THERAPY:    [ ] IV PCA Morphine		[ ] 5 mg/mL	[ ] 1 mg/mL  [x ] IV PCA Hydromorphone	[ ] 5 mg/mL	[x ] 1 mg/mL  [ ] IV PCA Fentanyl		[ ] 50 micrograms/mL    Demand dose 0.2mg    lockout  6  (minutes) 0Continuous Rate          MEDICATIONS  (STANDING):  multivitamin 1Tablet(s) Oral daily  pantoprazole    Tablet 40milliGRAM(s) Oral before breakfast  atorvastatin 40milliGRAM(s) Oral at bedtime  polyethylene glycol 3350 17Gram(s) Oral daily  ATENolol  Tablet 50milliGRAM(s) Oral daily  insulin lispro (HumaLOG) corrective regimen sliding scale  SubCutaneous three times a day before meals  insulin lispro (HumaLOG) corrective regimen sliding scale  SubCutaneous at bedtime  dextrose 5%. 1000milliLiter(s) IV Continuous <Continuous>  dextrose 50% Injectable 12.5Gram(s) IV Push once  dextrose 50% Injectable 25Gram(s) IV Push once  dextrose 50% Injectable 25Gram(s) IV Push once  HYDROmorphone PCA (1 mG/mL) 30milliLiter(s) PCA Continuous PCA Continuous  ferrous    sulfate 325milliGRAM(s) Oral daily  ascorbic acid 500milliGRAM(s) Oral daily  insulin glargine Injectable (LANTUS) 35Unit(s) SubCutaneous at bedtime  insulin glargine Injectable (LANTUS) 35Unit(s) SubCutaneous every morning  insulin lispro Injectable (HumaLOG) 20Unit(s) SubCutaneous three times a day before meals  sodium chloride 0.9%. 1000milliLiter(s) IV Continuous <Continuous>    MEDICATIONS  (PRN):  acetaminophen   Tablet 650milliGRAM(s) Oral every 6 hours PRN For Temp greater than 38 C (100.4 F)  ondansetron Injectable 4milliGRAM(s) IV Push every 6 hours PRN Nausea and/or Vomiting  magnesium hydroxide Suspension 30milliLiter(s) Oral daily PRN Constipation  calcium carbonate 500 mG (Tums) Chewable 1Tablet(s) Chew three times a day PRN Heartburn  aluminum hydroxide/magnesium hydroxide/simethicone Suspension 30milliLiter(s) Oral every 6 hours PRN Dyspepsia  dextrose Gel 1Dose(s) Oral once PRN Blood Glucose LESS THAN 70 milliGRAM(s)/deciliter  glucagon  Injectable 1milliGRAM(s) IntraMuscular once PRN Glucose LESS THAN 70 milligrams/deciliter  HYDROmorphone PCA (1 mG/mL) Rescue Clinician Bolus 0.5milliGRAM(s) IV Push every 15 minutes PRN for Pain Scale GREATER THAN 6  naloxone Injectable 0.1milliGRAM(s) IV Push every 3 minutes PRN For ANY of the following changes in patient status:  A. RR LESS THAN 10 breaths per minute, B. Oxygen saturation LESS THAN 90%, C. Sedation score of 6      OBJECTIVE:    Sedation Score:	[x ] Alert	[ ] Drowsy 	[ ] Arousable	[ ] Asleep	[ ] Unresponsive    Side Effects:	[ x] None	[ ] Nausea	[ ] Vomiting	[ ] Pruritus  		[ ] Other:    Vital Signs Last 24 Hrs  T(C): 36.6, Max: 37 (06-16 @ 15:45)  T(F): 97.9, Max: 98.6 (06-16 @ 15:45)  HR: 72 (60 - 78)  BP: 122/71 (102/66 - 161/86)  BP(mean): 106 (106 - 117)  RR: 18 (16 - 20)  SpO2: 98% (95% - 100%)    ASSESSMENT/ PLAN    Therapy to  be:	[ ] Continue   [ ] Discontinued   [ ] Change to prn Analgesics    Documentation and Verification of current medications:   [X] Done	[ ] Not done, not elligible    Comments: I was physically present for the key portionjs of the evaluation and management service provided. I agree with the above history, physical, and plan which I have reviewed and edited where appropriate
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
## 2017-06-18 LAB
ANION GAP SERPL CALC-SCNC: 13 MMOL/L — SIGNIFICANT CHANGE UP (ref 5–17)
BASOPHILS # BLD AUTO: 0.02 K/UL — SIGNIFICANT CHANGE UP (ref 0–0.2)
BASOPHILS NFR BLD AUTO: 0.2 % — SIGNIFICANT CHANGE UP (ref 0–2)
BUN SERPL-MCNC: 18 MG/DL — SIGNIFICANT CHANGE UP (ref 7–23)
CALCIUM SERPL-MCNC: 8.9 MG/DL — SIGNIFICANT CHANGE UP (ref 8.4–10.5)
CHLORIDE SERPL-SCNC: 100 MMOL/L — SIGNIFICANT CHANGE UP (ref 96–108)
CO2 SERPL-SCNC: 24 MMOL/L — SIGNIFICANT CHANGE UP (ref 22–31)
CREAT SERPL-MCNC: 1.16 MG/DL — SIGNIFICANT CHANGE UP (ref 0.5–1.3)
EOSINOPHIL # BLD AUTO: 0.28 K/UL — SIGNIFICANT CHANGE UP (ref 0–0.5)
EOSINOPHIL NFR BLD AUTO: 3.1 % — SIGNIFICANT CHANGE UP (ref 0–6)
GLUCOSE SERPL-MCNC: 129 MG/DL — HIGH (ref 70–99)
HCT VFR BLD CALC: 26 % — LOW (ref 34.5–45)
HGB BLD-MCNC: 7.9 G/DL — LOW (ref 11.5–15.5)
IMM GRANULOCYTES NFR BLD AUTO: 0.2 % — SIGNIFICANT CHANGE UP (ref 0–1.5)
LYMPHOCYTES # BLD AUTO: 3.63 K/UL — HIGH (ref 1–3.3)
LYMPHOCYTES # BLD AUTO: 39.9 % — SIGNIFICANT CHANGE UP (ref 13–44)
MCHC RBC-ENTMCNC: 25.4 PG — LOW (ref 27–34)
MCHC RBC-ENTMCNC: 30.4 GM/DL — LOW (ref 32–36)
MCV RBC AUTO: 83.6 FL — SIGNIFICANT CHANGE UP (ref 80–100)
MONOCYTES # BLD AUTO: 0.53 K/UL — SIGNIFICANT CHANGE UP (ref 0–0.9)
MONOCYTES NFR BLD AUTO: 5.8 % — SIGNIFICANT CHANGE UP (ref 2–14)
NEUTROPHILS # BLD AUTO: 4.61 K/UL — SIGNIFICANT CHANGE UP (ref 1.8–7.4)
NEUTROPHILS NFR BLD AUTO: 50.8 % — SIGNIFICANT CHANGE UP (ref 43–77)
PLATELET # BLD AUTO: 412 K/UL — HIGH (ref 150–400)
POTASSIUM SERPL-MCNC: 4.2 MMOL/L — SIGNIFICANT CHANGE UP (ref 3.5–5.3)
POTASSIUM SERPL-SCNC: 4.2 MMOL/L — SIGNIFICANT CHANGE UP (ref 3.5–5.3)
RBC # BLD: 3.11 M/UL — LOW (ref 3.8–5.2)
RBC # FLD: 16.6 % — HIGH (ref 10.3–14.5)
SODIUM SERPL-SCNC: 137 MMOL/L — SIGNIFICANT CHANGE UP (ref 135–145)
WBC # BLD: 9.09 K/UL — SIGNIFICANT CHANGE UP (ref 3.8–10.5)
WBC # FLD AUTO: 9.09 K/UL — SIGNIFICANT CHANGE UP (ref 3.8–10.5)

## 2017-06-18 PROCEDURE — 71010: CPT | Mod: 26

## 2017-06-18 PROCEDURE — 11045 DBRDMT SUBQ TISS EACH ADDL: CPT | Mod: 59

## 2017-06-18 PROCEDURE — 15100 SPLT AGRFT T/A/L 1ST 100SQCM: CPT

## 2017-06-18 PROCEDURE — 11042 DBRDMT SUBQ TIS 1ST 20SQCM/<: CPT | Mod: 59

## 2017-06-18 PROCEDURE — 15101 SPLT AGRFT T/A/L EA ADDL 100: CPT

## 2017-06-18 PROCEDURE — 14301 TIS TRNFR ANY 30.1-60 SQ CM: CPT

## 2017-06-18 PROCEDURE — 15734 MUSCLE-SKIN GRAFT TRUNK: CPT

## 2017-06-18 PROCEDURE — 64718 REVISE ULNAR NERVE AT ELBOW: CPT | Mod: RT

## 2017-06-18 RX ORDER — HYDROMORPHONE HYDROCHLORIDE 2 MG/ML
0.5 INJECTION INTRAMUSCULAR; INTRAVENOUS; SUBCUTANEOUS
Qty: 0 | Refills: 0 | Status: DISCONTINUED | OUTPATIENT
Start: 2017-06-18 | End: 2017-06-18

## 2017-06-18 RX ORDER — ATORVASTATIN CALCIUM 80 MG/1
40 TABLET, FILM COATED ORAL AT BEDTIME
Qty: 0 | Refills: 0 | Status: DISCONTINUED | OUTPATIENT
Start: 2017-06-18 | End: 2017-06-18

## 2017-06-18 RX ORDER — DEXTROSE 50 % IN WATER 50 %
1 SYRINGE (ML) INTRAVENOUS ONCE
Qty: 0 | Refills: 0 | Status: DISCONTINUED | OUTPATIENT
Start: 2017-06-18 | End: 2017-06-24

## 2017-06-18 RX ORDER — ONDANSETRON 8 MG/1
4 TABLET, FILM COATED ORAL EVERY 6 HOURS
Qty: 0 | Refills: 0 | Status: DISCONTINUED | OUTPATIENT
Start: 2017-06-18 | End: 2017-06-21

## 2017-06-18 RX ORDER — INSULIN LISPRO 100/ML
VIAL (ML) SUBCUTANEOUS
Qty: 0 | Refills: 0 | Status: DISCONTINUED | OUTPATIENT
Start: 2017-06-18 | End: 2017-06-24

## 2017-06-18 RX ORDER — HYDROMORPHONE HYDROCHLORIDE 2 MG/ML
0.5 INJECTION INTRAMUSCULAR; INTRAVENOUS; SUBCUTANEOUS EVERY 4 HOURS
Qty: 0 | Refills: 0 | Status: DISCONTINUED | OUTPATIENT
Start: 2017-06-18 | End: 2017-06-21

## 2017-06-18 RX ORDER — PANTOPRAZOLE SODIUM 20 MG/1
40 TABLET, DELAYED RELEASE ORAL
Qty: 0 | Refills: 0 | Status: DISCONTINUED | OUTPATIENT
Start: 2017-06-18 | End: 2017-06-24

## 2017-06-18 RX ORDER — ACETAMINOPHEN 500 MG
1000 TABLET ORAL ONCE
Qty: 0 | Refills: 0 | Status: COMPLETED | OUTPATIENT
Start: 2017-06-18 | End: 2017-06-18

## 2017-06-18 RX ORDER — DIPHENHYDRAMINE HCL 50 MG
12.5 CAPSULE ORAL EVERY 4 HOURS
Qty: 0 | Refills: 0 | Status: DISCONTINUED | OUTPATIENT
Start: 2017-06-18 | End: 2017-06-24

## 2017-06-18 RX ORDER — ASCORBIC ACID 60 MG
500 TABLET,CHEWABLE ORAL DAILY
Qty: 0 | Refills: 0 | Status: DISCONTINUED | OUTPATIENT
Start: 2017-06-18 | End: 2017-06-24

## 2017-06-18 RX ORDER — HYDROMORPHONE HYDROCHLORIDE 2 MG/ML
30 INJECTION INTRAMUSCULAR; INTRAVENOUS; SUBCUTANEOUS
Qty: 0 | Refills: 0 | Status: DISCONTINUED | OUTPATIENT
Start: 2017-06-18 | End: 2017-06-21

## 2017-06-18 RX ORDER — CEFAZOLIN SODIUM 1 G
2000 VIAL (EA) INJECTION EVERY 8 HOURS
Qty: 0 | Refills: 0 | Status: COMPLETED | OUTPATIENT
Start: 2017-06-18 | End: 2017-06-19

## 2017-06-18 RX ORDER — HYDROMORPHONE HYDROCHLORIDE 2 MG/ML
0.5 INJECTION INTRAMUSCULAR; INTRAVENOUS; SUBCUTANEOUS
Qty: 0 | Refills: 0 | Status: DISCONTINUED | OUTPATIENT
Start: 2017-06-18 | End: 2017-06-21

## 2017-06-18 RX ORDER — OXYCODONE HYDROCHLORIDE 5 MG/1
5 TABLET ORAL EVERY 4 HOURS
Qty: 0 | Refills: 0 | Status: DISCONTINUED | OUTPATIENT
Start: 2017-06-18 | End: 2017-06-21

## 2017-06-18 RX ORDER — SODIUM CHLORIDE 9 MG/ML
1000 INJECTION, SOLUTION INTRAVENOUS
Qty: 0 | Refills: 0 | Status: DISCONTINUED | OUTPATIENT
Start: 2017-06-18 | End: 2017-06-24

## 2017-06-18 RX ORDER — MAGNESIUM HYDROXIDE 400 MG/1
30 TABLET, CHEWABLE ORAL DAILY
Qty: 0 | Refills: 0 | Status: DISCONTINUED | OUTPATIENT
Start: 2017-06-18 | End: 2017-06-24

## 2017-06-18 RX ORDER — FERROUS SULFATE 325(65) MG
325 TABLET ORAL DAILY
Qty: 0 | Refills: 0 | Status: DISCONTINUED | OUTPATIENT
Start: 2017-06-18 | End: 2017-06-24

## 2017-06-18 RX ORDER — INSULIN LISPRO 100/ML
VIAL (ML) SUBCUTANEOUS AT BEDTIME
Qty: 0 | Refills: 0 | Status: DISCONTINUED | OUTPATIENT
Start: 2017-06-18 | End: 2017-06-24

## 2017-06-18 RX ORDER — GLUCAGON INJECTION, SOLUTION 0.5 MG/.1ML
1 INJECTION, SOLUTION SUBCUTANEOUS ONCE
Qty: 0 | Refills: 0 | Status: DISCONTINUED | OUTPATIENT
Start: 2017-06-18 | End: 2017-06-24

## 2017-06-18 RX ORDER — NALOXONE HYDROCHLORIDE 4 MG/.1ML
0.1 SPRAY NASAL
Qty: 0 | Refills: 0 | Status: DISCONTINUED | OUTPATIENT
Start: 2017-06-18 | End: 2017-06-21

## 2017-06-18 RX ORDER — INSULIN GLARGINE 100 [IU]/ML
28 INJECTION, SOLUTION SUBCUTANEOUS AT BEDTIME
Qty: 0 | Refills: 0 | Status: DISCONTINUED | OUTPATIENT
Start: 2017-06-18 | End: 2017-06-18

## 2017-06-18 RX ORDER — CALCIUM CARBONATE 500(1250)
1 TABLET ORAL THREE TIMES A DAY
Qty: 0 | Refills: 0 | Status: DISCONTINUED | OUTPATIENT
Start: 2017-06-18 | End: 2017-06-24

## 2017-06-18 RX ORDER — OXYCODONE HYDROCHLORIDE 5 MG/1
5 TABLET ORAL EVERY 6 HOURS
Qty: 0 | Refills: 0 | Status: DISCONTINUED | OUTPATIENT
Start: 2017-06-18 | End: 2017-06-18

## 2017-06-18 RX ORDER — ATENOLOL 25 MG/1
50 TABLET ORAL DAILY
Qty: 0 | Refills: 0 | Status: DISCONTINUED | OUTPATIENT
Start: 2017-06-18 | End: 2017-06-18

## 2017-06-18 RX ORDER — ATENOLOL 25 MG/1
50 TABLET ORAL DAILY
Qty: 0 | Refills: 0 | Status: DISCONTINUED | OUTPATIENT
Start: 2017-06-18 | End: 2017-06-24

## 2017-06-18 RX ORDER — ONDANSETRON 8 MG/1
4 TABLET, FILM COATED ORAL ONCE
Qty: 0 | Refills: 0 | Status: DISCONTINUED | OUTPATIENT
Start: 2017-06-18 | End: 2017-06-18

## 2017-06-18 RX ORDER — ENOXAPARIN SODIUM 100 MG/ML
40 INJECTION SUBCUTANEOUS EVERY 24 HOURS
Qty: 0 | Refills: 0 | Status: DISCONTINUED | OUTPATIENT
Start: 2017-06-18 | End: 2017-06-24

## 2017-06-18 RX ORDER — DOCUSATE SODIUM 100 MG
100 CAPSULE ORAL THREE TIMES A DAY
Qty: 0 | Refills: 0 | Status: DISCONTINUED | OUTPATIENT
Start: 2017-06-18 | End: 2017-06-24

## 2017-06-18 RX ORDER — ATORVASTATIN CALCIUM 80 MG/1
40 TABLET, FILM COATED ORAL AT BEDTIME
Qty: 0 | Refills: 0 | Status: DISCONTINUED | OUTPATIENT
Start: 2017-06-18 | End: 2017-06-24

## 2017-06-18 RX ORDER — INSULIN GLARGINE 100 [IU]/ML
35 INJECTION, SOLUTION SUBCUTANEOUS
Qty: 0 | Refills: 0 | Status: DISCONTINUED | OUTPATIENT
Start: 2017-06-18 | End: 2017-06-22

## 2017-06-18 RX ORDER — ACETAMINOPHEN 500 MG
975 TABLET ORAL EVERY 6 HOURS
Qty: 0 | Refills: 0 | Status: DISCONTINUED | OUTPATIENT
Start: 2017-06-18 | End: 2017-06-24

## 2017-06-18 RX ORDER — OXYCODONE HYDROCHLORIDE 5 MG/1
10 TABLET ORAL EVERY 4 HOURS
Qty: 0 | Refills: 0 | Status: DISCONTINUED | OUTPATIENT
Start: 2017-06-18 | End: 2017-06-21

## 2017-06-18 RX ORDER — INSULIN LISPRO 100/ML
20 VIAL (ML) SUBCUTANEOUS
Qty: 0 | Refills: 0 | Status: DISCONTINUED | OUTPATIENT
Start: 2017-06-18 | End: 2017-06-23

## 2017-06-18 RX ORDER — DEXTROSE 50 % IN WATER 50 %
12.5 SYRINGE (ML) INTRAVENOUS ONCE
Qty: 0 | Refills: 0 | Status: DISCONTINUED | OUTPATIENT
Start: 2017-06-18 | End: 2017-06-24

## 2017-06-18 RX ORDER — POLYETHYLENE GLYCOL 3350 17 G/17G
17 POWDER, FOR SOLUTION ORAL DAILY
Qty: 0 | Refills: 0 | Status: DISCONTINUED | OUTPATIENT
Start: 2017-06-18 | End: 2017-06-24

## 2017-06-18 RX ORDER — ONDANSETRON 8 MG/1
4 TABLET, FILM COATED ORAL EVERY 6 HOURS
Qty: 0 | Refills: 0 | Status: DISCONTINUED | OUTPATIENT
Start: 2017-06-18 | End: 2017-06-24

## 2017-06-18 RX ADMIN — HYDROMORPHONE HYDROCHLORIDE 30 MILLILITER(S): 2 INJECTION INTRAMUSCULAR; INTRAVENOUS; SUBCUTANEOUS at 03:51

## 2017-06-18 RX ADMIN — Medication 1000 MILLIGRAM(S): at 07:18

## 2017-06-18 RX ADMIN — SODIUM CHLORIDE 80 MILLILITER(S): 9 INJECTION INTRAMUSCULAR; INTRAVENOUS; SUBCUTANEOUS at 01:40

## 2017-06-18 RX ADMIN — HYDROMORPHONE HYDROCHLORIDE 30 MILLILITER(S): 2 INJECTION INTRAMUSCULAR; INTRAVENOUS; SUBCUTANEOUS at 22:19

## 2017-06-18 RX ADMIN — Medication 975 MILLIGRAM(S): at 18:45

## 2017-06-18 RX ADMIN — ATORVASTATIN CALCIUM 40 MILLIGRAM(S): 80 TABLET, FILM COATED ORAL at 22:18

## 2017-06-18 RX ADMIN — Medication 100 MILLIGRAM(S): at 22:18

## 2017-06-18 RX ADMIN — Medication 975 MILLIGRAM(S): at 18:40

## 2017-06-18 RX ADMIN — Medication 400 MILLIGRAM(S): at 07:03

## 2017-06-18 RX ADMIN — HYDROMORPHONE HYDROCHLORIDE 30 MILLILITER(S): 2 INJECTION INTRAMUSCULAR; INTRAVENOUS; SUBCUTANEOUS at 18:07

## 2017-06-18 RX ADMIN — HYDROMORPHONE HYDROCHLORIDE 30 MILLILITER(S): 2 INJECTION INTRAMUSCULAR; INTRAVENOUS; SUBCUTANEOUS at 19:30

## 2017-06-18 RX ADMIN — Medication: at 17:13

## 2017-06-18 RX ADMIN — HYDROMORPHONE HYDROCHLORIDE 30 MILLILITER(S): 2 INJECTION INTRAMUSCULAR; INTRAVENOUS; SUBCUTANEOUS at 15:55

## 2017-06-18 RX ADMIN — ATENOLOL 50 MILLIGRAM(S): 25 TABLET ORAL at 05:58

## 2017-06-18 RX ADMIN — SODIUM CHLORIDE 75 MILLILITER(S): 9 INJECTION, SOLUTION INTRAVENOUS at 16:42

## 2017-06-18 NOTE — PROGRESS NOTE ADULT - SUBJECTIVE AND OBJECTIVE BOX
Day _2__ of Anesthesia Pain Management Service    SUBJECTIVE:    Pain Scale Score	At rest: ___ 	With Activity: ___ 	[ x] Refer to charted pain scores    THERAPY:    [ ] IV PCA Morphine		[ ] 5 mg/mL	[ ] 1 mg/mL  [x ] IV PCA Hydromorphone	[ ] 5 mg/mL	[x ] 1 mg/mL  [ ] IV PCA Fentanyl		[ ] 50 micrograms/mL    Demand dose   0.2 lockout  6  (minutes)  0Continuous Rate         MEDICATIONS  (STANDING):    MEDICATIONS  (PRN):      OBJECTIVE:    Sedation Score:	[ x] Alert	[ ] Drowsy 	[ ] Arousable	[ ] Asleep	[ ] Unresponsive    Side Effects:	[x ] None	[ ] Nausea	[ ] Vomiting	[ ] Pruritus  		[ ] Other:    Vital Signs Last 24 Hrs  T(C): 36.4, Max: 36.8 (06-17 @ 22:07)  T(F): 97.6, Max: 98.3 (06-17 @ 22:07)  HR: 75 (58 - 75)  BP: 125/67 (101/63 - 125/67)  BP(mean): --  RR: 18 (18 - 18)  SpO2: 94% (94% - 100%)    ASSESSMENT/ PLAN    Therapy to  be:	[ ] Continue   [x ] Discontinued   [ ] Change to prn Analgesics    Documentation and Verification of current medications:   [X] Done	[ ] Not done, not elligible    Comments:I was physically present for the key portoins of the evaluation and management service provided, I agree with the above history and physical, and plan which I have reviewed and edited where appropriate

## 2017-06-18 NOTE — BRIEF OPERATIVE NOTE - OPERATION/FINDINGS
Preoperative Diagnosis: Open wound of Right Upper Extremity; Ulnar nerve and artery lacerations  Procedure: Debridement of RUE wound; Identification and ligation of proximal ulnar nerve; RUE reconstruction with pedicled latissimus dorsi myocutaneous flap and STSG (300cm^2)  Postoperative Diagnosis: Open wound of Right Upper Extremity; Ulnar nerve and artery lacerations

## 2017-06-18 NOTE — PRE-OP CHECKLIST - 1.
zosyn infusing at 25cc/hr at present
RUE ext fix in place
External fixator located to [facility]
emotional support and preop teaching provided to pt and family.

## 2017-06-18 NOTE — PROGRESS NOTE ADULT - ASSESSMENT
Patient is a 56y old  Female who presents with a chief complaint of R arm near amputation (04 Jun 2017 07:15)   s/p I+D with cement spacer of Right elbow.  OR today for repeat I+D with vac change, possible free flap.  VSS. NAD

## 2017-06-18 NOTE — CHART NOTE - NSCHARTNOTEFT_GEN_A_CORE
S: Patient underwent right latissimus dorsi flap to right elbow and skin graft and tolerated procedure without  issue and sent to PACU.  Patient denies chest pain, shortness of breath, nausea, vomiting, lightheadedness, or dizziness.  Pain was well controlled.      O:T(C): 36.7, Max: 36.7 (06-18 @ 14:24)  HR: 68 (61 - 78)  BP: --  RR: 16 (16 - 18)  SpO2: 100% (99% - 100%)  Wt(kg): --                        7.9    9.09  )-----------( 412      ( 18 Jun 2017 10:21 )             26.0        06-18    137  |  100  |  18  ----------------------------<  129<H>  4.2   |  24  |  1.16    Ca    8.9      18 Jun 2017 10:26      Gen: NAD  RUE:   Ex-fix in        Assessment/Plan:  56y Female s/p External fixator device (invasive)  Debridement, wound  Wound VAC placement  Application of arm splint  Debridement of right upper extremity  Repair, laceration, scalp    Pain control  Reg Diet  DVT PPX  Out of bed and encourage early ambulation  Incentive spirometry.

## 2017-06-18 NOTE — BRIEF OPERATIVE NOTE - PRE-OP DX
Open wound of upper arm, right, initial encounter  06/04/2017    Active  Emilie Ramirez  Other open displaced fracture of distal end of right humerus with delayed healing, subsequent encounter  06/09/2017    Active  Khang Elliott
Laceration of scalp, initial encounter  06/04/2017    Active  Emilie Ramirez  Open wound of upper arm, right, initial encounter  06/04/2017    Active  Emilie Ramirez

## 2017-06-18 NOTE — PROGRESS NOTE ADULT - PROBLEM SELECTOR PLAN 1
Flap 6/18 with PRS/I+D  NPO  hold anticoag  IVF  Analgesia  continue current tx    Christiano Esteves PA-C  Orthopedic Surgery  Pager: [phone]  Spectra: [phone]

## 2017-06-18 NOTE — PROGRESS NOTE ADULT - SUBJECTIVE AND OBJECTIVE BOX
Patient is a 56y old  Female who presents with a chief complaint of R arm near amputation (04 Jun 2017 07:15)    Patient resting without complaints.  No chest pain, SOB, N/V.    Vital Signs Last 24 Hrs  T(C): 36.4, Max: 36.8 (06-17 @ 22:07)  T(F): 97.6, Max: 98.3 (06-17 @ 22:07)  HR: 75 (58 - 75)  BP: 125/67 (101/63 - 125/67)  RR: 18 (18 - 18)  SpO2: 94% (94% - 100%)    Exam:  Alert and Oriented, No Acute Distress  RUE in exfix with Vac, dsgs c/d/i, vac good seal  compartments soft/compressable  + Radial pulse, digits pink, warm, mobile  decreased sensation at pink y finger  Calves Soft, Non-tender bilaterally                          8.1    11.17 )-----------( 468      ( 17 Jun 2017 15:12 )             26.4     06-17    137  |  97  |  17  ----------------------------<  129<H>  4.0   |  22  |  1.27

## 2017-06-18 NOTE — BRIEF OPERATIVE NOTE - COMMENTS
The patient tolerated the procedure well. There were no complications. The patient was extubated in the OR. The patient was transferred to the PACU in stable condition.
- tetanus received in trauma bay  - extubated at end of case  - sporadically on 0.4 mcg/kg/min of phenylephrine

## 2017-06-19 LAB
ANION GAP SERPL CALC-SCNC: 12 MMOL/L — SIGNIFICANT CHANGE UP (ref 5–17)
BASOPHILS # BLD AUTO: 0 K/UL — SIGNIFICANT CHANGE UP (ref 0–0.2)
BASOPHILS NFR BLD AUTO: 0.2 % — SIGNIFICANT CHANGE UP (ref 0–2)
BUN SERPL-MCNC: 16 MG/DL — SIGNIFICANT CHANGE UP (ref 7–23)
CALCIUM SERPL-MCNC: 8.4 MG/DL — SIGNIFICANT CHANGE UP (ref 8.4–10.5)
CHLORIDE SERPL-SCNC: 102 MMOL/L — SIGNIFICANT CHANGE UP (ref 96–108)
CK MB BLD-MCNC: 0.8 % — SIGNIFICANT CHANGE UP (ref 0–3.5)
CK MB CFR SERPL CALC: 3.3 NG/ML — SIGNIFICANT CHANGE UP (ref 0–3.8)
CK SERPL-CCNC: 395 U/L — HIGH (ref 25–170)
CO2 SERPL-SCNC: 25 MMOL/L — SIGNIFICANT CHANGE UP (ref 22–31)
CREAT SERPL-MCNC: 1.25 MG/DL — SIGNIFICANT CHANGE UP (ref 0.5–1.3)
EOSINOPHIL # BLD AUTO: 0.2 K/UL — SIGNIFICANT CHANGE UP (ref 0–0.5)
EOSINOPHIL NFR BLD AUTO: 1.3 % — SIGNIFICANT CHANGE UP (ref 0–6)
GLUCOSE SERPL-MCNC: 86 MG/DL — SIGNIFICANT CHANGE UP (ref 70–99)
HCT VFR BLD CALC: 30.3 % — LOW (ref 34.5–45)
HGB BLD-MCNC: 10.1 G/DL — LOW (ref 11.5–15.5)
LYMPHOCYTES # BLD AUTO: 2.5 K/UL — SIGNIFICANT CHANGE UP (ref 1–3.3)
LYMPHOCYTES # BLD AUTO: 20.3 % — SIGNIFICANT CHANGE UP (ref 13–44)
MCHC RBC-ENTMCNC: 28.4 PG — SIGNIFICANT CHANGE UP (ref 27–34)
MCHC RBC-ENTMCNC: 33.2 GM/DL — SIGNIFICANT CHANGE UP (ref 32–36)
MCV RBC AUTO: 85.5 FL — SIGNIFICANT CHANGE UP (ref 80–100)
MONOCYTES # BLD AUTO: 0.8 K/UL — SIGNIFICANT CHANGE UP (ref 0–0.9)
MONOCYTES NFR BLD AUTO: 6.8 % — SIGNIFICANT CHANGE UP (ref 2–14)
NEUTROPHILS # BLD AUTO: 8.8 K/UL — HIGH (ref 1.8–7.4)
NEUTROPHILS NFR BLD AUTO: 71.4 % — SIGNIFICANT CHANGE UP (ref 43–77)
PLATELET # BLD AUTO: 390 K/UL — SIGNIFICANT CHANGE UP (ref 150–400)
POTASSIUM SERPL-MCNC: 3.9 MMOL/L — SIGNIFICANT CHANGE UP (ref 3.5–5.3)
POTASSIUM SERPL-SCNC: 3.9 MMOL/L — SIGNIFICANT CHANGE UP (ref 3.5–5.3)
RBC # BLD: 3.54 M/UL — LOW (ref 3.8–5.2)
RBC # FLD: 15.9 % — HIGH (ref 10.3–14.5)
SODIUM SERPL-SCNC: 139 MMOL/L — SIGNIFICANT CHANGE UP (ref 135–145)
TROPONIN T SERPL-MCNC: <0.01 NG/ML — SIGNIFICANT CHANGE UP (ref 0–0.06)
WBC # BLD: 12.4 K/UL — HIGH (ref 3.8–10.5)
WBC # FLD AUTO: 12.4 K/UL — HIGH (ref 3.8–10.5)

## 2017-06-19 PROCEDURE — 93010 ELECTROCARDIOGRAM REPORT: CPT

## 2017-06-19 RX ADMIN — Medication 1: at 18:03

## 2017-06-19 RX ADMIN — Medication 30 MILLILITER(S): at 15:10

## 2017-06-19 RX ADMIN — Medication 975 MILLIGRAM(S): at 13:21

## 2017-06-19 RX ADMIN — Medication 975 MILLIGRAM(S): at 06:51

## 2017-06-19 RX ADMIN — HYDROMORPHONE HYDROCHLORIDE 30 MILLILITER(S): 2 INJECTION INTRAMUSCULAR; INTRAVENOUS; SUBCUTANEOUS at 02:48

## 2017-06-19 RX ADMIN — Medication 20 UNIT(S): at 07:59

## 2017-06-19 RX ADMIN — Medication 20 UNIT(S): at 18:04

## 2017-06-19 RX ADMIN — INSULIN GLARGINE 35 UNIT(S): 100 INJECTION, SOLUTION SUBCUTANEOUS at 08:21

## 2017-06-19 RX ADMIN — Medication 975 MILLIGRAM(S): at 12:51

## 2017-06-19 RX ADMIN — PANTOPRAZOLE SODIUM 40 MILLIGRAM(S): 20 TABLET, DELAYED RELEASE ORAL at 06:51

## 2017-06-19 RX ADMIN — HYDROMORPHONE HYDROCHLORIDE 30 MILLILITER(S): 2 INJECTION INTRAMUSCULAR; INTRAVENOUS; SUBCUTANEOUS at 14:06

## 2017-06-19 RX ADMIN — ATENOLOL 50 MILLIGRAM(S): 25 TABLET ORAL at 06:51

## 2017-06-19 RX ADMIN — INSULIN GLARGINE 35 UNIT(S): 100 INJECTION, SOLUTION SUBCUTANEOUS at 23:12

## 2017-06-19 RX ADMIN — HYDROMORPHONE HYDROCHLORIDE 30 MILLILITER(S): 2 INJECTION INTRAMUSCULAR; INTRAVENOUS; SUBCUTANEOUS at 10:10

## 2017-06-19 RX ADMIN — Medication 975 MILLIGRAM(S): at 07:21

## 2017-06-19 RX ADMIN — ENOXAPARIN SODIUM 40 MILLIGRAM(S): 100 INJECTION SUBCUTANEOUS at 12:51

## 2017-06-19 RX ADMIN — Medication 20 UNIT(S): at 12:50

## 2017-06-19 RX ADMIN — HYDROMORPHONE HYDROCHLORIDE 30 MILLILITER(S): 2 INJECTION INTRAMUSCULAR; INTRAVENOUS; SUBCUTANEOUS at 19:33

## 2017-06-19 RX ADMIN — Medication 975 MILLIGRAM(S): at 00:02

## 2017-06-19 RX ADMIN — Medication 975 MILLIGRAM(S): at 23:12

## 2017-06-19 RX ADMIN — SODIUM CHLORIDE 75 MILLILITER(S): 9 INJECTION, SOLUTION INTRAVENOUS at 06:52

## 2017-06-19 RX ADMIN — Medication 100 MILLIGRAM(S): at 06:51

## 2017-06-19 RX ADMIN — Medication 1 TABLET(S): at 12:51

## 2017-06-19 RX ADMIN — HYDROMORPHONE HYDROCHLORIDE 30 MILLILITER(S): 2 INJECTION INTRAMUSCULAR; INTRAVENOUS; SUBCUTANEOUS at 22:58

## 2017-06-19 RX ADMIN — ATORVASTATIN CALCIUM 40 MILLIGRAM(S): 80 TABLET, FILM COATED ORAL at 23:12

## 2017-06-19 RX ADMIN — Medication 325 MILLIGRAM(S): at 12:50

## 2017-06-19 RX ADMIN — Medication 975 MILLIGRAM(S): at 00:32

## 2017-06-19 RX ADMIN — HYDROMORPHONE HYDROCHLORIDE 30 MILLILITER(S): 2 INJECTION INTRAMUSCULAR; INTRAVENOUS; SUBCUTANEOUS at 18:07

## 2017-06-19 RX ADMIN — INSULIN GLARGINE 35 UNIT(S): 100 INJECTION, SOLUTION SUBCUTANEOUS at 00:02

## 2017-06-19 RX ADMIN — Medication 975 MILLIGRAM(S): at 18:02

## 2017-06-19 RX ADMIN — HYDROMORPHONE HYDROCHLORIDE 30 MILLILITER(S): 2 INJECTION INTRAMUSCULAR; INTRAVENOUS; SUBCUTANEOUS at 07:26

## 2017-06-19 RX ADMIN — Medication 500 MILLIGRAM(S): at 12:50

## 2017-06-19 RX ADMIN — POLYETHYLENE GLYCOL 3350 17 GRAM(S): 17 POWDER, FOR SOLUTION ORAL at 12:50

## 2017-06-19 NOTE — PROGRESS NOTE ADULT - SUBJECTIVE AND OBJECTIVE BOX
POST OPERATIVE DAY #:  [ ] s/p [ ] TKA   No acute events overnight.  Pt c/o R arm, R donor site pain, controlled. No n/v.     T(C): 37.1, Max: 37.1 (06-19 @ 02:00)  HR: 78 (57 - 78)  BP: 120/71 (102/64 - 128/60)  RR: 18 (16 - 18)  SpO2: 99% (94% - 100%)  Wt(kg): --    PHYSICAL EXAM:  NAD, awake and alert  RUE in ex-fix, dressings C/D/I  Vac in place, holding suction  JPx2 in place, S/S drainage: 15/45, 15/105  R thigh dressing some S/S drainage, otherwise intact  Compartments soft and compressible  No sensation in ulnar n. distribution, SILT m/r  Grossly intact AIN/PIN/r    LABS:                        7.9    9.09  )-----------( 412      ( 18 Jun 2017 10:21 )             26.0     06-18    137  |  100  |  18  ----------------------------<  129<H>  4.2   |  24  |  1.16    Ca    8.9      18 Jun 2017 10:26

## 2017-06-19 NOTE — PROGRESS NOTE ADULT - SUBJECTIVE AND OBJECTIVE BOX
Pt seen and examined. Doing well. No acute events o/n.     T(C): 37.1, Max: 37.1 (06-19 @ 02:00)  T(F): 98.7, Max: 98.7 (06-19 @ 02:00)  HR: 78 (57 - 78)  BP: 120/71 (102/64 - 128/60)  RR: 18 (16 - 18)  SpO2: 99% (94% - 100%)  Wt(kg): --    I & Os for 24h ending 18 Jun 2017 07:00  =============================================  IN:    Oral Fluid: 1250 ml    sodium chloride 0.9%: 480 ml    Solution: 200 ml    Total IN: 1930 ml  ---------------------------------------------  OUT:    Voided: 900 ml    VAC (Vacuum Assisted Closure) System: 300 ml    Total OUT: 1200 ml  ---------------------------------------------  Total NET: 730 ml    I & Os for current day (as of 19 Jun 2017 06:37)  =============================================  IN:    Oral Fluid: 1200 ml    lactated ringers.: 230 ml    Total IN: 1430 ml  ---------------------------------------------  OUT:    Indwelling Catheter - Urethral: 2010 ml    Bulb: 135 ml    Bulb: 85 ml    Total OUT: 2230 ml  ---------------------------------------------  Total NET: -800 ml      Exam:  ex-fix in place  skin paddle soft, pink, viable  vac in place, holding sxn  donor site incision c/d/i  no palpable collections  STSG donor site dressing intact  JPs serosanguinous.                           7.9    9.09  )-----------( 412      ( 18 Jun 2017 10:21 )             26.0     06-18    137  |  100  |  18  ----------------------------<  129<H>  4.2   |  24  |  1.16    Ca    8.9      18 Jun 2017 10:26        Plan:  - cont VAC  - cont RACHAEL  - keep donor sites dry  - OT for splint

## 2017-06-19 NOTE — PROGRESS NOTE ADULT - SUBJECTIVE AND OBJECTIVE BOX
Patient is a 56y old  Female who presents with a chief complaint of R arm near amputation (04 Jun 2017 07:15)      SUBJECTIVE / OVERNIGHT EVENTS:   Chest discomfort. now feels better.  Denies CP/SOB/Palpitation/HA.    MEDICATIONS  (STANDING):  HYDROmorphone PCA (1 mG/mL) 30milliLiter(s) PCA Continuous PCA Continuous  enoxaparin Injectable 40milliGRAM(s) SubCutaneous every 24 hours  lactated ringers. 1000milliLiter(s) IV Continuous <Continuous>  acetaminophen   Tablet. 975milliGRAM(s) Oral every 6 hours  pantoprazole    Tablet 40milliGRAM(s) Oral before breakfast  atorvastatin 40milliGRAM(s) Oral at bedtime  polyethylene glycol 3350 17Gram(s) Oral daily  ATENolol  Tablet 50milliGRAM(s) Oral daily  insulin lispro (HumaLOG) corrective regimen sliding scale  SubCutaneous three times a day before meals  insulin lispro (HumaLOG) corrective regimen sliding scale  SubCutaneous at bedtime  multivitamin 1Tablet(s) Oral daily  ferrous    sulfate 325milliGRAM(s) Oral daily  ascorbic acid 500milliGRAM(s) Oral daily  insulin lispro Injectable (HumaLOG) 20Unit(s) SubCutaneous three times a day before meals  dextrose 50% Injectable 12.5Gram(s) IV Push once  insulin glargine Injectable (LANTUS) 35Unit(s) SubCutaneous two times a day    MEDICATIONS  (PRN):  HYDROmorphone PCA (1 mG/mL) Rescue Clinician Bolus 0.5milliGRAM(s) IV Push every 15 minutes PRN for Pain Scale GREATER THAN 6  naloxone Injectable 0.1milliGRAM(s) IV Push every 3 minutes PRN For ANY of the following changes in patient status:  A. RR LESS THAN 10 breaths per minute, B. Oxygen saturation LESS THAN 90%, C. Sedation score of 6  ondansetron Injectable 4milliGRAM(s) IV Push every 6 hours PRN Nausea  HYDROmorphone  Injectable 0.5milliGRAM(s) IV Push every 4 hours PRN Breakthrough Pain  ondansetron Injectable 4milliGRAM(s) IV Push every 6 hours PRN Nausea and/or Vomiting  diphenhydrAMINE   Injectable 12.5milliGRAM(s) IV Push every 4 hours PRN Itching  aluminum hydroxide/magnesium hydroxide/simethicone Suspension 30milliLiter(s) Oral every 4 hours PRN Dyspepsia  docusate sodium 100milliGRAM(s) Oral three times a day PRN Constipation  oxyCODONE IR 10milliGRAM(s) Oral every 4 hours PRN Severe Pain (7 - 10)  oxyCODONE IR 5milliGRAM(s) Oral every 4 hours PRN Moderate Pain (4 - 6)  calcium carbonate 500 mG (Tums) Chewable 1Tablet(s) Chew three times a day PRN Heartburn  magnesium hydroxide Suspension 30milliLiter(s) Oral daily PRN Constipation  bisacodyl Suppository 10milliGRAM(s) Rectal daily PRN If no bowel movement  dextrose Gel 1Dose(s) Oral once PRN Blood Glucose LESS THAN 70 milliGRAM(s)/deciliter  glucagon  Injectable 1milliGRAM(s) IntraMuscular once PRN Glucose LESS THAN 70 milligrams/deciliter      Vital Signs Last 24 Hrs  T(C): 36.8, Max: 37.1 (06-19 @ 02:00)  HR: 67 (54 - 78)  BP: 109/61 (104/60 - 133/60)  RR: 18 (16 - 18)  SpO2: 100% (96% - 100%)  Wt(kg): --  CAPILLARY BLOOD GLUCOSE  135 (19 Jun 2017 22:05)  151 (19 Jun 2017 17:05)  130 (19 Jun 2017 12:37)  130 (19 Jun 2017 07:47)    I&O's Summary  I & Os for 24h ending 19 Jun 2017 07:00  =============================================  IN: 1430 ml / OUT: 2230 ml / NET: -800 ml    I & Os for current day (as of 19 Jun 2017 22:50)  =============================================  IN: 1550 ml / OUT: 1340 ml / NET: 210 ml      PHYSICAL EXAM:  GENERAL: NAD, well-developed  HEAD:  Atraumatic, Normocephalic  EYES: EOMI, PERRLA, conjunctiva and sclera clear  NECK: Supple, No JVD  CHEST/LUNG: Clear to auscultation bilaterally; No wheeze  HEART: Regular rate and rhythm; No murmurs, rubs, or gallops  ABDOMEN: Soft, Nontender, Nondistended; Bowel sounds present  EXTREMITIES:  2+ Peripheral Pulses, No clubbing, cyanosis, or edema  PSYCH: AAOx3  NEUROLOGY: non-focal  SKIN: No rashes or lesions    LABS:                        10.1   12.4  )-----------( 390      ( 19 Jun 2017 10:21 )             30.3     06-19    139  |  102  |  16  ----------------------------<  86  3.9   |  25  |  1.25    Ca    8.4      19 Jun 2017 10:21        CARDIAC MARKERS ( 19 Jun 2017 17:53 )  x     / <0.01 ng/mL / 395 U/L / x     / 3.3 ng/mL        CAPILLARY BLOOD GLUCOSE  135 (19 Jun 2017 22:05)  151 (19 Jun 2017 17:05)  130 (19 Jun 2017 12:37)  130 (19 Jun 2017 07:47)                RADIOLOGY & ADDITIONAL TESTS:    Imaging Personally Reviewed:    Consultant(s) Notes Reviewed:      Care Discussed with Consultants/Other Providers:

## 2017-06-19 NOTE — PROGRESS NOTE ADULT - ASSESSMENT
Patient is a 56y old  Female who presents with a chief complaint of R arm near amputation.    Chest discomfort:      Cardiac enzymes/EKG: S. bradycardia/ No acute St-T changes. Will obtain info from pt's cardiologist.   Rt arm crush injury:           Plastic Sx /Ortho f/up noted.         Ortho/Plastics f/up noted.    DM II :  FSSS/Lantus/Humalog.    Anemia:  Hb stable.

## 2017-06-19 NOTE — PROGRESS NOTE ADULT - SUBJECTIVE AND OBJECTIVE BOX
ORTHO  Patient is a 56y old  Female who presents with a chief complaint of R arm near amputation (04 Jun 2017 07:15)    Pt.  with complaint chest discomfort, first time out of bed after surgery 6/18    VS: temp36.7 pulse 61 bpm, SBP-124/68, respiration 16 02 % RA    M.S.- Alert   Lungs - clear to ascultation   Shaka- RRR  Abd- (+)BS, rotounded, soft           Calves- soft, nontender- PAS                               10.1   12.4  )-----------( 390      ( 19 Jun 2017 10:21 )             30.3     06-19    139  |  102  |  16  ----------------------------<  86  3.9   |  25  |  1.25    Ca    8.4      19 Jun 2017 10:21

## 2017-06-19 NOTE — PROGRESS NOTE ADULT - SUBJECTIVE AND OBJECTIVE BOX
Day __3_ of Anesthesia Pain Management Service    SUBJECTIVE:    Pain Scale Score	At rest: ___ 	With Activity: ___ 	[ x] Refer to charted pain scores    THERAPY:    [ ] IV PCA Morphine		[ ] 5 mg/mL	[ ] 1 mg/mL  [x ] IV PCA Hydromorphone	[ ] 5 mg/mL	[x ] 1 mg/mL  [ ] IV PCA Fentanyl		[ ] 50 micrograms/mL    Demand dose  .2  lockout  6  (minutes)          MEDICATIONS  (STANDING):  HYDROmorphone PCA (1 mG/mL) 30milliLiter(s) PCA Continuous PCA Continuous  enoxaparin Injectable 40milliGRAM(s) SubCutaneous every 24 hours  lactated ringers. 1000milliLiter(s) IV Continuous <Continuous>  acetaminophen   Tablet. 975milliGRAM(s) Oral every 6 hours  pantoprazole    Tablet 40milliGRAM(s) Oral before breakfast  atorvastatin 40milliGRAM(s) Oral at bedtime  polyethylene glycol 3350 17Gram(s) Oral daily  ATENolol  Tablet 50milliGRAM(s) Oral daily  insulin lispro (HumaLOG) corrective regimen sliding scale  SubCutaneous three times a day before meals  insulin lispro (HumaLOG) corrective regimen sliding scale  SubCutaneous at bedtime  multivitamin 1Tablet(s) Oral daily  ferrous    sulfate 325milliGRAM(s) Oral daily  ascorbic acid 500milliGRAM(s) Oral daily  insulin lispro Injectable (HumaLOG) 20Unit(s) SubCutaneous three times a day before meals  dextrose 50% Injectable 12.5Gram(s) IV Push once  insulin glargine Injectable (LANTUS) 35Unit(s) SubCutaneous two times a day    MEDICATIONS  (PRN):  HYDROmorphone PCA (1 mG/mL) Rescue Clinician Bolus 0.5milliGRAM(s) IV Push every 15 minutes PRN for Pain Scale GREATER THAN 6  naloxone Injectable 0.1milliGRAM(s) IV Push every 3 minutes PRN For ANY of the following changes in patient status:  A. RR LESS THAN 10 breaths per minute, B. Oxygen saturation LESS THAN 90%, C. Sedation score of 6  ondansetron Injectable 4milliGRAM(s) IV Push every 6 hours PRN Nausea  HYDROmorphone  Injectable 0.5milliGRAM(s) IV Push every 4 hours PRN Breakthrough Pain  ondansetron Injectable 4milliGRAM(s) IV Push every 6 hours PRN Nausea and/or Vomiting  diphenhydrAMINE   Injectable 12.5milliGRAM(s) IV Push every 4 hours PRN Itching  aluminum hydroxide/magnesium hydroxide/simethicone Suspension 30milliLiter(s) Oral every 4 hours PRN Dyspepsia  docusate sodium 100milliGRAM(s) Oral three times a day PRN Constipation  oxyCODONE IR 10milliGRAM(s) Oral every 4 hours PRN Severe Pain (7 - 10)  oxyCODONE IR 5milliGRAM(s) Oral every 4 hours PRN Moderate Pain (4 - 6)  calcium carbonate 500 mG (Tums) Chewable 1Tablet(s) Chew three times a day PRN Heartburn  magnesium hydroxide Suspension 30milliLiter(s) Oral daily PRN Constipation  bisacodyl Suppository 10milliGRAM(s) Rectal daily PRN If no bowel movement  dextrose Gel 1Dose(s) Oral once PRN Blood Glucose LESS THAN 70 milliGRAM(s)/deciliter  glucagon  Injectable 1milliGRAM(s) IntraMuscular once PRN Glucose LESS THAN 70 milligrams/deciliter      OBJECTIVE:    Sedation Score:	[x ] Alert	[ ] Drowsy 	[ ] Arousable	[ ] Asleep	[ ] Unresponsive    Side Effects:	[ x] None	[ ] Nausea	[ ] Vomiting	[ ] Pruritus  		[ ] Other:    Vital Signs Last 24 Hrs  T(C): 37.1, Max: 37.1 (06-19 @ 02:00)  T(F): 98.7, Max: 98.7 (06-19 @ 02:00)  HR: 78 (57 - 78)  BP: 120/71 (102/64 - 128/60)  BP(mean): --  RR: 18 (16 - 18)  SpO2: 99% (94% - 100%)    ASSESSMENT/ PLAN    Therapy to  be:	[x ] Continue   [ ] Discontinued   [ ] Change to prn Analgesics    Documentation and Verification of current medications:   [X] Done	[ ] Not done, not elligible    Comments:

## 2017-06-19 NOTE — PROGRESS NOTE ADULT - ASSESSMENT
Impression: Stable, R/O MI vs. Dyspepsia       Plan: EKG- Sinus bradycardia no acute change               Cardiac enzymes x 3.               Mylanta for dyspepsia               Continue to moniter

## 2017-06-19 NOTE — PROGRESS NOTE ADULT - ASSESSMENT
56F with grade 2B open right elbow fx, s/p ex-fix and multiple I&D/vac changes, now s/p PRS latissimus flap and skin grafting from R thigh  - NWB RUE in ex-fix  - PT/OT  - OOB  - Elevate RUE  - Pain control  - Lovenox for DVT ppx  - FU PRS recs

## 2017-06-20 LAB
SURGICAL PATHOLOGY STUDY: SIGNIFICANT CHANGE UP
TROPONIN T SERPL-MCNC: <0.01 NG/ML — SIGNIFICANT CHANGE UP (ref 0–0.06)

## 2017-06-20 RX ADMIN — ATENOLOL 50 MILLIGRAM(S): 25 TABLET ORAL at 07:00

## 2017-06-20 RX ADMIN — Medication 325 MILLIGRAM(S): at 12:46

## 2017-06-20 RX ADMIN — Medication 1: at 12:47

## 2017-06-20 RX ADMIN — Medication 975 MILLIGRAM(S): at 18:32

## 2017-06-20 RX ADMIN — HYDROMORPHONE HYDROCHLORIDE 30 MILLILITER(S): 2 INJECTION INTRAMUSCULAR; INTRAVENOUS; SUBCUTANEOUS at 02:35

## 2017-06-20 RX ADMIN — Medication 975 MILLIGRAM(S): at 18:02

## 2017-06-20 RX ADMIN — Medication 975 MILLIGRAM(S): at 23:24

## 2017-06-20 RX ADMIN — Medication 20 UNIT(S): at 09:17

## 2017-06-20 RX ADMIN — HYDROMORPHONE HYDROCHLORIDE 30 MILLILITER(S): 2 INJECTION INTRAMUSCULAR; INTRAVENOUS; SUBCUTANEOUS at 07:07

## 2017-06-20 RX ADMIN — Medication 500 MILLIGRAM(S): at 12:46

## 2017-06-20 RX ADMIN — Medication 1 TABLET(S): at 12:45

## 2017-06-20 RX ADMIN — Medication 975 MILLIGRAM(S): at 07:30

## 2017-06-20 RX ADMIN — Medication 975 MILLIGRAM(S): at 22:54

## 2017-06-20 RX ADMIN — INSULIN GLARGINE 35 UNIT(S): 100 INJECTION, SOLUTION SUBCUTANEOUS at 09:17

## 2017-06-20 RX ADMIN — HYDROMORPHONE HYDROCHLORIDE 30 MILLILITER(S): 2 INJECTION INTRAMUSCULAR; INTRAVENOUS; SUBCUTANEOUS at 10:05

## 2017-06-20 RX ADMIN — Medication 975 MILLIGRAM(S): at 12:46

## 2017-06-20 RX ADMIN — POLYETHYLENE GLYCOL 3350 17 GRAM(S): 17 POWDER, FOR SOLUTION ORAL at 12:47

## 2017-06-20 RX ADMIN — ONDANSETRON 4 MILLIGRAM(S): 8 TABLET, FILM COATED ORAL at 07:01

## 2017-06-20 RX ADMIN — HYDROMORPHONE HYDROCHLORIDE 30 MILLILITER(S): 2 INJECTION INTRAMUSCULAR; INTRAVENOUS; SUBCUTANEOUS at 22:50

## 2017-06-20 RX ADMIN — Medication 20 UNIT(S): at 18:02

## 2017-06-20 RX ADMIN — Medication 975 MILLIGRAM(S): at 07:00

## 2017-06-20 RX ADMIN — HYDROMORPHONE HYDROCHLORIDE 30 MILLILITER(S): 2 INJECTION INTRAMUSCULAR; INTRAVENOUS; SUBCUTANEOUS at 18:07

## 2017-06-20 RX ADMIN — HYDROMORPHONE HYDROCHLORIDE 30 MILLILITER(S): 2 INJECTION INTRAMUSCULAR; INTRAVENOUS; SUBCUTANEOUS at 19:33

## 2017-06-20 RX ADMIN — PANTOPRAZOLE SODIUM 40 MILLIGRAM(S): 20 TABLET, DELAYED RELEASE ORAL at 07:00

## 2017-06-20 RX ADMIN — INSULIN GLARGINE 35 UNIT(S): 100 INJECTION, SOLUTION SUBCUTANEOUS at 22:51

## 2017-06-20 RX ADMIN — Medication 20 UNIT(S): at 12:47

## 2017-06-20 RX ADMIN — HYDROMORPHONE HYDROCHLORIDE 30 MILLILITER(S): 2 INJECTION INTRAMUSCULAR; INTRAVENOUS; SUBCUTANEOUS at 14:52

## 2017-06-20 RX ADMIN — ATORVASTATIN CALCIUM 40 MILLIGRAM(S): 80 TABLET, FILM COATED ORAL at 22:54

## 2017-06-20 RX ADMIN — ENOXAPARIN SODIUM 40 MILLIGRAM(S): 100 INJECTION SUBCUTANEOUS at 12:45

## 2017-06-20 RX ADMIN — HYDROMORPHONE HYDROCHLORIDE 30 MILLILITER(S): 2 INJECTION INTRAMUSCULAR; INTRAVENOUS; SUBCUTANEOUS at 05:15

## 2017-06-20 RX ADMIN — Medication 975 MILLIGRAM(S): at 13:16

## 2017-06-20 NOTE — PROGRESS NOTE ADULT - SUBJECTIVE AND OBJECTIVE BOX
SUBJECTIVE:  Doing well.   No overnight events.     OBJECTIVE:     ** VITAL SIGNS / I&O's **    T(C): 36.8, Max: 37 (06-19 @ 14:24)  T(F): 98.3, Max: 98.6 (06-19 @ 14:24)  HR: 69 (54 - 69)  BP: 134/75 (109/61 - 134/75)  RR: 18 (16 - 18)  SpO2: 97% (97% - 100%)  Wt(kg): --    I & Os for 24h ending 19 Jun 2017 07:00  =============================================  IN:    Oral Fluid: 1200 ml    lactated ringers.: 230 ml    Total IN: 1430 ml  ---------------------------------------------  OUT:    Indwelling Catheter - Urethral: 2010 ml    Bulb: 135 ml    Bulb: 85 ml    Total OUT: 2230 ml  ---------------------------------------------  Total NET: -800 ml    I & Os for current day (as of 20 Jun 2017 06:59)  =============================================  IN:    Oral Fluid: 1550 ml    Total IN: 1550 ml  ---------------------------------------------  OUT:    Indwelling Catheter - Urethral: 1825 ml    Bulb: 75 ml    Bulb: 70 ml    Total OUT: 1970 ml  ---------------------------------------------  Total NET: -420 ml      ** PHYSICAL EXAM **    -- CONSTITUTIONAL: AOx3. NAD.   -- CARDIOVASCULAR: Regular rate and rhythm. S1, S2.  -- RESPIRATORY: Bilateral breath sounds.   -- BACK: No collections appreciated. Dressing in place.  -- RIGHT UPPER EXTREMITY: External fixator in place. VAC in place and functioning. Latissimus dorsi myocutaneous flap's skin paddle with distal tip ecchymosis. Proximal aspect with 2-3 second capillary refill. Hand edematous. Hand in splint.   -- RIGHT LOWER EXTREMITY: Dressing in place. e    ** LABS **    CAPILLARY BLOOD GLUCOSE  135 (19 Jun 2017 22:05)  151 (19 Jun 2017 17:05)  130 (19 Jun 2017 12:37)  130 (19 Jun 2017 07:47)    CARDIAC MARKERS ( 20 Jun 2017 04:33 )  x     / <0.01 ng/mL / x     / x     / x      CARDIAC MARKERS ( 19 Jun 2017 17:53 )  x     / <0.01 ng/mL / 395 U/L / x     / 3.3 ng/mL

## 2017-06-20 NOTE — PROGRESS NOTE ADULT - SUBJECTIVE AND OBJECTIVE BOX
Pain Management Attending Addendum    SUBJECTIVE:    Therapy:	  [x] IV PCA	   [ ] Epidural           [ ] s/p Spinal Opoid              [ ] Postpartum infusion	  [ ] Patient controlled regional anesthesia (PCRA)    [ ] prn Analgesics    OBJECTIVE:   [ x] No new signs     [ ] Other:    Side Effects:  [x ] None			[ ] Other:    Assessment of Catheter Site:		[ ] Intact		[ ] Other:    ASSESSMENT/PLAN  [x ] Continue current therapy    [ ] Therapy changed to:    [ ] IV PCA       [ ] Epidural     [ ] prn Analgesics     [ ] post partum infusion    Comments:

## 2017-06-20 NOTE — PROGRESS NOTE ADULT - SUBJECTIVE AND OBJECTIVE BOX
Patient is a 56y old  Female who presents with a chief complaint of R arm near amputation (04 Jun 2017 07:15)      SUBJECTIVE / OVERNIGHT EVENTS:   Feels better.  Denies CP/SOB/Palpitation/HA.    MEDICATIONS  (STANDING):  HYDROmorphone PCA (1 mG/mL) 30milliLiter(s) PCA Continuous PCA Continuous  enoxaparin Injectable 40milliGRAM(s) SubCutaneous every 24 hours  lactated ringers. 1000milliLiter(s) IV Continuous <Continuous>  acetaminophen   Tablet. 975milliGRAM(s) Oral every 6 hours  pantoprazole    Tablet 40milliGRAM(s) Oral before breakfast  atorvastatin 40milliGRAM(s) Oral at bedtime  polyethylene glycol 3350 17Gram(s) Oral daily  ATENolol  Tablet 50milliGRAM(s) Oral daily  insulin lispro (HumaLOG) corrective regimen sliding scale  SubCutaneous three times a day before meals  insulin lispro (HumaLOG) corrective regimen sliding scale  SubCutaneous at bedtime  multivitamin 1Tablet(s) Oral daily  ferrous    sulfate 325milliGRAM(s) Oral daily  ascorbic acid 500milliGRAM(s) Oral daily  insulin lispro Injectable (HumaLOG) 20Unit(s) SubCutaneous three times a day before meals  dextrose 50% Injectable 12.5Gram(s) IV Push once  insulin glargine Injectable (LANTUS) 35Unit(s) SubCutaneous two times a day    MEDICATIONS  (PRN):  HYDROmorphone PCA (1 mG/mL) Rescue Clinician Bolus 0.5milliGRAM(s) IV Push every 15 minutes PRN for Pain Scale GREATER THAN 6  naloxone Injectable 0.1milliGRAM(s) IV Push every 3 minutes PRN For ANY of the following changes in patient status:  A. RR LESS THAN 10 breaths per minute, B. Oxygen saturation LESS THAN 90%, C. Sedation score of 6  ondansetron Injectable 4milliGRAM(s) IV Push every 6 hours PRN Nausea  HYDROmorphone  Injectable 0.5milliGRAM(s) IV Push every 4 hours PRN Breakthrough Pain  ondansetron Injectable 4milliGRAM(s) IV Push every 6 hours PRN Nausea and/or Vomiting  diphenhydrAMINE   Injectable 12.5milliGRAM(s) IV Push every 4 hours PRN Itching  aluminum hydroxide/magnesium hydroxide/simethicone Suspension 30milliLiter(s) Oral every 4 hours PRN Dyspepsia  docusate sodium 100milliGRAM(s) Oral three times a day PRN Constipation  oxyCODONE IR 10milliGRAM(s) Oral every 4 hours PRN Severe Pain (7 - 10)  oxyCODONE IR 5milliGRAM(s) Oral every 4 hours PRN Moderate Pain (4 - 6)  calcium carbonate 500 mG (Tums) Chewable 1Tablet(s) Chew three times a day PRN Heartburn  magnesium hydroxide Suspension 30milliLiter(s) Oral daily PRN Constipation  bisacodyl Suppository 10milliGRAM(s) Rectal daily PRN If no bowel movement  dextrose Gel 1Dose(s) Oral once PRN Blood Glucose LESS THAN 70 milliGRAM(s)/deciliter  glucagon  Injectable 1milliGRAM(s) IntraMuscular once PRN Glucose LESS THAN 70 milligrams/deciliter      Vital Signs Last 24 Hrs  T(C): 37, Max: 37.2 (06-20 @ 09:08)  HR: 67 (61 - 70)  BP: 105/67 (105/67 - 152/76)  RR: 18 (16 - 18)  SpO2: 95% (95% - 100%)  Wt(kg): --  CAPILLARY BLOOD GLUCOSE  160 (20 Jun 2017 11:57)  129 (20 Jun 2017 07:51)  135 (19 Jun 2017 22:05)  151 (19 Jun 2017 17:05)    I&O's Summary  I & Os for 24h ending 20 Jun 2017 07:00  =============================================  IN: 1550 ml / OUT: 2470 ml / NET: -920 ml    I & Os for current day (as of 20 Jun 2017 15:44)  =============================================  IN: 1020 ml / OUT: 515 ml / NET: 505 ml      PHYSICAL EXAM:  GENERAL: NAD, well-developed  HEAD:  Atraumatic, Normocephalic  EYES: EOMI, PERRLA, conjunctiva and sclera clear  NECK: Supple, No JVD  CHEST/LUNG: Clear to auscultation bilaterally; No wheeze  HEART: Regular rate and rhythm; No murmurs, rubs, or gallops  ABDOMEN: Soft, Nontender, Nondistended; Bowel sounds present  EXTREMITIES:  2+ Peripheral Pulses, No clubbing, cyanosis, or edema  PSYCH: AAOx3  NEUROLOGY: non-focal  SKIN: No rashes or lesions    LABS:                        10.1   12.4  )-----------( 390      ( 19 Jun 2017 10:21 )             30.3     06-19    139  |  102  |  16  ----------------------------<  86  3.9   |  25  |  1.25    Ca    8.4      19 Jun 2017 10:21        CARDIAC MARKERS ( 20 Jun 2017 04:33 )  x     / <0.01 ng/mL / x     / x     / x      CARDIAC MARKERS ( 19 Jun 2017 17:53 )  x     / <0.01 ng/mL / 395 U/L / x     / 3.3 ng/mL        CAPILLARY BLOOD GLUCOSE  160 (20 Jun 2017 11:57)  129 (20 Jun 2017 07:51)  135 (19 Jun 2017 22:05)  151 (19 Jun 2017 17:05)                RADIOLOGY & ADDITIONAL TESTS:    Imaging Personally Reviewed:    Consultant(s) Notes Reviewed:      Care Discussed with Consultants/Other Providers:

## 2017-06-20 NOTE — PROGRESS NOTE ADULT - ASSESSMENT
Patient is a 56y old  Female who presents with a chief complaint of R arm near amputation.    Chest discomfort:      Cardiac enzymes/EKG: S. bradycardia/ No acute St-T changes.      Dw pt's cardiologist. Pt had Stress test and ECHO last year and were normal.   Rt arm crush injury:                    Ortho/Plastics f/up noted.    DM II :  FSSS/Lantus/Humalog.    Anemia:  Hb stable.

## 2017-06-20 NOTE — PROGRESS NOTE ADULT - SUBJECTIVE AND OBJECTIVE BOX
Day __4_ of Anesthesia Pain Management Service    SUBJECTIVE:    Pain Scale Score	At rest: ___ 	With Activity: ___ 	[ x] Refer to charted pain scores    THERAPY:    [ ] IV PCA Morphine		[ ] 5 mg/mL	[ ] 1 mg/mL  [x ] IV PCA Hydromorphone	[ ] 5 mg/mL	[x ] 1 mg/mL  [ ] IV PCA Fentanyl		[ ] 50 micrograms/mL    Demand dose  .2  lockout  6  (minutes)          MEDICATIONS  (STANDING):  HYDROmorphone PCA (1 mG/mL) 30milliLiter(s) PCA Continuous PCA Continuous  enoxaparin Injectable 40milliGRAM(s) SubCutaneous every 24 hours  lactated ringers. 1000milliLiter(s) IV Continuous <Continuous>  acetaminophen   Tablet. 975milliGRAM(s) Oral every 6 hours  pantoprazole    Tablet 40milliGRAM(s) Oral before breakfast  atorvastatin 40milliGRAM(s) Oral at bedtime  polyethylene glycol 3350 17Gram(s) Oral daily  ATENolol  Tablet 50milliGRAM(s) Oral daily  insulin lispro (HumaLOG) corrective regimen sliding scale  SubCutaneous three times a day before meals  insulin lispro (HumaLOG) corrective regimen sliding scale  SubCutaneous at bedtime  multivitamin 1Tablet(s) Oral daily  ferrous    sulfate 325milliGRAM(s) Oral daily  ascorbic acid 500milliGRAM(s) Oral daily  insulin lispro Injectable (HumaLOG) 20Unit(s) SubCutaneous three times a day before meals  dextrose 50% Injectable 12.5Gram(s) IV Push once  insulin glargine Injectable (LANTUS) 35Unit(s) SubCutaneous two times a day    MEDICATIONS  (PRN):  HYDROmorphone PCA (1 mG/mL) Rescue Clinician Bolus 0.5milliGRAM(s) IV Push every 15 minutes PRN for Pain Scale GREATER THAN 6  naloxone Injectable 0.1milliGRAM(s) IV Push every 3 minutes PRN For ANY of the following changes in patient status:  A. RR LESS THAN 10 breaths per minute, B. Oxygen saturation LESS THAN 90%, C. Sedation score of 6  ondansetron Injectable 4milliGRAM(s) IV Push every 6 hours PRN Nausea  HYDROmorphone  Injectable 0.5milliGRAM(s) IV Push every 4 hours PRN Breakthrough Pain  ondansetron Injectable 4milliGRAM(s) IV Push every 6 hours PRN Nausea and/or Vomiting  diphenhydrAMINE   Injectable 12.5milliGRAM(s) IV Push every 4 hours PRN Itching  aluminum hydroxide/magnesium hydroxide/simethicone Suspension 30milliLiter(s) Oral every 4 hours PRN Dyspepsia  docusate sodium 100milliGRAM(s) Oral three times a day PRN Constipation  oxyCODONE IR 10milliGRAM(s) Oral every 4 hours PRN Severe Pain (7 - 10)  oxyCODONE IR 5milliGRAM(s) Oral every 4 hours PRN Moderate Pain (4 - 6)  calcium carbonate 500 mG (Tums) Chewable 1Tablet(s) Chew three times a day PRN Heartburn  magnesium hydroxide Suspension 30milliLiter(s) Oral daily PRN Constipation  bisacodyl Suppository 10milliGRAM(s) Rectal daily PRN If no bowel movement  dextrose Gel 1Dose(s) Oral once PRN Blood Glucose LESS THAN 70 milliGRAM(s)/deciliter  glucagon  Injectable 1milliGRAM(s) IntraMuscular once PRN Glucose LESS THAN 70 milligrams/deciliter      OBJECTIVE:    Sedation Score:	[x ] Alert	[ ] Drowsy 	[ ] Arousable	[ ] Asleep	[ ] Unresponsive    Side Effects:	[ x] None	[ ] Nausea	[ ] Vomiting	[ ] Pruritus  		[ ] Other:    Vital Signs Last 24 Hrs  T(C): 37.2, Max: 37.2 (06-20 @ 09:08)  T(F): 98.9, Max: 98.9 (06-20 @ 09:08)  HR: 70 (54 - 70)  BP: 152/76 (109/61 - 152/76)  BP(mean): --  RR: 16 (16 - 18)  SpO2: 97% (97% - 100%)    ASSESSMENT/ PLAN    Therapy to  be:	[x ] Continue   [ ] Discontinued   [ ] Change to prn Analgesics    Documentation and Verification of current medications:   [X] Done	[ ] Not done, not elligible    Comments:

## 2017-06-20 NOTE — PROGRESS NOTE ADULT - ASSESSMENT
56F s/p RUE reconstruction with latissimus dorsi myocutaneous flap and STSG. POD 2.  >> Doing well.  >> Appreciate OT making splint for right hand  >> Continue VAC for 5-6 days total  >> RUE elevation  >> Continue drains  >> Patient will likely be discharged with at least one drain; thus will need VNS set up if going home  >> DVT prophylaxis  >> May d/c Wilde catheter from PRS point of view

## 2017-06-20 NOTE — PROGRESS NOTE ADULT - SUBJECTIVE AND OBJECTIVE BOX
Post op Day [ ]     Patient is a 56y old  Female who presents with a chief complaint of R arm near amputation (04 Jun 2017 07:15)/ S/P mult I&Ds, external fixator, cement spacer, wound vac application, last I&D w/ vac, lat. flap and skin graft with PRS POD#2      Patient resting without complaints.  No chest pain, SOB, N/V.    T(C): 36.8, Max: 37 (06-19 @ 14:24)  HR: 69 (54 - 69)  BP: 134/75 (109/61 - 134/75)  RR: 18 (16 - 18)  SpO2: 97% (97% - 100%)  Wt(kg): --    Exam:  Alert and Oriented, No Acute Distress  Cards: +S1/S2, RRR  Pulm: CTAB  Lower Extremities:  Calves Soft, Non-tender bilaterally  +PF/DF/EHL/FHL  no sensation right U/E ulnar disrtibution  +DP Pulse                       10.1   12.4  )-----------( 390      ( 19 Jun 2017 10:21 )             30.3    06-19    139  |  102  |  16  ----------------------------<  86  3.9   |  25  |  1.25    Ca    8.4      19 Jun 2017 10:21 Post op Day [2 ]     Patient is a 56y old  Female who presents with a chief complaint of R arm near amputation (04 Jun 2017 07:15)/ S/P mult I&Ds, external fixator, cement spacer, wound vac application, last I&D w/ vac, lat. flap and skin graft with PRS POD#2      Patient resting without complaints.  No chest pain, SOB, N/V.    T(C): 36.8, Max: 37 (06-19 @ 14:24)  HR: 69 (54 - 69)  BP: 134/75 (109/61 - 134/75)  RR: 18 (16 - 18)  SpO2: 97% (97% - 100%)  Wt(kg): --    Exam:  Alert and Oriented, No Acute Distress  Cards: +S1/S2, RRR  Pulm: CTAB  Lower Extremities:  Calves Soft, Non-tender bilaterally  +PF/DF/EHL/FHL  no sensation right U/E ulnar disrtibution  +DP Pulse                       10.1   12.4  )-----------( 390      ( 19 Jun 2017 10:21 )             30.3    06-19    139  |  102  |  16  ----------------------------<  86  3.9   |  25  |  1.25    Ca    8.4      19 Jun 2017 10:21

## 2017-06-20 NOTE — PROGRESS NOTE ADULT - ASSESSMENT
55 y/o fm w/ Gr3B open right distal humerus, olecranon, radial head fractures 6/4/17, s/p I&D, ex fix,Vac6/6, I&D, Vac6/9, I&D cement spacer,Vac 6/13, I&D lateral flap, skin graft from right thigh, Vac with PRS POD#2, JPs, HMVs as per PRS, pin care BID, d/c planning w/ VNS as per PRS    Kasey Pacheco PA-C  Orthopaedic Surgery  Team pager [phone]  [facility] [phone]  [phone]

## 2017-06-20 NOTE — CHART NOTE - NSCHARTNOTEFT_GEN_A_CORE
Female who presents with a chief complaint of R arm near amputation (04 Jun 2017 07:15)/ S/P mult I&Ds, external fixator, cement spacer, wound vac application, s/p RUE reconstruction with latissimus dorsi myocutaneous flap and STSG. POD 2.  Source: Patient [X]    Family [ ]     other [X] Comprehensive review of medical records     Diet : Consistent Carbohydrate with snack       Patient reports [X] nausea at times controlled with medication  [ ] vomiting [ ] diarrhea [ ] constipation  [ ]chewing problems [ ] swallowing issues  [X] other: Pt reports constipation, RN/team aware and pt is on a bowel regimen    PO intake:  < 50% [ ] 50-75% [ ]   % [ ]  other : Pt reports decreased po intake from PTA but reports she is eating fairly well, declines providing food preferences at this time.      Source for PO intake [X] Patient [ ] family [ ] chart [ ] staff [ ] other       Current Weight: Weight (kg): 90.7 (06-18 @ 01:34)= 199.9 pounds, noted previous weights: 222.2-228.6 pounds- question accuracy vs fluid shifts or wt loss      Pertinent Medications: MEDICATIONS  (STANDING):  HYDROmorphone PCA (1 mG/mL) 30milliLiter(s) PCA Continuous PCA Continuous  enoxaparin Injectable 40milliGRAM(s) SubCutaneous every 24 hours  lactated ringers. 1000milliLiter(s) IV Continuous <Continuous>  acetaminophen   Tablet. 975milliGRAM(s) Oral every 6 hours  pantoprazole    Tablet 40milliGRAM(s) Oral before breakfast  atorvastatin 40milliGRAM(s) Oral at bedtime  polyethylene glycol 3350 17Gram(s) Oral daily  ATENolol  Tablet 50milliGRAM(s) Oral daily  insulin lispro (HumaLOG) corrective regimen sliding scale  SubCutaneous three times a day before meals  insulin lispro (HumaLOG) corrective regimen sliding scale  SubCutaneous at bedtime  multivitamin 1Tablet(s) Oral daily  ferrous    sulfate 325milliGRAM(s) Oral daily  ascorbic acid 500milliGRAM(s) Oral daily  insulin lispro Injectable (HumaLOG) 20Unit(s) SubCutaneous three times a day before meals  dextrose 50% Injectable 12.5Gram(s) IV Push once  insulin glargine Injectable (LANTUS) 35Unit(s) SubCutaneous two times a day    MEDICATIONS  (PRN):  HYDROmorphone PCA (1 mG/mL) Rescue Clinician Bolus 0.5milliGRAM(s) IV Push every 15 minutes PRN for Pain Scale GREATER THAN 6  naloxone Injectable 0.1milliGRAM(s) IV Push every 3 minutes PRN For ANY of the following changes in patient status:  A. RR LESS THAN 10 breaths per minute, B. Oxygen saturation LESS THAN 90%, C. Sedation score of 6  ondansetron Injectable 4milliGRAM(s) IV Push every 6 hours PRN Nausea  HYDROmorphone  Injectable 0.5milliGRAM(s) IV Push every 4 hours PRN Breakthrough Pain  ondansetron Injectable 4milliGRAM(s) IV Push every 6 hours PRN Nausea and/or Vomiting  diphenhydrAMINE   Injectable 12.5milliGRAM(s) IV Push every 4 hours PRN Itching  aluminum hydroxide/magnesium hydroxide/simethicone Suspension 30milliLiter(s) Oral every 4 hours PRN Dyspepsia  docusate sodium 100milliGRAM(s) Oral three times a day PRN Constipation  oxyCODONE IR 10milliGRAM(s) Oral every 4 hours PRN Severe Pain (7 - 10)  oxyCODONE IR 5milliGRAM(s) Oral every 4 hours PRN Moderate Pain (4 - 6)  calcium carbonate 500 mG (Tums) Chewable 1Tablet(s) Chew three times a day PRN Heartburn  magnesium hydroxide Suspension 30milliLiter(s) Oral daily PRN Constipation  bisacodyl Suppository 10milliGRAM(s) Rectal daily PRN If no bowel movement  dextrose Gel 1Dose(s) Oral once PRN Blood Glucose LESS THAN 70 milliGRAM(s)/deciliter  glucagon  Injectable 1milliGRAM(s) IntraMuscular once PRN Glucose LESS THAN 70 milligrams/deciliter    Pertinent Labs:  06-19 Na139 mmol/L Glu 86 mg/dL K+ 3.9 mmol/L Cr  1.25 mg/dL BUN 16 mg/dL Phos n/a        Skin: 3+ right arm and hand edema, no pressure injury     Estimated Needs:   [X] no change since previous assessment  [ ] recalculated:       Previous Nutrition Diagnosis:     [[X] Food & Nutrition Related Knowledge Deficit          Nutrition Diagnosis is [X] ongoing  [ ] resolved [ ] not applicable          New Nutrition Diagnosis: [X] not applicable       Interventions: Nutrition education- pt declines education verbal and written at this time.     Recommend    [ ] Change Diet To:    [ ] Nutrition Supplement    [ ] Nutrition Support    [ ] Other:        Monitoring and Evaluation:     [X] PO intake [X] Tolerance to diet prescription [X] weights [X] follow up per protocol    [X] other: BM and skin Female who presents with a chief complaint of R arm near amputation (04 Jun 2017 07:15)/ S/P mult I&Ds, external fixator, cement spacer, wound vac application, s/p RUE reconstruction with latissimus dorsi myocutaneous flap and STSG. POD 2.  Source: Patient [X]    Family [ ]     other [X] Comprehensive review of medical records     Diet : Consistent Carbohydrate with snack       Patient reports [X] nausea at times controlled with medication  [ ] vomiting [ ] diarrhea [ ] constipation  [ ]chewing problems [ ] swallowing issues  [X] other: Pt reports constipation, RN/team aware and pt is on a bowel regimen    PO intake:  < 50% [ ] 50-75% [ ]   % [ ]  other : Pt reports decreased po intake from PTA but reports she is eating fairly well, declines providing food preferences at this time.      Source for PO intake [X] Patient [ ] family [ ] chart [ ] staff [ ] other       Current Weight: Weight (kg): 90.7 (06-18 @ 01:34)= 199.9 pounds, noted previous weights: 222.2-228.6 pounds- question accuracy vs fluid shifts or wt loss      Pertinent Medications: MEDICATIONS  (STANDING):  HYDROmorphone PCA (1 mG/mL) 30milliLiter(s) PCA Continuous PCA Continuous  enoxaparin Injectable 40milliGRAM(s) SubCutaneous every 24 hours  lactated ringers. 1000milliLiter(s) IV Continuous <Continuous>  acetaminophen   Tablet. 975milliGRAM(s) Oral every 6 hours  pantoprazole    Tablet 40milliGRAM(s) Oral before breakfast  atorvastatin 40milliGRAM(s) Oral at bedtime  polyethylene glycol 3350 17Gram(s) Oral daily  ATENolol  Tablet 50milliGRAM(s) Oral daily  insulin lispro (HumaLOG) corrective regimen sliding scale  SubCutaneous three times a day before meals  insulin lispro (HumaLOG) corrective regimen sliding scale  SubCutaneous at bedtime  multivitamin 1Tablet(s) Oral daily  ferrous    sulfate 325milliGRAM(s) Oral daily  ascorbic acid 500milliGRAM(s) Oral daily  insulin lispro Injectable (HumaLOG) 20Unit(s) SubCutaneous three times a day before meals  dextrose 50% Injectable 12.5Gram(s) IV Push once  insulin glargine Injectable (LANTUS) 35Unit(s) SubCutaneous two times a day    MEDICATIONS  (PRN):  HYDROmorphone PCA (1 mG/mL) Rescue Clinician Bolus 0.5milliGRAM(s) IV Push every 15 minutes PRN for Pain Scale GREATER THAN 6  naloxone Injectable 0.1milliGRAM(s) IV Push every 3 minutes PRN For ANY of the following changes in patient status:  A. RR LESS THAN 10 breaths per minute, B. Oxygen saturation LESS THAN 90%, C. Sedation score of 6  ondansetron Injectable 4milliGRAM(s) IV Push every 6 hours PRN Nausea  HYDROmorphone  Injectable 0.5milliGRAM(s) IV Push every 4 hours PRN Breakthrough Pain  ondansetron Injectable 4milliGRAM(s) IV Push every 6 hours PRN Nausea and/or Vomiting  diphenhydrAMINE   Injectable 12.5milliGRAM(s) IV Push every 4 hours PRN Itching  aluminum hydroxide/magnesium hydroxide/simethicone Suspension 30milliLiter(s) Oral every 4 hours PRN Dyspepsia  docusate sodium 100milliGRAM(s) Oral three times a day PRN Constipation  oxyCODONE IR 10milliGRAM(s) Oral every 4 hours PRN Severe Pain (7 - 10)  oxyCODONE IR 5milliGRAM(s) Oral every 4 hours PRN Moderate Pain (4 - 6)  calcium carbonate 500 mG (Tums) Chewable 1Tablet(s) Chew three times a day PRN Heartburn  magnesium hydroxide Suspension 30milliLiter(s) Oral daily PRN Constipation  bisacodyl Suppository 10milliGRAM(s) Rectal daily PRN If no bowel movement  dextrose Gel 1Dose(s) Oral once PRN Blood Glucose LESS THAN 70 milliGRAM(s)/deciliter  glucagon  Injectable 1milliGRAM(s) IntraMuscular once PRN Glucose LESS THAN 70 milligrams/deciliter    Pertinent Labs:  06-19 Na139 mmol/L Glu 86 mg/dL K+ 3.9 mmol/L Cr  1.25 mg/dL BUN 16 mg/dL Phos n/a        Skin: 3+ right arm and hand edema, no pressure injury     Estimated Needs:   [X] no change since previous assessment  [ ] recalculated:       Previous Nutrition Diagnosis:     [[X] Food & Nutrition Related Knowledge Deficit          Nutrition Diagnosis is [X] ongoing  [ ] resolved [ ] not applicable          New Nutrition Diagnosis: [X] not applicable       Interventions: Nutrition education- pt declines education verbal and written at this time.     Recommend    [ ] Change Diet To:    [ ] Nutrition Supplement    [ ] Nutrition Support    [X] Other: Provide nutrition education as able.        Monitoring and Evaluation:     [X] PO intake [X] Tolerance to diet prescription [X] weights [X] follow up per protocol    [X] other: BM and skin

## 2017-06-21 ENCOUNTER — TRANSCRIPTION ENCOUNTER (OUTPATIENT)
Age: 57
End: 2017-06-21

## 2017-06-21 RX ORDER — INSULIN LISPRO 100/ML
10 VIAL (ML) SUBCUTANEOUS ONCE
Qty: 0 | Refills: 0 | Status: COMPLETED | OUTPATIENT
Start: 2017-06-21 | End: 2017-06-21

## 2017-06-21 RX ORDER — HYDROMORPHONE HYDROCHLORIDE 2 MG/ML
1 INJECTION INTRAMUSCULAR; INTRAVENOUS; SUBCUTANEOUS EVERY 4 HOURS
Qty: 0 | Refills: 0 | Status: DISCONTINUED | OUTPATIENT
Start: 2017-06-21 | End: 2017-06-24

## 2017-06-21 RX ORDER — HYDROMORPHONE HYDROCHLORIDE 2 MG/ML
0.25 INJECTION INTRAMUSCULAR; INTRAVENOUS; SUBCUTANEOUS ONCE
Qty: 0 | Refills: 0 | Status: DISCONTINUED | OUTPATIENT
Start: 2017-06-21 | End: 2017-06-21

## 2017-06-21 RX ORDER — HYDROMORPHONE HYDROCHLORIDE 2 MG/ML
2 INJECTION INTRAMUSCULAR; INTRAVENOUS; SUBCUTANEOUS
Qty: 0 | Refills: 0 | Status: DISCONTINUED | OUTPATIENT
Start: 2017-06-21 | End: 2017-06-24

## 2017-06-21 RX ORDER — HYDROMORPHONE HYDROCHLORIDE 2 MG/ML
4 INJECTION INTRAMUSCULAR; INTRAVENOUS; SUBCUTANEOUS
Qty: 0 | Refills: 0 | Status: DISCONTINUED | OUTPATIENT
Start: 2017-06-21 | End: 2017-06-24

## 2017-06-21 RX ADMIN — HYDROMORPHONE HYDROCHLORIDE 4 MILLIGRAM(S): 2 INJECTION INTRAMUSCULAR; INTRAVENOUS; SUBCUTANEOUS at 12:00

## 2017-06-21 RX ADMIN — Medication 500 MILLIGRAM(S): at 13:29

## 2017-06-21 RX ADMIN — INSULIN GLARGINE 35 UNIT(S): 100 INJECTION, SOLUTION SUBCUTANEOUS at 08:57

## 2017-06-21 RX ADMIN — HYDROMORPHONE HYDROCHLORIDE 4 MILLIGRAM(S): 2 INJECTION INTRAMUSCULAR; INTRAVENOUS; SUBCUTANEOUS at 22:53

## 2017-06-21 RX ADMIN — Medication 975 MILLIGRAM(S): at 17:29

## 2017-06-21 RX ADMIN — HYDROMORPHONE HYDROCHLORIDE 4 MILLIGRAM(S): 2 INJECTION INTRAMUSCULAR; INTRAVENOUS; SUBCUTANEOUS at 19:00

## 2017-06-21 RX ADMIN — Medication 20 UNIT(S): at 13:43

## 2017-06-21 RX ADMIN — HYDROMORPHONE HYDROCHLORIDE 0.25 MILLIGRAM(S): 2 INJECTION INTRAMUSCULAR; INTRAVENOUS; SUBCUTANEOUS at 23:00

## 2017-06-21 RX ADMIN — HYDROMORPHONE HYDROCHLORIDE 1 MILLIGRAM(S): 2 INJECTION INTRAMUSCULAR; INTRAVENOUS; SUBCUTANEOUS at 20:16

## 2017-06-21 RX ADMIN — HYDROMORPHONE HYDROCHLORIDE 30 MILLILITER(S): 2 INJECTION INTRAMUSCULAR; INTRAVENOUS; SUBCUTANEOUS at 08:18

## 2017-06-21 RX ADMIN — Medication 20 UNIT(S): at 08:59

## 2017-06-21 RX ADMIN — HYDROMORPHONE HYDROCHLORIDE 0.25 MILLIGRAM(S): 2 INJECTION INTRAMUSCULAR; INTRAVENOUS; SUBCUTANEOUS at 23:15

## 2017-06-21 RX ADMIN — ENOXAPARIN SODIUM 40 MILLIGRAM(S): 100 INJECTION SUBCUTANEOUS at 13:29

## 2017-06-21 RX ADMIN — SODIUM CHLORIDE 75 MILLILITER(S): 9 INJECTION, SOLUTION INTRAVENOUS at 11:25

## 2017-06-21 RX ADMIN — Medication 975 MILLIGRAM(S): at 06:02

## 2017-06-21 RX ADMIN — POLYETHYLENE GLYCOL 3350 17 GRAM(S): 17 POWDER, FOR SOLUTION ORAL at 11:25

## 2017-06-21 RX ADMIN — HYDROMORPHONE HYDROCHLORIDE 4 MILLIGRAM(S): 2 INJECTION INTRAMUSCULAR; INTRAVENOUS; SUBCUTANEOUS at 22:23

## 2017-06-21 RX ADMIN — ATORVASTATIN CALCIUM 40 MILLIGRAM(S): 80 TABLET, FILM COATED ORAL at 22:24

## 2017-06-21 RX ADMIN — ATENOLOL 50 MILLIGRAM(S): 25 TABLET ORAL at 06:01

## 2017-06-21 RX ADMIN — HYDROMORPHONE HYDROCHLORIDE 4 MILLIGRAM(S): 2 INJECTION INTRAMUSCULAR; INTRAVENOUS; SUBCUTANEOUS at 15:45

## 2017-06-21 RX ADMIN — Medication 975 MILLIGRAM(S): at 18:00

## 2017-06-21 RX ADMIN — HYDROMORPHONE HYDROCHLORIDE 4 MILLIGRAM(S): 2 INJECTION INTRAMUSCULAR; INTRAVENOUS; SUBCUTANEOUS at 15:15

## 2017-06-21 RX ADMIN — Medication 1 TABLET(S): at 11:25

## 2017-06-21 RX ADMIN — Medication 975 MILLIGRAM(S): at 06:32

## 2017-06-21 RX ADMIN — Medication 1: at 17:40

## 2017-06-21 RX ADMIN — Medication 10 UNIT(S): at 09:00

## 2017-06-21 RX ADMIN — PANTOPRAZOLE SODIUM 40 MILLIGRAM(S): 20 TABLET, DELAYED RELEASE ORAL at 06:02

## 2017-06-21 RX ADMIN — HYDROMORPHONE HYDROCHLORIDE 30 MILLILITER(S): 2 INJECTION INTRAMUSCULAR; INTRAVENOUS; SUBCUTANEOUS at 02:59

## 2017-06-21 RX ADMIN — HYDROMORPHONE HYDROCHLORIDE 1 MILLIGRAM(S): 2 INJECTION INTRAMUSCULAR; INTRAVENOUS; SUBCUTANEOUS at 20:35

## 2017-06-21 RX ADMIN — HYDROMORPHONE HYDROCHLORIDE 1 MILLIGRAM(S): 2 INJECTION INTRAMUSCULAR; INTRAVENOUS; SUBCUTANEOUS at 14:49

## 2017-06-21 RX ADMIN — Medication 20 UNIT(S): at 17:40

## 2017-06-21 RX ADMIN — INSULIN GLARGINE 35 UNIT(S): 100 INJECTION, SOLUTION SUBCUTANEOUS at 22:27

## 2017-06-21 RX ADMIN — HYDROMORPHONE HYDROCHLORIDE 4 MILLIGRAM(S): 2 INJECTION INTRAMUSCULAR; INTRAVENOUS; SUBCUTANEOUS at 18:29

## 2017-06-21 RX ADMIN — Medication 975 MILLIGRAM(S): at 13:29

## 2017-06-21 RX ADMIN — HYDROMORPHONE HYDROCHLORIDE 4 MILLIGRAM(S): 2 INJECTION INTRAMUSCULAR; INTRAVENOUS; SUBCUTANEOUS at 12:30

## 2017-06-21 RX ADMIN — Medication 975 MILLIGRAM(S): at 14:00

## 2017-06-21 RX ADMIN — HYDROMORPHONE HYDROCHLORIDE 1 MILLIGRAM(S): 2 INJECTION INTRAMUSCULAR; INTRAVENOUS; SUBCUTANEOUS at 15:06

## 2017-06-21 RX ADMIN — Medication 325 MILLIGRAM(S): at 12:30

## 2017-06-21 NOTE — PROGRESS NOTE ADULT - ASSESSMENT
Patient is a 56y old  Female who presents with a chief complaint of R arm near amputation.    Chest discomfort:      Likely dyspepsia. Ischemic work up negative.  Rt arm crush injury:                    Ortho/Plastics f/up noted.  OR on friday for reconstruction.    DM II :  FSSS/Lantus/Humalog.    Anemia:  Hb stable.

## 2017-06-21 NOTE — DISCHARGE NOTE ADULT - REASON FOR ADMISSION
Patient is a 56y old  Female who presents with a chief complaint of R arm near amputation (04 Jun 2017 07:15)

## 2017-06-21 NOTE — DISCHARGE NOTE ADULT - SECONDARY DIAGNOSIS.
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

## 2017-06-21 NOTE — DISCHARGE NOTE ADULT - PATIENT PORTAL LINK FT
“You can access the [facility] Patient Portal, offered by [facility], by registering with the following website: [email or web address]”
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
## 2017-06-21 NOTE — PROGRESS NOTE ADULT - SUBJECTIVE AND OBJECTIVE BOX
Patient is a 56y old  Female who presents with a chief complaint of R arm near amputation (04 Jun 2017 07:15)      SUBJECTIVE / OVERNIGHT EVENTS:   Feels better.  Denies CP/SOB/Palpitation/HA.    MEDICATIONS  (STANDING):  enoxaparin Injectable 40milliGRAM(s) SubCutaneous every 24 hours  lactated ringers. 1000milliLiter(s) IV Continuous <Continuous>  acetaminophen   Tablet. 975milliGRAM(s) Oral every 6 hours  pantoprazole    Tablet 40milliGRAM(s) Oral before breakfast  atorvastatin 40milliGRAM(s) Oral at bedtime  polyethylene glycol 3350 17Gram(s) Oral daily  ATENolol  Tablet 50milliGRAM(s) Oral daily  insulin lispro (HumaLOG) corrective regimen sliding scale  SubCutaneous three times a day before meals  insulin lispro (HumaLOG) corrective regimen sliding scale  SubCutaneous at bedtime  multivitamin 1Tablet(s) Oral daily  ferrous    sulfate 325milliGRAM(s) Oral daily  ascorbic acid 500milliGRAM(s) Oral daily  insulin lispro Injectable (HumaLOG) 20Unit(s) SubCutaneous three times a day before meals  dextrose 50% Injectable 12.5Gram(s) IV Push once  insulin glargine Injectable (LANTUS) 35Unit(s) SubCutaneous two times a day    MEDICATIONS  (PRN):  ondansetron Injectable 4milliGRAM(s) IV Push every 6 hours PRN Nausea and/or Vomiting  diphenhydrAMINE   Injectable 12.5milliGRAM(s) IV Push every 4 hours PRN Itching  aluminum hydroxide/magnesium hydroxide/simethicone Suspension 30milliLiter(s) Oral every 4 hours PRN Dyspepsia  docusate sodium 100milliGRAM(s) Oral three times a day PRN Constipation  calcium carbonate 500 mG (Tums) Chewable 1Tablet(s) Chew three times a day PRN Heartburn  magnesium hydroxide Suspension 30milliLiter(s) Oral daily PRN Constipation  bisacodyl Suppository 10milliGRAM(s) Rectal daily PRN If no bowel movement  dextrose Gel 1Dose(s) Oral once PRN Blood Glucose LESS THAN 70 milliGRAM(s)/deciliter  glucagon  Injectable 1milliGRAM(s) IntraMuscular once PRN Glucose LESS THAN 70 milligrams/deciliter  HYDROmorphone   Tablet 2milliGRAM(s) Oral every 3 hours PRN Moderate Pain (4 - 6)  HYDROmorphone   Tablet 4milliGRAM(s) Oral every 3 hours PRN Severe Pain (7 - 10)  HYDROmorphone  Injectable 1milliGRAM(s) IV Push every 4 hours PRN breakthrough      Vital Signs Last 24 Hrs  T(C): 36.9, Max: 37.1 (06-21 @ 00:00)  HR: 73 (72 - 82)  BP: 118/75 (118/75 - 145/73)  RR: 18 (16 - 18)  SpO2: 95% (79% - 100%)  Wt(kg): --  CAPILLARY BLOOD GLUCOSE  109 (21 Jun 2017 08:00)  147 (20 Jun 2017 22:21)  140 (20 Jun 2017 17:29)    I&O's Summary  I & Os for 24h ending 21 Jun 2017 07:00  =============================================  IN: 1770 ml / OUT: 870 ml / NET: 900 ml    I & Os for current day (as of 21 Jun 2017 17:05)  =============================================  IN: 1390 ml / OUT: 65 ml / NET: 1325 ml      PHYSICAL EXAM:  GENERAL: NAD, well-developed  HEAD:  Atraumatic, Normocephalic  EYES: EOMI, PERRLA, conjunctiva and sclera clear  NECK: Supple, No JVD  CHEST/LUNG: Clear to auscultation bilaterally; No wheeze  HEART: Regular rate and rhythm; No murmurs, rubs, or gallops  ABDOMEN: Soft, Nontender, Nondistended; Bowel sounds present  EXTREMITIES:  2+ Peripheral Pulses, No clubbing, cyanosis, or edema  PSYCH: AAOx3  NEUROLOGY: non-focal  SKIN: No rashes or lesions    LABS:            CARDIAC MARKERS ( 20 Jun 2017 04:33 )  x     / <0.01 ng/mL / x     / x     / x      CARDIAC MARKERS ( 19 Jun 2017 17:53 )  x     / <0.01 ng/mL / 395 U/L / x     / 3.3 ng/mL        CAPILLARY BLOOD GLUCOSE  109 (21 Jun 2017 08:00)  147 (20 Jun 2017 22:21)  140 (20 Jun 2017 17:29)                RADIOLOGY & ADDITIONAL TESTS:    Imaging Personally Reviewed:    Consultant(s) Notes Reviewed:      Care Discussed with Consultants/Other Providers:

## 2017-06-21 NOTE — DISCHARGE NOTE ADULT - OTHER SIGNIFICANT FINDINGS
< from: CT Chest w/ IV Cont (06.04.17 @ 01:38) >  IMPRESSION:    Anterior subluxation at the right glenohumeral joint where the right   posterior humeral head is fractured and impacted against the glenoid   labrum.  No evidence of acute traumatic injury to the chest, abdomen, or pelvis.  Fibroid uterus.    < from: CT Cervical Spine No Cont (06.04.17 @ 01:42) >  IMPRESSION:    BRAIN: No intracranial hemorrhage, mass effect or depressed skull   fracture. Anterior frontal and left parietal scalp lacerations. There are   small radiodense foreign bodies overlying the left frontalsinus. Soft   tissue gas anterior to the left frontal sinus without displaced skull   fracture. Radiodense foreign bodies are seen in the left nasal cavity.  CERVICAL SPINE: No displaced fracture or traumatic malalignment.    < from: CT Abdomen and Pelvis w/ IV Cont (06.04.17 @ 01:43) >  IMPRESSION:    Anterior subluxation at the right glenohumeral joint where the right   posterior humeral head is fractured and impacted against the glenoid   labrum.  No evidence of acute traumatic injury to the chest, abdomen, or pelvis.  Fibroid uterus.    < from: CT Angio Upper Extremity w/ IV Cont, Right (06.09.17 @ 22:47) >  IMPRESSION:     1. No evidence of vascular occlusion or DVT involving the right upper   extremity.  2. Right elbow dislocation and fracture again noted. Fracture of the   greater tuberosity of the right humerus. Please see dedicated report of   concurrently performed CT elbow for further details.

## 2017-06-21 NOTE — PROGRESS NOTE ADULT - ASSESSMENT
Impression: Stable       Plan: Continue present treatment                 Out of bed, ambulate, nonweight bearing right upper ext                  Physical therapy evaluation, follow up                  Continue to monitor         Juan Kevin PA-C  Orthopaedic Surgery  Team pager [phone]  [phone]

## 2017-06-21 NOTE — DISCHARGE NOTE ADULT - HOME CARE AGENCY
[facility] [phone] SOC 6/29 RN for wound care teaching and pin care teaching.

## 2017-06-21 NOTE — PROGRESS NOTE ADULT - SUBJECTIVE AND OBJECTIVE BOX
Pain Management Attending Addendum    SUBJECTIVE:    Therapy:	  PCA	x   Epidural           s/p Spinal Opoid              Postpartum infusion	  Patient controlled regional anesthesia (PCRA)    prn Analgesics    OBJECTIVE: No new signs x    Other:    Side Effects:    Nonex			 Other:    Assessment of Catheter Site:		 Intact		 Other:    ASSESSMENT/PLAN  Continue current therapyx    Therapy changed to:     IV PCA        Epidural      prn Analgesics     post partum infusion    Comments:

## 2017-06-21 NOTE — DISCHARGE NOTE ADULT - PROVIDER TOKENS
TOKEN:'[id number]:'[id number]:'[id number]' TOKEN:'[id number]',TOKEN:'[id number]:'[id number]',TOKEN:'[id number]'

## 2017-06-21 NOTE — DISCHARGE NOTE ADULT - CARE PLAN
Principal Discharge DX:	Arm fracture, right, open, initial encounter  Goal:	pain free activities of daily living  Instructions for follow-up, activity and diet:	DIET: consistent carbohydrate diet  DVT PROPHYLAXIS: ecotrin 81mg daily  WEIGHT-BEARING STATUS: non-weight bearing right upper extremity; s/p nerve transfer (AIN to Ulnar), and tendon txfr FDP 3 sewn to FDP 4&5- Please do gentle wrist & finger PROM & AROM. Continue splint.  BATHING: keep dressing clean and dry   DRESSING CHANGES: keep dressing clean and dry  Secondary Diagnosis:	Somatic complaints, multiple  Instructions for follow-up, activity and diet:	- please follow up with Dr. Davis (Neurology) as an outpatient to schedule EMG/Nerve conduction study tests; call office for appointment  - continue gabapentin  Secondary Diagnosis:	History of skin graft  Instructions for follow-up, activity and diet:	- continue daily dressing changes to graft site - xeroform, abd gauze pad, jef wrap.  - elevate right upper extremity  Secondary Diagnosis:	Injury to ulnar nerve of right forearm, initial encounter  Instructions for follow-up, activity and diet:	s/p nerve transfer (AIN to Ulnar), and tendon txfr FDP 3 sewn to FDP 4&5.  - Please do gentle wrist & finger PROM & AROM. Continue splint  - continue gabapentin medication  - please follow up with Dr. Garcia (plastic surgery) upon discharge from rehab; call office for appointment  - daily xeroform dressing changes Principal Discharge DX:	Arm fracture, right, open, initial encounter  Goal:	pain free activities of daily living  Instructions for follow-up, activity and diet:	DIET: consistent carbohydrate diet  DVT PROPHYLAXIS: ecotrin 81mg daily  WEIGHT-BEARING STATUS: non-weight bearing right upper extremity; s/p nerve transfer (AIN to Ulnar), and tendon txfr FDP 3 sewn to FDP 4&5- Please do gentle wrist & finger PROM & AROM. Continue splint.  BATHING: keep dressing clean and dry   DRESSING CHANGES: keep dressing clean and dry  Secondary Diagnosis:	Somatic complaints, multiple  Instructions for follow-up, activity and diet:	- please follow up with Dr. Davis (Neurology) as an outpatient to schedule EMG/Nerve conduction study tests; call office for appointment  - continue gabapentin  Secondary Diagnosis:	History of skin graft  Instructions for follow-up, activity and diet:	- continue daily dressing changes to graft site - xeroform, abd gauze pad, jef wrap.  - elevate right upper extremity  Secondary Diagnosis:	Injury to ulnar nerve of right forearm, initial encounter  Instructions for follow-up, activity and diet:	s/p nerve transfer (AIN to Ulnar), and tendon txfr FDP 3 sewn to FDP 4&5.  - Please do gentle wrist & finger PROM & AROM. Continue splint  - continue gabapentin medication  - please follow up with Dr. Garcia (plastic surgery) upon discharge from rehab; call office for appointment  - daily xeroform dressing changes  Secondary Diagnosis:	Type 2 diabetes mellitus without complication, with long-term current use of insulin  Instructions for follow-up, activity and diet:	continue medication regimen as prescribed upon discharge; please follow up with PMD upon discharge home; call office for appointment; patient requests to make own appointment Principal Discharge DX:	Arm fracture, right, open, initial encounter  Goal:	pain free activities of daily living  Instructions for follow-up, activity and diet:	DIET: consistent carbohydrate diet  DVT PROPHYLAXIS: ecotrin 81mg daily  WEIGHT-BEARING STATUS: non-weight bearing right upper extremity; s/p nerve transfer (AIN to Ulnar), and tendon txfr FDP 3 sewn to FDP 4&5- Please do gentle wrist & finger PROM & AROM. Continue splint.  BATHING: keep dressing clean and dry   DRESSING CHANGES: keep dressing clean and dry  Secondary Diagnosis:	Somatic complaints, multiple  Instructions for follow-up, activity and diet:	- please follow up with Dr. Davis (Neurology) as an outpatient to schedule EMG/Nerve conduction study tests; call office for appointment  - continue gabapentin  Secondary Diagnosis:	History of skin graft  Instructions for follow-up, activity and diet:	- continue daily dressing changes to graft site - xeroform, abd gauze pad, jef wrap.  - elevate right upper extremity  Secondary Diagnosis:	Injury to ulnar nerve of right forearm, initial encounter  Instructions for follow-up, activity and diet:	s/p nerve transfer (AIN to Ulnar), and tendon txfr FDP 3 sewn to FDP 4&5.  - Please do gentle wrist & finger PROM & AROM. Continue splint  - continue gabapentin medication  - please follow up with Dr. Garcia (plastic surgery) upon discharge from rehab; call office for appointment  - daily xeroform dressing changes  Secondary Diagnosis:	Type 2 diabetes mellitus without complication, with long-term current use of insulin  Instructions for follow-up, activity and diet:	continue medication regimen as prescribed upon discharge; please follow up with PMD upon discharge home; call office for appointment; patient have appointment next wednesday 7/5/17 Principal Discharge DX:	Arm fracture, right, open, initial encounter  Goal:	pain free activities of daily living  Instructions for follow-up, activity and diet:	DIET: consistent carbohydrate diet  DVT PROPHYLAXIS: ecotrin 81mg daily  WEIGHT-BEARING STATUS: non-weight bearing right upper extremity; s/p nerve transfer (AIN to Ulnar), and tendon txfr FDP 3 sewn to FDP 4&5- Please do gentle wrist & finger PROM & AROM. Continue splint.  BATHING: keep dressing clean and dry   DRESSING CHANGES: keep dressing clean and dry  Secondary Diagnosis:	Somatic complaints, multiple  Instructions for follow-up, activity and diet:	- please follow up with Dr. Davis (Neurology) as an outpatient to schedule EMG/Nerve conduction study tests; call office for appointment  - continue gabapentin  Secondary Diagnosis:	History of skin graft  Instructions for follow-up, activity and diet:	- continue daily dressing changes to graft site - xeroform, abd gauze pad, jef wrap.  - elevate right upper extremity  Secondary Diagnosis:	Injury to ulnar nerve of right forearm, initial encounter  Instructions for follow-up, activity and diet:	s/p nerve transfer (AIN to Ulnar), and tendon txfr FDP 3 sewn to FDP 4&5.  - Please do gentle wrist & finger PROM & AROM. Continue splint.  call [facility] in [place] for [phone].  - continue gabapentin medication  - please follow up with Dr. Garcia (plastic surgery) upon discharge from rehab; call office for appointment  - daily xeroform dressing changes  Secondary Diagnosis:	Type 2 diabetes mellitus without complication, with long-term current use of insulin  Instructions for follow-up, activity and diet:	continue medication regimen as prescribed upon discharge; please follow up with PMD upon discharge home; call office for appointment; patient have appointment next wednesday 7/5/17 Principal Discharge DX:	Arm fracture, right, open, initial encounter  Goal:	pain free activities of daily living  Instructions for follow-up, activity and diet:	DIET: consistent carbohydrate diet  DVT PROPHYLAXIS: ecotrin 81mg daily  WEIGHT-BEARING STATUS: non-weight bearing right upper extremity; s/p nerve transfer (AIN to Ulnar), and tendon txfr FDP 3 sewn to FDP 4&5- Please do gentle wrist & finger PROM & AROM. Continue splint.  BATHING: keep dressing clean and dry   DRESSING CHANGES: keep dressing clean and dry  Secondary Diagnosis:	Somatic complaints, multiple  Instructions for follow-up, activity and diet:	- please follow up with Dr. Davis (Neurology) as an outpatient to schedule EMG/Nerve conduction study tests; call office for appointment  - continue gabapentin  Secondary Diagnosis:	History of skin graft  Instructions for follow-up, activity and diet:	- continue daily dressing changes to graft site - xeroform, abd gauze pad, jef wrap.  - elevate right upper extremity  Secondary Diagnosis:	Injury to ulnar nerve of right forearm, initial encounter  Instructions for follow-up, activity and diet:	s/p nerve transfer (AIN to Ulnar), and tendon txfr FDP 3 sewn to FDP 4&5.  - Please do gentle wrist & finger PROM & AROM. Continue splint.  call [facility] in [place] for [phone].  - continue gabapentin medication  - please follow up with Dr. Garcia (plastic surgery) upon discharge from rehab; call office for appointment  - daily xeroform dressing changes  Secondary Diagnosis:	Type 2 diabetes mellitus without complication, with long-term current use of insulin  Instructions for follow-up, activity and diet:	continue medication regimen as prescribed upon discharge; please follow up with PMD upon discharge home; call office for appointment; patient have appointment next wednesday 7/5/17 Principal Discharge DX:	Arm fracture, right, open, initial encounter  Goal:	pain free activities of daily living  Instructions for follow-up, activity and diet:	DIET: consistent carbohydrate diet  DVT PROPHYLAXIS: ecotrin 81mg daily  WEIGHT-BEARING STATUS: non-weight bearing right upper extremity; s/p nerve transfer (AIN to Ulnar), and tendon txfr FDP 3 sewn to FDP 4&5- Please do gentle wrist & finger PROM & AROM. Continue splint.  BATHING: keep dressing clean and dry   DRESSING CHANGES: keep dressing clean and dry  Secondary Diagnosis:	Somatic complaints, multiple  Instructions for follow-up, activity and diet:	- please follow up with Dr. Davis (Neurology) as an outpatient to schedule EMG/Nerve conduction study tests; call office for appointment  - continue gabapentin  Secondary Diagnosis:	History of skin graft  Instructions for follow-up, activity and diet:	- continue daily dressing changes to graft site - xeroform, abd gauze pad, jef wrap.  - elevate right upper extremity  Secondary Diagnosis:	Injury to ulnar nerve of right forearm, initial encounter  Instructions for follow-up, activity and diet:	s/p nerve transfer (AIN to Ulnar), and tendon txfr FDP 3 sewn to FDP 4&5.  - Please do gentle wrist & finger PROM & AROM. Continue splint.  call [facility] in [place] for [phone].  - continue gabapentin medication  - please follow up with Dr. Garcia (plastic surgery) upon discharge from rehab; call office for appointment  - daily xeroform dressing changes  Secondary Diagnosis:	Type 2 diabetes mellitus without complication, with long-term current use of insulin  Instructions for follow-up, activity and diet:	continue medication regimen as prescribed upon discharge; please follow up with PMD upon discharge home; call office for appointment; patient have appointment next wednesday 7/5/17

## 2017-06-21 NOTE — DISCHARGE NOTE ADULT - MEDICATION SUMMARY - MEDICATIONS TO CHANGE
I will SWITCH the dose or number of times a day I take the medications listed below when I get home from the hospital:    Tresiba FlexTouch 100 units/mL subcutaneous solution  -- 100 unit(s) subcutaneous once a day

## 2017-06-21 NOTE — DISCHARGE NOTE ADULT - MEDICATION SUMMARY - MEDICATIONS TO TAKE
I will START or STAY ON the medications listed below when I get home from the hospital:    occupational therapy, Right hand  -- 1 application percutaneous once a day MDD:occupational therapy, Right hand  -- Indication: For Supportive device    Ecotrin Adult Low Strength 81 mg oral delayed release tablet  -- 1 tab(s) by mouth once a day x 6 weeks  -- Indication: For dvt ppx    HYDROmorphone 2 mg oral tablet  -- 1-2 tab(s) by mouth every 3 hours, As needed, Moderate Pain (4 - 6) MDD:8  -- Indication: For Pain    HYDROmorphone 4 mg oral tablet  -- 1 tab(s) by mouth every 3 hours, As needed, Severe Pain (7 - 10)  -- Indication: For Pain    gabapentin 300 mg oral capsule  -- 1 cap(s) by mouth 3 times a day  -- Indication: For Pain    NovoLOG FlexPen 100 units/mL subcutaneous solution  -- 18 unit(s) subcutaneous 3 times a day (before meals)  -- Do not drink alcoholic beverages when taking this medication.  Keep in refrigerator.  Do not freeze.  Obtain medical advice before taking any non-prescription drugs as some may affect the action of this medication.    -- Indication: For diabetes type 2    Tresiba FlexTouch 100 units/mL subcutaneous solution  -- 50 unit(s) subcutaneous once a day (at bedtime)  -- Indication: For diabetes type 2    Crestor 10 mg oral tablet  -- 1 tab(s) by mouth once a day (at bedtime)  -- Indication: For Hyperlipidemia    TriCor 160 mg oral tablet  -- 1 tab(s) by mouth once a day  -- Indication: For Hyperlipidemia    bisoprolol-hydroCHLOROthiazide 5 mg-6.25 mg oral tablet  -- 1 tab(s) by mouth once a day  -- Indication: For Hypertension    Slow Fe (as elemental iron) 45 mg oral tablet, extended release  -- 1 tab(s) by mouth once a day  -- Indication: For Anemia    docusate sodium 100 mg oral capsule  -- 1 cap(s) by mouth 3 times a day, As needed, Constipation  -- Indication: For BM    NexIUM 40 mg oral delayed release capsule  -- 1 cap(s) by mouth once a day  -- Indication: For ulcer ppx    Multiple Vitamins oral tablet  -- 1 tab(s) by mouth once a day  -- Indication: For Supplement    ascorbic acid 500 mg oral tablet  -- 1 tab(s) by mouth once a day  -- Indication: For Supplement

## 2017-06-21 NOTE — DISCHARGE NOTE ADULT - NS AS DC STROKE ED MATERIALS
Stroke Education Booklet/Risk Factors for Stroke/Stroke Warning Signs and Symptoms/Need for Followup After Discharge/Call 911 for Stroke/Prescribed Medications

## 2017-06-21 NOTE — DISCHARGE NOTE ADULT - PLAN OF CARE
- please follow up with Dr. Davis (Neurology) as an outpatient to schedule EMG/Nerve conduction study tests; call office for appointment  - continue gabapentin - continue daily dressing changes to graft site - xeroform, abd gauze pad, jef wrap.  - elevate right upper extremity pain free activities of daily living DIET: consistent carbohydrate diet  DVT PROPHYLAXIS: ecotrin 81mg daily  WEIGHT-BEARING STATUS: non-weight bearing right upper extremity; s/p nerve transfer (AIN to Ulnar), and tendon txfr FDP 3 sewn to FDP 4&5- Please do gentle wrist & finger PROM & AROM. Continue splint.  BATHING: keep dressing clean and dry   DRESSING CHANGES: keep dressing clean and dry s/p nerve transfer (AIN to Ulnar), and tendon txfr FDP 3 sewn to FDP 4&5.  - Please do gentle wrist & finger PROM & AROM. Continue splint  - continue gabapentin medication  - please follow up with Dr. Garcia (plastic surgery) upon discharge from rehab; call office for appointment  - daily xeroform dressing changes continue medication regimen as prescribed upon discharge; please follow up with PMD upon discharge home; call office for appointment; patient requests to make own appointment continue medication regimen as prescribed upon discharge; please follow up with PMD upon discharge home; call office for appointment; patient have appointment next wednesday 7/5/17 s/p nerve transfer (AIN to Ulnar), and tendon txfr FDP 3 sewn to FDP 4&5.  - Please do gentle wrist & finger PROM & AROM. Continue splint.  call [facility] in [place] for [phone].  - continue gabapentin medication  - please follow up with Dr. Garcia (plastic surgery) upon discharge from rehab; call office for appointment  - daily xeroform dressing changes

## 2017-06-21 NOTE — DISCHARGE NOTE ADULT - CARE PROVIDERS DIRECT ADDRESSES
,[email or web address],[email or web address]

## 2017-06-21 NOTE — DISCHARGE NOTE ADULT - NS AS ACTIVITY OBS
Walking-Outdoors allowed/WEIGHT-BEARING STATUS: non-weight bearing right upper extremity; s/p nerve transfer (AIN to Ulnar), and tendon txfr FDP 3 sewn to FDP 4&5- Please do gentle wrist & finger PROM & AROM. Continue splint./Do not make important decisions/Do not drive or operate machinery/No Heavy lifting/straining/Walking-Indoors allowed/Stairs allowed/Showering allowed

## 2017-06-21 NOTE — PROGRESS NOTE ADULT - SUBJECTIVE AND OBJECTIVE BOX
Day 5 of Anesthesia Pain Management Service    SUBJECTIVE: I'm okay but need another surgery    Pain Scale Score	At rest: ___ 	With Activity: ___ 	[X ] Refer to charted pain scores    THERAPY:    [ ] IV PCA Morphine		[ ] 5 mg/mL	[ ] 1 mg/mL  [X ] IV PCA Hydromorphone	[ ] 5 mg/mL	[X ] 1 mg/mL  [ ] IV PCA Fentanyl		[ ] 50 micrograms/mL    Demand dose    lockout    minutes   Continuous Rate        MEDICATIONS  (STANDING):  HYDROmorphone PCA (1 mG/mL) 30milliLiter(s) PCA Continuous PCA Continuous  enoxaparin Injectable 40milliGRAM(s) SubCutaneous every 24 hours  lactated ringers. 1000milliLiter(s) IV Continuous <Continuous>  acetaminophen   Tablet. 975milliGRAM(s) Oral every 6 hours  pantoprazole    Tablet 40milliGRAM(s) Oral before breakfast  atorvastatin 40milliGRAM(s) Oral at bedtime  polyethylene glycol 3350 17Gram(s) Oral daily  ATENolol  Tablet 50milliGRAM(s) Oral daily  insulin lispro (HumaLOG) corrective regimen sliding scale  SubCutaneous three times a day before meals  insulin lispro (HumaLOG) corrective regimen sliding scale  SubCutaneous at bedtime  multivitamin 1Tablet(s) Oral daily  ferrous    sulfate 325milliGRAM(s) Oral daily  ascorbic acid 500milliGRAM(s) Oral daily  insulin lispro Injectable (HumaLOG) 20Unit(s) SubCutaneous three times a day before meals  dextrose 50% Injectable 12.5Gram(s) IV Push once  insulin glargine Injectable (LANTUS) 35Unit(s) SubCutaneous two times a day  insulin lispro Injectable (HumaLOG) 10Unit(s) SubCutaneous once    MEDICATIONS  (PRN):  HYDROmorphone PCA (1 mG/mL) Rescue Clinician Bolus 0.5milliGRAM(s) IV Push every 15 minutes PRN for Pain Scale GREATER THAN 6  naloxone Injectable 0.1milliGRAM(s) IV Push every 3 minutes PRN For ANY of the following changes in patient status:  A. RR LESS THAN 10 breaths per minute, B. Oxygen saturation LESS THAN 90%, C. Sedation score of 6  ondansetron Injectable 4milliGRAM(s) IV Push every 6 hours PRN Nausea  HYDROmorphone  Injectable 0.5milliGRAM(s) IV Push every 4 hours PRN Breakthrough Pain  ondansetron Injectable 4milliGRAM(s) IV Push every 6 hours PRN Nausea and/or Vomiting  diphenhydrAMINE   Injectable 12.5milliGRAM(s) IV Push every 4 hours PRN Itching  aluminum hydroxide/magnesium hydroxide/simethicone Suspension 30milliLiter(s) Oral every 4 hours PRN Dyspepsia  docusate sodium 100milliGRAM(s) Oral three times a day PRN Constipation  oxyCODONE IR 10milliGRAM(s) Oral every 4 hours PRN Severe Pain (7 - 10)  oxyCODONE IR 5milliGRAM(s) Oral every 4 hours PRN Moderate Pain (4 - 6)  calcium carbonate 500 mG (Tums) Chewable 1Tablet(s) Chew three times a day PRN Heartburn  magnesium hydroxide Suspension 30milliLiter(s) Oral daily PRN Constipation  bisacodyl Suppository 10milliGRAM(s) Rectal daily PRN If no bowel movement  dextrose Gel 1Dose(s) Oral once PRN Blood Glucose LESS THAN 70 milliGRAM(s)/deciliter  glucagon  Injectable 1milliGRAM(s) IntraMuscular once PRN Glucose LESS THAN 70 milligrams/deciliter      OBJECTIVE:    Sedation Score:	[X ] Alert	[ ] Drowsy 	[ ] Arousable	[ ] Asleep	[ ] Unresponsive    Side Effects:	[X ] None	[ ] Nausea	[ ] Vomiting	[ ] Pruritus  		[ ] Other:    Vital Signs Last 24 Hrs  T(C): 36.7, Max: 37.1 (06-20 @ 16:16)  T(F): 98.1, Max: 98.7 (06-20 @ 16:16)  HR: 82 (67 - 82)  BP: 145/73 (105/67 - 145/73)  BP(mean): --  RR: 18 (18 - 18)  SpO2: 94% (79% - 100%)    ASSESSMENT/ PLAN    Therapy to  be:	[ ] Continue   [X ] Discontinued   [ X] Change to prn Analgesics    Documentation and Verification of current medications:   [X] Done	[ ] Not done, not elligible    Comments:

## 2017-06-21 NOTE — DISCHARGE NOTE ADULT - ADDITIONAL INSTRUCTIONS
- Follow up with Dr. Maloney in 10-14 days after surgery; call office for appointment upon discharge  - - Please have staples/sutures removed by physician 10-14 days after surgery if applicable  - - please follow up with your primary care doctor after discharge from hospital to discuss your hospital stay and any changes to your medications   - - please follow up with Dr. Garcia (plastic surgery) within one week to monitor skin graft/nerve repair upon discharge from rehab; call office for appointment  - - - please follow up with Dr. Davis (Neurology) as an outpatient to schedule EMG/Nerve conduction study tests; call office for appointment - - Follow up with Dr. Maloney in 10-14 days after surgery; call office for appointment upon discharge  - - Please have staples/sutures removed by physician 10-14 days after surgery if applicable  - - please follow up with your primary care doctor after discharge from hospital to discuss your hospital stay and any changes to your medications   - - please follow up with Dr. Garcia (plastic surgery) within one week to monitor skin graft/nerve repair upon discharge from rehab; call office for appointment  - - - please follow up with Dr. Davis (Neurology) as an outpatient to schedule EMG/Nerve conduction study tests; call office for appointment

## 2017-06-21 NOTE — DISCHARGE NOTE ADULT - CARE PROVIDER_API CALL
Karyna Maloney), Orthopaedic Surgery  [address] [facility] [address]  [place] [address]  Phone: [phone]  Fax: [phone]    Mile Savage), EndocrinologyMetabDiabetes  [address] [place] [address]  Phone: [phone]  Fax: [phone]    Faustino Cordero), Vascular Surgery  [address] [place] [address]  Phone: [phone]  Fax: [phone] Karyna Maloney), Orthopaedic Surgery  [address] [facility] [address]  [place] [address]  Phone: [phone]  Fax: [phone]    Mile Savage), EndocrinologyMetabDiabetes  [address] [place] [address]  Phone: [phone]  Fax: [phone]    Phill Davis (DO), Neurology  [address] [place] [address]  Phone: [phone]  Fax: [phone]    Juma Garcia), Plastic Surgery  [address] [place] [address]  Phone: [phone]  Fax: [phone]

## 2017-06-21 NOTE — PROGRESS NOTE ADULT - ASSESSMENT
56F s/p RUE reconstruction with latissimus dorsi myocutaneous flap and STSG. POD 2.  >> Doing well.  >> Cont splint & OT  >> Possible OR Fri for nerve txfr  >> Continue VAC for 5-6 days total  >> RUE elevation  >> Continue drains  >> Patient will likely be discharged with at least one drain; thus will need VNS set up if going home  >> DVT prophylaxis

## 2017-06-21 NOTE — PROGRESS NOTE ADULT - SUBJECTIVE AND OBJECTIVE BOX
ORTHO  Patient is a 56y old  Female who presents with a chief complaint of R arm near amputation (04 Jun 2017 07:15)    Pt. resting without complaint    VS-  T(C): 37, Max: 37.2 (06-20 @ 09:08)  HR: 76 (67 - 76)  BP: 136/72 (105/67 - 152/76)  RR: 18 (16 - 18)  SpO2: 100% (79% - 100%)  Wt(kg): --    A&O  Right upper ext- ex fix in place, pin site dressings clean, dry  Vac dressing good seal  Neuro vascular unchanged  Back dressing with drains tx per plastics

## 2017-06-21 NOTE — PROGRESS NOTE ADULT - SUBJECTIVE AND OBJECTIVE BOX
Pt seen and examined. Doing well. No acute events o/n.     T(C): 37, Max: 37.2 (06-20 @ 09:08)  T(F): 98.6, Max: 98.9 (06-20 @ 09:08)  HR: 76 (67 - 76)  BP: 136/72 (105/67 - 152/76)  RR: 18 (16 - 18)  SpO2: 100% (79% - 100%)  Wt(kg): --    I & Os for 24h ending 20 Jun 2017 07:00  =============================================  IN:    Oral Fluid: 1550 ml    Total IN: 1550 ml  ---------------------------------------------  OUT:    Indwelling Catheter - Urethral: 1825 ml    Voided: 500 ml    Bulb: 75 ml    Bulb: 70 ml    Total OUT: 2470 ml  ---------------------------------------------  Total NET: -920 ml    I & Os for current day (as of 21 Jun 2017 06:37)  =============================================  IN:    Oral Fluid: 1770 ml    Total IN: 1770 ml  ---------------------------------------------  OUT:    Indwelling Catheter - Urethral: 350 ml    VAC (Vacuum Assisted Closure) System: 300 ml    Bulb: 170 ml    Bulb: 50 ml    Total OUT: 870 ml  ---------------------------------------------  Total NET: 900 ml      Exam:  Lat flap soft, pink, viable with distal tip ecchymosis.   Incisions c/d/i  VAC in place.  Lat donor site with no palpable collections  Thigh dressing c/d/i  s serosanguinous.                           10.1   12.4  )-----------( 390      ( 19 Jun 2017 10:21 )             30.3     06-19    139  |  102  |  16  ----------------------------<  86  3.9   |  25  |  1.25    Ca    8.4      19 Jun 2017 10:21

## 2017-06-21 NOTE — DISCHARGE NOTE ADULT - MEDICATION SUMMARY - MEDICATIONS TO STOP TAKING
I will STOP taking the medications listed below when I get home from the hospital:    NovoLIN 70/30 subcutaneous suspension  -- 32 unit(s) subcutaneous 2 times a day    Tradjenta 5 mg oral tablet  -- 1 tab(s) by mouth once a day    diclofenac  --  by mouth

## 2017-06-22 PROCEDURE — 99232 SBSQ HOSP IP/OBS MODERATE 35: CPT

## 2017-06-22 RX ORDER — INSULIN LISPRO 100/ML
10 VIAL (ML) SUBCUTANEOUS
Qty: 0 | Refills: 0 | Status: DISCONTINUED | OUTPATIENT
Start: 2017-06-22 | End: 2017-06-22

## 2017-06-22 RX ORDER — INSULIN LISPRO 100/ML
10 VIAL (ML) SUBCUTANEOUS ONCE
Qty: 0 | Refills: 0 | Status: COMPLETED | OUTPATIENT
Start: 2017-06-22 | End: 2017-06-22

## 2017-06-22 RX ORDER — INSULIN GLARGINE 100 [IU]/ML
35 INJECTION, SOLUTION SUBCUTANEOUS EVERY MORNING
Qty: 0 | Refills: 0 | Status: DISCONTINUED | OUTPATIENT
Start: 2017-06-23 | End: 2017-06-23

## 2017-06-22 RX ORDER — INSULIN GLARGINE 100 [IU]/ML
30 INJECTION, SOLUTION SUBCUTANEOUS AT BEDTIME
Qty: 0 | Refills: 0 | Status: DISCONTINUED | OUTPATIENT
Start: 2017-06-22 | End: 2017-06-23

## 2017-06-22 RX ADMIN — HYDROMORPHONE HYDROCHLORIDE 4 MILLIGRAM(S): 2 INJECTION INTRAMUSCULAR; INTRAVENOUS; SUBCUTANEOUS at 21:10

## 2017-06-22 RX ADMIN — HYDROMORPHONE HYDROCHLORIDE 1 MILLIGRAM(S): 2 INJECTION INTRAMUSCULAR; INTRAVENOUS; SUBCUTANEOUS at 12:15

## 2017-06-22 RX ADMIN — HYDROMORPHONE HYDROCHLORIDE 4 MILLIGRAM(S): 2 INJECTION INTRAMUSCULAR; INTRAVENOUS; SUBCUTANEOUS at 10:13

## 2017-06-22 RX ADMIN — HYDROMORPHONE HYDROCHLORIDE 1 MILLIGRAM(S): 2 INJECTION INTRAMUSCULAR; INTRAVENOUS; SUBCUTANEOUS at 23:03

## 2017-06-22 RX ADMIN — Medication 975 MILLIGRAM(S): at 23:30

## 2017-06-22 RX ADMIN — Medication 975 MILLIGRAM(S): at 17:35

## 2017-06-22 RX ADMIN — INSULIN GLARGINE 35 UNIT(S): 100 INJECTION, SOLUTION SUBCUTANEOUS at 09:21

## 2017-06-22 RX ADMIN — Medication 975 MILLIGRAM(S): at 23:00

## 2017-06-22 RX ADMIN — HYDROMORPHONE HYDROCHLORIDE 4 MILLIGRAM(S): 2 INJECTION INTRAMUSCULAR; INTRAVENOUS; SUBCUTANEOUS at 06:51

## 2017-06-22 RX ADMIN — HYDROMORPHONE HYDROCHLORIDE 4 MILLIGRAM(S): 2 INJECTION INTRAMUSCULAR; INTRAVENOUS; SUBCUTANEOUS at 06:21

## 2017-06-22 RX ADMIN — HYDROMORPHONE HYDROCHLORIDE 1 MILLIGRAM(S): 2 INJECTION INTRAMUSCULAR; INTRAVENOUS; SUBCUTANEOUS at 17:19

## 2017-06-22 RX ADMIN — HYDROMORPHONE HYDROCHLORIDE 4 MILLIGRAM(S): 2 INJECTION INTRAMUSCULAR; INTRAVENOUS; SUBCUTANEOUS at 20:41

## 2017-06-22 RX ADMIN — Medication 1 TABLET(S): at 12:03

## 2017-06-22 RX ADMIN — Medication 20 UNIT(S): at 19:01

## 2017-06-22 RX ADMIN — Medication 325 MILLIGRAM(S): at 12:03

## 2017-06-22 RX ADMIN — INSULIN GLARGINE 30 UNIT(S): 100 INJECTION, SOLUTION SUBCUTANEOUS at 22:10

## 2017-06-22 RX ADMIN — Medication 975 MILLIGRAM(S): at 12:35

## 2017-06-22 RX ADMIN — HYDROMORPHONE HYDROCHLORIDE 1 MILLIGRAM(S): 2 INJECTION INTRAMUSCULAR; INTRAVENOUS; SUBCUTANEOUS at 03:16

## 2017-06-22 RX ADMIN — ENOXAPARIN SODIUM 40 MILLIGRAM(S): 100 INJECTION SUBCUTANEOUS at 12:02

## 2017-06-22 RX ADMIN — Medication 975 MILLIGRAM(S): at 05:31

## 2017-06-22 RX ADMIN — HYDROMORPHONE HYDROCHLORIDE 4 MILLIGRAM(S): 2 INJECTION INTRAMUSCULAR; INTRAVENOUS; SUBCUTANEOUS at 10:45

## 2017-06-22 RX ADMIN — HYDROMORPHONE HYDROCHLORIDE 4 MILLIGRAM(S): 2 INJECTION INTRAMUSCULAR; INTRAVENOUS; SUBCUTANEOUS at 15:15

## 2017-06-22 RX ADMIN — HYDROMORPHONE HYDROCHLORIDE 1 MILLIGRAM(S): 2 INJECTION INTRAMUSCULAR; INTRAVENOUS; SUBCUTANEOUS at 23:15

## 2017-06-22 RX ADMIN — ATORVASTATIN CALCIUM 40 MILLIGRAM(S): 80 TABLET, FILM COATED ORAL at 22:11

## 2017-06-22 RX ADMIN — Medication 10 UNIT(S): at 09:37

## 2017-06-22 RX ADMIN — ATENOLOL 50 MILLIGRAM(S): 25 TABLET ORAL at 05:31

## 2017-06-22 RX ADMIN — POLYETHYLENE GLYCOL 3350 17 GRAM(S): 17 POWDER, FOR SOLUTION ORAL at 12:06

## 2017-06-22 RX ADMIN — Medication 500 MILLIGRAM(S): at 12:03

## 2017-06-22 RX ADMIN — HYDROMORPHONE HYDROCHLORIDE 1 MILLIGRAM(S): 2 INJECTION INTRAMUSCULAR; INTRAVENOUS; SUBCUTANEOUS at 17:35

## 2017-06-22 RX ADMIN — Medication 20 UNIT(S): at 13:05

## 2017-06-22 RX ADMIN — HYDROMORPHONE HYDROCHLORIDE 1 MILLIGRAM(S): 2 INJECTION INTRAMUSCULAR; INTRAVENOUS; SUBCUTANEOUS at 11:59

## 2017-06-22 RX ADMIN — Medication 975 MILLIGRAM(S): at 06:00

## 2017-06-22 RX ADMIN — Medication 975 MILLIGRAM(S): at 12:03

## 2017-06-22 RX ADMIN — HYDROMORPHONE HYDROCHLORIDE 4 MILLIGRAM(S): 2 INJECTION INTRAMUSCULAR; INTRAVENOUS; SUBCUTANEOUS at 14:44

## 2017-06-22 RX ADMIN — Medication 975 MILLIGRAM(S): at 17:18

## 2017-06-22 RX ADMIN — Medication 10 UNIT(S): at 09:00

## 2017-06-22 RX ADMIN — Medication 1: at 13:05

## 2017-06-22 RX ADMIN — HYDROMORPHONE HYDROCHLORIDE 1 MILLIGRAM(S): 2 INJECTION INTRAMUSCULAR; INTRAVENOUS; SUBCUTANEOUS at 03:30

## 2017-06-22 RX ADMIN — PANTOPRAZOLE SODIUM 40 MILLIGRAM(S): 20 TABLET, DELAYED RELEASE ORAL at 06:18

## 2017-06-22 NOTE — PROGRESS NOTE ADULT - PROBLEM SELECTOR PLAN 1
-check BG AC/HS  -Decrease Lantus 30 units QHS-C/w Lantus 35 units in AM. Please dose Lantus 26 units for 6/23 (NPO for surgery) and hold day Lantus day of surgery  -c/w Humalog 20 units AC meals  -c/w Humalog low correction scale AC + low HS  -Discussed w/Pt and family  -will see as needed.  - pager: [phone]
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
## 2017-06-22 NOTE — PROGRESS NOTE ADULT - SUBJECTIVE AND OBJECTIVE BOX
ORTHO  Patient is a 56y old  Female who presents with a chief complaint of R arm near amputation (04 Jun 2017 07:15)    Pt. resting without complaint    VS-  T(C): 36.6, Max: 37 (06-21 @ 14:20)  HR: 72 (72 - 82)  BP: 138/74 (118/75 - 145/73)  RR: 18 (16 - 18)  SpO2: 99% (94% - 99%)  Wt(kg): --    M.S. - A & O  Extremity- Right upper extremity - ex fix in place, vac dressing - good seal, RACHAEL drains x 2  Neuro- unchanged              Motor- little finger without motion              Sensation- grossly intact with some decrease little finger              Back dressing in place (plastic follow up)

## 2017-06-22 NOTE — PROGRESS NOTE ADULT - ASSESSMENT
Impression: Stable       Plan: Continue present treatment                 Out of bed, ambulate, nonweight bearing right upper extremity                  Physical therapy  follow up                  Continue to monitor                  Plastic nerve transfer Friday.    Juan Kevin PA-C  Orthopaedic Surgery  Team pager [phone]  [phone]
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
## 2017-06-23 LAB
ANION GAP SERPL CALC-SCNC: 11 MMOL/L — SIGNIFICANT CHANGE UP (ref 5–17)
APTT BLD: 35.7 SEC — SIGNIFICANT CHANGE UP (ref 27.5–37.4)
BLD GP AB SCN SERPL QL: POSITIVE — SIGNIFICANT CHANGE UP
BUN SERPL-MCNC: 17 MG/DL — SIGNIFICANT CHANGE UP (ref 7–23)
CALCIUM SERPL-MCNC: 8.7 MG/DL — SIGNIFICANT CHANGE UP (ref 8.4–10.5)
CHLORIDE SERPL-SCNC: 101 MMOL/L — SIGNIFICANT CHANGE UP (ref 96–108)
CO2 SERPL-SCNC: 27 MMOL/L — SIGNIFICANT CHANGE UP (ref 22–31)
CREAT SERPL-MCNC: 1.13 MG/DL — SIGNIFICANT CHANGE UP (ref 0.5–1.3)
DAT POLY-SP REAG RBC QL: NEGATIVE — SIGNIFICANT CHANGE UP
GLUCOSE SERPL-MCNC: 103 MG/DL — HIGH (ref 70–99)
HCT VFR BLD CALC: 27.7 % — LOW (ref 34.5–45)
HGB BLD-MCNC: 9.4 G/DL — LOW (ref 11.5–15.5)
INR BLD: 1.05 RATIO — SIGNIFICANT CHANGE UP (ref 0.88–1.16)
MCHC RBC-ENTMCNC: 28.8 PG — SIGNIFICANT CHANGE UP (ref 27–34)
MCHC RBC-ENTMCNC: 34.1 GM/DL — SIGNIFICANT CHANGE UP (ref 32–36)
MCV RBC AUTO: 84.5 FL — SIGNIFICANT CHANGE UP (ref 80–100)
PLATELET # BLD AUTO: 341 K/UL — SIGNIFICANT CHANGE UP (ref 150–400)
POTASSIUM SERPL-MCNC: 4 MMOL/L — SIGNIFICANT CHANGE UP (ref 3.5–5.3)
POTASSIUM SERPL-SCNC: 4 MMOL/L — SIGNIFICANT CHANGE UP (ref 3.5–5.3)
PROTHROM AB SERPL-ACNC: 11.4 SEC — SIGNIFICANT CHANGE UP (ref 9.8–12.7)
RBC # BLD: 3.28 M/UL — LOW (ref 3.8–5.2)
RBC # FLD: 15.4 % — HIGH (ref 10.3–14.5)
RH IG SCN BLD-IMP: NEGATIVE — SIGNIFICANT CHANGE UP
SODIUM SERPL-SCNC: 139 MMOL/L — SIGNIFICANT CHANGE UP (ref 135–145)
WBC # BLD: 9.5 K/UL — SIGNIFICANT CHANGE UP (ref 3.8–10.5)
WBC # FLD AUTO: 9.5 K/UL — SIGNIFICANT CHANGE UP (ref 3.8–10.5)

## 2017-06-23 PROCEDURE — 86077 PHYS BLOOD BANK SERV XMATCH: CPT

## 2017-06-23 PROCEDURE — 99232 SBSQ HOSP IP/OBS MODERATE 35: CPT

## 2017-06-23 RX ORDER — TRAMADOL HYDROCHLORIDE 50 MG/1
50 TABLET ORAL EVERY 8 HOURS
Qty: 0 | Refills: 0 | Status: DISCONTINUED | OUTPATIENT
Start: 2017-06-23 | End: 2017-06-24

## 2017-06-23 RX ORDER — INSULIN GLARGINE 100 [IU]/ML
22 INJECTION, SOLUTION SUBCUTANEOUS AT BEDTIME
Qty: 0 | Refills: 0 | Status: DISCONTINUED | OUTPATIENT
Start: 2017-06-23 | End: 2017-06-24

## 2017-06-23 RX ORDER — INSULIN GLARGINE 100 [IU]/ML
30 INJECTION, SOLUTION SUBCUTANEOUS EVERY MORNING
Qty: 0 | Refills: 0 | Status: CANCELLED | OUTPATIENT
Start: 2017-06-25 | End: 2017-06-24

## 2017-06-23 RX ORDER — INSULIN GLARGINE 100 [IU]/ML
26 INJECTION, SOLUTION SUBCUTANEOUS AT BEDTIME
Qty: 0 | Refills: 0 | Status: DISCONTINUED | OUTPATIENT
Start: 2017-06-23 | End: 2017-06-23

## 2017-06-23 RX ORDER — KETOROLAC TROMETHAMINE 30 MG/ML
30 SYRINGE (ML) INJECTION ONCE
Qty: 0 | Refills: 0 | Status: DISCONTINUED | OUTPATIENT
Start: 2017-06-23 | End: 2017-06-23

## 2017-06-23 RX ORDER — INSULIN LISPRO 100/ML
18 VIAL (ML) SUBCUTANEOUS
Qty: 0 | Refills: 0 | Status: DISCONTINUED | OUTPATIENT
Start: 2017-06-23 | End: 2017-06-24

## 2017-06-23 RX ADMIN — Medication 18 UNIT(S): at 18:05

## 2017-06-23 RX ADMIN — Medication 325 MILLIGRAM(S): at 12:31

## 2017-06-23 RX ADMIN — Medication 975 MILLIGRAM(S): at 23:42

## 2017-06-23 RX ADMIN — INSULIN GLARGINE 22 UNIT(S): 100 INJECTION, SOLUTION SUBCUTANEOUS at 22:16

## 2017-06-23 RX ADMIN — HYDROMORPHONE HYDROCHLORIDE 4 MILLIGRAM(S): 2 INJECTION INTRAMUSCULAR; INTRAVENOUS; SUBCUTANEOUS at 15:21

## 2017-06-23 RX ADMIN — INSULIN GLARGINE 35 UNIT(S): 100 INJECTION, SOLUTION SUBCUTANEOUS at 09:31

## 2017-06-23 RX ADMIN — HYDROMORPHONE HYDROCHLORIDE 4 MILLIGRAM(S): 2 INJECTION INTRAMUSCULAR; INTRAVENOUS; SUBCUTANEOUS at 13:30

## 2017-06-23 RX ADMIN — HYDROMORPHONE HYDROCHLORIDE 4 MILLIGRAM(S): 2 INJECTION INTRAMUSCULAR; INTRAVENOUS; SUBCUTANEOUS at 16:13

## 2017-06-23 RX ADMIN — Medication 1: at 12:30

## 2017-06-23 RX ADMIN — HYDROMORPHONE HYDROCHLORIDE 4 MILLIGRAM(S): 2 INJECTION INTRAMUSCULAR; INTRAVENOUS; SUBCUTANEOUS at 05:37

## 2017-06-23 RX ADMIN — ENOXAPARIN SODIUM 40 MILLIGRAM(S): 100 INJECTION SUBCUTANEOUS at 12:00

## 2017-06-23 RX ADMIN — PANTOPRAZOLE SODIUM 40 MILLIGRAM(S): 20 TABLET, DELAYED RELEASE ORAL at 06:05

## 2017-06-23 RX ADMIN — Medication 20 UNIT(S): at 12:30

## 2017-06-23 RX ADMIN — Medication 975 MILLIGRAM(S): at 06:07

## 2017-06-23 RX ADMIN — Medication 975 MILLIGRAM(S): at 05:37

## 2017-06-23 RX ADMIN — Medication 30 MILLIGRAM(S): at 00:45

## 2017-06-23 RX ADMIN — Medication 975 MILLIGRAM(S): at 12:30

## 2017-06-23 RX ADMIN — ATORVASTATIN CALCIUM 40 MILLIGRAM(S): 80 TABLET, FILM COATED ORAL at 22:16

## 2017-06-23 RX ADMIN — Medication 975 MILLIGRAM(S): at 18:04

## 2017-06-23 RX ADMIN — TRAMADOL HYDROCHLORIDE 50 MILLIGRAM(S): 50 TABLET ORAL at 00:45

## 2017-06-23 RX ADMIN — Medication 975 MILLIGRAM(S): at 14:08

## 2017-06-23 RX ADMIN — ONDANSETRON 4 MILLIGRAM(S): 8 TABLET, FILM COATED ORAL at 05:38

## 2017-06-23 RX ADMIN — ATENOLOL 50 MILLIGRAM(S): 25 TABLET ORAL at 05:37

## 2017-06-23 RX ADMIN — Medication 30 MILLIGRAM(S): at 00:15

## 2017-06-23 RX ADMIN — Medication 975 MILLIGRAM(S): at 23:12

## 2017-06-23 RX ADMIN — Medication 975 MILLIGRAM(S): at 18:26

## 2017-06-23 RX ADMIN — Medication 1 TABLET(S): at 12:30

## 2017-06-23 RX ADMIN — HYDROMORPHONE HYDROCHLORIDE 4 MILLIGRAM(S): 2 INJECTION INTRAMUSCULAR; INTRAVENOUS; SUBCUTANEOUS at 19:37

## 2017-06-23 RX ADMIN — HYDROMORPHONE HYDROCHLORIDE 1 MILLIGRAM(S): 2 INJECTION INTRAMUSCULAR; INTRAVENOUS; SUBCUTANEOUS at 16:30

## 2017-06-23 RX ADMIN — TRAMADOL HYDROCHLORIDE 50 MILLIGRAM(S): 50 TABLET ORAL at 09:36

## 2017-06-23 RX ADMIN — HYDROMORPHONE HYDROCHLORIDE 4 MILLIGRAM(S): 2 INJECTION INTRAMUSCULAR; INTRAVENOUS; SUBCUTANEOUS at 20:00

## 2017-06-23 RX ADMIN — HYDROMORPHONE HYDROCHLORIDE 4 MILLIGRAM(S): 2 INJECTION INTRAMUSCULAR; INTRAVENOUS; SUBCUTANEOUS at 23:12

## 2017-06-23 RX ADMIN — Medication 500 MILLIGRAM(S): at 12:31

## 2017-06-23 RX ADMIN — HYDROMORPHONE HYDROCHLORIDE 4 MILLIGRAM(S): 2 INJECTION INTRAMUSCULAR; INTRAVENOUS; SUBCUTANEOUS at 06:07

## 2017-06-23 RX ADMIN — Medication 20 UNIT(S): at 09:04

## 2017-06-23 RX ADMIN — HYDROMORPHONE HYDROCHLORIDE 1 MILLIGRAM(S): 2 INJECTION INTRAMUSCULAR; INTRAVENOUS; SUBCUTANEOUS at 22:16

## 2017-06-23 RX ADMIN — HYDROMORPHONE HYDROCHLORIDE 4 MILLIGRAM(S): 2 INJECTION INTRAMUSCULAR; INTRAVENOUS; SUBCUTANEOUS at 11:36

## 2017-06-23 RX ADMIN — TRAMADOL HYDROCHLORIDE 50 MILLIGRAM(S): 50 TABLET ORAL at 00:15

## 2017-06-23 RX ADMIN — HYDROMORPHONE HYDROCHLORIDE 1 MILLIGRAM(S): 2 INJECTION INTRAMUSCULAR; INTRAVENOUS; SUBCUTANEOUS at 16:15

## 2017-06-23 RX ADMIN — TRAMADOL HYDROCHLORIDE 50 MILLIGRAM(S): 50 TABLET ORAL at 09:04

## 2017-06-23 NOTE — PROGRESS NOTE ADULT - SUBJECTIVE AND OBJECTIVE BOX
pt seen and examined. pain controlled    Vital Signs Last 24 Hrs  T(C): 36.7, Max: 36.8 (06-22 @ 09:10)  T(F): 98, Max: 98.3 (06-22 @ 09:10)  HR: 72 (72 - 76)  BP: 142/76 (120/76 - 158/72)  BP(mean): --  RR: 18 (16 - 18)  SpO2: 97% (97% - 99%)    gen: nad  RUE  exfix present  dressign c/d/i  decreased sensation over 5th digit  decreased motor ulnar  ain/pin/m/r intact  hand warm cap refil <2 sec  compartments compressible swelling

## 2017-06-23 NOTE — PROGRESS NOTE ADULT - ASSESSMENT
A/P 55 yo F s/p Exfix and vac application w/ distal humerus spacer Gr3B Rigth distal humerus fx    pain control  nwb RUE  pt/ot  dvt ppx  plan for nerve trsfr w/ plastic sx tomorrow 6/24/17  fu labs  dispo planning

## 2017-06-23 NOTE — PROGRESS NOTE ADULT - SUBJECTIVE AND OBJECTIVE BOX
SUBJECTIVE:  Doing well.   No overnight events.     OBJECTIVE:     ** VITAL SIGNS / I&O's **    T(C): 36.7, Max: 36.8 (06-22 @ 09:10)  T(F): 98, Max: 98.3 (06-22 @ 09:10)  HR: 72 (72 - 76)  BP: 142/76 (120/76 - 158/72)  RR: 18 (16 - 18)  SpO2: 97% (97% - 99%)  Wt(kg): --      I & Os for current day (as of 23 Jun 2017 07:06)  =============================================  IN:    Oral Fluid: 1320 ml    Total IN: 1320 ml  ---------------------------------------------  OUT:    Voided: 650 ml    Bulb: 90 ml    Bulb: 35 ml    Total OUT: 775 ml  ---------------------------------------------  Total NET: 545 ml      ** PHYSICAL EXAM **    -- CONSTITUTIONAL: AOx3. NAD.   -- CARDIOVASCULAR: Regular rate and rhythm. S1, S2.  -- RESPIRATORY: Bilateral breath sounds.   -- BACK: Dressing in place. No collections. Drains serosanguinous.   -- EXTREMITIES: RUE external fixator in place. VAC in place. Holding suction. Latissimus flap skin paddle with stable ecchymosis. Skin paddle with 2-3 second capillary refill. Right hand with (+) Wartenburg's sign and (+) ulnar clawing.       ** LABS **    CAPILLARY BLOOD GLUCOSE  200 (22 Jun 2017 21:32)  110 (22 Jun 2017 17:52)  152 (22 Jun 2017 13:00)  114 (22 Jun 2017 09:10)
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
## 2017-06-23 NOTE — PROGRESS NOTE ADULT - PROBLEM SELECTOR PLAN 1
-check FS AC/HS  -Please give Lantus 22 units tonight (NPO dose). Hold Can readjust Lantus 28 units BID when diet is resumed.   -Decrease premeal humalog 18 units w/meals.  -c/w Humalog low correction scale AC + low HS scale  -discussed with Pt and team.  - pager: [phone] or [phone] (over weekend)
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
## 2017-06-23 NOTE — PROGRESS NOTE ADULT - ASSESSMENT
Patient is a 56y old  Female who presents with a chief complaint of R arm near amputation.    Chest discomfort:      Likely dyspepsia. Ischemic work up negative.  Rt arm crush injury:                    Ortho/Plastics f/up noted.  s/p OR for reconstruction sx  plan for OR saturday for nerve sx per plastic sx    DM II :  FSSS/Lantus/Humalog.  mngt per endo    Anemia:  Hb stable.    PT  DVT ppx
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
## 2017-06-24 LAB
ANION GAP SERPL CALC-SCNC: 10 MMOL/L — SIGNIFICANT CHANGE UP (ref 5–17)
APTT BLD: 31.5 SEC — SIGNIFICANT CHANGE UP (ref 27.5–37.4)
BLD GP AB SCN SERPL QL: POSITIVE — SIGNIFICANT CHANGE UP
BUN SERPL-MCNC: 15 MG/DL — SIGNIFICANT CHANGE UP (ref 7–23)
CALCIUM SERPL-MCNC: 8.8 MG/DL — SIGNIFICANT CHANGE UP (ref 8.4–10.5)
CHLORIDE SERPL-SCNC: 102 MMOL/L — SIGNIFICANT CHANGE UP (ref 96–108)
CO2 SERPL-SCNC: 27 MMOL/L — SIGNIFICANT CHANGE UP (ref 22–31)
CREAT SERPL-MCNC: 1.03 MG/DL — SIGNIFICANT CHANGE UP (ref 0.5–1.3)
GLUCOSE SERPL-MCNC: 101 MG/DL — HIGH (ref 70–99)
HCT VFR BLD CALC: 26.9 % — LOW (ref 34.5–45)
HGB BLD-MCNC: 8.9 G/DL — LOW (ref 11.5–15.5)
INR BLD: 1.05 RATIO — SIGNIFICANT CHANGE UP (ref 0.88–1.16)
MCHC RBC-ENTMCNC: 28.2 PG — SIGNIFICANT CHANGE UP (ref 27–34)
MCHC RBC-ENTMCNC: 33.3 GM/DL — SIGNIFICANT CHANGE UP (ref 32–36)
MCV RBC AUTO: 84.8 FL — SIGNIFICANT CHANGE UP (ref 80–100)
PLATELET # BLD AUTO: 341 K/UL — SIGNIFICANT CHANGE UP (ref 150–400)
POTASSIUM SERPL-MCNC: 4.1 MMOL/L — SIGNIFICANT CHANGE UP (ref 3.5–5.3)
POTASSIUM SERPL-SCNC: 4.1 MMOL/L — SIGNIFICANT CHANGE UP (ref 3.5–5.3)
PROTHROM AB SERPL-ACNC: 11.4 SEC — SIGNIFICANT CHANGE UP (ref 9.8–12.7)
RBC # BLD: 3.17 M/UL — LOW (ref 3.8–5.2)
RBC # FLD: 15.6 % — HIGH (ref 10.3–14.5)
RH IG SCN BLD-IMP: NEGATIVE — SIGNIFICANT CHANGE UP
SODIUM SERPL-SCNC: 139 MMOL/L — SIGNIFICANT CHANGE UP (ref 135–145)
WBC # BLD: 7.1 K/UL — SIGNIFICANT CHANGE UP (ref 3.8–10.5)
WBC # FLD AUTO: 7.1 K/UL — SIGNIFICANT CHANGE UP (ref 3.8–10.5)

## 2017-06-24 PROCEDURE — 64719 REVISE ULNAR NERVE AT WRIST: CPT | Mod: 78,59

## 2017-06-24 PROCEDURE — 64905 NERVE PEDICLE TRANSFER: CPT | Mod: 78,59

## 2017-06-24 PROCEDURE — 25310 TRANSPLANT FOREARM TENDON: CPT | Mod: 78,F8

## 2017-06-24 RX ORDER — ENOXAPARIN SODIUM 100 MG/ML
40 INJECTION SUBCUTANEOUS EVERY 24 HOURS
Qty: 0 | Refills: 0 | Status: DISCONTINUED | OUTPATIENT
Start: 2017-06-24 | End: 2017-06-28

## 2017-06-24 RX ORDER — ONDANSETRON 8 MG/1
4 TABLET, FILM COATED ORAL ONCE
Qty: 0 | Refills: 0 | Status: DISCONTINUED | OUTPATIENT
Start: 2017-06-24 | End: 2017-06-24

## 2017-06-24 RX ORDER — ONDANSETRON 8 MG/1
4 TABLET, FILM COATED ORAL EVERY 6 HOURS
Qty: 0 | Refills: 0 | Status: DISCONTINUED | OUTPATIENT
Start: 2017-06-24 | End: 2017-06-28

## 2017-06-24 RX ORDER — ATENOLOL 25 MG/1
50 TABLET ORAL DAILY
Qty: 0 | Refills: 0 | Status: DISCONTINUED | OUTPATIENT
Start: 2017-06-24 | End: 2017-06-28

## 2017-06-24 RX ORDER — TRAMADOL HYDROCHLORIDE 50 MG/1
50 TABLET ORAL EVERY 8 HOURS
Qty: 0 | Refills: 0 | Status: DISCONTINUED | OUTPATIENT
Start: 2017-06-24 | End: 2017-06-24

## 2017-06-24 RX ORDER — ACETAMINOPHEN 500 MG
975 TABLET ORAL EVERY 6 HOURS
Qty: 0 | Refills: 0 | Status: DISCONTINUED | OUTPATIENT
Start: 2017-06-24 | End: 2017-06-28

## 2017-06-24 RX ORDER — INSULIN LISPRO 100/ML
VIAL (ML) SUBCUTANEOUS
Qty: 0 | Refills: 0 | Status: DISCONTINUED | OUTPATIENT
Start: 2017-06-24 | End: 2017-06-28

## 2017-06-24 RX ORDER — DEXTROSE 50 % IN WATER 50 %
1 SYRINGE (ML) INTRAVENOUS ONCE
Qty: 0 | Refills: 0 | Status: DISCONTINUED | OUTPATIENT
Start: 2017-06-24 | End: 2017-06-28

## 2017-06-24 RX ORDER — HYDROMORPHONE HYDROCHLORIDE 2 MG/ML
1 INJECTION INTRAMUSCULAR; INTRAVENOUS; SUBCUTANEOUS ONCE
Qty: 0 | Refills: 0 | Status: DISCONTINUED | OUTPATIENT
Start: 2017-06-24 | End: 2017-06-24

## 2017-06-24 RX ORDER — INSULIN GLARGINE 100 [IU]/ML
22 INJECTION, SOLUTION SUBCUTANEOUS AT BEDTIME
Qty: 0 | Refills: 0 | Status: DISCONTINUED | OUTPATIENT
Start: 2017-06-24 | End: 2017-06-24

## 2017-06-24 RX ORDER — GLUCAGON INJECTION, SOLUTION 0.5 MG/.1ML
1 INJECTION, SOLUTION SUBCUTANEOUS ONCE
Qty: 0 | Refills: 0 | Status: DISCONTINUED | OUTPATIENT
Start: 2017-06-24 | End: 2017-06-28

## 2017-06-24 RX ORDER — CALCIUM CARBONATE 500(1250)
1 TABLET ORAL THREE TIMES A DAY
Qty: 0 | Refills: 0 | Status: DISCONTINUED | OUTPATIENT
Start: 2017-06-24 | End: 2017-06-28

## 2017-06-24 RX ORDER — KETOROLAC TROMETHAMINE 30 MG/ML
30 SYRINGE (ML) INJECTION ONCE
Qty: 0 | Refills: 0 | Status: DISCONTINUED | OUTPATIENT
Start: 2017-06-24 | End: 2017-06-24

## 2017-06-24 RX ORDER — INSULIN GLARGINE 100 [IU]/ML
28 INJECTION, SOLUTION SUBCUTANEOUS AT BEDTIME
Qty: 0 | Refills: 0 | Status: DISCONTINUED | OUTPATIENT
Start: 2017-06-24 | End: 2017-06-26

## 2017-06-24 RX ORDER — INSULIN LISPRO 100/ML
VIAL (ML) SUBCUTANEOUS AT BEDTIME
Qty: 0 | Refills: 0 | Status: DISCONTINUED | OUTPATIENT
Start: 2017-06-24 | End: 2017-06-28

## 2017-06-24 RX ORDER — FERROUS SULFATE 325(65) MG
325 TABLET ORAL DAILY
Qty: 0 | Refills: 0 | Status: DISCONTINUED | OUTPATIENT
Start: 2017-06-24 | End: 2017-06-28

## 2017-06-24 RX ORDER — HYDROMORPHONE HYDROCHLORIDE 2 MG/ML
0.5 INJECTION INTRAMUSCULAR; INTRAVENOUS; SUBCUTANEOUS
Qty: 0 | Refills: 0 | Status: DISCONTINUED | OUTPATIENT
Start: 2017-06-24 | End: 2017-06-24

## 2017-06-24 RX ORDER — INSULIN GLARGINE 100 [IU]/ML
30 INJECTION, SOLUTION SUBCUTANEOUS EVERY MORNING
Qty: 0 | Refills: 0 | Status: DISCONTINUED | OUTPATIENT
Start: 2017-06-24 | End: 2017-06-26

## 2017-06-24 RX ORDER — HYDROMORPHONE HYDROCHLORIDE 2 MG/ML
2 INJECTION INTRAMUSCULAR; INTRAVENOUS; SUBCUTANEOUS
Qty: 0 | Refills: 0 | Status: DISCONTINUED | OUTPATIENT
Start: 2017-06-24 | End: 2017-06-28

## 2017-06-24 RX ORDER — ATORVASTATIN CALCIUM 80 MG/1
40 TABLET, FILM COATED ORAL AT BEDTIME
Qty: 0 | Refills: 0 | Status: DISCONTINUED | OUTPATIENT
Start: 2017-06-24 | End: 2017-06-28

## 2017-06-24 RX ORDER — POLYETHYLENE GLYCOL 3350 17 G/17G
17 POWDER, FOR SOLUTION ORAL DAILY
Qty: 0 | Refills: 0 | Status: DISCONTINUED | OUTPATIENT
Start: 2017-06-24 | End: 2017-06-28

## 2017-06-24 RX ORDER — HYDROMORPHONE HYDROCHLORIDE 2 MG/ML
4 INJECTION INTRAMUSCULAR; INTRAVENOUS; SUBCUTANEOUS
Qty: 0 | Refills: 0 | Status: DISCONTINUED | OUTPATIENT
Start: 2017-06-24 | End: 2017-06-28

## 2017-06-24 RX ORDER — ACETAMINOPHEN 500 MG
1000 TABLET ORAL ONCE
Qty: 0 | Refills: 0 | Status: COMPLETED | OUTPATIENT
Start: 2017-06-24 | End: 2017-06-24

## 2017-06-24 RX ORDER — INSULIN LISPRO 100/ML
18 VIAL (ML) SUBCUTANEOUS
Qty: 0 | Refills: 0 | Status: DISCONTINUED | OUTPATIENT
Start: 2017-06-24 | End: 2017-06-28

## 2017-06-24 RX ORDER — SODIUM CHLORIDE 9 MG/ML
1000 INJECTION, SOLUTION INTRAVENOUS
Qty: 0 | Refills: 0 | Status: DISCONTINUED | OUTPATIENT
Start: 2017-06-24 | End: 2017-06-28

## 2017-06-24 RX ORDER — PANTOPRAZOLE SODIUM 20 MG/1
40 TABLET, DELAYED RELEASE ORAL
Qty: 0 | Refills: 0 | Status: DISCONTINUED | OUTPATIENT
Start: 2017-06-24 | End: 2017-06-28

## 2017-06-24 RX ORDER — DOCUSATE SODIUM 100 MG
100 CAPSULE ORAL THREE TIMES A DAY
Qty: 0 | Refills: 0 | Status: DISCONTINUED | OUTPATIENT
Start: 2017-06-24 | End: 2017-06-28

## 2017-06-24 RX ORDER — DIPHENHYDRAMINE HCL 50 MG
12.5 CAPSULE ORAL EVERY 4 HOURS
Qty: 0 | Refills: 0 | Status: DISCONTINUED | OUTPATIENT
Start: 2017-06-24 | End: 2017-06-28

## 2017-06-24 RX ORDER — HYDROMORPHONE HYDROCHLORIDE 2 MG/ML
1 INJECTION INTRAMUSCULAR; INTRAVENOUS; SUBCUTANEOUS EVERY 4 HOURS
Qty: 0 | Refills: 0 | Status: DISCONTINUED | OUTPATIENT
Start: 2017-06-24 | End: 2017-06-28

## 2017-06-24 RX ORDER — MAGNESIUM HYDROXIDE 400 MG/1
30 TABLET, CHEWABLE ORAL DAILY
Qty: 0 | Refills: 0 | Status: DISCONTINUED | OUTPATIENT
Start: 2017-06-24 | End: 2017-06-28

## 2017-06-24 RX ORDER — ASCORBIC ACID 60 MG
500 TABLET,CHEWABLE ORAL DAILY
Qty: 0 | Refills: 0 | Status: DISCONTINUED | OUTPATIENT
Start: 2017-06-24 | End: 2017-06-28

## 2017-06-24 RX ORDER — DEXTROSE 50 % IN WATER 50 %
12.5 SYRINGE (ML) INTRAVENOUS ONCE
Qty: 0 | Refills: 0 | Status: DISCONTINUED | OUTPATIENT
Start: 2017-06-24 | End: 2017-06-28

## 2017-06-24 RX ADMIN — HYDROMORPHONE HYDROCHLORIDE 1 MILLIGRAM(S): 2 INJECTION INTRAMUSCULAR; INTRAVENOUS; SUBCUTANEOUS at 05:12

## 2017-06-24 RX ADMIN — HYDROMORPHONE HYDROCHLORIDE 1 MILLIGRAM(S): 2 INJECTION INTRAMUSCULAR; INTRAVENOUS; SUBCUTANEOUS at 22:13

## 2017-06-24 RX ADMIN — Medication 975 MILLIGRAM(S): at 23:57

## 2017-06-24 RX ADMIN — Medication 30 MILLIGRAM(S): at 13:45

## 2017-06-24 RX ADMIN — HYDROMORPHONE HYDROCHLORIDE 4 MILLIGRAM(S): 2 INJECTION INTRAMUSCULAR; INTRAVENOUS; SUBCUTANEOUS at 20:24

## 2017-06-24 RX ADMIN — HYDROMORPHONE HYDROCHLORIDE 4 MILLIGRAM(S): 2 INJECTION INTRAMUSCULAR; INTRAVENOUS; SUBCUTANEOUS at 16:59

## 2017-06-24 RX ADMIN — Medication 30 MILLILITER(S): at 22:14

## 2017-06-24 RX ADMIN — Medication 2: at 18:37

## 2017-06-24 RX ADMIN — HYDROMORPHONE HYDROCHLORIDE 4 MILLIGRAM(S): 2 INJECTION INTRAMUSCULAR; INTRAVENOUS; SUBCUTANEOUS at 17:30

## 2017-06-24 RX ADMIN — Medication 1000 MILLIGRAM(S): at 13:00

## 2017-06-24 RX ADMIN — Medication 30 MILLIGRAM(S): at 13:32

## 2017-06-24 RX ADMIN — HYDROMORPHONE HYDROCHLORIDE 0.5 MILLIGRAM(S): 2 INJECTION INTRAMUSCULAR; INTRAVENOUS; SUBCUTANEOUS at 12:50

## 2017-06-24 RX ADMIN — PANTOPRAZOLE SODIUM 40 MILLIGRAM(S): 20 TABLET, DELAYED RELEASE ORAL at 05:13

## 2017-06-24 RX ADMIN — HYDROMORPHONE HYDROCHLORIDE 1 MILLIGRAM(S): 2 INJECTION INTRAMUSCULAR; INTRAVENOUS; SUBCUTANEOUS at 01:42

## 2017-06-24 RX ADMIN — HYDROMORPHONE HYDROCHLORIDE 0.5 MILLIGRAM(S): 2 INJECTION INTRAMUSCULAR; INTRAVENOUS; SUBCUTANEOUS at 13:20

## 2017-06-24 RX ADMIN — Medication 975 MILLIGRAM(S): at 18:37

## 2017-06-24 RX ADMIN — ATORVASTATIN CALCIUM 40 MILLIGRAM(S): 80 TABLET, FILM COATED ORAL at 22:13

## 2017-06-24 RX ADMIN — ENOXAPARIN SODIUM 40 MILLIGRAM(S): 100 INJECTION SUBCUTANEOUS at 16:59

## 2017-06-24 RX ADMIN — Medication 400 MILLIGRAM(S): at 13:00

## 2017-06-24 RX ADMIN — HYDROMORPHONE HYDROCHLORIDE 4 MILLIGRAM(S): 2 INJECTION INTRAMUSCULAR; INTRAVENOUS; SUBCUTANEOUS at 03:01

## 2017-06-24 RX ADMIN — HYDROMORPHONE HYDROCHLORIDE 1 MILLIGRAM(S): 2 INJECTION INTRAMUSCULAR; INTRAVENOUS; SUBCUTANEOUS at 22:43

## 2017-06-24 RX ADMIN — Medication 975 MILLIGRAM(S): at 05:13

## 2017-06-24 RX ADMIN — SODIUM CHLORIDE 75 MILLILITER(S): 9 INJECTION, SOLUTION INTRAVENOUS at 13:39

## 2017-06-24 RX ADMIN — ATENOLOL 50 MILLIGRAM(S): 25 TABLET ORAL at 05:12

## 2017-06-24 RX ADMIN — SODIUM CHLORIDE 75 MILLILITER(S): 9 INJECTION, SOLUTION INTRAVENOUS at 22:21

## 2017-06-24 RX ADMIN — Medication 18 UNIT(S): at 18:37

## 2017-06-24 RX ADMIN — HYDROMORPHONE HYDROCHLORIDE 1 MILLIGRAM(S): 2 INJECTION INTRAMUSCULAR; INTRAVENOUS; SUBCUTANEOUS at 01:10

## 2017-06-24 RX ADMIN — HYDROMORPHONE HYDROCHLORIDE 4 MILLIGRAM(S): 2 INJECTION INTRAMUSCULAR; INTRAVENOUS; SUBCUTANEOUS at 20:55

## 2017-06-24 RX ADMIN — Medication 975 MILLIGRAM(S): at 23:27

## 2017-06-24 RX ADMIN — INSULIN GLARGINE 28 UNIT(S): 100 INJECTION, SOLUTION SUBCUTANEOUS at 23:59

## 2017-06-24 NOTE — PRE-OP CHECKLIST - NOTHING BY MOUTH SINCE
18-Jun-2017 00:39
08-Jun-2017 22:00
12-Jun-2017 21:00
13-Jun-2017 23:00
16-Jun-2017 00:00
24-Jun-2017 00:00

## 2017-06-24 NOTE — PROGRESS NOTE ADULT - SUBJECTIVE AND OBJECTIVE BOX
Plastic Surgery Progress Note    S: Patient seen.  sleeping comfortably    Vital Signs Last 24 Hrs  T(C): 36.9, Max: 36.9 (06-23 @ 21:25)  T(F): 98.4, Max: 98.4 (06-23 @ 21:25)  HR: 71 (70 - 75)  BP: 145/79 (121/76 - 145/81)  BP(mean): --  RR: 18 (18 - 18)  SpO2: 97% (97% - 98%)  I&O's Detail    I & Os for current day (as of 24 Jun 2017 07:10)  =============================================  IN:    Oral Fluid: 1320 ml    Total IN: 1320 ml  ---------------------------------------------  OUT:    Voided: 1250 ml    Bulb: 65 ml    Bulb: 38 ml    Total OUT: 1353 ml  ---------------------------------------------  Total NET: -33 ml      Physical Exam:  General: sleeping comfortably, NAD
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
## 2017-06-24 NOTE — BRIEF OPERATIVE NOTE - OPERATION/FINDINGS
removal VAC, release guyon canal, ulnar nerve neurolysis, txfr AIN to ulnar motor branch, txfr ulnar sensory to median nerve, txfr dorsal cutaneous branch of ulnar to median, median neurolysis, txfr FDP 4&5 to FDP 3

## 2017-06-24 NOTE — BRIEF OPERATIVE NOTE - ANTIBIOTIC PROTOCOL
Followed protocol
120 mg gentamicin (received 2g Ancef in trauma bay)
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
## 2017-06-24 NOTE — PROGRESS NOTE ADULT - ASSESSMENT
56F with RUE trauma associated with ulnar nerve laceration; s/p RUE reconstruction with latissimus dorsi myocutaneous flap and STSG.    - RUE elevation  - Continue VAC  - Continue OT splint for Right hand  - OR today with PRS for nerve transfers.

## 2017-06-25 RX ADMIN — HYDROMORPHONE HYDROCHLORIDE 4 MILLIGRAM(S): 2 INJECTION INTRAMUSCULAR; INTRAVENOUS; SUBCUTANEOUS at 00:07

## 2017-06-25 RX ADMIN — INSULIN GLARGINE 30 UNIT(S): 100 INJECTION, SOLUTION SUBCUTANEOUS at 09:11

## 2017-06-25 RX ADMIN — HYDROMORPHONE HYDROCHLORIDE 4 MILLIGRAM(S): 2 INJECTION INTRAMUSCULAR; INTRAVENOUS; SUBCUTANEOUS at 18:23

## 2017-06-25 RX ADMIN — HYDROMORPHONE HYDROCHLORIDE 4 MILLIGRAM(S): 2 INJECTION INTRAMUSCULAR; INTRAVENOUS; SUBCUTANEOUS at 08:20

## 2017-06-25 RX ADMIN — Medication 1 TABLET(S): at 11:55

## 2017-06-25 RX ADMIN — INSULIN GLARGINE 28 UNIT(S): 100 INJECTION, SOLUTION SUBCUTANEOUS at 22:08

## 2017-06-25 RX ADMIN — Medication 975 MILLIGRAM(S): at 16:57

## 2017-06-25 RX ADMIN — Medication 325 MILLIGRAM(S): at 11:55

## 2017-06-25 RX ADMIN — HYDROMORPHONE HYDROCHLORIDE 1 MILLIGRAM(S): 2 INJECTION INTRAMUSCULAR; INTRAVENOUS; SUBCUTANEOUS at 21:13

## 2017-06-25 RX ADMIN — Medication 500 MILLIGRAM(S): at 11:55

## 2017-06-25 RX ADMIN — Medication 975 MILLIGRAM(S): at 11:54

## 2017-06-25 RX ADMIN — ENOXAPARIN SODIUM 40 MILLIGRAM(S): 100 INJECTION SUBCUTANEOUS at 17:04

## 2017-06-25 RX ADMIN — Medication 975 MILLIGRAM(S): at 18:23

## 2017-06-25 RX ADMIN — HYDROMORPHONE HYDROCHLORIDE 4 MILLIGRAM(S): 2 INJECTION INTRAMUSCULAR; INTRAVENOUS; SUBCUTANEOUS at 22:59

## 2017-06-25 RX ADMIN — HYDROMORPHONE HYDROCHLORIDE 1 MILLIGRAM(S): 2 INJECTION INTRAMUSCULAR; INTRAVENOUS; SUBCUTANEOUS at 20:43

## 2017-06-25 RX ADMIN — ONDANSETRON 4 MILLIGRAM(S): 8 TABLET, FILM COATED ORAL at 11:48

## 2017-06-25 RX ADMIN — HYDROMORPHONE HYDROCHLORIDE 4 MILLIGRAM(S): 2 INJECTION INTRAMUSCULAR; INTRAVENOUS; SUBCUTANEOUS at 12:38

## 2017-06-25 RX ADMIN — HYDROMORPHONE HYDROCHLORIDE 1 MILLIGRAM(S): 2 INJECTION INTRAMUSCULAR; INTRAVENOUS; SUBCUTANEOUS at 03:03

## 2017-06-25 RX ADMIN — Medication 975 MILLIGRAM(S): at 06:55

## 2017-06-25 RX ADMIN — HYDROMORPHONE HYDROCHLORIDE 4 MILLIGRAM(S): 2 INJECTION INTRAMUSCULAR; INTRAVENOUS; SUBCUTANEOUS at 00:37

## 2017-06-25 RX ADMIN — HYDROMORPHONE HYDROCHLORIDE 4 MILLIGRAM(S): 2 INJECTION INTRAMUSCULAR; INTRAVENOUS; SUBCUTANEOUS at 15:57

## 2017-06-25 RX ADMIN — HYDROMORPHONE HYDROCHLORIDE 1 MILLIGRAM(S): 2 INJECTION INTRAMUSCULAR; INTRAVENOUS; SUBCUTANEOUS at 13:41

## 2017-06-25 RX ADMIN — HYDROMORPHONE HYDROCHLORIDE 4 MILLIGRAM(S): 2 INJECTION INTRAMUSCULAR; INTRAVENOUS; SUBCUTANEOUS at 23:29

## 2017-06-25 RX ADMIN — HYDROMORPHONE HYDROCHLORIDE 1 MILLIGRAM(S): 2 INJECTION INTRAMUSCULAR; INTRAVENOUS; SUBCUTANEOUS at 03:33

## 2017-06-25 RX ADMIN — Medication 18 UNIT(S): at 19:17

## 2017-06-25 RX ADMIN — ATENOLOL 50 MILLIGRAM(S): 25 TABLET ORAL at 06:55

## 2017-06-25 RX ADMIN — Medication 18 UNIT(S): at 13:33

## 2017-06-25 RX ADMIN — HYDROMORPHONE HYDROCHLORIDE 1 MILLIGRAM(S): 2 INJECTION INTRAMUSCULAR; INTRAVENOUS; SUBCUTANEOUS at 11:04

## 2017-06-25 RX ADMIN — HYDROMORPHONE HYDROCHLORIDE 1 MILLIGRAM(S): 2 INJECTION INTRAMUSCULAR; INTRAVENOUS; SUBCUTANEOUS at 16:57

## 2017-06-25 RX ADMIN — HYDROMORPHONE HYDROCHLORIDE 4 MILLIGRAM(S): 2 INJECTION INTRAMUSCULAR; INTRAVENOUS; SUBCUTANEOUS at 11:47

## 2017-06-25 RX ADMIN — Medication 975 MILLIGRAM(S): at 23:29

## 2017-06-25 RX ADMIN — HYDROMORPHONE HYDROCHLORIDE 4 MILLIGRAM(S): 2 INJECTION INTRAMUSCULAR; INTRAVENOUS; SUBCUTANEOUS at 15:29

## 2017-06-25 RX ADMIN — PANTOPRAZOLE SODIUM 40 MILLIGRAM(S): 20 TABLET, DELAYED RELEASE ORAL at 06:55

## 2017-06-25 RX ADMIN — HYDROMORPHONE HYDROCHLORIDE 1 MILLIGRAM(S): 2 INJECTION INTRAMUSCULAR; INTRAVENOUS; SUBCUTANEOUS at 08:18

## 2017-06-25 RX ADMIN — Medication 1: at 13:33

## 2017-06-25 RX ADMIN — Medication 18 UNIT(S): at 09:20

## 2017-06-25 RX ADMIN — HYDROMORPHONE HYDROCHLORIDE 4 MILLIGRAM(S): 2 INJECTION INTRAMUSCULAR; INTRAVENOUS; SUBCUTANEOUS at 06:57

## 2017-06-25 RX ADMIN — ATORVASTATIN CALCIUM 40 MILLIGRAM(S): 80 TABLET, FILM COATED ORAL at 21:30

## 2017-06-25 RX ADMIN — Medication 975 MILLIGRAM(S): at 23:01

## 2017-06-25 RX ADMIN — Medication 975 MILLIGRAM(S): at 07:25

## 2017-06-25 RX ADMIN — POLYETHYLENE GLYCOL 3350 17 GRAM(S): 17 POWDER, FOR SOLUTION ORAL at 11:55
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
## 2017-06-25 NOTE — PROGRESS NOTE ADULT - SUBJECTIVE AND OBJECTIVE BOX
Plastic Surgery Progress Note    SUBJECTIVE:  Doing well. No overnight events. Pain controlled.    OBJECTIVE:     ** VITAL SIGNS / I&O's **    Vital Signs Last 24 Hrs  T(C): 37, Max: 37.2 (06-25 @ 05:13)  T(F): 98.6, Max: 98.9 (06-25 @ 05:13)  HR: 69 (63 - 89)  BP: 129/64 (114/57 - 142/61)  BP(mean): 84 (82 - 828)  RR: 18 (14 - 18)  SpO2: 99% (95% - 99%)      I & Os for current day (as of 25 Jun 2017 09:08)  =============================================  IN:    lactated ringers.: 717 ml    Oral Fluid: 500 ml    Total IN: 1217 ml  ---------------------------------------------  OUT:    Voided: 100 ml    Bulb: 50 ml    Bulb: 35 ml    Total OUT: 185 ml  ---------------------------------------------  Total NET: 1032 ml      ** PHYSICAL EXAM **    -- CONSTITUTIONAL: AOx3. NAD.   -- BACK: Latissimus dorsi donor site c/d/i; no palpable collections; JPs serosanguinous  -- EXTREMITIES: RUE dressing changed; SG healing well, muscle flap viable; dressing changed w/ xeroform, jef      ** LABS **                          8.9    7.1   )-----------( 341      ( 24 Jun 2017 05:40 )             26.9     24 Jun 2017 05:40    139    |  102    |  15     ----------------------------<  101    4.1     |  27     |  1.03     Ca    8.8        24 Jun 2017 05:40      PT/INR - ( 24 Jun 2017 05:40 )   PT: 11.4 sec;   INR: 1.05 ratio         PTT - ( 24 Jun 2017 05:40 )  PTT:31.5 sec  CAPILLARY BLOOD GLUCOSE  135 (25 Jun 2017 08:28)  165 (24 Jun 2017 22:03)  231 (24 Jun 2017 17:35)

## 2017-06-25 NOTE — PROGRESS NOTE ADULT - SUBJECTIVE AND OBJECTIVE BOX
Patient resting without complaints.      T(C): 37.2, Max: 37.2 (06-25 @ 05:13)  HR: 71 (63 - 89)  BP: 117/70 (114/57 - 142/61)  RR: 18 (14 - 18)  SpO2: 98% (95% - 99%)  Wt(kg): --    Exam:  Alert and Oriented, No Acute Distress                       EXT:         RUE   Dressing clean and intact         RACHAEL's x 2 patent  #1 15/35, #2 40/50        hand swollen        sensation grossly intact except for decrease sensation 5th digit                                                                                  8.9    7.1   )-----------( 341      ( 24 Jun 2017 05:40 )             26.9    06-24    139  |  102  |  15  ----------------------------<  101<H>  4.1   |  27  |  1.03    Ca    8.8      24 Jun 2017 05:40

## 2017-06-25 NOTE — PROGRESS NOTE ADULT - ASSESSMENT
Patient is a 56y old  Female who presents with a chief complaint of R arm near amputation.    Chest discomfort:      Likely dyspepsia. Ischemic work up negative.    Rt arm crush injury:       Ortho/Plastics f/up noted.  s/p OR for reconstruction sx  s/p OR for nerve sx   post op care per plastic sx    DM II :  FSSS/Lantus/Humalog.  mngt per endo    Anemia:  Hb stable.    PT  DVT ppx

## 2017-06-26 PROCEDURE — 99232 SBSQ HOSP IP/OBS MODERATE 35: CPT

## 2017-06-26 RX ORDER — INSULIN GLARGINE 100 [IU]/ML
26 INJECTION, SOLUTION SUBCUTANEOUS EVERY MORNING
Qty: 0 | Refills: 0 | Status: DISCONTINUED | OUTPATIENT
Start: 2017-06-27 | End: 2017-06-28

## 2017-06-26 RX ORDER — GABAPENTIN 400 MG/1
300 CAPSULE ORAL ONCE
Qty: 0 | Refills: 0 | Status: COMPLETED | OUTPATIENT
Start: 2017-06-26 | End: 2017-06-26

## 2017-06-26 RX ORDER — GABAPENTIN 400 MG/1
300 CAPSULE ORAL THREE TIMES A DAY
Qty: 0 | Refills: 0 | Status: DISCONTINUED | OUTPATIENT
Start: 2017-06-28 | End: 2017-06-28

## 2017-06-26 RX ORDER — GABAPENTIN 400 MG/1
300 CAPSULE ORAL
Qty: 0 | Refills: 0 | Status: COMPLETED | OUTPATIENT
Start: 2017-06-27 | End: 2017-06-27

## 2017-06-26 RX ORDER — INSULIN GLARGINE 100 [IU]/ML
26 INJECTION, SOLUTION SUBCUTANEOUS AT BEDTIME
Qty: 0 | Refills: 0 | Status: DISCONTINUED | OUTPATIENT
Start: 2017-06-26 | End: 2017-06-28

## 2017-06-26 RX ADMIN — Medication 975 MILLIGRAM(S): at 18:01

## 2017-06-26 RX ADMIN — HYDROMORPHONE HYDROCHLORIDE 4 MILLIGRAM(S): 2 INJECTION INTRAMUSCULAR; INTRAVENOUS; SUBCUTANEOUS at 09:07

## 2017-06-26 RX ADMIN — HYDROMORPHONE HYDROCHLORIDE 4 MILLIGRAM(S): 2 INJECTION INTRAMUSCULAR; INTRAVENOUS; SUBCUTANEOUS at 16:00

## 2017-06-26 RX ADMIN — HYDROMORPHONE HYDROCHLORIDE 1 MILLIGRAM(S): 2 INJECTION INTRAMUSCULAR; INTRAVENOUS; SUBCUTANEOUS at 06:45

## 2017-06-26 RX ADMIN — ENOXAPARIN SODIUM 40 MILLIGRAM(S): 100 INJECTION SUBCUTANEOUS at 17:32

## 2017-06-26 RX ADMIN — HYDROMORPHONE HYDROCHLORIDE 4 MILLIGRAM(S): 2 INJECTION INTRAMUSCULAR; INTRAVENOUS; SUBCUTANEOUS at 19:48

## 2017-06-26 RX ADMIN — Medication 325 MILLIGRAM(S): at 12:32

## 2017-06-26 RX ADMIN — Medication 975 MILLIGRAM(S): at 17:32

## 2017-06-26 RX ADMIN — GABAPENTIN 300 MILLIGRAM(S): 400 CAPSULE ORAL at 08:37

## 2017-06-26 RX ADMIN — Medication 975 MILLIGRAM(S): at 23:14

## 2017-06-26 RX ADMIN — HYDROMORPHONE HYDROCHLORIDE 4 MILLIGRAM(S): 2 INJECTION INTRAMUSCULAR; INTRAVENOUS; SUBCUTANEOUS at 15:29

## 2017-06-26 RX ADMIN — Medication 975 MILLIGRAM(S): at 23:44

## 2017-06-26 RX ADMIN — HYDROMORPHONE HYDROCHLORIDE 4 MILLIGRAM(S): 2 INJECTION INTRAMUSCULAR; INTRAVENOUS; SUBCUTANEOUS at 04:18

## 2017-06-26 RX ADMIN — Medication 18 UNIT(S): at 18:35

## 2017-06-26 RX ADMIN — Medication 975 MILLIGRAM(S): at 06:15

## 2017-06-26 RX ADMIN — ATORVASTATIN CALCIUM 40 MILLIGRAM(S): 80 TABLET, FILM COATED ORAL at 22:10

## 2017-06-26 RX ADMIN — ONDANSETRON 4 MILLIGRAM(S): 8 TABLET, FILM COATED ORAL at 07:04

## 2017-06-26 RX ADMIN — ATENOLOL 50 MILLIGRAM(S): 25 TABLET ORAL at 06:16

## 2017-06-26 RX ADMIN — Medication 975 MILLIGRAM(S): at 12:31

## 2017-06-26 RX ADMIN — INSULIN GLARGINE 30 UNIT(S): 100 INJECTION, SOLUTION SUBCUTANEOUS at 08:38

## 2017-06-26 RX ADMIN — Medication 1 TABLET(S): at 12:32

## 2017-06-26 RX ADMIN — HYDROMORPHONE HYDROCHLORIDE 1 MILLIGRAM(S): 2 INJECTION INTRAMUSCULAR; INTRAVENOUS; SUBCUTANEOUS at 06:17

## 2017-06-26 RX ADMIN — HYDROMORPHONE HYDROCHLORIDE 1 MILLIGRAM(S): 2 INJECTION INTRAMUSCULAR; INTRAVENOUS; SUBCUTANEOUS at 11:00

## 2017-06-26 RX ADMIN — HYDROMORPHONE HYDROCHLORIDE 1 MILLIGRAM(S): 2 INJECTION INTRAMUSCULAR; INTRAVENOUS; SUBCUTANEOUS at 18:02

## 2017-06-26 RX ADMIN — INSULIN GLARGINE 26 UNIT(S): 100 INJECTION, SOLUTION SUBCUTANEOUS at 22:14

## 2017-06-26 RX ADMIN — Medication 18 UNIT(S): at 12:46

## 2017-06-26 RX ADMIN — HYDROMORPHONE HYDROCHLORIDE 4 MILLIGRAM(S): 2 INJECTION INTRAMUSCULAR; INTRAVENOUS; SUBCUTANEOUS at 08:37

## 2017-06-26 RX ADMIN — HYDROMORPHONE HYDROCHLORIDE 1 MILLIGRAM(S): 2 INJECTION INTRAMUSCULAR; INTRAVENOUS; SUBCUTANEOUS at 00:45

## 2017-06-26 RX ADMIN — PANTOPRAZOLE SODIUM 40 MILLIGRAM(S): 20 TABLET, DELAYED RELEASE ORAL at 06:15

## 2017-06-26 RX ADMIN — HYDROMORPHONE HYDROCHLORIDE 4 MILLIGRAM(S): 2 INJECTION INTRAMUSCULAR; INTRAVENOUS; SUBCUTANEOUS at 04:48

## 2017-06-26 RX ADMIN — SODIUM CHLORIDE 75 MILLILITER(S): 9 INJECTION, SOLUTION INTRAVENOUS at 18:36

## 2017-06-26 RX ADMIN — Medication 500 MILLIGRAM(S): at 12:32

## 2017-06-26 RX ADMIN — HYDROMORPHONE HYDROCHLORIDE 1 MILLIGRAM(S): 2 INJECTION INTRAMUSCULAR; INTRAVENOUS; SUBCUTANEOUS at 17:40

## 2017-06-26 RX ADMIN — HYDROMORPHONE HYDROCHLORIDE 4 MILLIGRAM(S): 2 INJECTION INTRAMUSCULAR; INTRAVENOUS; SUBCUTANEOUS at 20:18

## 2017-06-26 RX ADMIN — Medication 1: at 12:46

## 2017-06-26 RX ADMIN — HYDROMORPHONE HYDROCHLORIDE 4 MILLIGRAM(S): 2 INJECTION INTRAMUSCULAR; INTRAVENOUS; SUBCUTANEOUS at 12:32

## 2017-06-26 RX ADMIN — HYDROMORPHONE HYDROCHLORIDE 4 MILLIGRAM(S): 2 INJECTION INTRAMUSCULAR; INTRAVENOUS; SUBCUTANEOUS at 13:02

## 2017-06-26 RX ADMIN — HYDROMORPHONE HYDROCHLORIDE 1 MILLIGRAM(S): 2 INJECTION INTRAMUSCULAR; INTRAVENOUS; SUBCUTANEOUS at 00:14

## 2017-06-26 RX ADMIN — HYDROMORPHONE HYDROCHLORIDE 4 MILLIGRAM(S): 2 INJECTION INTRAMUSCULAR; INTRAVENOUS; SUBCUTANEOUS at 23:44

## 2017-06-26 RX ADMIN — HYDROMORPHONE HYDROCHLORIDE 1 MILLIGRAM(S): 2 INJECTION INTRAMUSCULAR; INTRAVENOUS; SUBCUTANEOUS at 10:43

## 2017-06-26 RX ADMIN — Medication 975 MILLIGRAM(S): at 13:14

## 2017-06-26 RX ADMIN — Medication 975 MILLIGRAM(S): at 06:44

## 2017-06-26 RX ADMIN — HYDROMORPHONE HYDROCHLORIDE 4 MILLIGRAM(S): 2 INJECTION INTRAMUSCULAR; INTRAVENOUS; SUBCUTANEOUS at 23:14

## 2017-06-26 RX ADMIN — HYDROMORPHONE HYDROCHLORIDE 1 MILLIGRAM(S): 2 INJECTION INTRAMUSCULAR; INTRAVENOUS; SUBCUTANEOUS at 20:13

## 2017-06-26 RX ADMIN — HYDROMORPHONE HYDROCHLORIDE 1 MILLIGRAM(S): 2 INJECTION INTRAMUSCULAR; INTRAVENOUS; SUBCUTANEOUS at 20:57
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
## 2017-06-26 NOTE — CHART NOTE - NSCHARTNOTESELECT_GEN_ALL_CORE
Nutrition Services
Ortho POC
Ortho PreOp Checklist
Ortho-Postoperative Check
Preop Note/Event Note
postop check
Anesthesia

## 2017-06-26 NOTE — ANESTHESIA FOLLOW-UP NOTE - NSEVALATION_GEN_ALL_CORE
All questions were answered/No apparent complications or complaints reguarding anesthesia care at this time
All questions were answered/No apparent complications or complaints reguarding anesthesia care at this time
No apparent complications or complaints reguarding anesthesia care at this time/All questions were answered
All questions were answered/No apparent complications or complaints reguarding anesthesia care at this time
No apparent complications or complaints reguarding anesthesia care at this time

## 2017-06-26 NOTE — PROGRESS NOTE ADULT - PROBLEM SELECTOR PLAN 1
-Check FS AC/HS  -Adjust Lantus to 26 UNITS BID  -Continue humalog 18 units w/meals. HOLD IF NOT EATING!  -c/w Humalog correction scales AC AND HS   -discussed with Pt and team.  - pager: [phone] or [phone] (24/7)

## 2017-06-26 NOTE — PROGRESS NOTE ADULT - SUBJECTIVE AND OBJECTIVE BOX
Patient resting without complaints.      Vital Signs Last 24 Hrs  T(C): 36.9, Max: 37 (06-25 @ 08:28)  T(F): 98.5, Max: 98.6 (06-25 @ 08:28)  HR: 76 (58 - 80)  BP: 137/76 (111/64 - 137/76)  BP(mean): --  RR: 18 (18 - 18)  SpO2: 98% (96% - 99%)    Exam:  Alert and Oriented, No Acute Distress                       EXT:         RUE   Dressing clean and intact         RACHAEL's x 2 patent          hand swollen        sensation grossly intact except for decrease sensation 5th digit
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
## 2017-06-26 NOTE — PROGRESS NOTE ADULT - SUBJECTIVE AND OBJECTIVE BOX
Patient complains of RUE pain abd burning for the entire length of the arm, sensitivity over the wrist incision    Vital Signs Last 24 Hrs  T(C): 36.9, Max: 37 (06-25 @ 08:28)  T(F): 98.5, Max: 98.6 (06-25 @ 08:28)  HR: 76 (58 - 80)  BP: 137/76 (111/64 - 137/76)  BP(mean): --  RR: 18 (18 - 18)  SpO2: 98% (96% - 99%)    Gen - NAD, c/o pain  RUE STSG with good take  LD skin paddle warm, areas of superficial epidermolysis, incisions intact  back incision soft, no collections, healing well  drain #1 removed
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
## 2017-06-26 NOTE — CONSULT NOTE ADULT - ATTENDING COMMENTS
Patient seen and examined with neurology team and above note reviewed and I agree with assessment and plan as outlined. Patient with right ulnar neuropathic pain following her extensive surgical procedures. She has responded well to the neurontin which we will continue with 300mg three times daily. Continue PT and OT and medical and surgical management and she can follow up as outpatient for an EMG and nerve conduction study.
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
## 2017-06-26 NOTE — PROGRESS NOTE ADULT - ASSESSMENT
S/P I&D, Ex-fixator, cement spacer ,vac        transfer AIN to ulnar nerve by SAVANNAH Morocho DO  Orthopaedic Surgery Resident  Pager #[phone]
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
## 2017-06-27 ENCOUNTER — TRANSCRIPTION ENCOUNTER (OUTPATIENT)
Age: 57
End: 2017-06-27

## 2017-06-27 PROCEDURE — 99232 SBSQ HOSP IP/OBS MODERATE 35: CPT | Mod: GC

## 2017-06-27 PROCEDURE — 99221 1ST HOSP IP/OBS SF/LOW 40: CPT | Mod: GC

## 2017-06-27 RX ORDER — ASCORBIC ACID 60 MG
1 TABLET,CHEWABLE ORAL
Qty: 0 | Refills: 0 | COMMUNITY
Start: 2017-06-27

## 2017-06-27 RX ORDER — LINAGLIPTIN 5 MG/1
1 TABLET, FILM COATED ORAL
Qty: 0 | Refills: 0 | COMMUNITY

## 2017-06-27 RX ORDER — INSULIN DEGLUDEC 100 U/ML
100 INJECTION, SOLUTION SUBCUTANEOUS
Qty: 0 | Refills: 0 | COMMUNITY

## 2017-06-27 RX ORDER — GABAPENTIN 400 MG/1
1 CAPSULE ORAL
Qty: 0 | Refills: 0 | COMMUNITY
Start: 2017-06-27

## 2017-06-27 RX ORDER — HYDROMORPHONE HYDROCHLORIDE 2 MG/ML
1 INJECTION INTRAMUSCULAR; INTRAVENOUS; SUBCUTANEOUS
Qty: 0 | Refills: 0 | COMMUNITY
Start: 2017-06-27

## 2017-06-27 RX ORDER — DICLOFENAC SODIUM 75 MG/1
0 TABLET, DELAYED RELEASE ORAL
Qty: 0 | Refills: 0 | COMMUNITY

## 2017-06-27 RX ORDER — GABAPENTIN 400 MG/1
1 CAPSULE ORAL
Qty: 42 | Refills: 0 | OUTPATIENT
Start: 2017-06-27 | End: 2017-07-11

## 2017-06-27 RX ORDER — GABAPENTIN 400 MG/1
2 CAPSULE ORAL
Qty: 0 | Refills: 0 | COMMUNITY
Start: 2017-06-27 | End: 2017-07-11

## 2017-06-27 RX ORDER — INSULIN ASPART 100 [IU]/ML
18 INJECTION, SOLUTION SUBCUTANEOUS
Qty: 1080 | Refills: 0 | OUTPATIENT
Start: 2017-06-27 | End: 2017-07-27

## 2017-06-27 RX ORDER — HYDROMORPHONE HYDROCHLORIDE 2 MG/ML
1 INJECTION INTRAMUSCULAR; INTRAVENOUS; SUBCUTANEOUS
Qty: 80 | Refills: 0 | OUTPATIENT
Start: 2017-06-27

## 2017-06-27 RX ORDER — INSULIN NPH HUM/REG INSULIN HM 70-30/ML
32 VIAL (ML) SUBCUTANEOUS
Qty: 0 | Refills: 0 | COMMUNITY

## 2017-06-27 RX ORDER — DOCUSATE SODIUM 100 MG
1 CAPSULE ORAL
Qty: 0 | Refills: 0 | COMMUNITY
Start: 2017-06-27

## 2017-06-27 RX ADMIN — INSULIN GLARGINE 26 UNIT(S): 100 INJECTION, SOLUTION SUBCUTANEOUS at 22:36

## 2017-06-27 RX ADMIN — Medication 1 TABLET(S): at 12:01

## 2017-06-27 RX ADMIN — Medication 18 UNIT(S): at 12:02

## 2017-06-27 RX ADMIN — Medication 975 MILLIGRAM(S): at 17:34

## 2017-06-27 RX ADMIN — HYDROMORPHONE HYDROCHLORIDE 1 MILLIGRAM(S): 2 INJECTION INTRAMUSCULAR; INTRAVENOUS; SUBCUTANEOUS at 16:46

## 2017-06-27 RX ADMIN — HYDROMORPHONE HYDROCHLORIDE 4 MILLIGRAM(S): 2 INJECTION INTRAMUSCULAR; INTRAVENOUS; SUBCUTANEOUS at 15:37

## 2017-06-27 RX ADMIN — Medication 2: at 12:02

## 2017-06-27 RX ADMIN — INSULIN GLARGINE 26 UNIT(S): 100 INJECTION, SOLUTION SUBCUTANEOUS at 09:24

## 2017-06-27 RX ADMIN — ATORVASTATIN CALCIUM 40 MILLIGRAM(S): 80 TABLET, FILM COATED ORAL at 22:36

## 2017-06-27 RX ADMIN — HYDROMORPHONE HYDROCHLORIDE 4 MILLIGRAM(S): 2 INJECTION INTRAMUSCULAR; INTRAVENOUS; SUBCUTANEOUS at 18:43

## 2017-06-27 RX ADMIN — HYDROMORPHONE HYDROCHLORIDE 4 MILLIGRAM(S): 2 INJECTION INTRAMUSCULAR; INTRAVENOUS; SUBCUTANEOUS at 22:37

## 2017-06-27 RX ADMIN — HYDROMORPHONE HYDROCHLORIDE 4 MILLIGRAM(S): 2 INJECTION INTRAMUSCULAR; INTRAVENOUS; SUBCUTANEOUS at 09:56

## 2017-06-27 RX ADMIN — HYDROMORPHONE HYDROCHLORIDE 4 MILLIGRAM(S): 2 INJECTION INTRAMUSCULAR; INTRAVENOUS; SUBCUTANEOUS at 19:10

## 2017-06-27 RX ADMIN — GABAPENTIN 300 MILLIGRAM(S): 400 CAPSULE ORAL at 17:01

## 2017-06-27 RX ADMIN — POLYETHYLENE GLYCOL 3350 17 GRAM(S): 17 POWDER, FOR SOLUTION ORAL at 12:01

## 2017-06-27 RX ADMIN — HYDROMORPHONE HYDROCHLORIDE 4 MILLIGRAM(S): 2 INJECTION INTRAMUSCULAR; INTRAVENOUS; SUBCUTANEOUS at 23:07

## 2017-06-27 RX ADMIN — ATENOLOL 50 MILLIGRAM(S): 25 TABLET ORAL at 05:39

## 2017-06-27 RX ADMIN — HYDROMORPHONE HYDROCHLORIDE 1 MILLIGRAM(S): 2 INJECTION INTRAMUSCULAR; INTRAVENOUS; SUBCUTANEOUS at 12:32

## 2017-06-27 RX ADMIN — PANTOPRAZOLE SODIUM 40 MILLIGRAM(S): 20 TABLET, DELAYED RELEASE ORAL at 07:00

## 2017-06-27 RX ADMIN — Medication 975 MILLIGRAM(S): at 12:01

## 2017-06-27 RX ADMIN — HYDROMORPHONE HYDROCHLORIDE 4 MILLIGRAM(S): 2 INJECTION INTRAMUSCULAR; INTRAVENOUS; SUBCUTANEOUS at 09:28

## 2017-06-27 RX ADMIN — Medication 975 MILLIGRAM(S): at 06:09

## 2017-06-27 RX ADMIN — Medication 975 MILLIGRAM(S): at 05:39

## 2017-06-27 RX ADMIN — GABAPENTIN 300 MILLIGRAM(S): 400 CAPSULE ORAL at 05:39

## 2017-06-27 RX ADMIN — Medication 18 UNIT(S): at 18:42

## 2017-06-27 RX ADMIN — HYDROMORPHONE HYDROCHLORIDE 4 MILLIGRAM(S): 2 INJECTION INTRAMUSCULAR; INTRAVENOUS; SUBCUTANEOUS at 03:07

## 2017-06-27 RX ADMIN — Medication 975 MILLIGRAM(S): at 17:00

## 2017-06-27 RX ADMIN — ENOXAPARIN SODIUM 40 MILLIGRAM(S): 100 INJECTION SUBCUTANEOUS at 15:11

## 2017-06-27 RX ADMIN — HYDROMORPHONE HYDROCHLORIDE 4 MILLIGRAM(S): 2 INJECTION INTRAMUSCULAR; INTRAVENOUS; SUBCUTANEOUS at 15:08

## 2017-06-27 RX ADMIN — HYDROMORPHONE HYDROCHLORIDE 1 MILLIGRAM(S): 2 INJECTION INTRAMUSCULAR; INTRAVENOUS; SUBCUTANEOUS at 17:26

## 2017-06-27 RX ADMIN — Medication 18 UNIT(S): at 09:23

## 2017-06-27 RX ADMIN — HYDROMORPHONE HYDROCHLORIDE 1 MILLIGRAM(S): 2 INJECTION INTRAMUSCULAR; INTRAVENOUS; SUBCUTANEOUS at 12:01

## 2017-06-27 RX ADMIN — Medication 500 MILLIGRAM(S): at 12:01

## 2017-06-27 RX ADMIN — Medication 325 MILLIGRAM(S): at 12:01

## 2017-06-27 RX ADMIN — Medication 975 MILLIGRAM(S): at 12:31

## 2017-06-27 RX ADMIN — HYDROMORPHONE HYDROCHLORIDE 4 MILLIGRAM(S): 2 INJECTION INTRAMUSCULAR; INTRAVENOUS; SUBCUTANEOUS at 03:37

## 2017-06-27 NOTE — PROGRESS NOTE ADULT - PROBLEM SELECTOR PLAN 1
-Check FS AC/HS  -Continue Lantus to 26 UNITS BID plus Humalog 18 ac meals while in hospital  -c/w Humalog correction scales AC AND HS while in hospital  -Upon discharge pt to go home on Tresiba 50units qhs plus Novolog 18 ac meals. Hold Farxiga for now.   -Pt to F/u with Dr Barriga at [address] [place] Phone [phone]   -discussed with Pt and team.  Contact: pager: [phone] or [phone] (24/7) office

## 2017-06-27 NOTE — PROGRESS NOTE ADULT - ASSESSMENT
Impression: Stable       Plan: Continue present treatment                 Out of bed, ambulate,                  Continue to monitor                      Juan Kevin PA-C  Orthopaedic Surgery  Team pager [phone]  [phone]
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
## 2017-06-27 NOTE — PROGRESS NOTE ADULT - SUBJECTIVE AND OBJECTIVE BOX
Pt seen and examined. Doing well. No acute events o/n.     T(C): 37.1, Max: 37.1 (06-27 @ 00:20)  T(F): 98.7, Max: 98.7 (06-27 @ 00:20)  HR: 79 (63 - 79)  BP: 136/80 (125/77 - 147/82)  RR: 18 (18 - 18)  SpO2: 95% (95% - 99%)  Wt(kg): --    I & Os for 24h ending 26 Jun 2017 07:00  =============================================  IN:    Oral Fluid: 1590 ml    lactated ringers.: 559 ml    Total IN: 2149 ml  ---------------------------------------------  OUT:    Voided: 1210 ml    Bulb: 90 ml    Bulb: 40 ml    Total OUT: 1340 ml  ---------------------------------------------  Total NET: 809 ml    I & Os for current day (as of 27 Jun 2017 06:30)  =============================================  IN:    Oral Fluid: 1460 ml    lactated ringers.: 875 ml    Total IN: 2335 ml  ---------------------------------------------  OUT:    Voided: 1300 ml    Bulb: 78 ml    Total OUT: 1378 ml  ---------------------------------------------  Total NET: 957 ml      Exam:  STSG with good take.  Flap exam unchanged, distal necrosis vs epidermalysis.  Distal incision c/d/i  JPs serosanguinous.   Back soft.  No splint in palce.
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
## 2017-06-28 VITALS
HEART RATE: 70 BPM | SYSTOLIC BLOOD PRESSURE: 136 MMHG | OXYGEN SATURATION: 98 % | RESPIRATION RATE: 18 BRPM | DIASTOLIC BLOOD PRESSURE: 75 MMHG | TEMPERATURE: 98 F

## 2017-06-28 PROCEDURE — P9016: CPT

## 2017-06-28 PROCEDURE — 73130 X-RAY EXAM OF HAND: CPT

## 2017-06-28 PROCEDURE — 76000 FLUOROSCOPY <1 HR PHYS/QHP: CPT

## 2017-06-28 PROCEDURE — 73080 X-RAY EXAM OF ELBOW: CPT

## 2017-06-28 PROCEDURE — 97165 OT EVAL LOW COMPLEX 30 MIN: CPT

## 2017-06-28 PROCEDURE — 84702 CHORIONIC GONADOTROPIN TEST: CPT

## 2017-06-28 PROCEDURE — 73206 CT ANGIO UPR EXTRM W/O&W/DYE: CPT

## 2017-06-28 PROCEDURE — 85610 PROTHROMBIN TIME: CPT

## 2017-06-28 PROCEDURE — 86870 RBC ANTIBODY IDENTIFICATION: CPT

## 2017-06-28 PROCEDURE — 82435 ASSAY OF BLOOD CHLORIDE: CPT

## 2017-06-28 PROCEDURE — 90715 TDAP VACCINE 7 YRS/> IM: CPT

## 2017-06-28 PROCEDURE — 73200 CT UPPER EXTREMITY W/O DYE: CPT

## 2017-06-28 PROCEDURE — 80307 DRUG TEST PRSMV CHEM ANLYZR: CPT

## 2017-06-28 PROCEDURE — 86902 BLOOD TYPE ANTIGEN DONOR EA: CPT

## 2017-06-28 PROCEDURE — 86901 BLOOD TYPING SEROLOGIC RH(D): CPT

## 2017-06-28 PROCEDURE — 88304 TISSUE EXAM BY PATHOLOGIST: CPT

## 2017-06-28 PROCEDURE — 72125 CT NECK SPINE W/O DYE: CPT

## 2017-06-28 PROCEDURE — 93005 ELECTROCARDIOGRAM TRACING: CPT

## 2017-06-28 PROCEDURE — G8978: CPT | Mod: CI,CI

## 2017-06-28 PROCEDURE — 73030 X-RAY EXAM OF SHOULDER: CPT

## 2017-06-28 PROCEDURE — C1713: CPT

## 2017-06-28 PROCEDURE — 70450 CT HEAD/BRAIN W/O DYE: CPT

## 2017-06-28 PROCEDURE — 97116 GAIT TRAINING THERAPY: CPT

## 2017-06-28 PROCEDURE — 71045 X-RAY EXAM CHEST 1 VIEW: CPT

## 2017-06-28 PROCEDURE — 73090 X-RAY EXAM OF FOREARM: CPT

## 2017-06-28 PROCEDURE — 82150 ASSAY OF AMYLASE: CPT

## 2017-06-28 PROCEDURE — 80053 COMPREHEN METABOLIC PANEL: CPT

## 2017-06-28 PROCEDURE — 82947 ASSAY GLUCOSE BLOOD QUANT: CPT

## 2017-06-28 PROCEDURE — 84132 ASSAY OF SERUM POTASSIUM: CPT

## 2017-06-28 PROCEDURE — 84484 ASSAY OF TROPONIN QUANT: CPT

## 2017-06-28 PROCEDURE — 73060 X-RAY EXAM OF HUMERUS: CPT

## 2017-06-28 PROCEDURE — 85027 COMPLETE CBC AUTOMATED: CPT

## 2017-06-28 PROCEDURE — 72170 X-RAY EXAM OF PELVIS: CPT

## 2017-06-28 PROCEDURE — 99285 EMERGENCY DEPT VISIT HI MDM: CPT | Mod: 25

## 2017-06-28 PROCEDURE — 97530 THERAPEUTIC ACTIVITIES: CPT

## 2017-06-28 PROCEDURE — 82550 ASSAY OF CK (CPK): CPT

## 2017-06-28 PROCEDURE — 90471 IMMUNIZATION ADMIN: CPT

## 2017-06-28 PROCEDURE — 82803 BLOOD GASES ANY COMBINATION: CPT

## 2017-06-28 PROCEDURE — 86905 BLOOD TYPING RBC ANTIGENS: CPT

## 2017-06-28 PROCEDURE — C1889: CPT

## 2017-06-28 PROCEDURE — 73070 X-RAY EXAM OF ELBOW: CPT

## 2017-06-28 PROCEDURE — 85730 THROMBOPLASTIN TIME PARTIAL: CPT

## 2017-06-28 PROCEDURE — 82330 ASSAY OF CALCIUM: CPT

## 2017-06-28 PROCEDURE — 82553 CREATINE MB FRACTION: CPT

## 2017-06-28 PROCEDURE — 83735 ASSAY OF MAGNESIUM: CPT

## 2017-06-28 PROCEDURE — 83690 ASSAY OF LIPASE: CPT

## 2017-06-28 PROCEDURE — 83605 ASSAY OF LACTIC ACID: CPT

## 2017-06-28 PROCEDURE — 80048 BASIC METABOLIC PNL TOTAL CA: CPT

## 2017-06-28 PROCEDURE — 81001 URINALYSIS AUTO W/SCOPE: CPT

## 2017-06-28 PROCEDURE — 84295 ASSAY OF SERUM SODIUM: CPT

## 2017-06-28 PROCEDURE — 96374 THER/PROPH/DIAG INJ IV PUSH: CPT | Mod: XU

## 2017-06-28 PROCEDURE — 86880 COOMBS TEST DIRECT: CPT

## 2017-06-28 PROCEDURE — 88305 TISSUE EXAM BY PATHOLOGIST: CPT

## 2017-06-28 PROCEDURE — 83036 HEMOGLOBIN GLYCOSYLATED A1C: CPT

## 2017-06-28 PROCEDURE — 76376 3D RENDER W/INTRP POSTPROCES: CPT

## 2017-06-28 PROCEDURE — 85014 HEMATOCRIT: CPT

## 2017-06-28 PROCEDURE — 74177 CT ABD & PELVIS W/CONTRAST: CPT

## 2017-06-28 PROCEDURE — 84100 ASSAY OF PHOSPHORUS: CPT

## 2017-06-28 PROCEDURE — G8979: CPT | Mod: CI,CI

## 2017-06-28 PROCEDURE — 86922 COMPATIBILITY TEST ANTIGLOB: CPT

## 2017-06-28 PROCEDURE — 83874 ASSAY OF MYOGLOBIN: CPT

## 2017-06-28 PROCEDURE — G8980: CPT | Mod: CI,CI

## 2017-06-28 PROCEDURE — 97162 PT EVAL MOD COMPLEX 30 MIN: CPT

## 2017-06-28 PROCEDURE — 86850 RBC ANTIBODY SCREEN: CPT

## 2017-06-28 PROCEDURE — 88309 TISSUE EXAM BY PATHOLOGIST: CPT

## 2017-06-28 PROCEDURE — 97760 ORTHOTIC MGMT&TRAING 1ST ENC: CPT

## 2017-06-28 PROCEDURE — 97110 THERAPEUTIC EXERCISES: CPT

## 2017-06-28 PROCEDURE — 86900 BLOOD TYPING SEROLOGIC ABO: CPT

## 2017-06-28 PROCEDURE — 36430 TRANSFUSION BLD/BLD COMPNT: CPT

## 2017-06-28 PROCEDURE — 71260 CT THORAX DX C+: CPT

## 2017-06-28 PROCEDURE — 97161 PT EVAL LOW COMPLEX 20 MIN: CPT

## 2017-06-28 RX ADMIN — HYDROMORPHONE HYDROCHLORIDE 4 MILLIGRAM(S): 2 INJECTION INTRAMUSCULAR; INTRAVENOUS; SUBCUTANEOUS at 02:47

## 2017-06-28 RX ADMIN — Medication 975 MILLIGRAM(S): at 06:37

## 2017-06-28 RX ADMIN — HYDROMORPHONE HYDROCHLORIDE 4 MILLIGRAM(S): 2 INJECTION INTRAMUSCULAR; INTRAVENOUS; SUBCUTANEOUS at 07:09

## 2017-06-28 RX ADMIN — ATENOLOL 50 MILLIGRAM(S): 25 TABLET ORAL at 06:37

## 2017-06-28 RX ADMIN — HYDROMORPHONE HYDROCHLORIDE 4 MILLIGRAM(S): 2 INJECTION INTRAMUSCULAR; INTRAVENOUS; SUBCUTANEOUS at 12:51

## 2017-06-28 RX ADMIN — Medication 975 MILLIGRAM(S): at 12:13

## 2017-06-28 RX ADMIN — Medication 1 TABLET(S): at 12:13

## 2017-06-28 RX ADMIN — HYDROMORPHONE HYDROCHLORIDE 4 MILLIGRAM(S): 2 INJECTION INTRAMUSCULAR; INTRAVENOUS; SUBCUTANEOUS at 06:39

## 2017-06-28 RX ADMIN — HYDROMORPHONE HYDROCHLORIDE 1 MILLIGRAM(S): 2 INJECTION INTRAMUSCULAR; INTRAVENOUS; SUBCUTANEOUS at 04:00

## 2017-06-28 RX ADMIN — Medication 975 MILLIGRAM(S): at 06:57

## 2017-06-28 RX ADMIN — Medication 1: at 13:00

## 2017-06-28 RX ADMIN — POLYETHYLENE GLYCOL 3350 17 GRAM(S): 17 POWDER, FOR SOLUTION ORAL at 12:13

## 2017-06-28 RX ADMIN — Medication 18 UNIT(S): at 09:44

## 2017-06-28 RX ADMIN — HYDROMORPHONE HYDROCHLORIDE 4 MILLIGRAM(S): 2 INJECTION INTRAMUSCULAR; INTRAVENOUS; SUBCUTANEOUS at 09:51

## 2017-06-28 RX ADMIN — Medication 18 UNIT(S): at 13:00

## 2017-06-28 RX ADMIN — Medication 975 MILLIGRAM(S): at 12:14

## 2017-06-28 RX ADMIN — HYDROMORPHONE HYDROCHLORIDE 1 MILLIGRAM(S): 2 INJECTION INTRAMUSCULAR; INTRAVENOUS; SUBCUTANEOUS at 03:30

## 2017-06-28 RX ADMIN — GABAPENTIN 300 MILLIGRAM(S): 400 CAPSULE ORAL at 13:00

## 2017-06-28 RX ADMIN — HYDROMORPHONE HYDROCHLORIDE 4 MILLIGRAM(S): 2 INJECTION INTRAMUSCULAR; INTRAVENOUS; SUBCUTANEOUS at 03:15

## 2017-06-28 RX ADMIN — HYDROMORPHONE HYDROCHLORIDE 4 MILLIGRAM(S): 2 INJECTION INTRAMUSCULAR; INTRAVENOUS; SUBCUTANEOUS at 10:21

## 2017-06-28 RX ADMIN — GABAPENTIN 300 MILLIGRAM(S): 400 CAPSULE ORAL at 06:37

## 2017-06-28 RX ADMIN — INSULIN GLARGINE 26 UNIT(S): 100 INJECTION, SOLUTION SUBCUTANEOUS at 09:44

## 2017-06-28 RX ADMIN — Medication 325 MILLIGRAM(S): at 12:13

## 2017-06-28 RX ADMIN — PANTOPRAZOLE SODIUM 40 MILLIGRAM(S): 20 TABLET, DELAYED RELEASE ORAL at 06:37

## 2017-06-28 RX ADMIN — Medication 500 MILLIGRAM(S): at 12:13

## 2017-06-28 RX ADMIN — HYDROMORPHONE HYDROCHLORIDE 4 MILLIGRAM(S): 2 INJECTION INTRAMUSCULAR; INTRAVENOUS; SUBCUTANEOUS at 13:21
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
## 2017-06-28 NOTE — PROGRESS NOTE ADULT - SUBJECTIVE AND OBJECTIVE BOX
Plastic Surgery Progress Note    S: Patient seen and examined.  doing well.     Vital Signs Last 24 Hrs  T(C): 37 (28 Jun 2017 04:29), Max: 37.1 (27 Jun 2017 21:04)  T(F): 98.6 (28 Jun 2017 04:29), Max: 98.8 (27 Jun 2017 21:04)  HR: 83 (28 Jun 2017 04:29) (63 - 83)  BP: 127/76 (28 Jun 2017 04:29) (107/68 - 139/84)  BP(mean): --  RR: 18 (28 Jun 2017 04:29) (18 - 18)  SpO2: 97% (28 Jun 2017 04:29) (95% - 98%)  I&O's Detail    26 Jun 2017 07:01  -  27 Jun 2017 07:00  --------------------------------------------------------  IN:    lactated ringers.: 875 mL    Oral Fluid: 1460 mL  Total IN: 2335 mL    OUT:    Bulb: 98 mL    Voided: 1300 mL  Total OUT: 1398 mL    Total NET: 937 mL      27 Jun 2017 07:01  -  28 Jun 2017 06:39  --------------------------------------------------------  IN:    Oral Fluid: 760 mL  Total IN: 760 mL    OUT:    Bulb: 50 mL    Voided: 600 mL  Total OUT: 650 mL    Total NET: 110 mL          Physical Exam:  General: Awake and alert, NAD  RUE:  external fixator and dressing in place
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
## 2017-06-28 NOTE — PROGRESS NOTE ADULT - ASSESSMENT
S/P I&D, External-Fixator, Cement Spacer        transfer AIN to ulnar nerve by PRS    -pain control  -discharge today  -instructions provided by plastic surgery team for rehab and followup  -50/50 [employer] on discharge for ex fix

## 2017-06-28 NOTE — PROGRESS NOTE ADULT - PROVIDER SPECIALTY LIST ADULT
Anesthesia
Endocrinology
Internal Medicine
Orthopedics
Plastic Surgery
SICU
Trauma Surgery
Vascular Surgery
Anesthesia
Internal Medicine
Orthopedics
Plastic Surgery
Orthopedics

## 2017-06-28 NOTE — PROGRESS NOTE ADULT - SUBJECTIVE AND OBJECTIVE BOX
Patient resting without complaints. No acute events overnight    Vital Signs Last 24 Hrs  T(C): 37 (28 Jun 2017 04:29), Max: 37.1 (27 Jun 2017 21:04)  T(F): 98.6 (28 Jun 2017 04:29), Max: 98.8 (27 Jun 2017 21:04)  HR: 83 (28 Jun 2017 04:29) (63 - 83)  BP: 127/76 (28 Jun 2017 04:29) (107/68 - 139/84)  BP(mean): --  RR: 18 (28 Jun 2017 04:29) (18 - 18)  SpO2: 97% (28 Jun 2017 04:29) (95% - 98%)    Exam:  Alert and Oriented, No Acute Distress              RUE:         Dressing clean and intact        RACHAEL's x 2 patent          hand swollen        sensation grossly intact except for decrease sensation 5th digit

## 2017-06-28 NOTE — PROGRESS NOTE ADULT - PROBLEM SELECTOR PROBLEM 1
Arm fracture, right, open, initial encounter
Arm fracture, right, open, initial encounter
Fracture of right elbow, open, initial encounter
Fracture of right elbow, open, initial encounter
Type 2 diabetes mellitus without complication, with long-term current use of insulin
Fracture of olecranon, right, open
Type 2 diabetes mellitus without complication, with long-term current use of insulin
Arm fracture, right, open, initial encounter
Arm laceration with complication, right, initial encounter
Fracture of right elbow, open, initial encounter
Fracture of right elbow, open, initial encounter
Type 2 diabetes mellitus without complication, with long-term current use of insulin

## 2017-07-01 ENCOUNTER — TRANSCRIPTION ENCOUNTER (OUTPATIENT)
Age: 57
End: 2017-07-01

## 2017-07-05 ENCOUNTER — APPOINTMENT (OUTPATIENT)
Dept: PLASTIC SURGERY | Facility: CLINIC | Age: 57
End: 2017-07-05

## 2017-07-05 PROBLEM — Z00.00 ENCOUNTER FOR PREVENTIVE HEALTH EXAMINATION: Status: ACTIVE | Noted: 2017-07-05

## 2017-07-06 RX ORDER — HYDROMORPHONE HYDROCHLORIDE 2 MG/1
2 TABLET ORAL EVERY 6 HOURS
Qty: 20 | Refills: 0 | Status: ACTIVE | COMMUNITY
Start: 2017-07-06 | End: 1900-01-01

## 2017-07-06 RX ORDER — HYDROMORPHONE HYDROCHLORIDE 2 MG/1
2 TABLET ORAL EVERY 6 HOURS
Qty: 20 | Refills: 0 | Status: DISCONTINUED | COMMUNITY
Start: 2017-07-06 | End: 2017-07-06

## 2017-07-12 ENCOUNTER — APPOINTMENT (OUTPATIENT)
Dept: PLASTIC SURGERY | Facility: CLINIC | Age: 57
End: 2017-07-12

## 2017-07-12 RX ORDER — GABAPENTIN 300 MG/1
300 CAPSULE ORAL 3 TIMES DAILY
Qty: 30 | Refills: 0 | Status: ACTIVE | COMMUNITY
Start: 2017-07-12 | End: 1900-01-01

## 2017-07-12 RX ORDER — HYDROMORPHONE HYDROCHLORIDE 2 MG/1
2 TABLET ORAL
Qty: 20 | Refills: 0 | Status: ACTIVE | COMMUNITY
Start: 2017-07-12 | End: 1900-01-01

## 2017-07-19 ENCOUNTER — APPOINTMENT (OUTPATIENT)
Dept: PLASTIC SURGERY | Facility: CLINIC | Age: 57
End: 2017-07-19

## 2017-07-19 RX ORDER — OXYCODONE 5 MG/1
5 TABLET ORAL
Qty: 15 | Refills: 0 | Status: ACTIVE | COMMUNITY
Start: 2017-07-19 | End: 1900-01-01

## 2017-07-24 ENCOUNTER — APPOINTMENT (OUTPATIENT)
Dept: PLASTIC SURGERY | Facility: CLINIC | Age: 57
End: 2017-07-24

## 2017-08-03 ENCOUNTER — APPOINTMENT (OUTPATIENT)
Dept: PLASTIC SURGERY | Facility: CLINIC | Age: 57
End: 2017-08-03
Payer: COMMERCIAL

## 2017-08-03 PROCEDURE — 11042 DBRDMT SUBQ TIS 1ST 20SQCM/<: CPT

## 2017-08-09 ENCOUNTER — APPOINTMENT (OUTPATIENT)
Dept: PLASTIC SURGERY | Facility: CLINIC | Age: 57
End: 2017-08-09
Payer: COMMERCIAL

## 2017-08-09 PROCEDURE — 11042 DBRDMT SUBQ TIS 1ST 20SQCM/<: CPT

## 2017-08-10 RX ORDER — GABAPENTIN 300 MG/1
300 CAPSULE ORAL 3 TIMES DAILY
Qty: 30 | Refills: 1 | Status: ACTIVE | COMMUNITY
Start: 2017-07-19 | End: 1900-01-01

## 2017-08-16 ENCOUNTER — APPOINTMENT (OUTPATIENT)
Dept: PLASTIC SURGERY | Facility: CLINIC | Age: 57
End: 2017-08-16
Payer: COMMERCIAL

## 2017-08-16 PROCEDURE — 99024 POSTOP FOLLOW-UP VISIT: CPT

## 2017-08-17 RX ORDER — ALOE VERA/COLLAGEN
FOAM (ML) TOPICAL
Qty: 355 | Refills: 4 | Status: ACTIVE | COMMUNITY
Start: 2017-08-17 | End: 1900-01-01

## 2017-08-23 ENCOUNTER — APPOINTMENT (OUTPATIENT)
Dept: PLASTIC SURGERY | Facility: CLINIC | Age: 57
End: 2017-08-23
Payer: COMMERCIAL

## 2017-08-23 PROCEDURE — 99024 POSTOP FOLLOW-UP VISIT: CPT

## 2017-08-30 ENCOUNTER — APPOINTMENT (OUTPATIENT)
Dept: PLASTIC SURGERY | Facility: CLINIC | Age: 57
End: 2017-08-30
Payer: COMMERCIAL

## 2017-08-30 PROCEDURE — 99024 POSTOP FOLLOW-UP VISIT: CPT

## 2017-09-08 ENCOUNTER — INPATIENT (INPATIENT)
Facility: HOSPITAL | Age: 57
LOS: 0 days | Discharge: HOME | DRG: 561 | End: 2017-09-08
Attending: ORTHOPAEDIC SURGERY | Admitting: ORTHOPAEDIC SURGERY
Payer: COMMERCIAL

## 2017-09-08 VITALS
OXYGEN SATURATION: 98 % | SYSTOLIC BLOOD PRESSURE: 112 MMHG | RESPIRATION RATE: 16 BRPM | TEMPERATURE: 99 F | HEART RATE: 79 BPM | DIASTOLIC BLOOD PRESSURE: 76 MMHG

## 2017-09-08 VITALS
SYSTOLIC BLOOD PRESSURE: 128 MMHG | TEMPERATURE: 98 F | HEIGHT: 60 IN | RESPIRATION RATE: 20 BRPM | WEIGHT: 194.01 LBS | OXYGEN SATURATION: 98 % | DIASTOLIC BLOOD PRESSURE: 77 MMHG | HEART RATE: 84 BPM

## 2017-09-08 DIAGNOSIS — S42.401A UNSPECIFIED FRACTURE OF LOWER END OF RIGHT HUMERUS, INITIAL ENCOUNTER FOR CLOSED FRACTURE: ICD-10-CM

## 2017-09-08 DIAGNOSIS — Z96.7 PRESENCE OF OTHER BONE AND TENDON IMPLANTS: Chronic | ICD-10-CM

## 2017-09-08 LAB
ANION GAP SERPL CALC-SCNC: 15 MMOL/L — SIGNIFICANT CHANGE UP (ref 5–17)
APTT BLD: 29 SEC — SIGNIFICANT CHANGE UP (ref 27.5–37.4)
BLD GP AB SCN SERPL QL: POSITIVE — SIGNIFICANT CHANGE UP
BUN SERPL-MCNC: 21 MG/DL — SIGNIFICANT CHANGE UP (ref 7–23)
CALCIUM SERPL-MCNC: 9.7 MG/DL — SIGNIFICANT CHANGE UP (ref 8.4–10.5)
CHLORIDE SERPL-SCNC: 99 MMOL/L — SIGNIFICANT CHANGE UP (ref 96–108)
CO2 SERPL-SCNC: 25 MMOL/L — SIGNIFICANT CHANGE UP (ref 22–31)
CREAT SERPL-MCNC: 1.36 MG/DL — HIGH (ref 0.5–1.3)
GLUCOSE SERPL-MCNC: 215 MG/DL — HIGH (ref 70–99)
HCT VFR BLD CALC: 31.4 % — LOW (ref 34.5–45)
HGB BLD-MCNC: 10.8 G/DL — LOW (ref 11.5–15.5)
INR BLD: 1.09 RATIO — SIGNIFICANT CHANGE UP (ref 0.88–1.16)
MCHC RBC-ENTMCNC: 27.4 PG — SIGNIFICANT CHANGE UP (ref 27–34)
MCHC RBC-ENTMCNC: 34.2 GM/DL — SIGNIFICANT CHANGE UP (ref 32–36)
MCV RBC AUTO: 80 FL — SIGNIFICANT CHANGE UP (ref 80–100)
PLATELET # BLD AUTO: 312 K/UL — SIGNIFICANT CHANGE UP (ref 150–400)
POTASSIUM SERPL-MCNC: 4.7 MMOL/L — SIGNIFICANT CHANGE UP (ref 3.5–5.3)
POTASSIUM SERPL-SCNC: 4.7 MMOL/L — SIGNIFICANT CHANGE UP (ref 3.5–5.3)
PROTHROM AB SERPL-ACNC: 11.8 SEC — SIGNIFICANT CHANGE UP (ref 9.8–12.7)
RBC # BLD: 3.93 M/UL — SIGNIFICANT CHANGE UP (ref 3.8–5.2)
RBC # FLD: 16.7 % — HIGH (ref 10.3–14.5)
RH IG SCN BLD-IMP: NEGATIVE — SIGNIFICANT CHANGE UP
SODIUM SERPL-SCNC: 139 MMOL/L — SIGNIFICANT CHANGE UP (ref 135–145)
WBC # BLD: 11.9 K/UL — HIGH (ref 3.8–10.5)
WBC # FLD AUTO: 11.9 K/UL — HIGH (ref 3.8–10.5)

## 2017-09-08 PROCEDURE — 86850 RBC ANTIBODY SCREEN: CPT

## 2017-09-08 PROCEDURE — 85027 COMPLETE CBC AUTOMATED: CPT

## 2017-09-08 PROCEDURE — 86870 RBC ANTIBODY IDENTIFICATION: CPT

## 2017-09-08 PROCEDURE — 71045 X-RAY EXAM CHEST 1 VIEW: CPT

## 2017-09-08 PROCEDURE — 86900 BLOOD TYPING SEROLOGIC ABO: CPT

## 2017-09-08 PROCEDURE — 80048 BASIC METABOLIC PNL TOTAL CA: CPT

## 2017-09-08 PROCEDURE — 73080 X-RAY EXAM OF ELBOW: CPT

## 2017-09-08 PROCEDURE — 76000 FLUOROSCOPY <1 HR PHYS/QHP: CPT

## 2017-09-08 PROCEDURE — 85730 THROMBOPLASTIN TIME PARTIAL: CPT

## 2017-09-08 PROCEDURE — 73080 X-RAY EXAM OF ELBOW: CPT | Mod: 26,RT

## 2017-09-08 PROCEDURE — 71010: CPT | Mod: 26

## 2017-09-08 PROCEDURE — 86922 COMPATIBILITY TEST ANTIGLOB: CPT

## 2017-09-08 PROCEDURE — 86901 BLOOD TYPING SEROLOGIC RH(D): CPT

## 2017-09-08 PROCEDURE — 85610 PROTHROMBIN TIME: CPT

## 2017-09-08 RX ORDER — OXYCODONE HYDROCHLORIDE 5 MG/1
1 TABLET ORAL
Qty: 60 | Refills: 0 | OUTPATIENT
Start: 2017-09-08

## 2017-09-08 RX ORDER — OXYCODONE HYDROCHLORIDE 5 MG/1
1 TABLET ORAL
Qty: 0 | Refills: 0 | COMMUNITY

## 2017-09-08 RX ORDER — ONDANSETRON 8 MG/1
4 TABLET, FILM COATED ORAL ONCE
Qty: 0 | Refills: 0 | Status: DISCONTINUED | OUTPATIENT
Start: 2017-09-08 | End: 2017-09-08

## 2017-09-08 RX ORDER — HYDROMORPHONE HYDROCHLORIDE 2 MG/ML
0.5 INJECTION INTRAMUSCULAR; INTRAVENOUS; SUBCUTANEOUS
Qty: 0 | Refills: 0 | Status: DISCONTINUED | OUTPATIENT
Start: 2017-09-08 | End: 2017-09-08

## 2017-09-08 NOTE — ASU DISCHARGE PLAN (ADULT/PEDIATRIC). - MEDICATION SUMMARY - MEDICATIONS TO STOP TAKING
I will STOP taking the medications listed below when I get home from the hospital:    HYDROmorphone 4 mg oral tablet  -- 1 tab(s) by mouth every 3 hours, As needed, Severe Pain (7 - 10)    HYDROmorphone 2 mg oral tablet  -- 1-2 tab(s) by mouth every 3 hours, As needed, Moderate Pain (4 - 6) MDD:8    OxyCONTIN 10 mg oral tablet, extended release  -- 1 tab(s) by mouth every 12 hours

## 2017-09-08 NOTE — BRIEF OPERATIVE NOTE - PROCEDURE
Removal of hardware  09/08/2017  right elbow removal of external fixation, manipulation of right elbow under anesthesia  Active  NOLSON

## 2017-09-08 NOTE — ASU DISCHARGE PLAN (ADULT/PEDIATRIC). - MEDICATION SUMMARY - MEDICATIONS TO TAKE
I will START or STAY ON the medications listed below when I get home from the hospital:    occupational therapy, Right hand  -- 1 application percutaneous once a day MDD:occupational therapy, Right hand  -- Indication: For home med    Ecotrin Adult Low Strength 81 mg oral delayed release tablet  -- 1 tab(s) by mouth once a day x 6 weeks  -- Indication: For home med    Tylenol PM  -- 250  by mouth once (at bedtime)  -- Indication: For home med    oxyCODONE 10 mg oral tablet  -- 1 tab(s) by mouth every 6 hours, As Needed -for severe pain MDD:4  -- Caution federal law prohibits the transfer of this drug to any person other  than the person for whom it was prescribed.  Check with your doctor before becoming pregnant.  Do not drink alcoholic beverages when taking this medication.  May cause drowsiness.  Alcohol may intensify this effect.  Use care when operating dangerous machinery.  This drug may impair the ability to drive or operate machinery.  Use care until you become familiar with its effects.  This prescription cannot be refilled.  Using more of this medication than prescribed may cause serious breathing problems.    -- Indication: For pain    gabapentin 300 mg oral capsule  -- 1 cap(s) by mouth 3 times a day  -- Indication: For home med    NovoLOG FlexPen 100 units/mL subcutaneous solution  -- 18 unit(s) subcutaneous 3 times a day (before meals)  -- Do not drink alcoholic beverages when taking this medication.  Keep in refrigerator.  Do not freeze.  Obtain medical advice before taking any non-prescription drugs as some may affect the action of this medication.    -- Indication: For home med    Tresiba FlexTouch 100 units/mL subcutaneous solution  -- 50 unit(s) subcutaneous once a day (at bedtime)  -- Indication: For home med    NovoLOG FlexPen 100 units/mL subcutaneous solution  -- 40  subcutaneous 3 times a day. before meals  -- Indication: For home med    Crestor 10 mg oral tablet  -- 1 tab(s) by mouth once a day (at bedtime)  -- Indication: For home med    TriCor 160 mg oral tablet  -- 1 tab(s) by mouth once a day  -- Indication: For home med    bisoprolol-hydroCHLOROthiazide 5 mg-6.25 mg oral tablet  -- 1 tab(s) by mouth once a day  -- Indication: For home med    Slow Fe (as elemental iron) 45 mg oral tablet, extended release  -- 1 tab(s) by mouth once a day  -- Indication: For home med    docusate sodium 100 mg oral capsule  -- 1 cap(s) by mouth 3 times a day, As needed, Constipation  -- Indication: For home med    NexIUM 40 mg oral delayed release capsule  -- 1 cap(s) by mouth once a day  -- Indication: For home med    Multiple Vitamins oral tablet  -- 1 tab(s) by mouth once a day  -- Indication: For home med    ascorbic acid 500 mg oral tablet  -- 1 tab(s) by mouth once a day  -- Indication: For home med

## 2017-09-08 NOTE — ASU DISCHARGE PLAN (ADULT/PEDIATRIC). - NOTIFY
Bleeding that does not stop/Fever greater than 101/Swelling that continues/Numbness, color, or temperature change to extremity

## 2017-09-08 NOTE — ASU DISCHARGE PLAN (ADULT/PEDIATRIC). - SPECIAL INSTRUCTIONS
Follow up with Dr Maloney in 7-10 days. Call office for appointment. Take medications as prescribed. Keep dressing clean, dry, and intact. Rest, ice, and elevate affected extremity.  Non weight bearing right upper extremity in splint.

## 2017-09-08 NOTE — ASU DISCHARGE PLAN (ADULT/PEDIATRIC). - ACTIVITY LEVEL
no exercise/no sports/gym/no heavy lifting/non weight bearing right upper extremity/no weight bearing

## 2017-09-15 ENCOUNTER — TRANSCRIPTION ENCOUNTER (OUTPATIENT)
Age: 57
End: 2017-09-15

## 2017-09-20 ENCOUNTER — APPOINTMENT (OUTPATIENT)
Dept: PLASTIC SURGERY | Facility: CLINIC | Age: 57
End: 2017-09-20
Payer: COMMERCIAL

## 2017-09-20 PROCEDURE — 99024 POSTOP FOLLOW-UP VISIT: CPT

## 2017-10-11 ENCOUNTER — APPOINTMENT (OUTPATIENT)
Dept: PLASTIC SURGERY | Facility: CLINIC | Age: 57
End: 2017-10-11
Payer: COMMERCIAL

## 2017-10-11 PROCEDURE — 99212 OFFICE O/P EST SF 10 MIN: CPT

## 2017-11-01 ENCOUNTER — APPOINTMENT (OUTPATIENT)
Dept: PLASTIC SURGERY | Facility: CLINIC | Age: 57
End: 2017-11-01
Payer: COMMERCIAL

## 2017-11-01 PROCEDURE — 99213 OFFICE O/P EST LOW 20 MIN: CPT

## 2018-01-17 ENCOUNTER — APPOINTMENT (OUTPATIENT)
Dept: PLASTIC SURGERY | Facility: CLINIC | Age: 58
End: 2018-01-17
Payer: COMMERCIAL

## 2018-01-17 PROCEDURE — 99213 OFFICE O/P EST LOW 20 MIN: CPT

## 2018-03-14 ENCOUNTER — APPOINTMENT (OUTPATIENT)
Dept: PLASTIC SURGERY | Facility: CLINIC | Age: 58
End: 2018-03-14
Payer: COMMERCIAL

## 2018-03-14 DIAGNOSIS — S42.401A UNSPECIFIED FRACTURE OF LOWER END OF RIGHT HUMERUS, INITIAL ENCOUNTER FOR CLOSED FRACTURE: ICD-10-CM

## 2018-03-14 PROCEDURE — 99213 OFFICE O/P EST LOW 20 MIN: CPT

## 2018-04-20 ENCOUNTER — OUTPATIENT (OUTPATIENT)
Dept: OUTPATIENT SERVICES | Facility: HOSPITAL | Age: 58
LOS: 1 days | End: 2018-04-20
Payer: COMMERCIAL

## 2018-04-20 VITALS
RESPIRATION RATE: 14 BRPM | HEART RATE: 79 BPM | TEMPERATURE: 98 F | OXYGEN SATURATION: 98 % | HEIGHT: 60 IN | WEIGHT: 220.46 LBS | SYSTOLIC BLOOD PRESSURE: 127 MMHG | DIASTOLIC BLOOD PRESSURE: 83 MMHG

## 2018-04-20 DIAGNOSIS — E11.9 TYPE 2 DIABETES MELLITUS WITHOUT COMPLICATIONS: ICD-10-CM

## 2018-04-20 DIAGNOSIS — Z96.7 PRESENCE OF OTHER BONE AND TENDON IMPLANTS: Chronic | ICD-10-CM

## 2018-04-20 DIAGNOSIS — M86.9 OSTEOMYELITIS, UNSPECIFIED: ICD-10-CM

## 2018-04-20 DIAGNOSIS — Z01.818 ENCOUNTER FOR OTHER PREPROCEDURAL EXAMINATION: ICD-10-CM

## 2018-04-20 DIAGNOSIS — M86.231: ICD-10-CM

## 2018-04-20 DIAGNOSIS — I10 ESSENTIAL (PRIMARY) HYPERTENSION: ICD-10-CM

## 2018-04-20 DIAGNOSIS — Z98.891 HISTORY OF UTERINE SCAR FROM PREVIOUS SURGERY: Chronic | ICD-10-CM

## 2018-04-20 LAB
ANION GAP SERPL CALC-SCNC: 15 MMOL/L — SIGNIFICANT CHANGE UP (ref 5–17)
BUN SERPL-MCNC: 27 MG/DL — HIGH (ref 7–23)
CALCIUM SERPL-MCNC: 9.8 MG/DL — SIGNIFICANT CHANGE UP (ref 8.4–10.5)
CHLORIDE SERPL-SCNC: 96 MMOL/L — SIGNIFICANT CHANGE UP (ref 96–108)
CO2 SERPL-SCNC: 25 MMOL/L — SIGNIFICANT CHANGE UP (ref 22–31)
CREAT SERPL-MCNC: 1.45 MG/DL — HIGH (ref 0.5–1.3)
GLUCOSE SERPL-MCNC: 343 MG/DL — HIGH (ref 70–99)
POTASSIUM SERPL-MCNC: 3.9 MMOL/L — SIGNIFICANT CHANGE UP (ref 3.5–5.3)
POTASSIUM SERPL-SCNC: 3.9 MMOL/L — SIGNIFICANT CHANGE UP (ref 3.5–5.3)
SODIUM SERPL-SCNC: 136 MMOL/L — SIGNIFICANT CHANGE UP (ref 135–145)

## 2018-04-20 RX ORDER — FERROUS SULFATE 325(65) MG
1 TABLET ORAL
Qty: 0 | Refills: 0 | COMMUNITY

## 2018-04-20 RX ORDER — INSULIN ASPART 100 [IU]/ML
40 INJECTION, SOLUTION SUBCUTANEOUS
Qty: 0 | Refills: 0 | COMMUNITY

## 2018-04-20 RX ORDER — INSULIN DEGLUDEC 100 U/ML
50 INJECTION, SOLUTION SUBCUTANEOUS
Qty: 0 | Refills: 0 | COMMUNITY

## 2018-04-20 NOTE — H&P PST ADULT - PROBLEM SELECTOR PLAN 3
c/w antihypertensives the night before surgery as usual  pt was sent for stress test by cardio on 4/19/18
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
## 2018-04-20 NOTE — H&P PST ADULT - NSANTHOSAYNRD_GEN_A_CORE
No. HAYDER screening performed.  STOP BANG Legend: 0-2 = LOW Risk; 3-4 = INTERMEDIATE Risk; 5-8 = HIGH Risk

## 2018-04-20 NOTE — H&P PST ADULT - PROBLEM SELECTOR PLAN 2
STAT FS on admission  A1c ordered with PST labs   instructed to hold Novolog the morning of surgery and will take 80% (57 units) of Tresiba the night before surgery

## 2018-04-20 NOTE — H&P PST ADULT - PMH
Essential hypertension    HLD (hyperlipidemia)    Renal stones  sepsis - 3 yrs ago  Type 2 diabetes mellitus without complication, with long-term current use of insulin Essential hypertension    HLD (hyperlipidemia)    Obesity    Osteomyelitis of right ulna    Renal stones  sepsis - 3 yrs ago  T2DM (type 2 diabetes mellitus) Essential hypertension    HLD (hyperlipidemia)    Obesity    Osteomyelitis of right ulna    Renal stones  sepsis - 3 yrs ago  T2DM (type 2 diabetes mellitus)    Ulnar nerve injury

## 2018-04-21 LAB
HBA1C BLD-MCNC: 8.2 % — HIGH (ref 4–5.6)
HCT VFR BLD CALC: 36.9 % — SIGNIFICANT CHANGE UP (ref 34.5–45)
HGB BLD-MCNC: 11.7 G/DL — SIGNIFICANT CHANGE UP (ref 11.5–15.5)
MCHC RBC-ENTMCNC: 26.8 PG — LOW (ref 27–34)
MCHC RBC-ENTMCNC: 31.7 GM/DL — LOW (ref 32–36)
MCV RBC AUTO: 84.6 FL — SIGNIFICANT CHANGE UP (ref 80–100)
PLATELET # BLD AUTO: 310 K/UL — SIGNIFICANT CHANGE UP (ref 150–400)
RBC # BLD: 4.36 M/UL — SIGNIFICANT CHANGE UP (ref 3.8–5.2)
RBC # FLD: 15.1 % — HIGH (ref 10.3–14.5)
WBC # BLD: 8.3 K/UL — SIGNIFICANT CHANGE UP (ref 3.8–10.5)
WBC # FLD AUTO: 8.3 K/UL — SIGNIFICANT CHANGE UP (ref 3.8–10.5)

## 2018-04-23 ENCOUNTER — TRANSCRIPTION ENCOUNTER (OUTPATIENT)
Age: 58
End: 2018-04-23

## 2018-04-24 ENCOUNTER — RESULT REVIEW (OUTPATIENT)
Age: 58
End: 2018-04-24

## 2018-04-24 ENCOUNTER — INPATIENT (INPATIENT)
Facility: HOSPITAL | Age: 58
LOS: 5 days | Discharge: HOME | DRG: 857 | End: 2018-04-30
Attending: ORTHOPAEDIC SURGERY | Admitting: ORTHOPAEDIC SURGERY
Payer: COMMERCIAL

## 2018-04-24 VITALS
HEART RATE: 82 BPM | SYSTOLIC BLOOD PRESSURE: 129 MMHG | TEMPERATURE: 98 F | HEIGHT: 60 IN | WEIGHT: 220.46 LBS | DIASTOLIC BLOOD PRESSURE: 82 MMHG | OXYGEN SATURATION: 95 % | RESPIRATION RATE: 16 BRPM

## 2018-04-24 DIAGNOSIS — M86.231: ICD-10-CM

## 2018-04-24 DIAGNOSIS — Z96.7 PRESENCE OF OTHER BONE AND TENDON IMPLANTS: Chronic | ICD-10-CM

## 2018-04-24 DIAGNOSIS — Z98.891 HISTORY OF UTERINE SCAR FROM PREVIOUS SURGERY: Chronic | ICD-10-CM

## 2018-04-24 LAB
ANION GAP SERPL CALC-SCNC: 13 MMOL/L — SIGNIFICANT CHANGE UP (ref 5–17)
BUN SERPL-MCNC: 30 MG/DL — HIGH (ref 7–23)
CALCIUM SERPL-MCNC: 9.1 MG/DL — SIGNIFICANT CHANGE UP (ref 8.4–10.5)
CHLORIDE SERPL-SCNC: 98 MMOL/L — SIGNIFICANT CHANGE UP (ref 96–108)
CO2 SERPL-SCNC: 26 MMOL/L — SIGNIFICANT CHANGE UP (ref 22–31)
CREAT SERPL-MCNC: 1.43 MG/DL — HIGH (ref 0.5–1.3)
GLUCOSE BLDC GLUCOMTR-MCNC: 256 MG/DL — HIGH (ref 70–99)
GLUCOSE BLDC GLUCOMTR-MCNC: 282 MG/DL — HIGH (ref 70–99)
GLUCOSE BLDC GLUCOMTR-MCNC: 346 MG/DL — HIGH (ref 70–99)
GLUCOSE SERPL-MCNC: 278 MG/DL — HIGH (ref 70–99)
HCT VFR BLD CALC: 34.3 % — LOW (ref 34.5–45)
HGB BLD-MCNC: 11.9 G/DL — SIGNIFICANT CHANGE UP (ref 11.5–15.5)
MCHC RBC-ENTMCNC: 28.9 PG — SIGNIFICANT CHANGE UP (ref 27–34)
MCHC RBC-ENTMCNC: 34.8 GM/DL — SIGNIFICANT CHANGE UP (ref 32–36)
MCV RBC AUTO: 83 FL — SIGNIFICANT CHANGE UP (ref 80–100)
PLATELET # BLD AUTO: 240 K/UL — SIGNIFICANT CHANGE UP (ref 150–400)
POTASSIUM SERPL-MCNC: 4.8 MMOL/L — SIGNIFICANT CHANGE UP (ref 3.5–5.3)
POTASSIUM SERPL-SCNC: 4.8 MMOL/L — SIGNIFICANT CHANGE UP (ref 3.5–5.3)
RBC # BLD: 4.13 M/UL — SIGNIFICANT CHANGE UP (ref 3.8–5.2)
RBC # FLD: 14.2 % — SIGNIFICANT CHANGE UP (ref 10.3–14.5)
SODIUM SERPL-SCNC: 137 MMOL/L — SIGNIFICANT CHANGE UP (ref 135–145)
WBC # BLD: 6.8 K/UL — SIGNIFICANT CHANGE UP (ref 3.8–10.5)
WBC # FLD AUTO: 6.8 K/UL — SIGNIFICANT CHANGE UP (ref 3.8–10.5)

## 2018-04-24 PROCEDURE — G0463: CPT

## 2018-04-24 PROCEDURE — 73090 X-RAY EXAM OF FOREARM: CPT | Mod: 26,LT

## 2018-04-24 PROCEDURE — 80048 BASIC METABOLIC PNL TOTAL CA: CPT

## 2018-04-24 PROCEDURE — 88305 TISSUE EXAM BY PATHOLOGIST: CPT | Mod: 26

## 2018-04-24 PROCEDURE — 99222 1ST HOSP IP/OBS MODERATE 55: CPT

## 2018-04-24 PROCEDURE — 76000 FLUOROSCOPY <1 HR PHYS/QHP: CPT

## 2018-04-24 PROCEDURE — 85027 COMPLETE CBC AUTOMATED: CPT

## 2018-04-24 PROCEDURE — 83036 HEMOGLOBIN GLYCOSYLATED A1C: CPT

## 2018-04-24 RX ORDER — INSULIN ASPART 100 [IU]/ML
52 INJECTION, SOLUTION SUBCUTANEOUS
Qty: 0 | Refills: 0 | COMMUNITY

## 2018-04-24 RX ORDER — DEXTROSE 50 % IN WATER 50 %
25 SYRINGE (ML) INTRAVENOUS ONCE
Qty: 0 | Refills: 0 | Status: DISCONTINUED | OUTPATIENT
Start: 2018-04-24 | End: 2018-04-30

## 2018-04-24 RX ORDER — SODIUM CHLORIDE 9 MG/ML
1000 INJECTION, SOLUTION INTRAVENOUS
Qty: 0 | Refills: 0 | Status: DISCONTINUED | OUTPATIENT
Start: 2018-04-24 | End: 2018-04-30

## 2018-04-24 RX ORDER — VANCOMYCIN HCL 1 G
VIAL (EA) INTRAVENOUS
Qty: 0 | Refills: 0 | Status: DISCONTINUED | OUTPATIENT
Start: 2018-04-24 | End: 2018-04-24

## 2018-04-24 RX ORDER — TRAMADOL HYDROCHLORIDE 50 MG/1
50 TABLET ORAL
Qty: 0 | Refills: 0 | Status: DISCONTINUED | OUTPATIENT
Start: 2018-04-24 | End: 2018-04-30

## 2018-04-24 RX ORDER — INSULIN DEGLUDEC 100 U/ML
72 INJECTION, SOLUTION SUBCUTANEOUS
Qty: 0 | Refills: 0 | COMMUNITY

## 2018-04-24 RX ORDER — INSULIN LISPRO 100/ML
VIAL (ML) SUBCUTANEOUS
Qty: 0 | Refills: 0 | Status: DISCONTINUED | OUTPATIENT
Start: 2018-04-24 | End: 2018-04-30

## 2018-04-24 RX ORDER — ATORVASTATIN CALCIUM 80 MG/1
40 TABLET, FILM COATED ORAL AT BEDTIME
Qty: 0 | Refills: 0 | Status: DISCONTINUED | OUTPATIENT
Start: 2018-04-24 | End: 2018-04-30

## 2018-04-24 RX ORDER — ASPIRIN/CALCIUM CARB/MAGNESIUM 324 MG
81 TABLET ORAL DAILY
Qty: 0 | Refills: 0 | Status: DISCONTINUED | OUTPATIENT
Start: 2018-04-24 | End: 2018-04-30

## 2018-04-24 RX ORDER — FENOFIBRATE,MICRONIZED 130 MG
1 CAPSULE ORAL
Qty: 0 | Refills: 0 | COMMUNITY

## 2018-04-24 RX ORDER — VANCOMYCIN HCL 1 G
1000 VIAL (EA) INTRAVENOUS EVERY 12 HOURS
Qty: 0 | Refills: 0 | Status: DISCONTINUED | OUTPATIENT
Start: 2018-04-24 | End: 2018-04-27

## 2018-04-24 RX ORDER — INSULIN LISPRO 100/ML
VIAL (ML) SUBCUTANEOUS AT BEDTIME
Qty: 0 | Refills: 0 | Status: DISCONTINUED | OUTPATIENT
Start: 2018-04-24 | End: 2018-04-29

## 2018-04-24 RX ORDER — ACETAMINOPHEN 500 MG
650 TABLET ORAL EVERY 6 HOURS
Qty: 0 | Refills: 0 | Status: DISCONTINUED | OUTPATIENT
Start: 2018-04-24 | End: 2018-04-30

## 2018-04-24 RX ORDER — ACETAMINOPHEN 500 MG
1000 TABLET ORAL ONCE
Qty: 0 | Refills: 0 | Status: DISCONTINUED | OUTPATIENT
Start: 2018-04-24 | End: 2018-04-30

## 2018-04-24 RX ORDER — HYDROMORPHONE HYDROCHLORIDE 2 MG/ML
0.3 INJECTION INTRAMUSCULAR; INTRAVENOUS; SUBCUTANEOUS
Qty: 0 | Refills: 0 | Status: DISCONTINUED | OUTPATIENT
Start: 2018-04-24 | End: 2018-04-30

## 2018-04-24 RX ORDER — DOCUSATE SODIUM 100 MG
100 CAPSULE ORAL THREE TIMES A DAY
Qty: 0 | Refills: 0 | Status: DISCONTINUED | OUTPATIENT
Start: 2018-04-24 | End: 2018-04-29

## 2018-04-24 RX ORDER — LIDOCAINE HCL 20 MG/ML
0.2 VIAL (ML) INJECTION ONCE
Qty: 0 | Refills: 0 | Status: DISCONTINUED | OUTPATIENT
Start: 2018-04-24 | End: 2018-04-24

## 2018-04-24 RX ORDER — HEPARIN SODIUM 5000 [USP'U]/ML
5000 INJECTION INTRAVENOUS; SUBCUTANEOUS EVERY 8 HOURS
Qty: 0 | Refills: 0 | Status: DISCONTINUED | OUTPATIENT
Start: 2018-04-24 | End: 2018-04-30

## 2018-04-24 RX ORDER — ASCORBIC ACID 60 MG
500 TABLET,CHEWABLE ORAL DAILY
Qty: 0 | Refills: 0 | Status: DISCONTINUED | OUTPATIENT
Start: 2018-04-24 | End: 2018-04-30

## 2018-04-24 RX ORDER — ONDANSETRON 8 MG/1
4 TABLET, FILM COATED ORAL EVERY 6 HOURS
Qty: 0 | Refills: 0 | Status: DISCONTINUED | OUTPATIENT
Start: 2018-04-24 | End: 2018-04-30

## 2018-04-24 RX ORDER — DEXTROSE 50 % IN WATER 50 %
1 SYRINGE (ML) INTRAVENOUS ONCE
Qty: 0 | Refills: 0 | Status: DISCONTINUED | OUTPATIENT
Start: 2018-04-24 | End: 2018-04-30

## 2018-04-24 RX ORDER — GLUCAGON INJECTION, SOLUTION 0.5 MG/.1ML
1 INJECTION, SOLUTION SUBCUTANEOUS ONCE
Qty: 0 | Refills: 0 | Status: DISCONTINUED | OUTPATIENT
Start: 2018-04-24 | End: 2018-04-30

## 2018-04-24 RX ORDER — SODIUM CHLORIDE 9 MG/ML
3 INJECTION INTRAMUSCULAR; INTRAVENOUS; SUBCUTANEOUS EVERY 8 HOURS
Qty: 0 | Refills: 0 | Status: DISCONTINUED | OUTPATIENT
Start: 2018-04-24 | End: 2018-04-24

## 2018-04-24 RX ORDER — SODIUM CHLORIDE 9 MG/ML
1000 INJECTION INTRAMUSCULAR; INTRAVENOUS; SUBCUTANEOUS
Qty: 0 | Refills: 0 | Status: DISCONTINUED | OUTPATIENT
Start: 2018-04-24 | End: 2018-04-27

## 2018-04-24 RX ORDER — INSULIN LISPRO 100/ML
3 VIAL (ML) SUBCUTANEOUS ONCE
Qty: 0 | Refills: 0 | Status: COMPLETED | OUTPATIENT
Start: 2018-04-24 | End: 2018-04-24

## 2018-04-24 RX ORDER — ESOMEPRAZOLE MAGNESIUM 40 MG/1
1 CAPSULE, DELAYED RELEASE ORAL
Qty: 0 | Refills: 0 | COMMUNITY

## 2018-04-24 RX ORDER — HYDROMORPHONE HYDROCHLORIDE 2 MG/ML
1 INJECTION INTRAMUSCULAR; INTRAVENOUS; SUBCUTANEOUS EVERY 4 HOURS
Qty: 0 | Refills: 0 | Status: DISCONTINUED | OUTPATIENT
Start: 2018-04-24 | End: 2018-04-30

## 2018-04-24 RX ORDER — CEFAZOLIN SODIUM 1 G
2000 VIAL (EA) INJECTION ONCE
Qty: 0 | Refills: 0 | Status: DISCONTINUED | OUTPATIENT
Start: 2018-04-24 | End: 2018-04-24

## 2018-04-24 RX ORDER — FENOFIBRATE,MICRONIZED 130 MG
145 CAPSULE ORAL DAILY
Qty: 0 | Refills: 0 | Status: DISCONTINUED | OUTPATIENT
Start: 2018-04-24 | End: 2018-04-30

## 2018-04-24 RX ORDER — OXYCODONE HYDROCHLORIDE 5 MG/1
5 TABLET ORAL EVERY 4 HOURS
Qty: 0 | Refills: 0 | Status: DISCONTINUED | OUTPATIENT
Start: 2018-04-24 | End: 2018-04-30

## 2018-04-24 RX ORDER — OXYCODONE HYDROCHLORIDE 5 MG/1
10 TABLET ORAL EVERY 4 HOURS
Qty: 0 | Refills: 0 | Status: DISCONTINUED | OUTPATIENT
Start: 2018-04-24 | End: 2018-04-30

## 2018-04-24 RX ORDER — GABAPENTIN 400 MG/1
600 CAPSULE ORAL THREE TIMES A DAY
Qty: 0 | Refills: 0 | Status: DISCONTINUED | OUTPATIENT
Start: 2018-04-24 | End: 2018-04-30

## 2018-04-24 RX ORDER — ONDANSETRON 8 MG/1
4 TABLET, FILM COATED ORAL ONCE
Qty: 0 | Refills: 0 | Status: DISCONTINUED | OUTPATIENT
Start: 2018-04-24 | End: 2018-04-30

## 2018-04-24 RX ORDER — PANTOPRAZOLE SODIUM 20 MG/1
40 TABLET, DELAYED RELEASE ORAL
Qty: 0 | Refills: 0 | Status: DISCONTINUED | OUTPATIENT
Start: 2018-04-24 | End: 2018-04-30

## 2018-04-24 RX ORDER — ROSUVASTATIN CALCIUM 5 MG/1
1 TABLET ORAL
Qty: 0 | Refills: 0 | COMMUNITY

## 2018-04-24 RX ORDER — DEXTROSE 50 % IN WATER 50 %
12.5 SYRINGE (ML) INTRAVENOUS ONCE
Qty: 0 | Refills: 0 | Status: DISCONTINUED | OUTPATIENT
Start: 2018-04-24 | End: 2018-04-30

## 2018-04-24 RX ADMIN — TRAMADOL HYDROCHLORIDE 50 MILLIGRAM(S): 50 TABLET ORAL at 17:30

## 2018-04-24 RX ADMIN — TRAMADOL HYDROCHLORIDE 50 MILLIGRAM(S): 50 TABLET ORAL at 18:00

## 2018-04-24 RX ADMIN — GABAPENTIN 600 MILLIGRAM(S): 400 CAPSULE ORAL at 21:06

## 2018-04-24 RX ADMIN — Medication 3 UNIT(S): at 09:28

## 2018-04-24 RX ADMIN — Medication 250 MILLIGRAM(S): at 23:23

## 2018-04-24 RX ADMIN — Medication 6: at 16:18

## 2018-04-24 RX ADMIN — ONDANSETRON 4 MILLIGRAM(S): 8 TABLET, FILM COATED ORAL at 15:30

## 2018-04-24 RX ADMIN — SODIUM CHLORIDE 100 MILLILITER(S): 9 INJECTION INTRAMUSCULAR; INTRAVENOUS; SUBCUTANEOUS at 16:19

## 2018-04-24 RX ADMIN — HEPARIN SODIUM 5000 UNIT(S): 5000 INJECTION INTRAVENOUS; SUBCUTANEOUS at 21:06

## 2018-04-24 RX ADMIN — Medication 2: at 21:38

## 2018-04-24 RX ADMIN — ATORVASTATIN CALCIUM 40 MILLIGRAM(S): 80 TABLET, FILM COATED ORAL at 21:06

## 2018-04-24 NOTE — CHART NOTE - NSCHARTNOTEFT_GEN_A_CORE
No complaints; Denies SOB/CP/N/V.    T(C): 37.4 (04-24-18 @ 16:30)  T(F): 99.3 (04-24-18 @ 16:30)  HR: 92 (04-24-18 @ 17:30)  BP: 114/58 (04-24-18 @ 17:30)  RR: 16 (04-24-18 @ 17:30)  SpO2: 95% (04-24-18 @ 17:30)      Exam:  Gen: NAD    RUE:   Dressing CDI  Sensation/R/M/AIN/PIN grossly intact  No sensation to ulnar distribution d/t h/o nerve damage  Digits WWP; Cap refill <2 secs                          11.9   6.8   )-----------( 240      ( 24 Apr 2018 15:52 )             34.3     137  |  98  |  30<H>  ----------------------------<  278<H>  4.8   |  26  |  1.43<H>      A/P: 57y Female s/p R ulna I&D; Stable  -Pain control; Ice prn; Elevate  -DVT ppx; IS  -PT eval: NWB in sling/immobilizer  -Cont current tx      Mariangel Nunez PA-C  Orthopedic Surgery  Pagers [phone]

## 2018-04-24 NOTE — BRIEF OPERATIVE NOTE - PROCEDURE
<<-----Click on this checkbox to enter Procedure Debridement of bone  04/24/2018  R rasheed  Active  DSTAL

## 2018-04-25 LAB
ANION GAP SERPL CALC-SCNC: 15 MMOL/L — SIGNIFICANT CHANGE UP (ref 5–17)
APTT BLD: 23.2 SEC — LOW (ref 27.5–37.4)
BUN SERPL-MCNC: 29 MG/DL — HIGH (ref 7–23)
CALCIUM SERPL-MCNC: 8.9 MG/DL — SIGNIFICANT CHANGE UP (ref 8.4–10.5)
CHLORIDE SERPL-SCNC: 95 MMOL/L — LOW (ref 96–108)
CO2 SERPL-SCNC: 24 MMOL/L — SIGNIFICANT CHANGE UP (ref 22–31)
CREAT SERPL-MCNC: 1.4 MG/DL — HIGH (ref 0.5–1.3)
GLUCOSE BLDC GLUCOMTR-MCNC: 197 MG/DL — HIGH (ref 70–99)
GLUCOSE BLDC GLUCOMTR-MCNC: 286 MG/DL — HIGH (ref 70–99)
GLUCOSE BLDC GLUCOMTR-MCNC: 336 MG/DL — HIGH (ref 70–99)
GLUCOSE BLDC GLUCOMTR-MCNC: 364 MG/DL — HIGH (ref 70–99)
GLUCOSE BLDC GLUCOMTR-MCNC: 380 MG/DL — HIGH (ref 70–99)
GLUCOSE SERPL-MCNC: 297 MG/DL — HIGH (ref 70–99)
HCT VFR BLD CALC: 32.4 % — LOW (ref 34.5–45)
HGB BLD-MCNC: 10.7 G/DL — LOW (ref 11.5–15.5)
INR BLD: 1.06 RATIO — SIGNIFICANT CHANGE UP (ref 0.88–1.16)
MCHC RBC-ENTMCNC: 27.6 PG — SIGNIFICANT CHANGE UP (ref 27–34)
MCHC RBC-ENTMCNC: 33.2 GM/DL — SIGNIFICANT CHANGE UP (ref 32–36)
MCV RBC AUTO: 83.1 FL — SIGNIFICANT CHANGE UP (ref 80–100)
PLATELET # BLD AUTO: 274 K/UL — SIGNIFICANT CHANGE UP (ref 150–400)
POTASSIUM SERPL-MCNC: 4.3 MMOL/L — SIGNIFICANT CHANGE UP (ref 3.5–5.3)
POTASSIUM SERPL-SCNC: 4.3 MMOL/L — SIGNIFICANT CHANGE UP (ref 3.5–5.3)
PROTHROM AB SERPL-ACNC: 11.6 SEC — SIGNIFICANT CHANGE UP (ref 9.8–12.7)
RBC # BLD: 3.9 M/UL — SIGNIFICANT CHANGE UP (ref 3.8–5.2)
RBC # FLD: 14.2 % — SIGNIFICANT CHANGE UP (ref 10.3–14.5)
SODIUM SERPL-SCNC: 134 MMOL/L — LOW (ref 135–145)
WBC # BLD: 10.8 K/UL — HIGH (ref 3.8–10.5)
WBC # FLD AUTO: 10.8 K/UL — HIGH (ref 3.8–10.5)

## 2018-04-25 RX ORDER — CALCIUM CARBONATE 500(1250)
1 TABLET ORAL ONCE
Qty: 0 | Refills: 0 | Status: COMPLETED | OUTPATIENT
Start: 2018-04-25 | End: 2018-04-25

## 2018-04-25 RX ORDER — DIPHENHYDRAMINE HCL 50 MG
25 CAPSULE ORAL EVERY 6 HOURS
Qty: 0 | Refills: 0 | Status: DISCONTINUED | OUTPATIENT
Start: 2018-04-25 | End: 2018-04-30

## 2018-04-25 RX ORDER — TRAMADOL HYDROCHLORIDE 50 MG/1
25 TABLET ORAL ONCE
Qty: 0 | Refills: 0 | Status: DISCONTINUED | OUTPATIENT
Start: 2018-04-25 | End: 2018-04-25

## 2018-04-25 RX ADMIN — PANTOPRAZOLE SODIUM 40 MILLIGRAM(S): 20 TABLET, DELAYED RELEASE ORAL at 08:29

## 2018-04-25 RX ADMIN — GABAPENTIN 600 MILLIGRAM(S): 400 CAPSULE ORAL at 06:33

## 2018-04-25 RX ADMIN — Medication 81 MILLIGRAM(S): at 11:36

## 2018-04-25 RX ADMIN — Medication 500 MILLIGRAM(S): at 13:05

## 2018-04-25 RX ADMIN — Medication 3: at 22:23

## 2018-04-25 RX ADMIN — Medication 250 MILLIGRAM(S): at 13:06

## 2018-04-25 RX ADMIN — Medication 145 MILLIGRAM(S): at 11:36

## 2018-04-25 RX ADMIN — HEPARIN SODIUM 5000 UNIT(S): 5000 INJECTION INTRAVENOUS; SUBCUTANEOUS at 06:32

## 2018-04-25 RX ADMIN — TRAMADOL HYDROCHLORIDE 50 MILLIGRAM(S): 50 TABLET ORAL at 22:50

## 2018-04-25 RX ADMIN — Medication 1 TABLET(S): at 11:36

## 2018-04-25 RX ADMIN — Medication 1 TABLET(S): at 04:00

## 2018-04-25 RX ADMIN — TRAMADOL HYDROCHLORIDE 50 MILLIGRAM(S): 50 TABLET ORAL at 06:32

## 2018-04-25 RX ADMIN — SODIUM CHLORIDE 100 MILLILITER(S): 9 INJECTION INTRAMUSCULAR; INTRAVENOUS; SUBCUTANEOUS at 11:35

## 2018-04-25 RX ADMIN — HEPARIN SODIUM 5000 UNIT(S): 5000 INJECTION INTRAVENOUS; SUBCUTANEOUS at 15:43

## 2018-04-25 RX ADMIN — TRAMADOL HYDROCHLORIDE 50 MILLIGRAM(S): 50 TABLET ORAL at 22:20

## 2018-04-25 RX ADMIN — ATORVASTATIN CALCIUM 40 MILLIGRAM(S): 80 TABLET, FILM COATED ORAL at 22:19

## 2018-04-25 RX ADMIN — Medication 2: at 09:10

## 2018-04-25 RX ADMIN — GABAPENTIN 600 MILLIGRAM(S): 400 CAPSULE ORAL at 13:19

## 2018-04-25 RX ADMIN — Medication 100 MILLIGRAM(S): at 22:20

## 2018-04-25 RX ADMIN — GABAPENTIN 600 MILLIGRAM(S): 400 CAPSULE ORAL at 22:20

## 2018-04-25 RX ADMIN — Medication 30 MILLILITER(S): at 18:47

## 2018-04-25 RX ADMIN — Medication 10: at 18:59

## 2018-04-25 RX ADMIN — HEPARIN SODIUM 5000 UNIT(S): 5000 INJECTION INTRAVENOUS; SUBCUTANEOUS at 22:20

## 2018-04-25 RX ADMIN — Medication 6: at 13:14

## 2018-04-25 NOTE — PHYSICAL THERAPY INITIAL EVALUATION ADULT - ADDITIONAL COMMENTS
Pt lives with spouse in a private home with 5 stairs to enter (+ bilateral handrails), full flight to basement and full flight to bedroom (+bilateral handrails). Pt states prior to admission she was independent with all functional mobility without AD. Pt states prior to admission her spouse would assist her with ADLs. Pt states prior to admission she was receiving outpatient PT and OT services.

## 2018-04-25 NOTE — PROGRESS NOTE ADULT - SUBJECTIVE AND OBJECTIVE BOX
Post op note    Patient seen and examined. Pain controlled. No acute events overnight      MEDICATIONS  (STANDING):  acetaminophen  IVPB. 1000 milliGRAM(s) IV Intermittent once  ascorbic acid 500 milliGRAM(s) Oral daily  aspirin enteric coated 81 milliGRAM(s) Oral daily  atorvastatin 40 milliGRAM(s) Oral at bedtime  calcium carbonate 500 mG (Tums) Chewable 1 Tablet(s) Chew once  dextrose 5%. 1000 milliLiter(s) IV Continuous <Continuous>  dextrose 50% Injectable 12.5 Gram(s) IV Push once  dextrose 50% Injectable 25 Gram(s) IV Push once  dextrose 50% Injectable 25 Gram(s) IV Push once  fenofibrate Tablet 145 milliGRAM(s) Oral daily  gabapentin 600 milliGRAM(s) Oral three times a day  heparin  Injectable 5000 Unit(s) SubCutaneous every 8 hours  insulin lispro (HumaLOG) corrective regimen sliding scale   SubCutaneous three times a day before meals  insulin lispro (HumaLOG) corrective regimen sliding scale   SubCutaneous at bedtime  multivitamin 1 Tablet(s) Oral daily  pantoprazole    Tablet 40 milliGRAM(s) Oral before breakfast  sodium chloride 0.9%. 1000 milliLiter(s) IV Continuous <Continuous>  traMADol 50 milliGRAM(s) Oral two times a day  traMADol 25 milliGRAM(s) Oral once  vancomycin  IVPB 1000 milliGRAM(s) IV Intermittent every 12 hours    Allergies    No Known Allergies    Intolerances                            10.7   10.8  )-----------( 274      ( 25 Apr 2018 03:17 )             32.4     25 Apr 2018 03:17    134    |  95     |  29     ----------------------------<  297    4.3     |  24     |  1.40     Ca    8.9        25 Apr 2018 03:17        Vital Signs Last 24 Hrs  T(C): 36.5 (04-24-18 @ 23:00), Max: 37.4 (04-24-18 @ 16:30)  T(F): 97.7 (04-24-18 @ 23:00), Max: 99.3 (04-24-18 @ 16:30)  HR: 92 (04-25-18 @ 04:00) (82 - 96)  BP: 109/59 (04-25-18 @ 04:00) (106/55 - 133/58)  BP(mean): 80 (04-25-18 @ 04:00) (77 - 89)  RR: 16 (04-25-18 @ 04:00) (15 - 18)  SpO2: 97% (04-25-18 @ 04:00) (95% - 100%)    Physical Exam  Gen: NAD  RUE:  Dressing c/d/i  +ain/pin/rad/uln function.. unable to fully extend 3rd/4th digits, unchanged from prior to OR  sensation diminished 4th and 5th digits, unchanged from prior to OR  Rad 2+/bcr  Compartments soft    A/P: 57y Female sp R ulna I&D and abx cement placement  Pain control  DVT ppx  NWB RUE, elbow ROM as tolerated  FU labs  Ice/elevate  abx as per ID  Incentive spirometry

## 2018-04-25 NOTE — PHYSICAL THERAPY INITIAL EVALUATION ADULT - MANUAL MUSCLE TESTING RESULTS, REHAB EVAL
grossly assessed due to/R elbow grossly at least 3-/5, R hand grossly 3/5; R shoulder grossly 3/5; LUE grossly at least 4/5; BLE grossly at least 5/5 throughout
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
## 2018-04-25 NOTE — PHYSICAL THERAPY INITIAL EVALUATION ADULT - PLANNED THERAPY INTERVENTIONS, PT EVAL
Pt presents at previous level of function, no skilled PT needs, pt cleared for discharge from PT standpoint.

## 2018-04-26 LAB
-  AMPICILLIN/SULBACTAM: SIGNIFICANT CHANGE UP
-  CEFAZOLIN: SIGNIFICANT CHANGE UP
-  CIPROFLOXACIN: SIGNIFICANT CHANGE UP
-  CLINDAMYCIN: SIGNIFICANT CHANGE UP
-  ERYTHROMYCIN: SIGNIFICANT CHANGE UP
-  GENTAMICIN: SIGNIFICANT CHANGE UP
-  LEVOFLOXACIN: SIGNIFICANT CHANGE UP
-  MOXIFLOXACIN(AEROBIC): SIGNIFICANT CHANGE UP
-  OXACILLIN: SIGNIFICANT CHANGE UP
-  PENICILLIN: SIGNIFICANT CHANGE UP
-  RIFAMPIN: SIGNIFICANT CHANGE UP
-  TETRACYCLINE: SIGNIFICANT CHANGE UP
-  TRIMETHOPRIM/SULFAMETHOXAZOLE: SIGNIFICANT CHANGE UP
-  VANCOMYCIN: SIGNIFICANT CHANGE UP
ANION GAP SERPL CALC-SCNC: 14 MMOL/L — SIGNIFICANT CHANGE UP (ref 5–17)
APTT BLD: 32 SEC — SIGNIFICANT CHANGE UP (ref 27.5–37.4)
BASOPHILS # BLD AUTO: 0 K/UL — SIGNIFICANT CHANGE UP (ref 0–0.2)
BASOPHILS NFR BLD AUTO: 0.7 % — SIGNIFICANT CHANGE UP (ref 0–2)
BUN SERPL-MCNC: 27 MG/DL — HIGH (ref 7–23)
CALCIUM SERPL-MCNC: 9.4 MG/DL — SIGNIFICANT CHANGE UP (ref 8.4–10.5)
CHLORIDE SERPL-SCNC: 97 MMOL/L — SIGNIFICANT CHANGE UP (ref 96–108)
CO2 SERPL-SCNC: 24 MMOL/L — SIGNIFICANT CHANGE UP (ref 22–31)
CREAT SERPL-MCNC: 1.38 MG/DL — HIGH (ref 0.5–1.3)
EOSINOPHIL # BLD AUTO: 0.5 K/UL — SIGNIFICANT CHANGE UP (ref 0–0.5)
EOSINOPHIL NFR BLD AUTO: 6.5 % — HIGH (ref 0–6)
GLUCOSE BLDC GLUCOMTR-MCNC: 250 MG/DL — HIGH (ref 70–99)
GLUCOSE BLDC GLUCOMTR-MCNC: 264 MG/DL — HIGH (ref 70–99)
GLUCOSE BLDC GLUCOMTR-MCNC: 355 MG/DL — HIGH (ref 70–99)
GLUCOSE BLDC GLUCOMTR-MCNC: 395 MG/DL — HIGH (ref 70–99)
GLUCOSE SERPL-MCNC: 336 MG/DL — HIGH (ref 70–99)
HCT VFR BLD CALC: 32.7 % — LOW (ref 34.5–45)
HGB BLD-MCNC: 11.1 G/DL — LOW (ref 11.5–15.5)
LYMPHOCYTES # BLD AUTO: 2.4 K/UL — SIGNIFICANT CHANGE UP (ref 1–3.3)
LYMPHOCYTES # BLD AUTO: 34.1 % — SIGNIFICANT CHANGE UP (ref 13–44)
MCHC RBC-ENTMCNC: 28.1 PG — SIGNIFICANT CHANGE UP (ref 27–34)
MCHC RBC-ENTMCNC: 33.8 GM/DL — SIGNIFICANT CHANGE UP (ref 32–36)
MCV RBC AUTO: 82.9 FL — SIGNIFICANT CHANGE UP (ref 80–100)
METHOD TYPE: SIGNIFICANT CHANGE UP
MONOCYTES # BLD AUTO: 0.4 K/UL — SIGNIFICANT CHANGE UP (ref 0–0.9)
MONOCYTES NFR BLD AUTO: 5.5 % — SIGNIFICANT CHANGE UP (ref 2–14)
NEUTROPHILS # BLD AUTO: 3.8 K/UL — SIGNIFICANT CHANGE UP (ref 1.8–7.4)
NEUTROPHILS NFR BLD AUTO: 53.2 % — SIGNIFICANT CHANGE UP (ref 43–77)
PLATELET # BLD AUTO: 266 K/UL — SIGNIFICANT CHANGE UP (ref 150–400)
POTASSIUM SERPL-MCNC: 4.4 MMOL/L — SIGNIFICANT CHANGE UP (ref 3.5–5.3)
POTASSIUM SERPL-SCNC: 4.4 MMOL/L — SIGNIFICANT CHANGE UP (ref 3.5–5.3)
RBC # BLD: 3.95 M/UL — SIGNIFICANT CHANGE UP (ref 3.8–5.2)
RBC # FLD: 14.2 % — SIGNIFICANT CHANGE UP (ref 10.3–14.5)
SODIUM SERPL-SCNC: 135 MMOL/L — SIGNIFICANT CHANGE UP (ref 135–145)
VANCOMYCIN TROUGH SERPL-MCNC: 15.4 UG/ML — SIGNIFICANT CHANGE UP (ref 10–20)
WBC # BLD: 7 K/UL — SIGNIFICANT CHANGE UP (ref 3.8–10.5)
WBC # FLD AUTO: 7 K/UL — SIGNIFICANT CHANGE UP (ref 3.8–10.5)

## 2018-04-26 PROCEDURE — 99232 SBSQ HOSP IP/OBS MODERATE 35: CPT

## 2018-04-26 PROCEDURE — 71045 X-RAY EXAM CHEST 1 VIEW: CPT | Mod: 26

## 2018-04-26 RX ADMIN — HEPARIN SODIUM 5000 UNIT(S): 5000 INJECTION INTRAVENOUS; SUBCUTANEOUS at 14:31

## 2018-04-26 RX ADMIN — ATORVASTATIN CALCIUM 40 MILLIGRAM(S): 80 TABLET, FILM COATED ORAL at 21:43

## 2018-04-26 RX ADMIN — TRAMADOL HYDROCHLORIDE 50 MILLIGRAM(S): 50 TABLET ORAL at 22:13

## 2018-04-26 RX ADMIN — Medication 145 MILLIGRAM(S): at 12:56

## 2018-04-26 RX ADMIN — TRAMADOL HYDROCHLORIDE 50 MILLIGRAM(S): 50 TABLET ORAL at 05:44

## 2018-04-26 RX ADMIN — Medication 250 MILLIGRAM(S): at 00:16

## 2018-04-26 RX ADMIN — PANTOPRAZOLE SODIUM 40 MILLIGRAM(S): 20 TABLET, DELAYED RELEASE ORAL at 05:45

## 2018-04-26 RX ADMIN — GABAPENTIN 600 MILLIGRAM(S): 400 CAPSULE ORAL at 05:44

## 2018-04-26 RX ADMIN — HEPARIN SODIUM 5000 UNIT(S): 5000 INJECTION INTRAVENOUS; SUBCUTANEOUS at 05:44

## 2018-04-26 RX ADMIN — Medication 6: at 13:56

## 2018-04-26 RX ADMIN — Medication 1 TABLET(S): at 12:56

## 2018-04-26 RX ADMIN — Medication 4: at 08:59

## 2018-04-26 RX ADMIN — Medication 250 MILLIGRAM(S): at 18:43

## 2018-04-26 RX ADMIN — Medication 500 MILLIGRAM(S): at 12:56

## 2018-04-26 RX ADMIN — TRAMADOL HYDROCHLORIDE 50 MILLIGRAM(S): 50 TABLET ORAL at 06:14

## 2018-04-26 RX ADMIN — HEPARIN SODIUM 5000 UNIT(S): 5000 INJECTION INTRAVENOUS; SUBCUTANEOUS at 21:43

## 2018-04-26 RX ADMIN — GABAPENTIN 600 MILLIGRAM(S): 400 CAPSULE ORAL at 14:31

## 2018-04-26 RX ADMIN — Medication 100 MILLIGRAM(S): at 05:44

## 2018-04-26 RX ADMIN — TRAMADOL HYDROCHLORIDE 50 MILLIGRAM(S): 50 TABLET ORAL at 21:43

## 2018-04-26 RX ADMIN — Medication 3: at 21:50

## 2018-04-26 RX ADMIN — Medication 81 MILLIGRAM(S): at 12:56

## 2018-04-26 RX ADMIN — Medication 10: at 19:23

## 2018-04-26 RX ADMIN — GABAPENTIN 600 MILLIGRAM(S): 400 CAPSULE ORAL at 21:43

## 2018-04-26 NOTE — PROGRESS NOTE ADULT - SUBJECTIVE AND OBJECTIVE BOX
infectious diseases progress note:    Patient is a 57y old  Female who presents with a chief complaint of Removal of Ex-fix Right Elbow (25 Apr 2018 18:28)        Subacute osteomyelitis of bone of right forearm        ROS:  CONSTITUTIONAL:  Negative fever or chills, feels well, good appetite  EYES:  Negative  blurry vision or double vision  CARDIOVASCULAR:  Negative for chest pain or palpitations  RESPIRATORY:  Negative for cough, wheezing, or SOB   GASTROINTESTINAL:  Negative for nausea, vomiting, diarrhea, constipation, or abdominal pain  GENITOURINARY:  Negative frequency, urgency or dysuria  NEUROLOGIC:  No headache, confusion, dizziness, lightheadedness    Allergies    No Known Allergies    Intolerances        ANTIBIOTICS/RELEVANT:  antimicrobials  vancomycin  IVPB 1000 milliGRAM(s) IV Intermittent every 12 hours    immunologic:    OTHER:  acetaminophen   Tablet 650 milliGRAM(s) Oral every 6 hours PRN  acetaminophen   Tablet. 650 milliGRAM(s) Oral every 6 hours PRN  acetaminophen  IVPB. 1000 milliGRAM(s) IV Intermittent once  aluminum hydroxide/magnesium hydroxide/simethicone Suspension 30 milliLiter(s) Oral four times a day PRN  ascorbic acid 500 milliGRAM(s) Oral daily  aspirin enteric coated 81 milliGRAM(s) Oral daily  atorvastatin 40 milliGRAM(s) Oral at bedtime  dextrose 5%. 1000 milliLiter(s) IV Continuous <Continuous>  dextrose 50% Injectable 12.5 Gram(s) IV Push once  dextrose 50% Injectable 25 Gram(s) IV Push once  dextrose 50% Injectable 25 Gram(s) IV Push once  dextrose Gel 1 Dose(s) Oral once PRN  diphenhydrAMINE   Capsule 25 milliGRAM(s) Oral every 6 hours PRN  docusate sodium 100 milliGRAM(s) Oral three times a day PRN  fenofibrate Tablet 145 milliGRAM(s) Oral daily  gabapentin 600 milliGRAM(s) Oral three times a day  glucagon  Injectable 1 milliGRAM(s) IntraMuscular once PRN  heparin  Injectable 5000 Unit(s) SubCutaneous every 8 hours  HYDROmorphone  Injectable 1 milliGRAM(s) IV Push every 4 hours PRN  HYDROmorphone  Injectable 0.3 milliGRAM(s) IV Push every 10 minutes PRN  insulin lispro (HumaLOG) corrective regimen sliding scale   SubCutaneous three times a day before meals  insulin lispro (HumaLOG) corrective regimen sliding scale   SubCutaneous at bedtime  multivitamin 1 Tablet(s) Oral daily  ondansetron Injectable 4 milliGRAM(s) IV Push once PRN  ondansetron Injectable 4 milliGRAM(s) IV Push every 6 hours PRN  oxyCODONE    IR 5 milliGRAM(s) Oral every 4 hours PRN  oxyCODONE    IR 10 milliGRAM(s) Oral every 4 hours PRN  pantoprazole    Tablet 40 milliGRAM(s) Oral before breakfast  sodium chloride 0.9%. 1000 milliLiter(s) IV Continuous <Continuous>  traMADol 50 milliGRAM(s) Oral two times a day      Objective:  Vital Signs Last 24 Hrs  T(C): 36.7 (26 Apr 2018 05:27), Max: 37.1 (25 Apr 2018 17:15)  T(F): 98 (26 Apr 2018 05:27), Max: 98.8 (25 Apr 2018 17:15)  HR: 80 (26 Apr 2018 05:27) (74 - 94)  BP: 131/75 (26 Apr 2018 05:27) (111/65 - 154/82)  BP(mean): 86 (25 Apr 2018 08:00) (86 - 86)  RR: 18 (26 Apr 2018 05:27) (16 - 18)  SpO2: 97% (26 Apr 2018 05:27) (95% - 100%)    PHYSICAL EXAM:  Constitutional:Well-developed, well nourished--no acute distress  Eyes:ESTHER, EOMI  Ear/Nose/Throat: no oral lesion, no sinus tenderness on percussion	  Neck:no JVD, no lymphadenopathy, supple  Respiratory: CTA jaime  Cardiovascular: S1S2 RRR, no murmurs  Gastrointestinal:soft, (+) BS, no HSM  Extremities: wd [id number]       LABS:                        10.7   10.8  )-----------( 274      ( 25 Apr 2018 03:17 )             32.4     04-25    134<L>  |  95<L>  |  29<H>  ----------------------------<  297<H>  4.3   |  24  |  1.40<H>    Ca    8.9      25 Apr 2018 03:17      PT/INR - ( 25 Apr 2018 07:30 )   PT: 11.6 sec;   INR: 1.06 ratio         PTT - ( 25 Apr 2018 07:30 )  PTT:23.2 sec        MICROBIOLOGY:    RECENT CULTURES:  04-25 @ 00:48 .Surgical Swab right ulna #3                Few Staphylococcus aureus          RESPIRATORY CULTURES:              RADIOLOGY & ADDITIONAL STUDIES:        Pager [phone]  After 5 pm/weekends or if no response :[phone]
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
## 2018-04-26 NOTE — PROGRESS NOTE ADULT - SUBJECTIVE AND OBJECTIVE BOX
No acute events overnight. Pain controlled.     PE:  Vital Signs Last 24 Hrs  T(C): 36.7 (26 Apr 2018 05:27), Max: 37.1 (25 Apr 2018 17:15)  T(F): 98 (26 Apr 2018 05:27), Max: 98.8 (25 Apr 2018 17:15)  HR: 80 (26 Apr 2018 05:27) (74 - 94)  BP: 131/75 (26 Apr 2018 05:27) (111/65 - 154/82)  BP(mean): 86 (25 Apr 2018 08:00) (86 - 86)  RR: 18 (26 Apr 2018 05:27) (16 - 18)  SpO2: 97% (26 Apr 2018 05:27) (95% - 100%)  Gen: No acute distress  RUE: dressing c/d/i  skin intact  motor AIN/PIN intact; motor absent ulnar distribution   SILT M/R. decreased U  hand wwp     Labs:                        10.7   10.8  )-----------( 274      ( 25 Apr 2018 03:17 )             32.4   04-25    134<L>  |  95<L>  |  29<H>  ----------------------------<  297<H>  4.3   |  24  |  1.40<H>    Ca    8.9      25 Apr 2018 03:17    Assessment/Plan:  57yFemale s/p R ulna I and d, abx cement POD 2  -pain control  -PT  -WBAT  -OOB  -DVT ppx  -dispo plan

## 2018-04-27 DIAGNOSIS — I10 ESSENTIAL (PRIMARY) HYPERTENSION: ICD-10-CM

## 2018-04-27 DIAGNOSIS — E78.4 OTHER HYPERLIPIDEMIA: ICD-10-CM

## 2018-04-27 DIAGNOSIS — E11.65 TYPE 2 DIABETES MELLITUS WITH HYPERGLYCEMIA: ICD-10-CM

## 2018-04-27 LAB
GLUCOSE BLDC GLUCOMTR-MCNC: 249 MG/DL — HIGH (ref 70–99)
GLUCOSE BLDC GLUCOMTR-MCNC: 360 MG/DL — HIGH (ref 70–99)
GLUCOSE BLDC GLUCOMTR-MCNC: 389 MG/DL — HIGH (ref 70–99)
GLUCOSE BLDC GLUCOMTR-MCNC: 403 MG/DL — HIGH (ref 70–99)

## 2018-04-27 PROCEDURE — 99232 SBSQ HOSP IP/OBS MODERATE 35: CPT

## 2018-04-27 PROCEDURE — 99254 IP/OBS CNSLTJ NEW/EST MOD 60: CPT

## 2018-04-27 RX ORDER — CEFAZOLIN SODIUM 1 G
VIAL (EA) INJECTION
Qty: 0 | Refills: 0 | Status: DISCONTINUED | OUTPATIENT
Start: 2018-04-27 | End: 2018-04-30

## 2018-04-27 RX ORDER — INSULIN LISPRO 100/ML
10 VIAL (ML) SUBCUTANEOUS
Qty: 0 | Refills: 0 | Status: DISCONTINUED | OUTPATIENT
Start: 2018-04-27 | End: 2018-04-28

## 2018-04-27 RX ORDER — INSULIN GLARGINE 100 [IU]/ML
20 INJECTION, SOLUTION SUBCUTANEOUS AT BEDTIME
Qty: 0 | Refills: 0 | Status: DISCONTINUED | OUTPATIENT
Start: 2018-04-27 | End: 2018-04-28

## 2018-04-27 RX ORDER — CEFAZOLIN SODIUM 1 G
2000 VIAL (EA) INJECTION EVERY 8 HOURS
Qty: 0 | Refills: 0 | Status: DISCONTINUED | OUTPATIENT
Start: 2018-04-27 | End: 2018-04-30

## 2018-04-27 RX ORDER — CEFAZOLIN SODIUM 1 G
2 VIAL (EA) INJECTION
Qty: 240 | Refills: 0 | OUTPATIENT
Start: 2018-04-27 | End: 2018-06-05

## 2018-04-27 RX ORDER — CEFAZOLIN SODIUM 1 G
2000 VIAL (EA) INJECTION ONCE
Qty: 0 | Refills: 0 | Status: COMPLETED | OUTPATIENT
Start: 2018-04-27 | End: 2018-04-27

## 2018-04-27 RX ADMIN — GABAPENTIN 600 MILLIGRAM(S): 400 CAPSULE ORAL at 06:13

## 2018-04-27 RX ADMIN — Medication 500 MILLIGRAM(S): at 11:42

## 2018-04-27 RX ADMIN — Medication 10: at 18:31

## 2018-04-27 RX ADMIN — Medication 1 TABLET(S): at 11:42

## 2018-04-27 RX ADMIN — GABAPENTIN 600 MILLIGRAM(S): 400 CAPSULE ORAL at 13:31

## 2018-04-27 RX ADMIN — Medication 145 MILLIGRAM(S): at 11:42

## 2018-04-27 RX ADMIN — Medication 81 MILLIGRAM(S): at 11:42

## 2018-04-27 RX ADMIN — Medication 10 UNIT(S): at 18:31

## 2018-04-27 RX ADMIN — ATORVASTATIN CALCIUM 40 MILLIGRAM(S): 80 TABLET, FILM COATED ORAL at 22:46

## 2018-04-27 RX ADMIN — TRAMADOL HYDROCHLORIDE 50 MILLIGRAM(S): 50 TABLET ORAL at 06:16

## 2018-04-27 RX ADMIN — Medication 12: at 13:32

## 2018-04-27 RX ADMIN — Medication 4: at 09:13

## 2018-04-27 RX ADMIN — HEPARIN SODIUM 5000 UNIT(S): 5000 INJECTION INTRAVENOUS; SUBCUTANEOUS at 06:13

## 2018-04-27 RX ADMIN — INSULIN GLARGINE 20 UNIT(S): 100 INJECTION, SOLUTION SUBCUTANEOUS at 22:39

## 2018-04-27 RX ADMIN — TRAMADOL HYDROCHLORIDE 50 MILLIGRAM(S): 50 TABLET ORAL at 22:46

## 2018-04-27 RX ADMIN — HEPARIN SODIUM 5000 UNIT(S): 5000 INJECTION INTRAVENOUS; SUBCUTANEOUS at 22:46

## 2018-04-27 RX ADMIN — PANTOPRAZOLE SODIUM 40 MILLIGRAM(S): 20 TABLET, DELAYED RELEASE ORAL at 06:13

## 2018-04-27 RX ADMIN — HEPARIN SODIUM 5000 UNIT(S): 5000 INJECTION INTRAVENOUS; SUBCUTANEOUS at 13:30

## 2018-04-27 RX ADMIN — TRAMADOL HYDROCHLORIDE 50 MILLIGRAM(S): 50 TABLET ORAL at 22:57

## 2018-04-27 RX ADMIN — Medication 100 MILLIGRAM(S): at 11:41

## 2018-04-27 RX ADMIN — Medication 100 MILLIGRAM(S): at 22:49

## 2018-04-27 RX ADMIN — Medication 3: at 22:39

## 2018-04-27 RX ADMIN — TRAMADOL HYDROCHLORIDE 50 MILLIGRAM(S): 50 TABLET ORAL at 06:19

## 2018-04-27 RX ADMIN — GABAPENTIN 600 MILLIGRAM(S): 400 CAPSULE ORAL at 22:46

## 2018-04-27 RX ADMIN — Medication 250 MILLIGRAM(S): at 06:13

## 2018-04-27 NOTE — PROGRESS NOTE ADULT - SUBJECTIVE AND OBJECTIVE BOX
Patient is a 57y old  Female who presents with a chief complaint of Removal of Ex-fix Right Elbow  Patient is s/p I&D, antibiotic cement right Ulna POD#3  Patient resting without complaints.  No chest pain, SOB, N/V.    T(C): 36.8 (04-27-18 @ 08:00), Max: 36.8 (04-27-18 @ 08:00)  HR: 80 (04-27-18 @ 08:00) (79 - 88)  BP: 152/88 (04-27-18 @ 08:00) (101/66 - 159/74)  RR: 18 (04-27-18 @ 08:00) (18 - 18)  SpO2: 95% (04-27-18 @ 08:00) (95% - 98%)    Exam:  Alert and Oriented, No Acute Distress  Cards: +S1/S2, RRR  Pulm: CTAB  Right upper extremity: moving digits, in Posey block  Lower Extremities:  Calves Soft, Non-tender bilaterally  +PF/DF/EHL/FHL  SILT  +DP Pulse                        11.1   7.0   )-----------( 266      ( 26 Apr 2018 18:22 )             32.7    04-26  135  |  97  |  27<H>  ----------------------------<  336<H>  4.4   |  24  |  1.38<H>  Ca    9.4      26 Apr 2018 18:22

## 2018-04-27 NOTE — PROGRESS NOTE ADULT - ASSESSMENT
57 yr old sp complicated repair of crush injury to right humerus last year with spacer and external fixator,  recently noted drainage from old external fixator site, no fever or chills  no recent antibiotics, s/p I&D of forearm and antibiotic cement 4/24, OR cx with MSSA    osteomyelitis of forearm after crush injury and hardware placement last year    1.	DC vanco  2.	start cefazolin 2 g q 8  3.	weekly CBC, CMP, ESR, CRP after DC  4.	f/u with Dr. Torres at ID clinic in 2 weeks

## 2018-04-27 NOTE — PROGRESS NOTE ADULT - SUBJECTIVE AND OBJECTIVE BOX
Follow Up:  osteomyelitis after fracture and ORIF    Interval History: pt stable and afebrile, the cx came back with MSSA    ROS:      All other systems negative    Constitutional: no fever, no chills  Head: no trauma  Eyes: no vision changes, no eye pain  ENT:  no sore throat, no rhinorrhea  Cardiovascular:  no chest pain, no palpitation  Respiratory:  no SOB, no cough  GI:  no abd pain, no vomiting, no diarrhea  urinary: no dysuria, no hematuria, no flank pain  musculoskeletal:  no joint pain, no joint swelling  skin:  no rash  neurology:  no headache, no seizure, no change in mental status  psych: no anxiety, no depression         Allergies  No Known Allergies        ANTIMICROBIALS:  ceFAZolin   IVPB    ceFAZolin   IVPB 2000 every 8 hours      OTHER MEDS:  acetaminophen   Tablet 650 milliGRAM(s) Oral every 6 hours PRN  acetaminophen   Tablet. 650 milliGRAM(s) Oral every 6 hours PRN  acetaminophen  IVPB. 1000 milliGRAM(s) IV Intermittent once  aluminum hydroxide/magnesium hydroxide/simethicone Suspension 30 milliLiter(s) Oral four times a day PRN  ascorbic acid 500 milliGRAM(s) Oral daily  aspirin enteric coated 81 milliGRAM(s) Oral daily  atorvastatin 40 milliGRAM(s) Oral at bedtime  dextrose 5%. 1000 milliLiter(s) IV Continuous <Continuous>  dextrose 50% Injectable 12.5 Gram(s) IV Push once  dextrose 50% Injectable 25 Gram(s) IV Push once  dextrose 50% Injectable 25 Gram(s) IV Push once  dextrose Gel 1 Dose(s) Oral once PRN  diphenhydrAMINE   Capsule 25 milliGRAM(s) Oral every 6 hours PRN  docusate sodium 100 milliGRAM(s) Oral three times a day PRN  fenofibrate Tablet 145 milliGRAM(s) Oral daily  gabapentin 600 milliGRAM(s) Oral three times a day  glucagon  Injectable 1 milliGRAM(s) IntraMuscular once PRN  heparin  Injectable 5000 Unit(s) SubCutaneous every 8 hours  HYDROmorphone  Injectable 1 milliGRAM(s) IV Push every 4 hours PRN  HYDROmorphone  Injectable 0.3 milliGRAM(s) IV Push every 10 minutes PRN  insulin lispro (HumaLOG) corrective regimen sliding scale   SubCutaneous three times a day before meals  insulin lispro (HumaLOG) corrective regimen sliding scale   SubCutaneous at bedtime  multivitamin 1 Tablet(s) Oral daily  ondansetron Injectable 4 milliGRAM(s) IV Push once PRN  ondansetron Injectable 4 milliGRAM(s) IV Push every 6 hours PRN  oxyCODONE    IR 5 milliGRAM(s) Oral every 4 hours PRN  oxyCODONE    IR 10 milliGRAM(s) Oral every 4 hours PRN  pantoprazole    Tablet 40 milliGRAM(s) Oral before breakfast  traMADol 50 milliGRAM(s) Oral two times a day      Vital Signs Last 24 Hrs  T(C): 36.7 (27 Apr 2018 13:32), Max: 36.8 (27 Apr 2018 08:00)  T(F): 98.1 (27 Apr 2018 13:32), Max: 98.2 (27 Apr 2018 08:00)  HR: 88 (27 Apr 2018 13:32) (79 - 88)  BP: 157/84 (27 Apr 2018 13:32) (101/66 - 159/74)  BP(mean): --  RR: 18 (27 Apr 2018 13:32) (18 - 18)  SpO2: 97% (27 Apr 2018 13:32) (95% - 98%)    Physical Exam:  General:    NAD,  non toxic, A&O x 3  Head: atraumatic, normocephalic  Eye: normal sclera and conjunctiva  ENT:    no oropharyngeal lesions,   no LAD,   neck supple  Cardio:     regular S1, S2,  no murmur  Respiratory:    clear b/l,    no wheezing  abd:     soft,   BS +,   no tenderness,    no organomegaly  :   no CVAT,  no suprapubic tenderness,   no  singleton  Musculoskeletal:   right forearm wound clean  vascular: no lines, normal pulses  Skin:    no rash  Neurologic:     no focal deficit  psych: normal affect, no suicidal ideation                          11.1   7.0   )-----------( 266      ( 26 Apr 2018 18:22 )             32.7       04-26    135  |  97  |  27<H>  ----------------------------<  336<H>  4.4   |  24  |  1.38<H>    Ca    9.4      26 Apr 2018 18:22            MICROBIOLOGY:  Vancomycin Level, Trough: 15.4 ug/mL (04-26-18 @ 17:58)  v  .Surgical Swab right ulna #3  04-25-18   Few Staphylococcus aureus  --  Staphylococcus aureus                RADIOLOGY:    < from: Xray Elbow AP + Lateral + Oblique, Right (09.08.17 @ 09:15) >  MPRESSION:   There has been interval removal of external fixation hardware. There are   new multiple surgical clips in the medial subcutaneous tissues. There is   a stable medial plate with multiple screws transfixing a distal humerus   fracture and bone graft material in the region of the trochlea, unchanged.

## 2018-04-27 NOTE — CONSULT NOTE ADULT - ASSESSMENT
57 yr F with T2DM A1C 8.2 c/b retinopathy, neuropathy and nephropathy here with R ulnar OM. Patient requires hgih doses of insulin at home. Here she has only been on moderate dose correctional scale without basal/bolus with glucose values in 300s-400s. Patient requiring much less than her home doses likely due to eating less as compared to home.
57 yr old sp complicated repair of crush injury to right humerus last year with spacer and external fixatory.  recently noted drainage from old external fixator site, no fever or chills  no recent antibiotics.     Hold preop antibiotics  OR cultures  start vanco 1 gram q 12   will need picc based    ab based on cultures

## 2018-04-27 NOTE — CONSULT NOTE ADULT - ATTENDING COMMENTS
57 yr F with T2DM A1C 8.2 c/b retinopathy, neuropathy and nephropathy here with R ulnar OM. Here requiring less insulin dose likely due to improved dietary discretion. Recommend to start Lantus 20U and humalog 10U before meals plus moderate dose correctional before meals and low dose at bedtime.

## 2018-04-27 NOTE — CONSULT NOTE ADULT - PROBLEM SELECTOR RECOMMENDATION 9
-While inpatient, start Lantus 20U and humalog 10U before meals plus moderate dose correctional before meals and low dose at bedtime  -Goal glucose 100-180  -Can be discharged on B/B doses tbd  -F/U with [place] [id number] [phone].

## 2018-04-27 NOTE — PROGRESS NOTE ADULT - ASSESSMENT
58 y/o fm s/p I&D, antibiotic cement right elbow POD#3, f/u ID recs, NWB right U/E, ROM elbow as tolerated  Kasey Pacheco PA-C  Orthopaedic Surgery  Team pager [phone]  [facility] [phone]  [phone]

## 2018-04-28 DIAGNOSIS — M86.9 OSTEOMYELITIS, UNSPECIFIED: ICD-10-CM

## 2018-04-28 LAB
GLUCOSE BLDC GLUCOMTR-MCNC: 298 MG/DL — HIGH (ref 70–99)
GLUCOSE BLDC GLUCOMTR-MCNC: 426 MG/DL — HIGH (ref 70–99)
GLUCOSE BLDC GLUCOMTR-MCNC: 434 MG/DL — HIGH (ref 70–99)
GLUCOSE BLDC GLUCOMTR-MCNC: 449 MG/DL — HIGH (ref 70–99)
GLUCOSE BLDC GLUCOMTR-MCNC: 468 MG/DL — CRITICAL HIGH (ref 70–99)

## 2018-04-28 RX ORDER — INSULIN LISPRO 100/ML
20 VIAL (ML) SUBCUTANEOUS
Qty: 0 | Refills: 0 | Status: DISCONTINUED | OUTPATIENT
Start: 2018-04-28 | End: 2018-04-29

## 2018-04-28 RX ORDER — INSULIN GLARGINE 100 [IU]/ML
30 INJECTION, SOLUTION SUBCUTANEOUS AT BEDTIME
Qty: 0 | Refills: 0 | Status: DISCONTINUED | OUTPATIENT
Start: 2018-04-28 | End: 2018-04-29

## 2018-04-28 RX ADMIN — Medication 4: at 21:36

## 2018-04-28 RX ADMIN — Medication 81 MILLIGRAM(S): at 11:37

## 2018-04-28 RX ADMIN — Medication 145 MILLIGRAM(S): at 11:37

## 2018-04-28 RX ADMIN — Medication 100 MILLIGRAM(S): at 21:34

## 2018-04-28 RX ADMIN — GABAPENTIN 600 MILLIGRAM(S): 400 CAPSULE ORAL at 06:50

## 2018-04-28 RX ADMIN — Medication 100 MILLIGRAM(S): at 13:09

## 2018-04-28 RX ADMIN — TRAMADOL HYDROCHLORIDE 50 MILLIGRAM(S): 50 TABLET ORAL at 09:30

## 2018-04-28 RX ADMIN — INSULIN GLARGINE 30 UNIT(S): 100 INJECTION, SOLUTION SUBCUTANEOUS at 21:36

## 2018-04-28 RX ADMIN — Medication 12: at 19:39

## 2018-04-28 RX ADMIN — HEPARIN SODIUM 5000 UNIT(S): 5000 INJECTION INTRAVENOUS; SUBCUTANEOUS at 13:09

## 2018-04-28 RX ADMIN — TRAMADOL HYDROCHLORIDE 50 MILLIGRAM(S): 50 TABLET ORAL at 09:55

## 2018-04-28 RX ADMIN — OXYCODONE HYDROCHLORIDE 5 MILLIGRAM(S): 5 TABLET ORAL at 03:47

## 2018-04-28 RX ADMIN — Medication 10 UNIT(S): at 08:45

## 2018-04-28 RX ADMIN — Medication 100 MILLIGRAM(S): at 06:50

## 2018-04-28 RX ADMIN — OXYCODONE HYDROCHLORIDE 5 MILLIGRAM(S): 5 TABLET ORAL at 04:17

## 2018-04-28 RX ADMIN — Medication 500 MILLIGRAM(S): at 11:37

## 2018-04-28 RX ADMIN — ATORVASTATIN CALCIUM 40 MILLIGRAM(S): 80 TABLET, FILM COATED ORAL at 21:34

## 2018-04-28 RX ADMIN — GABAPENTIN 600 MILLIGRAM(S): 400 CAPSULE ORAL at 21:34

## 2018-04-28 RX ADMIN — Medication 1 TABLET(S): at 11:37

## 2018-04-28 RX ADMIN — Medication 12: at 12:30

## 2018-04-28 RX ADMIN — PANTOPRAZOLE SODIUM 40 MILLIGRAM(S): 20 TABLET, DELAYED RELEASE ORAL at 06:50

## 2018-04-28 RX ADMIN — TRAMADOL HYDROCHLORIDE 50 MILLIGRAM(S): 50 TABLET ORAL at 22:17

## 2018-04-28 RX ADMIN — Medication 6: at 08:45

## 2018-04-28 RX ADMIN — Medication 10 UNIT(S): at 12:30

## 2018-04-28 RX ADMIN — HEPARIN SODIUM 5000 UNIT(S): 5000 INJECTION INTRAVENOUS; SUBCUTANEOUS at 06:50

## 2018-04-28 RX ADMIN — Medication 25 MILLIGRAM(S): at 02:28

## 2018-04-28 RX ADMIN — TRAMADOL HYDROCHLORIDE 50 MILLIGRAM(S): 50 TABLET ORAL at 21:34

## 2018-04-28 RX ADMIN — HEPARIN SODIUM 5000 UNIT(S): 5000 INJECTION INTRAVENOUS; SUBCUTANEOUS at 21:34

## 2018-04-28 RX ADMIN — Medication 25 MILLIGRAM(S): at 23:00

## 2018-04-28 RX ADMIN — GABAPENTIN 600 MILLIGRAM(S): 400 CAPSULE ORAL at 13:09

## 2018-04-28 RX ADMIN — Medication 20 UNIT(S): at 19:37

## 2018-04-28 NOTE — PROGRESS NOTE ADULT - PROBLEM SELECTOR PLAN 1
PT/OT-NWB RUE  IS  DVT PPx  Pain Control  Continue Current Tx.  ID and endo recs appreciated  dispo planning    Deandre Polo PA-C  Team Pager: [phone]

## 2018-04-28 NOTE — PROGRESS NOTE ADULT - PROBLEM SELECTOR PLAN 1
- Increase Lantus to..  - Increase Humalog to..  - c/w moderate correction scale qac and qhs  - consistent carb diet  - will follow  - Can be discharged on basal bolus insulin, doses to be determined  -F/U with ENDO [address] [facility] [phone]. - Increase Lantus to 30 units qhs  - Increase Humalog to 20 units before meals  - c/w moderate correction scale qac and qhs  - consistent carb diet  - will follow  - Can be discharged on basal bolus insulin, doses to be determined  -F/U with ENDO [address] [facility] [phone].

## 2018-04-28 NOTE — PROGRESS NOTE ADULT - ASSESSMENT
57 yr F with T2DM A1C 8.2 c/b retinopathy, neuropathy and nephropathy here with R ulnar OM. Patient requires high doses of insulin at home. Here she is on basal bolus insulin with persistent hyperglycemia, will adjust doses

## 2018-04-28 NOTE — PROGRESS NOTE ADULT - SUBJECTIVE AND OBJECTIVE BOX
Post op Day [4 ]    Patient resting without complaints.  No chest pain, SOB, N/V.    T(C): 36.3 (04-28-18 @ 05:10), Max: 36.9 (04-27-18 @ 21:02)  HR: 90 (04-28-18 @ 05:10) (80 - 98)  BP: 120/70 (04-28-18 @ 05:10) (116/79 - 157/84)  RR: 18 (04-28-18 @ 05:10) (18 - 18)  SpO2: 98% (04-28-18 @ 05:10) (95% - 98%)  Wt(kg): --    Exam:  Alert and Oriented, No Acute Distress  RUE dsg c/d/i with soft/compressible compartments  Ulnar nerve dysfunction 2/2 prior injury, 4th and 5th finger contracted with decreased sensation.  Otherwise intact , SILT,  +Radial pulse  Calves Soft, Non-tender bilaterally                           11.1   7.0   )-----------( 266      ( 26 Apr 2018 18:22 )             32.7    04-26    135  |  97  |  27<H>  ----------------------------<  336<H>  4.4   |  24  |  1.38<H>    Ca    9.4      26 Apr 2018 18:22

## 2018-04-28 NOTE — PROGRESS NOTE ADULT - SUBJECTIVE AND OBJECTIVE BOX
Chief Complaint: f/u DM2    History:  Patient with persistent hyperglycemia. Breakfast late this morning and has some nausea because of that. But otherwise eating well, no abdominal pain or diarrhea.    MEDICATIONS  (STANDING):  acetaminophen  IVPB. 1000 milliGRAM(s) IV Intermittent once  ascorbic acid 500 milliGRAM(s) Oral daily  aspirin enteric coated 81 milliGRAM(s) Oral daily  atorvastatin 40 milliGRAM(s) Oral at bedtime  ceFAZolin   IVPB      ceFAZolin   IVPB 2000 milliGRAM(s) IV Intermittent every 8 hours  dextrose 5%. 1000 milliLiter(s) (50 mL/Hr) IV Continuous <Continuous>  dextrose 50% Injectable 12.5 Gram(s) IV Push once  dextrose 50% Injectable 25 Gram(s) IV Push once  dextrose 50% Injectable 25 Gram(s) IV Push once  fenofibrate Tablet 145 milliGRAM(s) Oral daily  gabapentin 600 milliGRAM(s) Oral three times a day  heparin  Injectable 5000 Unit(s) SubCutaneous every 8 hours  insulin glargine Injectable (LANTUS) 20 Unit(s) SubCutaneous at bedtime  insulin lispro (HumaLOG) corrective regimen sliding scale   SubCutaneous three times a day before meals  insulin lispro (HumaLOG) corrective regimen sliding scale   SubCutaneous at bedtime  insulin lispro Injectable (HumaLOG) 10 Unit(s) SubCutaneous three times a day before meals  multivitamin 1 Tablet(s) Oral daily  pantoprazole    Tablet 40 milliGRAM(s) Oral before breakfast  traMADol 50 milliGRAM(s) Oral two times a day    MEDICATIONS  (PRN):  acetaminophen   Tablet 650 milliGRAM(s) Oral every 6 hours PRN For Temp greater than 38 C (100.4 F)  acetaminophen   Tablet. 650 milliGRAM(s) Oral every 6 hours PRN Mild Pain (1 - 3)  aluminum hydroxide/magnesium hydroxide/simethicone Suspension 30 milliLiter(s) Oral four times a day PRN Indigestion  dextrose Gel 1 Dose(s) Oral once PRN Blood Glucose LESS THAN 70 milliGRAM(s)/deciliter  diphenhydrAMINE   Capsule 25 milliGRAM(s) Oral every 6 hours PRN Rash and/or Itching  docusate sodium 100 milliGRAM(s) Oral three times a day PRN Constipation  glucagon  Injectable 1 milliGRAM(s) IntraMuscular once PRN Glucose LESS THAN 70 milligrams/deciliter  HYDROmorphone  Injectable 1 milliGRAM(s) IV Push every 4 hours PRN severe breakthrough pain  HYDROmorphone  Injectable 0.3 milliGRAM(s) IV Push every 10 minutes PRN Severe Pain (7 - 10)  ondansetron Injectable 4 milliGRAM(s) IV Push once PRN Nausea and/or Vomiting  ondansetron Injectable 4 milliGRAM(s) IV Push every 6 hours PRN Nausea and/or Vomiting  oxyCODONE    IR 5 milliGRAM(s) Oral every 4 hours PRN Moderate Pain (4 - 6)  oxyCODONE    IR 10 milliGRAM(s) Oral every 4 hours PRN Severe Pain (7 - 10)      Allergies  No Known Allergies        Review of Systems:  Constitutional: No fever  Cardiovascular: No chest pain, palpitations  Respiratory: No SOB, no cough  GI: + nausea, no vomiting or abdominal pain    ALL OTHER SYSTEMS REVIEWED AND NEGATIVE      PHYSICAL EXAM:  VITALS: T(C): 36.8 (04-28-18 @ 08:00)  T(F): 98.2 (04-28-18 @ 08:00), Max: 98.5 (04-27-18 @ 21:02)  HR: 89 (04-28-18 @ 08:00) (87 - 98)  BP: 136/74 (04-28-18 @ 08:00) (116/79 - 157/84)  RR:  (18 - 18)  SpO2:  (95% - 98%)  Wt(kg): --  GENERAL: NAD, well-developed  RESPIRATORY: Clear to auscultation bilaterally; No rales, rhonchi, wheezing, or rubs  CARDIOVASCULAR: Regular rate and rhythm; No murmurs  GI: Soft, nontender, non distended  PSYCH: Alert and oriented x 3, normal affect, reactive affect    POCT Blood Glucose.: 298 mg/dL (04-28-18 @ 08:05) H 10, 6  POCT Blood Glucose.: 360 mg/dL (04-27-18 @ 21:42) L 20, H 3  POCT Blood Glucose.: 389 mg/dL (04-27-18 @ 18:23) H 10, H 10  POCT Blood Glucose.: 403 mg/dL (04-27-18 @ 13:28) H 12  POCT Blood Glucose.: 249 mg/dL (04-27-18 @ 09:10) H 4  POCT Blood Glucose.: 395 mg/dL (04-26-18 @ 21:48)  POCT Blood Glucose.: 355 mg/dL (04-26-18 @ 19:19)  POCT Blood Glucose.: 264 mg/dL (04-26-18 @ 13:50)  POCT Blood Glucose.: 250 mg/dL (04-26-18 @ 08:34)  POCT Blood Glucose.: 380 mg/dL (04-25-18 @ 22:21)  POCT Blood Glucose.: 336 mg/dL (04-25-18 @ 21:13)  POCT Blood Glucose.: 364 mg/dL (04-25-18 @ 18:54)  POCT Blood Glucose.: 286 mg/dL (04-25-18 @ 13:12)      04-26    135  |  97  |  27<H>  ----------------------------<  336<H>  4.4   |  24  |  1.38<H>    EGFR if : 49<L>  EGFR if non : 42<L>    Ca    9.4      04-26            Thyroid Function Tests:      Hemoglobin A1C, Whole Blood: 8.2 % <H> [4.0 - 5.6] (04-21-18 @ 00:59) Chief Complaint: f/u DM2    History:  Patient with persistent hyperglycemia. Breakfast late this morning and has some nausea because of that. But otherwise eating well, no abdominal pain or diarrhea.    MEDICATIONS  (STANDING):  acetaminophen  IVPB. 1000 milliGRAM(s) IV Intermittent once  ascorbic acid 500 milliGRAM(s) Oral daily  aspirin enteric coated 81 milliGRAM(s) Oral daily  atorvastatin 40 milliGRAM(s) Oral at bedtime  ceFAZolin   IVPB      ceFAZolin   IVPB 2000 milliGRAM(s) IV Intermittent every 8 hours  dextrose 5%. 1000 milliLiter(s) (50 mL/Hr) IV Continuous <Continuous>  dextrose 50% Injectable 12.5 Gram(s) IV Push once  dextrose 50% Injectable 25 Gram(s) IV Push once  dextrose 50% Injectable 25 Gram(s) IV Push once  fenofibrate Tablet 145 milliGRAM(s) Oral daily  gabapentin 600 milliGRAM(s) Oral three times a day  heparin  Injectable 5000 Unit(s) SubCutaneous every 8 hours  insulin glargine Injectable (LANTUS) 20 Unit(s) SubCutaneous at bedtime  insulin lispro (HumaLOG) corrective regimen sliding scale   SubCutaneous three times a day before meals  insulin lispro (HumaLOG) corrective regimen sliding scale   SubCutaneous at bedtime  insulin lispro Injectable (HumaLOG) 10 Unit(s) SubCutaneous three times a day before meals  multivitamin 1 Tablet(s) Oral daily  pantoprazole    Tablet 40 milliGRAM(s) Oral before breakfast  traMADol 50 milliGRAM(s) Oral two times a day    MEDICATIONS  (PRN):  acetaminophen   Tablet 650 milliGRAM(s) Oral every 6 hours PRN For Temp greater than 38 C (100.4 F)  acetaminophen   Tablet. 650 milliGRAM(s) Oral every 6 hours PRN Mild Pain (1 - 3)  aluminum hydroxide/magnesium hydroxide/simethicone Suspension 30 milliLiter(s) Oral four times a day PRN Indigestion  dextrose Gel 1 Dose(s) Oral once PRN Blood Glucose LESS THAN 70 milliGRAM(s)/deciliter  diphenhydrAMINE   Capsule 25 milliGRAM(s) Oral every 6 hours PRN Rash and/or Itching  docusate sodium 100 milliGRAM(s) Oral three times a day PRN Constipation  glucagon  Injectable 1 milliGRAM(s) IntraMuscular once PRN Glucose LESS THAN 70 milligrams/deciliter  HYDROmorphone  Injectable 1 milliGRAM(s) IV Push every 4 hours PRN severe breakthrough pain  HYDROmorphone  Injectable 0.3 milliGRAM(s) IV Push every 10 minutes PRN Severe Pain (7 - 10)  ondansetron Injectable 4 milliGRAM(s) IV Push once PRN Nausea and/or Vomiting  ondansetron Injectable 4 milliGRAM(s) IV Push every 6 hours PRN Nausea and/or Vomiting  oxyCODONE    IR 5 milliGRAM(s) Oral every 4 hours PRN Moderate Pain (4 - 6)  oxyCODONE    IR 10 milliGRAM(s) Oral every 4 hours PRN Severe Pain (7 - 10)      Allergies  No Known Allergies        Review of Systems:  Constitutional: No fever  Cardiovascular: No chest pain, palpitations  Respiratory: No SOB, no cough  GI: + nausea, no vomiting or abdominal pain    ALL OTHER SYSTEMS REVIEWED AND NEGATIVE      PHYSICAL EXAM:  VITALS: T(C): 36.8 (04-28-18 @ 08:00)  T(F): 98.2 (04-28-18 @ 08:00), Max: 98.5 (04-27-18 @ 21:02)  HR: 89 (04-28-18 @ 08:00) (87 - 98)  BP: 136/74 (04-28-18 @ 08:00) (116/79 - 157/84)  RR:  (18 - 18)  SpO2:  (95% - 98%)  Wt(kg): --  GENERAL: NAD, well-developed  RESPIRATORY: Clear to auscultation bilaterally; No rales, rhonchi, wheezing, or rubs  CARDIOVASCULAR: Regular rate and rhythm; No murmurs  GI: Soft, nontender, non distended  PSYCH: Alert and oriented x 3, normal affect, reactive affect    POCT Blood Glucose.: 434 mg/dL (04-28-18 @ 12:20) H 10, 12  POCT Blood Glucose.: 298 mg/dL (04-28-18 @ 08:05) H 10, 6  POCT Blood Glucose.: 360 mg/dL (04-27-18 @ 21:42) L 20, H 3  POCT Blood Glucose.: 389 mg/dL (04-27-18 @ 18:23) H 10, H 10  POCT Blood Glucose.: 403 mg/dL (04-27-18 @ 13:28) H 12  POCT Blood Glucose.: 249 mg/dL (04-27-18 @ 09:10) H 4  POCT Blood Glucose.: 395 mg/dL (04-26-18 @ 21:48)  POCT Blood Glucose.: 355 mg/dL (04-26-18 @ 19:19)  POCT Blood Glucose.: 264 mg/dL (04-26-18 @ 13:50)  POCT Blood Glucose.: 250 mg/dL (04-26-18 @ 08:34)  POCT Blood Glucose.: 380 mg/dL (04-25-18 @ 22:21)  POCT Blood Glucose.: 336 mg/dL (04-25-18 @ 21:13)  POCT Blood Glucose.: 364 mg/dL (04-25-18 @ 18:54)  POCT Blood Glucose.: 286 mg/dL (04-25-18 @ 13:12)      04-26    135  |  97  |  27<H>  ----------------------------<  336<H>  4.4   |  24  |  1.38<H>    EGFR if : 49<L>  EGFR if non : 42<L>    Ca    9.4      04-26            Thyroid Function Tests:      Hemoglobin A1C, Whole Blood: 8.2 % <H> [4.0 - 5.6] (04-21-18 @ 00:59)

## 2018-04-29 DIAGNOSIS — M86.9 OSTEOMYELITIS, UNSPECIFIED: ICD-10-CM

## 2018-04-29 LAB
CULTURE RESULTS: SIGNIFICANT CHANGE UP
GLUCOSE BLDC GLUCOMTR-MCNC: 261 MG/DL — HIGH (ref 70–99)
GLUCOSE BLDC GLUCOMTR-MCNC: 277 MG/DL — HIGH (ref 70–99)
GLUCOSE BLDC GLUCOMTR-MCNC: 328 MG/DL — HIGH (ref 70–99)
GLUCOSE BLDC GLUCOMTR-MCNC: 405 MG/DL — HIGH (ref 70–99)
GLUCOSE BLDC GLUCOMTR-MCNC: 415 MG/DL — HIGH (ref 70–99)
GLUCOSE BLDC GLUCOMTR-MCNC: 430 MG/DL — HIGH (ref 70–99)
GLUCOSE BLDC GLUCOMTR-MCNC: 467 MG/DL — CRITICAL HIGH (ref 70–99)
ORGANISM # SPEC MICROSCOPIC CNT: SIGNIFICANT CHANGE UP
SPECIMEN SOURCE: SIGNIFICANT CHANGE UP

## 2018-04-29 RX ORDER — INSULIN LISPRO 100/ML
20 VIAL (ML) SUBCUTANEOUS ONCE
Qty: 0 | Refills: 0 | Status: COMPLETED | OUTPATIENT
Start: 2018-04-29 | End: 2018-04-29

## 2018-04-29 RX ORDER — INSULIN LISPRO 100/ML
VIAL (ML) SUBCUTANEOUS AT BEDTIME
Qty: 0 | Refills: 0 | Status: DISCONTINUED | OUTPATIENT
Start: 2018-04-29 | End: 2018-04-30

## 2018-04-29 RX ORDER — INSULIN LISPRO 100/ML
30 VIAL (ML) SUBCUTANEOUS
Qty: 0 | Refills: 0 | Status: DISCONTINUED | OUTPATIENT
Start: 2018-04-29 | End: 2018-04-30

## 2018-04-29 RX ORDER — SENNA PLUS 8.6 MG/1
2 TABLET ORAL AT BEDTIME
Qty: 0 | Refills: 0 | Status: DISCONTINUED | OUTPATIENT
Start: 2018-04-29 | End: 2018-04-30

## 2018-04-29 RX ORDER — INSULIN GLARGINE 100 [IU]/ML
45 INJECTION, SOLUTION SUBCUTANEOUS AT BEDTIME
Qty: 0 | Refills: 0 | Status: DISCONTINUED | OUTPATIENT
Start: 2018-04-29 | End: 2018-04-30

## 2018-04-29 RX ORDER — DOCUSATE SODIUM 100 MG
100 CAPSULE ORAL THREE TIMES A DAY
Qty: 0 | Refills: 0 | Status: DISCONTINUED | OUTPATIENT
Start: 2018-04-29 | End: 2018-04-30

## 2018-04-29 RX ORDER — INSULIN LISPRO 100/ML
VIAL (ML) SUBCUTANEOUS
Qty: 0 | Refills: 0 | Status: DISCONTINUED | OUTPATIENT
Start: 2018-04-29 | End: 2018-04-30

## 2018-04-29 RX ADMIN — Medication 25 MILLIGRAM(S): at 22:51

## 2018-04-29 RX ADMIN — TRAMADOL HYDROCHLORIDE 50 MILLIGRAM(S): 50 TABLET ORAL at 21:19

## 2018-04-29 RX ADMIN — PANTOPRAZOLE SODIUM 40 MILLIGRAM(S): 20 TABLET, DELAYED RELEASE ORAL at 07:39

## 2018-04-29 RX ADMIN — Medication 1 TABLET(S): at 11:54

## 2018-04-29 RX ADMIN — INSULIN GLARGINE 45 UNIT(S): 100 INJECTION, SOLUTION SUBCUTANEOUS at 21:20

## 2018-04-29 RX ADMIN — Medication 30 UNIT(S): at 12:32

## 2018-04-29 RX ADMIN — HEPARIN SODIUM 5000 UNIT(S): 5000 INJECTION INTRAVENOUS; SUBCUTANEOUS at 21:20

## 2018-04-29 RX ADMIN — Medication 30 UNIT(S): at 18:01

## 2018-04-29 RX ADMIN — TRAMADOL HYDROCHLORIDE 50 MILLIGRAM(S): 50 TABLET ORAL at 10:46

## 2018-04-29 RX ADMIN — GABAPENTIN 600 MILLIGRAM(S): 400 CAPSULE ORAL at 13:56

## 2018-04-29 RX ADMIN — GABAPENTIN 600 MILLIGRAM(S): 400 CAPSULE ORAL at 21:24

## 2018-04-29 RX ADMIN — Medication 4: at 21:21

## 2018-04-29 RX ADMIN — Medication 20 UNIT(S): at 02:10

## 2018-04-29 RX ADMIN — Medication 100 MILLIGRAM(S): at 05:54

## 2018-04-29 RX ADMIN — Medication 100 MILLIGRAM(S): at 13:58

## 2018-04-29 RX ADMIN — HEPARIN SODIUM 5000 UNIT(S): 5000 INJECTION INTRAVENOUS; SUBCUTANEOUS at 13:56

## 2018-04-29 RX ADMIN — GABAPENTIN 600 MILLIGRAM(S): 400 CAPSULE ORAL at 05:54

## 2018-04-29 RX ADMIN — Medication 100 MILLIGRAM(S): at 13:56

## 2018-04-29 RX ADMIN — Medication 6: at 08:09

## 2018-04-29 RX ADMIN — TRAMADOL HYDROCHLORIDE 50 MILLIGRAM(S): 50 TABLET ORAL at 10:16

## 2018-04-29 RX ADMIN — Medication 145 MILLIGRAM(S): at 11:54

## 2018-04-29 RX ADMIN — Medication 12: at 18:00

## 2018-04-29 RX ADMIN — TRAMADOL HYDROCHLORIDE 50 MILLIGRAM(S): 50 TABLET ORAL at 22:00

## 2018-04-29 RX ADMIN — Medication 20 UNIT(S): at 08:09

## 2018-04-29 RX ADMIN — Medication 12: at 12:31

## 2018-04-29 RX ADMIN — ATORVASTATIN CALCIUM 40 MILLIGRAM(S): 80 TABLET, FILM COATED ORAL at 21:19

## 2018-04-29 RX ADMIN — Medication 81 MILLIGRAM(S): at 11:54

## 2018-04-29 RX ADMIN — Medication 500 MILLIGRAM(S): at 11:54

## 2018-04-29 RX ADMIN — Medication 100 MILLIGRAM(S): at 21:24

## 2018-04-29 RX ADMIN — HEPARIN SODIUM 5000 UNIT(S): 5000 INJECTION INTRAVENOUS; SUBCUTANEOUS at 05:54

## 2018-04-29 NOTE — PROVIDER CONTACT NOTE (CHANGE IN STATUS NOTIFICATION) - ACTION/TREATMENT ORDERED:
12 units Humalog Sliding Scale and 30 Units Humalog Pre meal Coverage Given.Will continue to monitor pt.
12 units Humalog Given plus 30 units Humalag premeal Given.Will continue to monitor pt.

## 2018-04-29 NOTE — PROGRESS NOTE ADULT - PROBLEM SELECTOR PLAN 1
PT/OT-NWB RUE  Ice/elevate hand, finger/hand exercises  IS  DVT PPx  Pain Control  Continue Current Tx.  Follow cx's, continue ancef  dispo plan home when abx therapy arranged  endocrine appreciated    Deandre Polo PA-C  Team Pager: #[phone]

## 2018-04-29 NOTE — CHART NOTE - NSCHARTNOTEFT_GEN_A_CORE
Patient with persistently elevated fingersticks to > 400 overnight. Discussed case with Endocrine, who recommended 20 units of correctional insulin at this time followed by fingerstick at 0600. Patient currently asymptomatic.

## 2018-04-29 NOTE — PROGRESS NOTE ADULT - ASSESSMENT
A/p: 57yFemale s/p I+D ulna OM with antibiotic cement placement.  Hand swelling likely 2/2 surgery.   Hyperglycemia o/n (hx t2DM), given 20u humalog per endocrine. Trending FS is improving. VSS. NAD.

## 2018-04-29 NOTE — PROGRESS NOTE ADULT - SUBJECTIVE AND OBJECTIVE BOX
Chief Complaint: f/u DM2    History:  Patient with significant hyperglycemia yesterday and overnight. States she had some nausea this morning and only had some cereal and coffee. Otherwise eating well at other meal times. Does not have nausea, vomiting, abdominal pain.    MEDICATIONS  (STANDING):  acetaminophen  IVPB. 1000 milliGRAM(s) IV Intermittent once  ascorbic acid 500 milliGRAM(s) Oral daily  aspirin enteric coated 81 milliGRAM(s) Oral daily  atorvastatin 40 milliGRAM(s) Oral at bedtime  ceFAZolin   IVPB      ceFAZolin   IVPB 2000 milliGRAM(s) IV Intermittent every 8 hours  dextrose 5%. 1000 milliLiter(s) (50 mL/Hr) IV Continuous <Continuous>  dextrose 50% Injectable 12.5 Gram(s) IV Push once  dextrose 50% Injectable 25 Gram(s) IV Push once  dextrose 50% Injectable 25 Gram(s) IV Push once  docusate sodium 100 milliGRAM(s) Oral three times a day  fenofibrate Tablet 145 milliGRAM(s) Oral daily  gabapentin 600 milliGRAM(s) Oral three times a day  heparin  Injectable 5000 Unit(s) SubCutaneous every 8 hours  insulin glargine Injectable (LANTUS) 30 Unit(s) SubCutaneous at bedtime  insulin lispro (HumaLOG) corrective regimen sliding scale   SubCutaneous three times a day before meals  insulin lispro (HumaLOG) corrective regimen sliding scale   SubCutaneous at bedtime  insulin lispro Injectable (HumaLOG) 20 Unit(s) SubCutaneous three times a day before meals  multivitamin 1 Tablet(s) Oral daily  pantoprazole    Tablet 40 milliGRAM(s) Oral before breakfast  senna 2 Tablet(s) Oral at bedtime  traMADol 50 milliGRAM(s) Oral two times a day    MEDICATIONS  (PRN):  acetaminophen   Tablet 650 milliGRAM(s) Oral every 6 hours PRN For Temp greater than 38 C (100.4 F)  acetaminophen   Tablet. 650 milliGRAM(s) Oral every 6 hours PRN Mild Pain (1 - 3)  aluminum hydroxide/magnesium hydroxide/simethicone Suspension 30 milliLiter(s) Oral four times a day PRN Indigestion  dextrose Gel 1 Dose(s) Oral once PRN Blood Glucose LESS THAN 70 milliGRAM(s)/deciliter  diphenhydrAMINE   Capsule 25 milliGRAM(s) Oral every 6 hours PRN Rash and/or Itching  glucagon  Injectable 1 milliGRAM(s) IntraMuscular once PRN Glucose LESS THAN 70 milligrams/deciliter  HYDROmorphone  Injectable 1 milliGRAM(s) IV Push every 4 hours PRN severe breakthrough pain  HYDROmorphone  Injectable 0.3 milliGRAM(s) IV Push every 10 minutes PRN Severe Pain (7 - 10)  ondansetron Injectable 4 milliGRAM(s) IV Push once PRN Nausea and/or Vomiting  ondansetron Injectable 4 milliGRAM(s) IV Push every 6 hours PRN Nausea and/or Vomiting  oxyCODONE    IR 5 milliGRAM(s) Oral every 4 hours PRN Moderate Pain (4 - 6)  oxyCODONE    IR 10 milliGRAM(s) Oral every 4 hours PRN Severe Pain (7 - 10)      Allergies  No Known Allergies    Review of Systems:  Constitutional: No fever  Cardiovascular: No chest pain, palpitations  Respiratory: No SOB, no cough  GI: No nausea, vomiting, abdominal pain      ALL OTHER SYSTEMS REVIEWED AND NEGATIVE    PHYSICAL EXAM:  VITALS: T(C): 36.3 (04-29-18 @ 10:22)  T(F): 97.4 (04-29-18 @ 10:22), Max: 98.4 (04-28-18 @ 16:50)  HR: 104 (04-29-18 @ 10:22) (88 - 104)  BP: 135/94 (04-29-18 @ 10:22) (121/64 - 157/87)  RR:  (18 - 18)  SpO2:  (97% - 99%)  Wt(kg): --  GENERAL: NAD, well-developed  RESPIRATORY: Clear to auscultation bilaterally; No rales, rhonchi, wheezing, or rubs  CARDIOVASCULAR: Regular rate and rhythm; No murmurs  GI: Soft, nontender, non distended  PSYCH: Alert and oriented x 3, reactive affect    POCT Blood Glucose.: 261 mg/dL (04-29-18 @ 08:02)  POCT Blood Glucose.: 277 mg/dL (04-29-18 @ 07:16) H 20, 6  POCT Blood Glucose.: 415 mg/dL (04-29-18 @ 00:32) H 20  POCT Blood Glucose.: 449 mg/dL (04-28-18 @ 21:36) L 30, 4  POCT Blood Glucose.: 468 mg/dL (04-28-18 @ 21:35)  POCT Blood Glucose.: 426 mg/dL (04-28-18 @ 19:27) H 20, 10  POCT Blood Glucose.: 434 mg/dL (04-28-18 @ 12:20) H 10, 12  POCT Blood Glucose.: 298 mg/dL (04-28-18 @ 08:05)  POCT Blood Glucose.: 360 mg/dL (04-27-18 @ 21:42)  POCT Blood Glucose.: 389 mg/dL (04-27-18 @ 18:23)  POCT Blood Glucose.: 403 mg/dL (04-27-18 @ 13:28)  POCT Blood Glucose.: 249 mg/dL (04-27-18 @ 09:10)  POCT Blood Glucose.: 395 mg/dL (04-26-18 @ 21:48)  POCT Blood Glucose.: 355 mg/dL (04-26-18 @ 19:19)  POCT Blood Glucose.: 264 mg/dL (04-26-18 @ 13:50)      04-26    135  |  97  |  27<H>  ----------------------------<  336<H>  4.4   |  24  |  1.38<H>    EGFR if : 49<L>  EGFR if non : 42<L>    Ca    9.4      04-26          Hemoglobin A1C, Whole Blood: 8.2 % <H> [4.0 - 5.6] (04-21-18 @ 00:59)

## 2018-04-29 NOTE — PROGRESS NOTE ADULT - PROBLEM SELECTOR PLAN 1
- Increase Lantus to 45 units qhs  - Increase Humalog to 30 units before meals  - c/w moderate correction scale qac and qhs  - consistent carb diet  - will follow  - Can be discharged on basal bolus insulin, doses to be determined  -F/U with ENDO [id number] [facility] [phone].

## 2018-04-29 NOTE — PROGRESS NOTE ADULT - SUBJECTIVE AND OBJECTIVE BOX
Post op Day [5 ]    Patient resting without complaints.  No chest pain, SOB, N/V.    T(C): 36.6 (04-29-18 @ 05:01), Max: 36.9 (04-28-18 @ 16:50)  HR: 88 (04-29-18 @ 05:01) (88 - 97)  BP: 121/64 (04-29-18 @ 05:01) (121/64 - 157/87)  RR: 18 (04-29-18 @ 05:01) (18 - 18)  SpO2: 98% (04-29-18 @ 05:01) (95% - 99%)  Wt(kg): --    Exam:  Alert and Oriented, No Acute Distress  RUE dressing c/d/i, re-wrapped ace to assist with moderate hand swelling, limb elevated.  4th/5th finger contracted, with ulnar neuropraxia from prior injury, otherwise SILT,  digits pink, warm, mobile with +radial pulse  Calves Soft, Non-tender bilaterally    CAPILLARY BLOOD GLUCOSE      POCT Blood Glucose.: 277 mg/dL (29 Apr 2018 07:16)      Culture - Surgical Swab (04.25.18 @ 00:48)    -  Ampicillin/Sulbactam: S <=8/4    -  Cefazolin: S <=4    -  Ciprofloxacin: S <=1    -  Clindamycin: S 0.5    -  Erythromycin: S 0.5    -  Gentamicin: S <=1    -  Levofloxacin: S <=0.5    -  Moxifloxacin(Aerobic): S <=0.5    -  Oxacillin: S 0.5    -  Penicillin: R >8    -  RIF- Rifampin: S <=1    -  Tetra/Doxy: R >8    -  Trimethoprim/Sulfamethoxazole: S <=0.5/9.5    -  Vancomycin: S 2    Specimen Source: .Surgical Swab #1 right ulna    Culture Results:   Few Staphylococcus aureus    Organism Identification: Staphylococcus aureus    Organism: Staphylococcus aureus    Method Type: KRYSTYNA

## 2018-04-30 ENCOUNTER — TRANSCRIPTION ENCOUNTER (OUTPATIENT)
Age: 58
End: 2018-04-30

## 2018-04-30 VITALS
TEMPERATURE: 98 F | HEART RATE: 93 BPM | SYSTOLIC BLOOD PRESSURE: 135 MMHG | DIASTOLIC BLOOD PRESSURE: 74 MMHG | OXYGEN SATURATION: 96 % | RESPIRATION RATE: 18 BRPM

## 2018-04-30 LAB
GLUCOSE BLDC GLUCOMTR-MCNC: 331 MG/DL — HIGH (ref 70–99)
GLUCOSE BLDC GLUCOMTR-MCNC: 340 MG/DL — HIGH (ref 70–99)
GLUCOSE BLDC GLUCOMTR-MCNC: 422 MG/DL — HIGH (ref 70–99)
GLUCOSE BLDC GLUCOMTR-MCNC: 452 MG/DL — CRITICAL HIGH (ref 70–99)

## 2018-04-30 PROCEDURE — 85730 THROMBOPLASTIN TIME PARTIAL: CPT

## 2018-04-30 PROCEDURE — 97161 PT EVAL LOW COMPLEX 20 MIN: CPT

## 2018-04-30 PROCEDURE — 85027 COMPLETE CBC AUTOMATED: CPT

## 2018-04-30 PROCEDURE — 99232 SBSQ HOSP IP/OBS MODERATE 35: CPT

## 2018-04-30 PROCEDURE — 87070 CULTURE OTHR SPECIMN AEROBIC: CPT

## 2018-04-30 PROCEDURE — 73090 X-RAY EXAM OF FOREARM: CPT

## 2018-04-30 PROCEDURE — 85610 PROTHROMBIN TIME: CPT

## 2018-04-30 PROCEDURE — 80048 BASIC METABOLIC PNL TOTAL CA: CPT

## 2018-04-30 PROCEDURE — 71045 X-RAY EXAM CHEST 1 VIEW: CPT

## 2018-04-30 PROCEDURE — 88305 TISSUE EXAM BY PATHOLOGIST: CPT

## 2018-04-30 PROCEDURE — 82962 GLUCOSE BLOOD TEST: CPT

## 2018-04-30 PROCEDURE — C1751: CPT

## 2018-04-30 PROCEDURE — C1889: CPT

## 2018-04-30 PROCEDURE — 80202 ASSAY OF VANCOMYCIN: CPT

## 2018-04-30 PROCEDURE — 36569 INSJ PICC 5 YR+ W/O IMAGING: CPT

## 2018-04-30 PROCEDURE — 87186 SC STD MICRODIL/AGAR DIL: CPT

## 2018-04-30 RX ORDER — ASPIRIN/CALCIUM CARB/MAGNESIUM 324 MG
1 TABLET ORAL
Qty: 0 | Refills: 0 | COMMUNITY

## 2018-04-30 RX ORDER — ACETAMINOPHEN 500 MG
2 TABLET ORAL
Qty: 0 | Refills: 0 | COMMUNITY
Start: 2018-04-30

## 2018-04-30 RX ORDER — INSULIN LISPRO 100/ML
38 VIAL (ML) SUBCUTANEOUS
Qty: 0 | Refills: 0 | Status: DISCONTINUED | OUTPATIENT
Start: 2018-04-30 | End: 2018-04-30

## 2018-04-30 RX ORDER — OXYCODONE HYDROCHLORIDE 5 MG/1
1 TABLET ORAL
Qty: 0 | Refills: 0 | COMMUNITY
Start: 2018-04-30

## 2018-04-30 RX ORDER — ASPIRIN/CALCIUM CARB/MAGNESIUM 324 MG
1 TABLET ORAL
Qty: 0 | Refills: 0 | COMMUNITY
Start: 2018-04-30

## 2018-04-30 RX ORDER — CEFAZOLIN SODIUM 1 G
2000 VIAL (EA) INJECTION ONCE
Qty: 0 | Refills: 0 | Status: COMPLETED | OUTPATIENT
Start: 2018-04-30 | End: 2018-04-30

## 2018-04-30 RX ADMIN — Medication 145 MILLIGRAM(S): at 11:36

## 2018-04-30 RX ADMIN — Medication 100 MILLIGRAM(S): at 13:45

## 2018-04-30 RX ADMIN — Medication 8: at 02:07

## 2018-04-30 RX ADMIN — Medication 100 MILLIGRAM(S): at 05:05

## 2018-04-30 RX ADMIN — Medication 100 MILLIGRAM(S): at 11:52

## 2018-04-30 RX ADMIN — HEPARIN SODIUM 5000 UNIT(S): 5000 INJECTION INTRAVENOUS; SUBCUTANEOUS at 05:04

## 2018-04-30 RX ADMIN — Medication 12: at 13:42

## 2018-04-30 RX ADMIN — Medication 81 MILLIGRAM(S): at 11:36

## 2018-04-30 RX ADMIN — Medication 1 TABLET(S): at 11:36

## 2018-04-30 RX ADMIN — Medication 30 UNIT(S): at 08:55

## 2018-04-30 RX ADMIN — GABAPENTIN 600 MILLIGRAM(S): 400 CAPSULE ORAL at 05:04

## 2018-04-30 RX ADMIN — PANTOPRAZOLE SODIUM 40 MILLIGRAM(S): 20 TABLET, DELAYED RELEASE ORAL at 05:04

## 2018-04-30 RX ADMIN — TRAMADOL HYDROCHLORIDE 50 MILLIGRAM(S): 50 TABLET ORAL at 09:16

## 2018-04-30 RX ADMIN — Medication 100 MILLIGRAM(S): at 15:52

## 2018-04-30 RX ADMIN — Medication 8: at 08:54

## 2018-04-30 RX ADMIN — GABAPENTIN 600 MILLIGRAM(S): 400 CAPSULE ORAL at 13:46

## 2018-04-30 RX ADMIN — Medication 100 MILLIGRAM(S): at 05:04

## 2018-04-30 RX ADMIN — Medication 500 MILLIGRAM(S): at 11:36

## 2018-04-30 RX ADMIN — HEPARIN SODIUM 5000 UNIT(S): 5000 INJECTION INTRAVENOUS; SUBCUTANEOUS at 13:46

## 2018-04-30 RX ADMIN — Medication 38 UNIT(S): at 13:42

## 2018-04-30 NOTE — DISCHARGE NOTE ADULT - HOME CARE AGENCY
[facility] at [facility] care SOC 5/1 IV antibiotic infusion [phone]

## 2018-04-30 NOTE — DISCHARGE NOTE ADULT - NS AS ACTIVITY OBS
Walking-Indoors allowed/Walking-Outdoors allowed/Stairs allowed/No Heavy lifting/straining/Do not make important decisions/Do not drive or operate machinery/maintain non-weight bearing right upper extremity in sling for comfort,  elbow range of motion as tolerated

## 2018-04-30 NOTE — PROGRESS NOTE ADULT - SUBJECTIVE AND OBJECTIVE BOX
infectious diseases progress note:    Patient is a 57y old  Female who presents with a chief complaint of Removal of Ex-fix Right Elbow (25 Apr 2018 18:28)        Subacute osteomyelitis of bone of right forearm        ROS:  CONSTITUTIONAL:  Negative fever or chills, feels well, good appetite  EYES:  Negative  blurry vision or double vision  CARDIOVASCULAR:  Negative for chest pain or palpitations  RESPIRATORY:  Negative for cough, wheezing, or SOB   GASTROINTESTINAL:  Negative for nausea, vomiting, diarrhea, constipation, or abdominal pain  GENITOURINARY:  Negative frequency, urgency or dysuria  NEUROLOGIC:  No headache, confusion, dizziness, lightheadedness    Allergies    No Known Allergies    Intolerances        ANTIBIOTICS/RELEVANT:  antimicrobials  ceFAZolin   IVPB      ceFAZolin   IVPB 2000 milliGRAM(s) IV Intermittent every 8 hours    immunologic:    OTHER:  acetaminophen   Tablet 650 milliGRAM(s) Oral every 6 hours PRN  acetaminophen   Tablet. 650 milliGRAM(s) Oral every 6 hours PRN  acetaminophen  IVPB. 1000 milliGRAM(s) IV Intermittent once  aluminum hydroxide/magnesium hydroxide/simethicone Suspension 30 milliLiter(s) Oral four times a day PRN  ascorbic acid 500 milliGRAM(s) Oral daily  aspirin enteric coated 81 milliGRAM(s) Oral daily  atorvastatin 40 milliGRAM(s) Oral at bedtime  dextrose 5%. 1000 milliLiter(s) IV Continuous <Continuous>  dextrose 50% Injectable 12.5 Gram(s) IV Push once  dextrose 50% Injectable 25 Gram(s) IV Push once  dextrose 50% Injectable 25 Gram(s) IV Push once  dextrose Gel 1 Dose(s) Oral once PRN  diphenhydrAMINE   Capsule 25 milliGRAM(s) Oral every 6 hours PRN  docusate sodium 100 milliGRAM(s) Oral three times a day  fenofibrate Tablet 145 milliGRAM(s) Oral daily  gabapentin 600 milliGRAM(s) Oral three times a day  glucagon  Injectable 1 milliGRAM(s) IntraMuscular once PRN  heparin  Injectable 5000 Unit(s) SubCutaneous every 8 hours  HYDROmorphone  Injectable 1 milliGRAM(s) IV Push every 4 hours PRN  HYDROmorphone  Injectable 0.3 milliGRAM(s) IV Push every 10 minutes PRN  insulin glargine Injectable (LANTUS) 45 Unit(s) SubCutaneous at bedtime  insulin lispro (HumaLOG) corrective regimen sliding scale   SubCutaneous <User Schedule>  insulin lispro (HumaLOG) corrective regimen sliding scale   SubCutaneous at bedtime  insulin lispro (HumaLOG) corrective regimen sliding scale   SubCutaneous three times a day before meals  insulin lispro Injectable (HumaLOG) 30 Unit(s) SubCutaneous three times a day before meals  multivitamin 1 Tablet(s) Oral daily  ondansetron Injectable 4 milliGRAM(s) IV Push once PRN  ondansetron Injectable 4 milliGRAM(s) IV Push every 6 hours PRN  oxyCODONE    IR 5 milliGRAM(s) Oral every 4 hours PRN  oxyCODONE    IR 10 milliGRAM(s) Oral every 4 hours PRN  pantoprazole    Tablet 40 milliGRAM(s) Oral before breakfast  senna 2 Tablet(s) Oral at bedtime  traMADol 50 milliGRAM(s) Oral two times a day      Objective:  Vital Signs Last 24 Hrs  T(C): 36.7 (30 Apr 2018 05:17), Max: 36.8 (29 Apr 2018 21:20)  T(F): 98.1 (30 Apr 2018 05:17), Max: 98.3 (29 Apr 2018 21:20)  HR: 92 (30 Apr 2018 05:17) (92 - 104)  BP: 103/67 (30 Apr 2018 05:17) (101/67 - 145/54)  BP(mean): --  RR: 18 (30 Apr 2018 05:17) (18 - 18)  SpO2: 97% (30 Apr 2018 05:17) (96% - 98%)    PHYSICAL EXAM:   --no acute distress  Eyes:ESTHER, EOMI  Ear/Nose/Throat: no oral lesion, no sinus tenderness on percussion	  Neck:no JVD, no lymphadenopathy, supple  Respiratory: CTA jaime  Cardiovascular: S1S2 RRR, no murmurs  Gastrointestinal:soft, (+) BS, no HSM  Extremities:no dry        LABS:                  MICROBIOLOGY:    RECENT CULTURES:  04-25 @ 00:48 .Surgical Swab right ulna #3   KRYSTYNA      Staphylococcus aureus  Staphylococcus aureus     Few Staphylococcus aureus          RESPIRATORY CULTURES:              RADIOLOGY & ADDITIONAL STUDIES:        Pager [phone]  After 5 pm/weekends or if no response :[phone]

## 2018-04-30 NOTE — DISCHARGE NOTE ADULT - FINDINGS/TREATMENT
maintain non-weight bearing right upper extremity in sling for comfort,  elbow range of motion as tolerated

## 2018-04-30 NOTE — PROGRESS NOTE ADULT - ASSESSMENT
57 yr F with T2DM A1C 8.2 c/b retinopathy, neuropathy and nephropathy, severe insulin resistance here with R ulnar OM. Patient requires high doses of insulin at home. Here she is on basal bolus insulin with persistent hyperglycemia. Pt to be discharged home today. Will restart home regimen at discharge. BG goal (100-180mg/dl).

## 2018-04-30 NOTE — DISCHARGE NOTE ADULT - PLAN OF CARE
surgical intervention should result in improved function maintain non-weight bearing right upper extremity in sling for comfort,  elbow range of motion as tolerated
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
## 2018-04-30 NOTE — PROGRESS NOTE ADULT - SUBJECTIVE AND OBJECTIVE BOX
Diabetes Follow up note:  Interval Hx:  57 year old female w/uncontrolled T2DM (known from admission last year) w/severe insulin resistance here w/R ulnar OM s/p debridement. BG values remain elevated, on lower doses of insulin vs home. Pt seen at bedside. Planning for discharge home today. Reports good appetite.     Review of Systems:  General: No complaints.   GI: Tolerating POs without any N/V/D/ABD PAIN.  CV: No CP/SOB  ENDO: No S&Sx of hypoglycemia  MEDS:  atorvastatin 40 milliGRAM(s) Oral at bedtime    fenofibrate Tablet 145 milliGRAM(s) Oral daily    insulin glargine Injectable (LANTUS) 45 Unit(s) SubCutaneous at bedtime  insulin lispro (HumaLOG) corrective regimen sliding scale   SubCutaneous <User Schedule>  insulin lispro (HumaLOG) corrective regimen sliding scale   SubCutaneous at bedtime  insulin lispro (HumaLOG) corrective regimen sliding scale   SubCutaneous three times a day before meals  insulin lispro Injectable (HumaLOG) 30 Unit(s) SubCutaneous three times a day before meals    ceFAZolin   IVPB      ceFAZolin   IVPB 2000 milliGRAM(s) IV Intermittent every 8 hours    Allergies    No Known Allergies      PE:  General: Female sitting in chair. NAD>   Vital Signs Last 24 Hrs  T(C): 36.6 (30 Apr 2018 08:33), Max: 36.8 (29 Apr 2018 21:20)  T(F): 97.9 (30 Apr 2018 08:33), Max: 98.3 (29 Apr 2018 21:20)  HR: 90 (30 Apr 2018 08:33) (90 - 104)  BP: 130/71 (30 Apr 2018 08:33) (101/67 - 145/54)  BP(mean): --  RR: 18 (30 Apr 2018 08:33) (18 - 18)  SpO2: 98% (30 Apr 2018 08:33) (96% - 98%)  Abd: Soft, NT,ND, Obese.   Extremities: Warm. R arm w/ace wrap c/d/i  Neuro: A&O X3    LABS:    POCT Blood Glucose.: 331 mg/dL (04-30-18 @ 08:46)  POCT Blood Glucose.: 340 mg/dL (04-30-18 @ 01:58)  POCT Blood Glucose.: 328 mg/dL (04-29-18 @ 21:18)  POCT Blood Glucose.: 405 mg/dL (04-29-18 @ 17:54)  POCT Blood Glucose.: 467 mg/dL (04-29-18 @ 12:27)  POCT Blood Glucose.: 430 mg/dL (04-29-18 @ 12:25)  POCT Blood Glucose.: 261 mg/dL (04-29-18 @ 08:02)  POCT Blood Glucose.: 277 mg/dL (04-29-18 @ 07:16)  POCT Blood Glucose.: 415 mg/dL (04-29-18 @ 00:32)  POCT Blood Glucose.: 449 mg/dL (04-28-18 @ 21:36)  POCT Blood Glucose.: 468 mg/dL (04-28-18 @ 21:35)  POCT Blood Glucose.: 426 mg/dL (04-28-18 @ 19:27)  POCT Blood Glucose.: 434 mg/dL (04-28-18 @ 12:20)  POCT Blood Glucose.: 298 mg/dL (04-28-18 @ 08:05)  POCT Blood Glucose.: 360 mg/dL (04-27-18 @ 21:42)  POCT Blood Glucose.: 389 mg/dL (04-27-18 @ 18:23)  POCT Blood Glucose.: 403 mg/dL (04-27-18 @ 13:28)                      Hemoglobin A1C, Whole Blood: 8.2 % <H> [4.0 - 5.6] (04-21-18 @ 00:59)            Contact number: aline [phone] or [phone]

## 2018-04-30 NOTE — DISCHARGE NOTE ADULT - MEDICATION SUMMARY - MEDICATIONS TO TAKE
I will START or STAY ON the medications listed below when I get home from the hospital:    Weekly  Blood draws forCBC, SMA-7, CRP, SED rate  -- Send Results to Dr Juanpablo Torres [phone]  Fax:[phone]  -- Indication: For weekly blood draws    PICC line Flush, & care  -- as per protocol for 40 days  -- Indication: For PICC line maintainance    acetaminophen 325 mg oral tablet  -- 2 tab(s) by mouth every 6 hours, As needed, For Temp greater than 38 C (100.4 F)  -- Indication: For fever, H/A    acetaminophen 325 mg oral tablet  -- 2 tab(s) by mouth every 6 hours, As needed, Mild Pain (1 - 3)  -- Indication: For mild pain    oxyCODONE 5 mg oral tablet  -- 1 tab(s) by mouth every 4 hours, As needed, Moderate Pain (4 - 6)  -- Indication: For severe pain    aspirin 81 mg oral delayed release tablet  -- 1 tab(s) by mouth once a day  -- Indication: For antiplatelet therapy    traMADol 50 mg oral tablet  -- 1 tab(s) by mouth 2 times a day  -- Indication: For moderate pain    gabapentin 300 mg oral capsule  -- 2 cap(s) by mouth 3 times a day  -- Indication: For neuropathic pain    NovoLOG FlexPen 100 units/mL subcutaneous solution  -- 52 unit(s) subcutaneous 3 times a day  -- Indication: For antidiabetic agent    Tresiba FlexTouch 100 units/mL subcutaneous solution  -- 72 unit(s) subcutaneous once a day (at bedtime)  -- Indication: For antidiabetic agent    Crestor 10 mg oral tablet  -- 1 tab(s) by mouth once a day (at bedtime)  -- Indication: For antihyperlipidemic agent    TriCor 160 mg oral tablet  -- 1 tab(s) by mouth once a day  -- Indication: For antihyperlipidemic agent    bisoprolol-hydroCHLOROthiazide 5 mg-6.25 mg oral tablet  -- 1 tab(s) by mouth once a day  -- Indication: For antihypertensive combination agent    ceFAZolin 2 g/100 mL-D5% injectable solution  -- 2 gram(s) intravenously every 8 hours for 40 days  -- Indication: For antiinfective agent    docusate sodium 100 mg oral capsule  -- 1 cap(s) by mouth 3 times a day, As needed, Constipation  -- Indication: For Stool softener    NexIUM 40 mg oral delayed release capsule  -- 1 cap(s) by mouth once a day  -- Indication: For gastrointestinal agent    Multiple Vitamins oral tablet  -- 1 tab(s) by mouth once a day  -- Indication: For Supplement    ascorbic acid 500 mg oral tablet  -- 1 tab(s) by mouth once a day  -- Indication: For Supplement I will START or STAY ON the medications listed below when I get home from the hospital:    Weekly  Blood draws forCBC, SMA-7, CRP, SED rate  -- Send Results to Dr Juanpablo Torres [phone]  Fax:[phone]  -- Indication: For weekly blood draws    PICC line Flush, & care  -- as per protocol for 40 days  -- Indication: For PICC line maintainance    acetaminophen 325 mg oral tablet  -- 2 tab(s) by mouth every 6 hours, As needed, For Temp greater than 38 C (100.4 F)  -- Indication: For fever, H/A    acetaminophen 325 mg oral tablet  -- 2 tab(s) by mouth every 6 hours, As needed, Mild Pain (1 - 3)  -- Indication: For mild pain    oxyCODONE 5 mg oral tablet  -- 1 tab(s) by mouth every 4 hours, As needed, Moderate Pain (4 - 6)  -- Indication: For severe pain    aspirin 81 mg oral delayed release tablet  -- 1 tab(s) by mouth once a day  -- Indication: For antiplatelet therapy    traMADol 50 mg oral tablet  -- 1 tab(s) by mouth 2 times a day  -- Indication: For moderate pain    gabapentin 300 mg oral capsule  -- 2 cap(s) by mouth 3 times a day  -- Indication: For neuropathic pain    NovoLOG FlexPen 100 units/mL subcutaneous solution  -- 52 unit(s) subcutaneous 3 times a day  -- Indication: For antidiabetic agent    Tresiba FlexTouch 100 units/mL subcutaneous solution  -- 72 unit(s) subcutaneous once a day (at bedtime)  -- Indication: For antidiabetic agent    Crestor 10 mg oral tablet  -- 1 tab(s) by mouth once a day (at bedtime)  -- Indication: For antihyperlipidemic agent    TriCor 160 mg oral tablet  -- 1 tab(s) by mouth once a day  -- Indication: For antihyperlipidemic agent    bisoprolol-hydroCHLOROthiazide 5 mg-6.25 mg oral tablet  -- 1 tab(s) by mouth once a day  -- Indication: For antihypertensive combination agent    ceFAZolin 2 g/100 mL-D5% injectable solution  -- 2 gram(s) intravenously every 8 hours for 40 days  -- Indication: For antiinfective agent    docusate sodium 100 mg oral capsule  -- 1 cap(s) by mouth 3 times a day, As needed, Constipation  -- Indication: For Stool softener    NexIUM 40 mg oral delayed release capsule  -- 1 cap(s) by mouth once a day  -- Indication: For gastrointestinal agent    Multiple Vitamins oral tablet  -- 1 tab(s) by mouth once a day  -- Indication: For Supplement    ascorbic acid 500 mg oral tablet  -- 1 tab(s) by mouth once a day  -- Indication: For Supplement I will START or STAY ON the medications listed below when I get home from the hospital:    Weekly  Blood draws forCBC, SMA-7, CRP, SED rate  -- Send Results to Dr Juanpablo Torres [phone]  Fax:[phone]  -- Indication: For weekly blood draws    PICC line Flush, & care  -- as per protocol for 40 days  -- Indication: For PICC line maintainance    acetaminophen 325 mg oral tablet  -- 2 tab(s) by mouth every 6 hours, As needed, For Temp greater than 38 C (100.4 F)  -- Indication: For fever, H/A    acetaminophen 325 mg oral tablet  -- 2 tab(s) by mouth every 6 hours, As needed, Mild Pain (1 - 3)  -- Indication: For mild pain    oxyCODONE 5 mg oral tablet  -- 1 tab(s) by mouth every 4 hours, As needed, Moderate Pain (4 - 6)  -- Indication: For severe pain    aspirin 81 mg oral delayed release tablet  -- 1 tab(s) by mouth once a day  -- Indication: For antiplatelet therapy    traMADol 50 mg oral tablet  -- 1 tab(s) by mouth 2 times a day  -- Indication: For moderate pain    gabapentin 300 mg oral capsule  -- 2 cap(s) by mouth 3 times a day  -- Indication: For neuropathic pain    NovoLOG FlexPen 100 units/mL subcutaneous solution  -- 52 unit(s) subcutaneous 3 times a day  -- Indication: For antidiabetic agent    Tresiba FlexTouch 100 units/mL subcutaneous solution  -- 72 unit(s) subcutaneous once a day (at bedtime)  -- Indication: For antidiabetic agent    Crestor 10 mg oral tablet  -- 1 tab(s) by mouth once a day (at bedtime)  -- Indication: For antihyperlipidemic agent    TriCor 160 mg oral tablet  -- 1 tab(s) by mouth once a day  -- Indication: For antihyperlipidemic agent    bisoprolol-hydroCHLOROthiazide 5 mg-6.25 mg oral tablet  -- 1 tab(s) by mouth once a day  -- Indication: For antihypertensive combination agent    ceFAZolin 2 g/100 mL-D5% injectable solution  -- 2 gram(s) intravenously every 8 hours for 40 days  -- Indication: For antiinfective agent    docusate sodium 100 mg oral capsule  -- 1 cap(s) by mouth 3 times a day, As needed, Constipation  -- Indication: For Stool softener    NexIUM 40 mg oral delayed release capsule  -- 1 cap(s) by mouth once a day  -- Indication: For gastrointestinal agent    Multiple Vitamins oral tablet  -- 1 tab(s) by mouth once a day  -- Indication: For Supplement    ascorbic acid 500 mg oral tablet  -- 1 tab(s) by mouth once a day  -- Indication: For Supplement I will START or STAY ON the medications listed below when I get home from the hospital:    Weekly  Blood draws forCBC, SMA-7, CRP, SED rate  -- Send Results to Dr Juanpablo Torres [phone]  Fax:[phone]  -- Indication: For weekly blood draws    PICC line Flush, & care  -- as per protocol for 40 days  -- Indication: For PICC line maintainance    acetaminophen 325 mg oral tablet  -- 2 tab(s) by mouth every 6 hours, As needed, For Temp greater than 38 C (100.4 F)  -- Indication: For fever, H/A    acetaminophen 325 mg oral tablet  -- 2 tab(s) by mouth every 6 hours, As needed, Mild Pain (1 - 3)  -- Indication: For mild pain    oxyCODONE 5 mg oral tablet  -- 1 tab(s) by mouth every 4 hours, As needed, Moderate Pain (4 - 6)  -- Indication: For severe pain    aspirin 81 mg oral delayed release tablet  -- 1 tab(s) by mouth once a day  -- Indication: For antiplatelet therapy    traMADol 50 mg oral tablet  -- 1 tab(s) by mouth 2 times a day  -- Indication: For moderate pain    gabapentin 300 mg oral capsule  -- 2 cap(s) by mouth 3 times a day  -- Indication: For neuropathic pain    NovoLOG FlexPen 100 units/mL subcutaneous solution  -- 52 unit(s) subcutaneous 3 times a day  -- Indication: For antidiabetic agent    Tresiba FlexTouch 100 units/mL subcutaneous solution  -- 72 unit(s) subcutaneous once a day (at bedtime)  -- Indication: For antidiabetic agent    Crestor 10 mg oral tablet  -- 1 tab(s) by mouth once a day (at bedtime)  -- Indication: For antihyperlipidemic agent    TriCor 160 mg oral tablet  -- 1 tab(s) by mouth once a day  -- Indication: For antihyperlipidemic agent    bisoprolol-hydroCHLOROthiazide 5 mg-6.25 mg oral tablet  -- 1 tab(s) by mouth once a day  -- Indication: For antihypertensive combination agent    ceFAZolin 2 g/100 mL-D5% injectable solution  -- 2 gram(s) intravenously every 8 hours for 40 days  -- Indication: For antiinfective agent    docusate sodium 100 mg oral capsule  -- 1 cap(s) by mouth 3 times a day, As needed, Constipation  -- Indication: For Stool softener    NexIUM 40 mg oral delayed release capsule  -- 1 cap(s) by mouth once a day  -- Indication: For gastrointestinal agent    Multiple Vitamins oral tablet  -- 1 tab(s) by mouth once a day  -- Indication: For Supplement    ascorbic acid 500 mg oral tablet  -- 1 tab(s) by mouth once a day  -- Indication: For Supplement

## 2018-04-30 NOTE — PROGRESS NOTE ADULT - SUBJECTIVE AND OBJECTIVE BOX
Patient resting without complaints.  No chest pain, SOB, N/V.    Vital Signs Last 24 Hrs  T(C): 36.7 (30 Apr 2018 00:40), Max: 36.8 (29 Apr 2018 21:20)  T(F): 98.1 (30 Apr 2018 00:40), Max: 98.3 (29 Apr 2018 21:20)  HR: 102 (30 Apr 2018 00:40) (100 - 104)  BP: 101/67 (30 Apr 2018 00:40) (101/67 - 145/54)  BP(mean): --  RR: 18 (30 Apr 2018 00:40) (18 - 18)  SpO2: 98% (30 Apr 2018 00:40) (96% - 98%)    Exam:  Alert and Oriented, No Acute Distress  RUE dressing c/d/i, re-wrapped ace to assist with moderate hand swelling, limb elevated.  4th/5th finger contracted, with ulnar neuropraxia from prior injury, otherwise SILT,  digits pink, warm, mobile with +radial pulse  Calves Soft, Non-tender bilaterally    Culture - Surgical Swab (04.25.18 @ 00:48)    -  Ampicillin/Sulbactam: S <=8/4    -  Cefazolin: S <=4    -  Ciprofloxacin: S <=1    -  Clindamycin: S 0.5    -  Erythromycin: S 0.5    -  Gentamicin: S <=1    -  Levofloxacin: S <=0.5    -  Moxifloxacin(Aerobic): S <=0.5    -  Oxacillin: S 0.5    -  Penicillin: R >8    -  RIF- Rifampin: S <=1    -  Tetra/Doxy: R >8    -  Trimethoprim/Sulfamethoxazole: S <=0.5/9.5    -  Vancomycin: S 2    Specimen Source: .Surgical Swab #1 right ulna    Culture Results:   Few Staphylococcus aureus    Organism Identification: Staphylococcus aureus    Organism: Staphylococcus aureus    Method Type: KRYSTYNA

## 2018-04-30 NOTE — DISCHARGE NOTE ADULT - PATIENT PORTAL LINK FT
You can access the [facility] Patient Portal, offered by [facility], by registering with the following website: [email or web address]

## 2018-04-30 NOTE — PROGRESS NOTE ADULT - PROBLEM SELECTOR PLAN 1
PT/OT-NWB RUE  Ice/elevate hand, finger/hand exercises  IS  DVT PPx  Pain Control  Continue Current Tx.  Follow cx's, continue ancef  dispo plan home when abx therapy arranged  endocrine appreciated    Deandre Polo PA-C  Team Pager: #[phone]

## 2018-04-30 NOTE — PROGRESS NOTE ADULT - PROBLEM SELECTOR PLAN 1
-test BG AC/HS  -Increase Humalog 38 units w/meals while inpatient  -c/w humalog correction scales as ordered  Discharge plan:  Pt can restart Tresiba 72 units QHS and Novolog 52 units TID w/meals  -pt has insulin and relevant testing supplies at home  -Will follow up w/PMD Dr Barrett  -discussed w/pt and team  pager: [phone]

## 2018-04-30 NOTE — DISCHARGE NOTE ADULT - INSTRUCTIONS
resume consistent carbohydrate diet Call MD / go to ER if: increased pain, swelling, discharge with foul odor, redness, increased numbness/tingling, hand discoloration, increased temperature.

## 2018-04-30 NOTE — PROVIDER CONTACT NOTE (OTHER) - ASSESSMENT
Pt VSS, A&Ox4, denies pain/discomfort. Pre bed glucose 426, coverage and Lantus given. Re-assessment blood glucose 412
Pts in the chair resting.
Pt BP 92/48 on RLE, . Pt aroused from sleeping, denies pain/ lightheadedness/ dizziness.

## 2018-04-30 NOTE — PROVIDER CONTACT NOTE (OTHER) - BACKGROUND
4/24 removal of  ex fix
Pts BS is elevated, pt on insulin at home.
Pts in the chair resting.
4/24 ex. fix removal w/ abx cement placement

## 2018-04-30 NOTE — DISCHARGE NOTE ADULT - MEDICATION SUMMARY - MEDICATIONS TO STOP TAKING
I will STOP taking the medications listed below when I get home from the hospital:    Ecotrin Adult Low Strength 81 mg oral delayed release tablet  -- 1 tab(s) by mouth once a day x 6 weeks    Tylenol PM  -- 1000 milligram(s) by mouth once a day (at bedtime)

## 2018-04-30 NOTE — DISCHARGE NOTE ADULT - CARE PLAN
Principal Discharge DX:	Osteomyelitis of right ulna  Goal:	surgical intervention should result in improved function  Assessment and plan of treatment:	maintain non-weight bearing right upper extremity in sling for comfort,  elbow range of motion as tolerated

## 2018-04-30 NOTE — PROVIDER CONTACT NOTE (OTHER) - ACTION/TREATMENT ORDERED:
As per MD [place], no further interventions at this time. RN to continue to monitor BP and HR, will re-evaluate as needed if BP continues to drop/HR increase.
Notify Junior DURÁN. Ordered to give insulin as ordered, will continue to monitor.
Junior DURÁN, made aware endocrine consult ordered. Pt given the appropriate amount as per sliding scale. Will continue to monitor.
MD Munoz to contact endocrine, will contact RN with further instructions.

## 2018-04-30 NOTE — PROGRESS NOTE ADULT - ASSESSMENT
A/p: 57yFemale s/p I+D ulna OM with antibiotic cement placement  - NWB RUE  - Pain control  - Elevate  - A81/SQH for DVT ppx  - Cont abx Ancef for S. aureus  - FU ID recs  - Dispo planning likely home today

## 2018-04-30 NOTE — DISCHARGE NOTE ADULT - ADDITIONAL INSTRUCTIONS
Keep surgical incision/dressing clean and dry, maintain non-weight bearing right upper extremity in sling for comfort,  elbow range of motion as tolerated. Follow up with Dr. Maloney post operative day#14 for wound check. Follow up with your endocrinologist in 1 week. Continue anti-infective agent via PICC for 6 weeks total. Weekly CBC, CMP, CRP, SED, with results sent to Dr. PRINCESS Torres FAX: [phone]. Follow up with Dr Torres in 3-4 weeks. Keep surgical incision/dressing clean and dry, maintain non-weight bearing right upper extremity in sling for comfort,    elbow range of motion as tolerated.   Follow up with Dr. Maloney post operative day#14 for wound check.   Follow up with your endocrinologist in 1 week.   Continue anti-infective agent via PICC for 6 weeks total. Weekly CBC, CMP, CRP, SED, with results sent to Dr. PRINCESS Torres FAX: [phone].   Follow up with Dr Torres in 3-4 weeks.  Please call for an ALL appointment (s)

## 2018-04-30 NOTE — DISCHARGE NOTE ADULT - CARE PROVIDER_API CALL
Karyna Maloney), Orthopaedic Surgery  [address]  [place] [address]  Phone: [phone]  Fax: [phone]    Juanpablo Torres), Infectious Disease; Internal Medicine  [address] [place] [address]  Phone: [phone]  Fax: [phone]    Edis Albrecht), Internal Medicine  [address] [place] [id number]  Phone: [phone]  Fax: [phone]

## 2018-04-30 NOTE — DISCHARGE NOTE ADULT - CARE PROVIDERS DIRECT ADDRESSES
,DirectAddress_Unknown,[email or web address]

## 2018-05-01 LAB — SURGICAL PATHOLOGY STUDY: SIGNIFICANT CHANGE UP

## 2018-05-07 PROBLEM — S42.401A ELBOW FRACTURE, RIGHT: Status: ACTIVE | Noted: 2017-07-05

## 2018-05-13 NOTE — DIETITIAN INITIAL EVALUATION ADULT. - ETIOLOGY
lack of understanding of/ compliance with diet recommendations for DM2 and weight management
Discharged

## 2018-05-22 ENCOUNTER — APPOINTMENT (OUTPATIENT)
Dept: INFECTIOUS DISEASE | Facility: CLINIC | Age: 58
End: 2018-05-22

## 2018-05-23 ENCOUNTER — APPOINTMENT (OUTPATIENT)
Dept: INFECTIOUS DISEASE | Facility: CLINIC | Age: 58
End: 2018-05-23
Payer: COMMERCIAL

## 2018-05-23 VITALS
TEMPERATURE: 97.1 F | HEART RATE: 88 BPM | SYSTOLIC BLOOD PRESSURE: 157 MMHG | DIASTOLIC BLOOD PRESSURE: 108 MMHG | RESPIRATION RATE: 18 BRPM | HEIGHT: 60 IN | OXYGEN SATURATION: 94 % | WEIGHT: 222 LBS | BODY MASS INDEX: 43.59 KG/M2

## 2018-05-23 PROCEDURE — 99213 OFFICE O/P EST LOW 20 MIN: CPT

## 2018-05-30 ENCOUNTER — APPOINTMENT (OUTPATIENT)
Dept: PLASTIC SURGERY | Facility: CLINIC | Age: 58
End: 2018-05-30

## 2018-06-20 ENCOUNTER — APPOINTMENT (OUTPATIENT)
Dept: PLASTIC SURGERY | Facility: CLINIC | Age: 58
End: 2018-06-20
Payer: COMMERCIAL

## 2018-06-20 DIAGNOSIS — S51.009A UNSPECIFIED OPEN WOUND OF UNSPECIFIED ELBOW, INITIAL ENCOUNTER: ICD-10-CM

## 2018-06-20 PROCEDURE — 99213 OFFICE O/P EST LOW 20 MIN: CPT

## 2018-06-22 PROBLEM — S51.009A WOUND, OPEN, ELBOW: Status: ACTIVE | Noted: 2017-07-24

## 2018-07-09 ENCOUNTER — APPOINTMENT (OUTPATIENT)
Dept: INFECTIOUS DISEASE | Facility: CLINIC | Age: 58
End: 2018-07-09
Payer: COMMERCIAL

## 2018-07-09 VITALS
SYSTOLIC BLOOD PRESSURE: 122 MMHG | BODY MASS INDEX: 43.39 KG/M2 | DIASTOLIC BLOOD PRESSURE: 78 MMHG | TEMPERATURE: 98.1 F | HEART RATE: 83 BPM | WEIGHT: 221 LBS | HEIGHT: 60 IN | OXYGEN SATURATION: 99 %

## 2018-07-09 PROCEDURE — 99213 OFFICE O/P EST LOW 20 MIN: CPT

## 2018-07-10 LAB
BASOPHILS # BLD AUTO: 0.02 K/UL
BASOPHILS NFR BLD AUTO: 0.3 %
EOSINOPHIL # BLD AUTO: 0.31 K/UL
EOSINOPHIL NFR BLD AUTO: 4.7 %
ERYTHROCYTE [SEDIMENTATION RATE] IN BLOOD BY WESTERGREN METHOD: 9 MM/HR
HCT VFR BLD CALC: 35.9 %
HGB BLD-MCNC: 11.5 G/DL
IMM GRANULOCYTES NFR BLD AUTO: 0.2 %
LYMPHOCYTES # BLD AUTO: 2.79 K/UL
LYMPHOCYTES NFR BLD AUTO: 42.3 %
MAN DIFF?: NORMAL
MCHC RBC-ENTMCNC: 26.4 PG
MCHC RBC-ENTMCNC: 32 GM/DL
MCV RBC AUTO: 82.3 FL
MONOCYTES # BLD AUTO: 0.29 K/UL
MONOCYTES NFR BLD AUTO: 4.4 %
NEUTROPHILS # BLD AUTO: 3.17 K/UL
NEUTROPHILS NFR BLD AUTO: 48.1 %
PLATELET # BLD AUTO: 256 K/UL
RBC # BLD: 4.36 M/UL
RBC # FLD: 15 %
WBC # FLD AUTO: 6.59 K/UL

## 2018-07-16 PROBLEM — E11.9 TYPE 2 DIABETES MELLITUS WITHOUT COMPLICATIONS: Chronic | Status: INACTIVE | Noted: 2017-06-04 | Resolved: 2018-04-20

## 2018-10-03 ENCOUNTER — APPOINTMENT (OUTPATIENT)
Dept: PLASTIC SURGERY | Facility: CLINIC | Age: 58
End: 2018-10-03
Payer: COMMERCIAL

## 2018-10-03 DIAGNOSIS — S54.00XA INJURY OF ULNAR NERVE AT FOREARM LEVEL, UNSPECIFIED ARM, INITIAL ENCOUNTER: ICD-10-CM

## 2018-10-03 PROBLEM — E11.9 TYPE 2 DIABETES MELLITUS WITHOUT COMPLICATIONS: Chronic | Status: ACTIVE | Noted: 2018-04-20

## 2018-10-03 PROBLEM — E66.9 OBESITY, UNSPECIFIED: Chronic | Status: ACTIVE | Noted: 2018-04-20

## 2018-10-03 PROBLEM — N20.0 CALCULUS OF KIDNEY: Chronic | Status: ACTIVE | Noted: 2018-04-20

## 2018-10-03 PROBLEM — I10 ESSENTIAL (PRIMARY) HYPERTENSION: Chronic | Status: ACTIVE | Noted: 2017-06-04

## 2018-10-03 PROBLEM — E78.5 HYPERLIPIDEMIA, UNSPECIFIED: Chronic | Status: ACTIVE | Noted: 2018-04-20

## 2018-10-03 PROBLEM — M86.9 OSTEOMYELITIS, UNSPECIFIED: Chronic | Status: ACTIVE | Noted: 2018-04-20

## 2018-10-03 PROCEDURE — 99213 OFFICE O/P EST LOW 20 MIN: CPT

## 2018-10-04 PROBLEM — S54.00XA ULNAR NERVE INJURY: Status: ACTIVE | Noted: 2017-07-24

## 2019-04-01 ENCOUNTER — APPOINTMENT (OUTPATIENT)
Dept: PLASTIC SURGERY | Facility: CLINIC | Age: 59
End: 2019-04-01
Payer: COMMERCIAL

## 2019-04-01 ENCOUNTER — APPOINTMENT (OUTPATIENT)
Dept: VASCULAR SURGERY | Facility: CLINIC | Age: 59
End: 2019-04-01
Payer: COMMERCIAL

## 2019-04-01 ENCOUNTER — TRANSCRIPTION ENCOUNTER (OUTPATIENT)
Age: 59
End: 2019-04-01

## 2019-04-01 DIAGNOSIS — M79.89 OTHER SPECIFIED SOFT TISSUE DISORDERS: ICD-10-CM

## 2019-04-01 PROCEDURE — 93971 EXTREMITY STUDY: CPT

## 2019-04-01 PROCEDURE — 99213 OFFICE O/P EST LOW 20 MIN: CPT

## 2019-04-04 ENCOUNTER — INPATIENT (INPATIENT)
Facility: HOSPITAL | Age: 59
LOS: 7 days | Discharge: HOME | DRG: 857 | End: 2019-04-12
Attending: ORTHOPAEDIC SURGERY | Admitting: ORTHOPAEDIC SURGERY
Payer: COMMERCIAL

## 2019-04-04 ENCOUNTER — TRANSCRIPTION ENCOUNTER (OUTPATIENT)
Age: 59
End: 2019-04-04

## 2019-04-04 VITALS
WEIGHT: 210.1 LBS | TEMPERATURE: 99 F | RESPIRATION RATE: 17 BRPM | OXYGEN SATURATION: 98 % | SYSTOLIC BLOOD PRESSURE: 172 MMHG | HEART RATE: 102 BPM | DIASTOLIC BLOOD PRESSURE: 84 MMHG

## 2019-04-04 DIAGNOSIS — Z96.7 PRESENCE OF OTHER BONE AND TENDON IMPLANTS: Chronic | ICD-10-CM

## 2019-04-04 DIAGNOSIS — I10 ESSENTIAL (PRIMARY) HYPERTENSION: ICD-10-CM

## 2019-04-04 DIAGNOSIS — S54.00XA INJURY OF ULNAR NERVE AT FOREARM LEVEL, UNSPECIFIED ARM, INITIAL ENCOUNTER: ICD-10-CM

## 2019-04-04 DIAGNOSIS — M25.421 EFFUSION, RIGHT ELBOW: ICD-10-CM

## 2019-04-04 DIAGNOSIS — E11.9 TYPE 2 DIABETES MELLITUS WITHOUT COMPLICATIONS: ICD-10-CM

## 2019-04-04 DIAGNOSIS — Z98.891 HISTORY OF UTERINE SCAR FROM PREVIOUS SURGERY: Chronic | ICD-10-CM

## 2019-04-04 LAB
ANION GAP SERPL CALC-SCNC: 15 MMOL/L — SIGNIFICANT CHANGE UP (ref 5–17)
ANTIBODY ID 1_1: SIGNIFICANT CHANGE UP
APPEARANCE UR: CLEAR — SIGNIFICANT CHANGE UP
APTT BLD: 36.2 SEC — SIGNIFICANT CHANGE UP (ref 27.5–36.3)
BILIRUB UR-MCNC: NEGATIVE — SIGNIFICANT CHANGE UP
BLD GP AB SCN SERPL QL: POSITIVE — SIGNIFICANT CHANGE UP
BUN SERPL-MCNC: 26 MG/DL — HIGH (ref 7–23)
CALCIUM SERPL-MCNC: 9.8 MG/DL — SIGNIFICANT CHANGE UP (ref 8.4–10.5)
CHLORIDE SERPL-SCNC: 98 MMOL/L — SIGNIFICANT CHANGE UP (ref 96–108)
CO2 SERPL-SCNC: 23 MMOL/L — SIGNIFICANT CHANGE UP (ref 22–31)
COLOR SPEC: SIGNIFICANT CHANGE UP
CREAT SERPL-MCNC: 1.42 MG/DL — HIGH (ref 0.5–1.3)
DAT POLY-SP REAG RBC QL: NEGATIVE — SIGNIFICANT CHANGE UP
DIFF PNL FLD: NEGATIVE — SIGNIFICANT CHANGE UP
GLUCOSE BLDC GLUCOMTR-MCNC: 168 MG/DL — HIGH (ref 70–99)
GLUCOSE BLDC GLUCOMTR-MCNC: 309 MG/DL — HIGH (ref 70–99)
GLUCOSE SERPL-MCNC: 265 MG/DL — HIGH (ref 70–99)
GLUCOSE UR QL: ABNORMAL
HCG UR QL: NEGATIVE — SIGNIFICANT CHANGE UP
HCT VFR BLD CALC: 35 % — SIGNIFICANT CHANGE UP (ref 34.5–45)
HGB BLD-MCNC: 12.2 G/DL — SIGNIFICANT CHANGE UP (ref 11.5–15.5)
INR BLD: 1.06 RATIO — SIGNIFICANT CHANGE UP (ref 0.88–1.16)
KETONES UR-MCNC: NEGATIVE — SIGNIFICANT CHANGE UP
LEUKOCYTE ESTERASE UR-ACNC: NEGATIVE — SIGNIFICANT CHANGE UP
MCHC RBC-ENTMCNC: 29 PG — SIGNIFICANT CHANGE UP (ref 27–34)
MCHC RBC-ENTMCNC: 34.8 GM/DL — SIGNIFICANT CHANGE UP (ref 32–36)
MCV RBC AUTO: 83.4 FL — SIGNIFICANT CHANGE UP (ref 80–100)
NITRITE UR-MCNC: NEGATIVE — SIGNIFICANT CHANGE UP
PH UR: 6 — SIGNIFICANT CHANGE UP (ref 5–8)
PLATELET # BLD AUTO: 350 K/UL — SIGNIFICANT CHANGE UP (ref 150–400)
POTASSIUM SERPL-MCNC: 4.5 MMOL/L — SIGNIFICANT CHANGE UP (ref 3.5–5.3)
POTASSIUM SERPL-SCNC: 4.5 MMOL/L — SIGNIFICANT CHANGE UP (ref 3.5–5.3)
PROT UR-MCNC: 100 — SIGNIFICANT CHANGE UP
PROTHROM AB SERPL-ACNC: 12.2 SEC — SIGNIFICANT CHANGE UP (ref 10–12.9)
RBC # BLD: 4.19 M/UL — SIGNIFICANT CHANGE UP (ref 3.8–5.2)
RBC # FLD: 13.5 % — SIGNIFICANT CHANGE UP (ref 10.3–14.5)
RH IG SCN BLD-IMP: NEGATIVE — SIGNIFICANT CHANGE UP
SODIUM SERPL-SCNC: 136 MMOL/L — SIGNIFICANT CHANGE UP (ref 135–145)
SP GR SPEC: 1.02 — SIGNIFICANT CHANGE UP (ref 1.01–1.02)
UROBILINOGEN FLD QL: NEGATIVE — SIGNIFICANT CHANGE UP
WBC # BLD: 11.4 K/UL — HIGH (ref 3.8–10.5)
WBC # FLD AUTO: 11.4 K/UL — HIGH (ref 3.8–10.5)

## 2019-04-04 PROCEDURE — 99285 EMERGENCY DEPT VISIT HI MDM: CPT | Mod: 25

## 2019-04-04 PROCEDURE — 73090 X-RAY EXAM OF FOREARM: CPT | Mod: 26,LT

## 2019-04-04 PROCEDURE — 71045 X-RAY EXAM CHEST 1 VIEW: CPT | Mod: 26

## 2019-04-04 PROCEDURE — 73080 X-RAY EXAM OF ELBOW: CPT | Mod: 26,RT

## 2019-04-04 PROCEDURE — 93010 ELECTROCARDIOGRAM REPORT: CPT | Mod: NC

## 2019-04-04 PROCEDURE — 86077 PHYS BLOOD BANK SERV XMATCH: CPT

## 2019-04-04 RX ORDER — SODIUM CHLORIDE 9 MG/ML
1000 INJECTION INTRAMUSCULAR; INTRAVENOUS; SUBCUTANEOUS
Qty: 0 | Refills: 0 | Status: DISCONTINUED | OUTPATIENT
Start: 2019-04-05 | End: 2019-04-05

## 2019-04-04 RX ORDER — DEXTROSE 50 % IN WATER 50 %
15 SYRINGE (ML) INTRAVENOUS ONCE
Qty: 0 | Refills: 0 | Status: DISCONTINUED | OUTPATIENT
Start: 2019-04-04 | End: 2019-04-05

## 2019-04-04 RX ORDER — INSULIN LISPRO 100/ML
VIAL (ML) SUBCUTANEOUS AT BEDTIME
Qty: 0 | Refills: 0 | Status: DISCONTINUED | OUTPATIENT
Start: 2019-04-04 | End: 2019-04-05

## 2019-04-04 RX ORDER — OXYCODONE HYDROCHLORIDE 5 MG/1
5 TABLET ORAL EVERY 4 HOURS
Qty: 0 | Refills: 0 | Status: DISCONTINUED | OUTPATIENT
Start: 2019-04-04 | End: 2019-04-05

## 2019-04-04 RX ORDER — GLUCAGON INJECTION, SOLUTION 0.5 MG/.1ML
1 INJECTION, SOLUTION SUBCUTANEOUS ONCE
Qty: 0 | Refills: 0 | Status: DISCONTINUED | OUTPATIENT
Start: 2019-04-04 | End: 2019-04-05

## 2019-04-04 RX ORDER — DEXTROSE 50 % IN WATER 50 %
12.5 SYRINGE (ML) INTRAVENOUS ONCE
Qty: 0 | Refills: 0 | Status: DISCONTINUED | OUTPATIENT
Start: 2019-04-04 | End: 2019-04-05

## 2019-04-04 RX ORDER — SODIUM CHLORIDE 9 MG/ML
1000 INJECTION, SOLUTION INTRAVENOUS
Qty: 0 | Refills: 0 | Status: DISCONTINUED | OUTPATIENT
Start: 2019-04-04 | End: 2019-04-05

## 2019-04-04 RX ORDER — DEXTROSE 50 % IN WATER 50 %
25 SYRINGE (ML) INTRAVENOUS ONCE
Qty: 0 | Refills: 0 | Status: DISCONTINUED | OUTPATIENT
Start: 2019-04-04 | End: 2019-04-05

## 2019-04-04 RX ORDER — HYDROCHLOROTHIAZIDE 25 MG
12.5 TABLET ORAL DAILY
Qty: 0 | Refills: 0 | Status: DISCONTINUED | OUTPATIENT
Start: 2019-04-04 | End: 2019-04-05

## 2019-04-04 RX ORDER — INSULIN LISPRO 100/ML
VIAL (ML) SUBCUTANEOUS
Qty: 0 | Refills: 0 | Status: DISCONTINUED | OUTPATIENT
Start: 2019-04-04 | End: 2019-04-05

## 2019-04-04 RX ORDER — INSULIN GLARGINE 100 [IU]/ML
72 INJECTION, SOLUTION SUBCUTANEOUS AT BEDTIME
Qty: 0 | Refills: 0 | Status: DISCONTINUED | OUTPATIENT
Start: 2019-04-04 | End: 2019-04-05

## 2019-04-04 RX ORDER — GABAPENTIN 400 MG/1
600 CAPSULE ORAL THREE TIMES A DAY
Qty: 0 | Refills: 0 | Status: DISCONTINUED | OUTPATIENT
Start: 2019-04-04 | End: 2019-04-05

## 2019-04-04 RX ORDER — HYDROCHLOROTHIAZIDE 25 MG
6.25 TABLET ORAL DAILY
Qty: 0 | Refills: 0 | Status: DISCONTINUED | OUTPATIENT
Start: 2019-04-04 | End: 2019-04-04

## 2019-04-04 RX ORDER — BISOPROLOL FUMARATE 10 MG/1
5 TABLET, FILM COATED ORAL DAILY
Qty: 0 | Refills: 0 | Status: DISCONTINUED | OUTPATIENT
Start: 2019-04-04 | End: 2019-04-05

## 2019-04-04 RX ORDER — OXYCODONE HYDROCHLORIDE 5 MG/1
10 TABLET ORAL EVERY 4 HOURS
Qty: 0 | Refills: 0 | Status: DISCONTINUED | OUTPATIENT
Start: 2019-04-04 | End: 2019-04-05

## 2019-04-04 RX ORDER — FENOFIBRATE,MICRONIZED 130 MG
145 CAPSULE ORAL DAILY
Qty: 0 | Refills: 0 | Status: DISCONTINUED | OUTPATIENT
Start: 2019-04-04 | End: 2019-04-05

## 2019-04-04 RX ORDER — PANTOPRAZOLE SODIUM 20 MG/1
40 TABLET, DELAYED RELEASE ORAL
Qty: 0 | Refills: 0 | Status: DISCONTINUED | OUTPATIENT
Start: 2019-04-04 | End: 2019-04-05

## 2019-04-04 RX ORDER — CHLORHEXIDINE GLUCONATE 213 G/1000ML
1 SOLUTION TOPICAL ONCE
Qty: 0 | Refills: 0 | Status: COMPLETED | OUTPATIENT
Start: 2019-04-05 | End: 2019-04-05

## 2019-04-04 RX ORDER — INSULIN LISPRO 100/ML
52 VIAL (ML) SUBCUTANEOUS
Qty: 0 | Refills: 0 | Status: DISCONTINUED | OUTPATIENT
Start: 2019-04-04 | End: 2019-04-05

## 2019-04-04 RX ORDER — ATORVASTATIN CALCIUM 80 MG/1
40 TABLET, FILM COATED ORAL AT BEDTIME
Qty: 0 | Refills: 0 | Status: DISCONTINUED | OUTPATIENT
Start: 2019-04-04 | End: 2019-04-05

## 2019-04-04 RX ADMIN — OXYCODONE HYDROCHLORIDE 10 MILLIGRAM(S): 5 TABLET ORAL at 23:15

## 2019-04-04 RX ADMIN — Medication 52 UNIT(S): at 21:11

## 2019-04-04 RX ADMIN — INSULIN GLARGINE 72 UNIT(S): 100 INJECTION, SOLUTION SUBCUTANEOUS at 23:07

## 2019-04-04 RX ADMIN — Medication 4: at 23:07

## 2019-04-04 RX ADMIN — OXYCODONE HYDROCHLORIDE 10 MILLIGRAM(S): 5 TABLET ORAL at 22:48

## 2019-04-04 RX ADMIN — ATORVASTATIN CALCIUM 40 MILLIGRAM(S): 80 TABLET, FILM COATED ORAL at 23:06

## 2019-04-04 RX ADMIN — GABAPENTIN 600 MILLIGRAM(S): 400 CAPSULE ORAL at 23:06

## 2019-04-04 NOTE — H&P ADULT - NSICDXPASTSURGICALHX_GEN_ALL_CORE_FT
PAST SURGICAL HISTORY:  S/P  25 y/o    S/P ORIF (open reduction internal fixation) fracture Right Elbow
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
## 2019-04-04 NOTE — ED PROVIDER NOTE - OBJECTIVE STATEMENT
58y f PMHx R elbow osteomyelitis, presenting from ortho clinic for admission for drainage of R elbow fluid collection visualized on outside MRI. Ortho aware.

## 2019-04-04 NOTE — ED ADULT NURSE NOTE - NSIMPLEMENTINTERV_GEN_ALL_ED
Implemented All Universal Safety Interventions:  [place] to call system. Call bell, personal items and telephone within reach. Instruct patient to call for assistance. Room bathroom lighting operational. Non-slip footwear when patient is off stretcher. Physically safe environment: no spills, clutter or unnecessary equipment. Stretcher in lowest position, wheels locked, appropriate side rails in place.

## 2019-04-04 NOTE — H&P ADULT - HISTORY OF PRESENT ILLNESS
58y Female community ambulatory presents c/o right forearm pain and swelling onset last weekend after physical therapy with some noted erythema to the forearm. The patient has a history of multiple surgeries on her right forearm and elbow after an injury. She was sent outpatient for an MRI after the noted swelling which showed a fluid collection and was sent to the ED. 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 Denies HS/LOC. Denies numbness/tingling. No other pain/injuries. Denies fevers/chills.     MEDICATIONS  (STANDING):  atorvastatin 40 milliGRAM(s) Oral at bedtime  bisoprolol   Tablet 5 milliGRAM(s) Oral daily  dextrose 5%. 1000 milliLiter(s) IV Continuous <Continuous>  dextrose 5%. 1000 milliLiter(s) IV Continuous <Continuous>  dextrose 50% Injectable 25 Gram(s) IV Push once  dextrose 50% Injectable 12.5 Gram(s) IV Push once  dextrose 50% Injectable 25 Gram(s) IV Push once  dextrose 50% Injectable 25 Gram(s) IV Push once  dextrose 50% Injectable 12.5 Gram(s) IV Push once  dextrose 50% Injectable 25 Gram(s) IV Push once  fenofibrate Tablet 145 milliGRAM(s) Oral daily  gabapentin 600 milliGRAM(s) Oral three times a day  hydrochlorothiazide 6.25 milliGRAM(s) Oral daily  insulin glargine Injectable (LANTUS) 72 Unit(s) SubCutaneous at bedtime  insulin lispro (HumaLOG) corrective regimen sliding scale   SubCutaneous three times a day before meals  insulin lispro (HumaLOG) corrective regimen sliding scale   SubCutaneous at bedtime  insulin lispro (HumaLOG) corrective regimen sliding scale   SubCutaneous three times a day before meals  insulin lispro (HumaLOG) corrective regimen sliding scale   SubCutaneous at bedtime  insulin lispro Injectable (HumaLOG) 52 Unit(s) SubCutaneous three times a day before meals  pantoprazole    Tablet 40 milliGRAM(s) Oral before breakfast    Allergies    No Known Allergies    Intolerances                          12.2   11.4  )-----------( 350      ( 04 Apr 2019 17:27 )             35.0     04 Apr 2019 17:27    136    |  98     |  26     ----------------------------<  265    4.5     |  23     |  1.42     Ca    9.8        04 Apr 2019 17:27      PT/INR - ( 04 Apr 2019 17:27 )   PT: 12.2 sec;   INR: 1.06 ratio         PTT - ( 04 Apr 2019 17:27 )  PTT:36.2 sec  Vital Signs Last 24 Hrs  T(C): 37.1 (04-04-19 @ 16:09), Max: 37.1 (04-04-19 @ 16:09)  T(F): 98.7 (04-04-19 @ 16:09), Max: 98.7 (04-04-19 @ 16:09)  HR: 102 (04-04-19 @ 16:09) (102 - 102)  BP: 172/84 (04-04-19 @ 16:09) (172/84 - 172/84)  BP(mean): --  RR: 17 (04-04-19 @ 16:09) (17 - 17)  SpO2: 98% (04-04-19 @ 16:09) (98% - 98%)

## 2019-04-04 NOTE — ED PROVIDER NOTE - ATTENDING CONTRIBUTION TO CARE
------------ATTENDING NOTE------------   RHD pt sent to ED by Ortho for concerns of RUE edema joint fluid complicated by osteomyelitis, no fevers or systemic symptoms, no antibiotics at this time per Ortho (met pt in ED triage), in for admission / OR and ID following.  - Ramesh Torres MD   ----------------------------------------------

## 2019-04-04 NOTE — ED PROVIDER NOTE - PMH
Essential hypertension    HLD (hyperlipidemia)    Obesity    Osteomyelitis of right ulna    Renal stones  sepsis - 3 yrs ago  T2DM (type 2 diabetes mellitus)    Ulnar nerve injury

## 2019-04-04 NOTE — H&P ADULT - NSICDXPASTMEDICALHX_GEN_ALL_CORE_FT
PAST MEDICAL HISTORY:  Essential hypertension     HLD (hyperlipidemia)     Obesity     Osteomyelitis of right ulna     Renal stones sepsis - 3 yrs ago    T2DM (type 2 diabetes mellitus)     Ulnar nerve injury

## 2019-04-04 NOTE — CONSULT NOTE ADULT - ATTENDING COMMENTS
I am a non participating [employer] physician seeing Pt in coverage for Dr Fry I am a non participating [facility] physician seeing Pt in coverage for Dr Fry. The above patient examination was reviewed with Dr. Munguia and I agree with his evaluation, assessment and treatment plan.  Jermeiah Fry M.D.

## 2019-04-04 NOTE — H&P ADULT - ASSESSMENT
A/P: 58y Female with R forearm fluid collection  Pain control  WBAT RUE  Ice/elevation  ID consult, blood cultures ESR/CRP ordered  Plan for OR tomorrow 4/5/19  NPO after midnight for OR  IVF while NPO  Hold chemical AC for OR  Medicine consult for OR clearance  Discussed wtih Dr. Sethi who agrees with above

## 2019-04-04 NOTE — CONSULT NOTE ADULT - PROBLEM SELECTOR RECOMMENDATION 9
scheduled for I&D of arm  No contraindication to scheduled procedure  pain meds as needed  DVT and GI prophylaxis

## 2019-04-05 ENCOUNTER — RESULT REVIEW (OUTPATIENT)
Age: 59
End: 2019-04-05

## 2019-04-05 LAB
ANION GAP SERPL CALC-SCNC: 14 MMOL/L — SIGNIFICANT CHANGE UP (ref 5–17)
ANION GAP SERPL CALC-SCNC: 17 MMOL/L — SIGNIFICANT CHANGE UP (ref 5–17)
APTT BLD: 37.5 SEC — HIGH (ref 27.5–36.3)
BASOPHILS # BLD AUTO: 0 K/UL — SIGNIFICANT CHANGE UP (ref 0–0.2)
BASOPHILS NFR BLD AUTO: 0.2 % — SIGNIFICANT CHANGE UP (ref 0–2)
BLD GP AB SCN SERPL QL: POSITIVE — SIGNIFICANT CHANGE UP
BUN SERPL-MCNC: 25 MG/DL — HIGH (ref 7–23)
BUN SERPL-MCNC: 26 MG/DL — HIGH (ref 7–23)
CALCIUM SERPL-MCNC: 9.1 MG/DL — SIGNIFICANT CHANGE UP (ref 8.4–10.5)
CALCIUM SERPL-MCNC: 9.6 MG/DL — SIGNIFICANT CHANGE UP (ref 8.4–10.5)
CHLORIDE SERPL-SCNC: 100 MMOL/L — SIGNIFICANT CHANGE UP (ref 96–108)
CHLORIDE SERPL-SCNC: 99 MMOL/L — SIGNIFICANT CHANGE UP (ref 96–108)
CO2 SERPL-SCNC: 18 MMOL/L — LOW (ref 22–31)
CO2 SERPL-SCNC: 26 MMOL/L — SIGNIFICANT CHANGE UP (ref 22–31)
CREAT SERPL-MCNC: 1.24 MG/DL — SIGNIFICANT CHANGE UP (ref 0.5–1.3)
CREAT SERPL-MCNC: 1.38 MG/DL — HIGH (ref 0.5–1.3)
CRP SERPL-MCNC: 15.71 MG/DL — HIGH (ref 0–0.4)
EOSINOPHIL # BLD AUTO: 0.1 K/UL — SIGNIFICANT CHANGE UP (ref 0–0.5)
EOSINOPHIL NFR BLD AUTO: 0.6 % — SIGNIFICANT CHANGE UP (ref 0–6)
GLUCOSE BLDC GLUCOMTR-MCNC: 127 MG/DL — HIGH (ref 70–99)
GLUCOSE BLDC GLUCOMTR-MCNC: 266 MG/DL — HIGH (ref 70–99)
GLUCOSE BLDC GLUCOMTR-MCNC: 271 MG/DL — HIGH (ref 70–99)
GLUCOSE SERPL-MCNC: 126 MG/DL — HIGH (ref 70–99)
GLUCOSE SERPL-MCNC: 286 MG/DL — HIGH (ref 70–99)
GRAM STN FLD: SIGNIFICANT CHANGE UP
GRAM STN FLD: SIGNIFICANT CHANGE UP
HBA1C BLD-MCNC: 9.1 % — HIGH (ref 4–5.6)
HCT VFR BLD CALC: 35.4 % — SIGNIFICANT CHANGE UP (ref 34.5–45)
HCT VFR BLD CALC: 37.2 % — SIGNIFICANT CHANGE UP (ref 34.5–45)
HCV AB S/CO SERPL IA: 0.16 S/CO — SIGNIFICANT CHANGE UP (ref 0–0.99)
HCV AB SERPL-IMP: SIGNIFICANT CHANGE UP
HGB BLD-MCNC: 12.2 G/DL — SIGNIFICANT CHANGE UP (ref 11.5–15.5)
HGB BLD-MCNC: 12.4 G/DL — SIGNIFICANT CHANGE UP (ref 11.5–15.5)
INR BLD: 1.12 RATIO — SIGNIFICANT CHANGE UP (ref 0.88–1.16)
LYMPHOCYTES # BLD AUTO: 1.9 K/UL — SIGNIFICANT CHANGE UP (ref 1–3.3)
LYMPHOCYTES # BLD AUTO: 16 % — SIGNIFICANT CHANGE UP (ref 13–44)
MCHC RBC-ENTMCNC: 27.9 PG — SIGNIFICANT CHANGE UP (ref 27–34)
MCHC RBC-ENTMCNC: 28.6 PG — SIGNIFICANT CHANGE UP (ref 27–34)
MCHC RBC-ENTMCNC: 33.4 GM/DL — SIGNIFICANT CHANGE UP (ref 32–36)
MCHC RBC-ENTMCNC: 34.4 GM/DL — SIGNIFICANT CHANGE UP (ref 32–36)
MCV RBC AUTO: 83.2 FL — SIGNIFICANT CHANGE UP (ref 80–100)
MCV RBC AUTO: 83.7 FL — SIGNIFICANT CHANGE UP (ref 80–100)
MONOCYTES # BLD AUTO: 0.3 K/UL — SIGNIFICANT CHANGE UP (ref 0–0.9)
MONOCYTES NFR BLD AUTO: 2.8 % — SIGNIFICANT CHANGE UP (ref 2–14)
NEUTROPHILS # BLD AUTO: 9.4 K/UL — HIGH (ref 1.8–7.4)
NEUTROPHILS NFR BLD AUTO: 80.4 % — HIGH (ref 43–77)
NIGHT BLUE STAIN TISS: SIGNIFICANT CHANGE UP
PLATELET # BLD AUTO: 313 K/UL — SIGNIFICANT CHANGE UP (ref 150–400)
PLATELET # BLD AUTO: 358 K/UL — SIGNIFICANT CHANGE UP (ref 150–400)
POTASSIUM SERPL-MCNC: 3.8 MMOL/L — SIGNIFICANT CHANGE UP (ref 3.5–5.3)
POTASSIUM SERPL-MCNC: 5.3 MMOL/L — SIGNIFICANT CHANGE UP (ref 3.5–5.3)
POTASSIUM SERPL-SCNC: 3.8 MMOL/L — SIGNIFICANT CHANGE UP (ref 3.5–5.3)
POTASSIUM SERPL-SCNC: 5.3 MMOL/L — SIGNIFICANT CHANGE UP (ref 3.5–5.3)
PROTHROM AB SERPL-ACNC: 12.9 SEC — SIGNIFICANT CHANGE UP (ref 10–12.9)
RBC # BLD: 4.26 M/UL — SIGNIFICANT CHANGE UP (ref 3.8–5.2)
RBC # BLD: 4.44 M/UL — SIGNIFICANT CHANGE UP (ref 3.8–5.2)
RBC # FLD: 13.3 % — SIGNIFICANT CHANGE UP (ref 10.3–14.5)
RBC # FLD: 13.4 % — SIGNIFICANT CHANGE UP (ref 10.3–14.5)
RH IG SCN BLD-IMP: NEGATIVE — SIGNIFICANT CHANGE UP
SODIUM SERPL-SCNC: 135 MMOL/L — SIGNIFICANT CHANGE UP (ref 135–145)
SODIUM SERPL-SCNC: 139 MMOL/L — SIGNIFICANT CHANGE UP (ref 135–145)
SPECIMEN SOURCE: SIGNIFICANT CHANGE UP
WBC # BLD: 10.2 K/UL — SIGNIFICANT CHANGE UP (ref 3.8–10.5)
WBC # BLD: 11.7 K/UL — HIGH (ref 3.8–10.5)
WBC # FLD AUTO: 10.2 K/UL — SIGNIFICANT CHANGE UP (ref 3.8–10.5)
WBC # FLD AUTO: 11.7 K/UL — HIGH (ref 3.8–10.5)

## 2019-04-05 PROCEDURE — 88304 TISSUE EXAM BY PATHOLOGIST: CPT | Mod: 26

## 2019-04-05 PROCEDURE — 99222 1ST HOSP IP/OBS MODERATE 55: CPT

## 2019-04-05 RX ORDER — BISOPROLOL FUMARATE 10 MG/1
5 TABLET, FILM COATED ORAL DAILY
Qty: 0 | Refills: 0 | Status: DISCONTINUED | OUTPATIENT
Start: 2019-04-05 | End: 2019-04-05

## 2019-04-05 RX ORDER — GABAPENTIN 400 MG/1
800 CAPSULE ORAL THREE TIMES A DAY
Qty: 0 | Refills: 0 | Status: DISCONTINUED | OUTPATIENT
Start: 2019-04-05 | End: 2019-04-09

## 2019-04-05 RX ORDER — DEXTROSE 50 % IN WATER 50 %
15 SYRINGE (ML) INTRAVENOUS ONCE
Qty: 0 | Refills: 0 | Status: DISCONTINUED | OUTPATIENT
Start: 2019-04-05 | End: 2019-04-05

## 2019-04-05 RX ORDER — HYDROMORPHONE HYDROCHLORIDE 2 MG/ML
0.5 INJECTION INTRAMUSCULAR; INTRAVENOUS; SUBCUTANEOUS
Qty: 0 | Refills: 0 | Status: DISCONTINUED | OUTPATIENT
Start: 2019-04-05 | End: 2019-04-05

## 2019-04-05 RX ORDER — GLUCAGON INJECTION, SOLUTION 0.5 MG/.1ML
1 INJECTION, SOLUTION SUBCUTANEOUS ONCE
Qty: 0 | Refills: 0 | Status: DISCONTINUED | OUTPATIENT
Start: 2019-04-05 | End: 2019-04-09

## 2019-04-05 RX ORDER — INSULIN LISPRO 100/ML
VIAL (ML) SUBCUTANEOUS
Qty: 0 | Refills: 0 | Status: DISCONTINUED | OUTPATIENT
Start: 2019-04-05 | End: 2019-04-05

## 2019-04-05 RX ORDER — ATORVASTATIN CALCIUM 80 MG/1
40 TABLET, FILM COATED ORAL AT BEDTIME
Qty: 0 | Refills: 0 | Status: DISCONTINUED | OUTPATIENT
Start: 2019-04-05 | End: 2019-04-09

## 2019-04-05 RX ORDER — ONDANSETRON 8 MG/1
4 TABLET, FILM COATED ORAL ONCE
Qty: 0 | Refills: 0 | Status: COMPLETED | OUTPATIENT
Start: 2019-04-05 | End: 2019-04-05

## 2019-04-05 RX ORDER — METOCLOPRAMIDE HCL 10 MG
10 TABLET ORAL EVERY 6 HOURS
Qty: 0 | Refills: 0 | Status: DISCONTINUED | OUTPATIENT
Start: 2019-04-05 | End: 2019-04-09

## 2019-04-05 RX ORDER — PANTOPRAZOLE SODIUM 20 MG/1
40 TABLET, DELAYED RELEASE ORAL
Qty: 0 | Refills: 0 | Status: DISCONTINUED | OUTPATIENT
Start: 2019-04-05 | End: 2019-04-09

## 2019-04-05 RX ORDER — MAGNESIUM HYDROXIDE 400 MG/1
30 TABLET, CHEWABLE ORAL DAILY
Qty: 0 | Refills: 0 | Status: DISCONTINUED | OUTPATIENT
Start: 2019-04-05 | End: 2019-04-09

## 2019-04-05 RX ORDER — DEXTROSE 50 % IN WATER 50 %
12.5 SYRINGE (ML) INTRAVENOUS ONCE
Qty: 0 | Refills: 0 | Status: DISCONTINUED | OUTPATIENT
Start: 2019-04-05 | End: 2019-04-05

## 2019-04-05 RX ORDER — METOCLOPRAMIDE HCL 10 MG
5 TABLET ORAL ONCE
Qty: 0 | Refills: 0 | Status: COMPLETED | OUTPATIENT
Start: 2019-04-05 | End: 2019-04-05

## 2019-04-05 RX ORDER — GABAPENTIN 400 MG/1
600 CAPSULE ORAL THREE TIMES A DAY
Qty: 0 | Refills: 0 | Status: DISCONTINUED | OUTPATIENT
Start: 2019-04-05 | End: 2019-04-05

## 2019-04-05 RX ORDER — ONDANSETRON 8 MG/1
4 TABLET, FILM COATED ORAL EVERY 6 HOURS
Qty: 0 | Refills: 0 | Status: DISCONTINUED | OUTPATIENT
Start: 2019-04-05 | End: 2019-04-07

## 2019-04-05 RX ORDER — DOCUSATE SODIUM 100 MG
100 CAPSULE ORAL THREE TIMES A DAY
Qty: 0 | Refills: 0 | Status: DISCONTINUED | OUTPATIENT
Start: 2019-04-05 | End: 2019-04-09

## 2019-04-05 RX ORDER — CEFAZOLIN SODIUM 1 G
2000 VIAL (EA) INJECTION EVERY 8 HOURS
Qty: 0 | Refills: 0 | Status: DISCONTINUED | OUTPATIENT
Start: 2019-04-05 | End: 2019-04-09

## 2019-04-05 RX ORDER — NALOXONE HYDROCHLORIDE 4 MG/.1ML
0.1 SPRAY NASAL
Qty: 0 | Refills: 0 | Status: DISCONTINUED | OUTPATIENT
Start: 2019-04-05 | End: 2019-04-07

## 2019-04-05 RX ORDER — INSULIN GLARGINE 100 [IU]/ML
72 INJECTION, SOLUTION SUBCUTANEOUS AT BEDTIME
Qty: 0 | Refills: 0 | Status: DISCONTINUED | OUTPATIENT
Start: 2019-04-05 | End: 2019-04-07

## 2019-04-05 RX ORDER — SODIUM CHLORIDE 9 MG/ML
1000 INJECTION, SOLUTION INTRAVENOUS
Qty: 0 | Refills: 0 | Status: DISCONTINUED | OUTPATIENT
Start: 2019-04-05 | End: 2019-04-09

## 2019-04-05 RX ORDER — METOPROLOL TARTRATE 50 MG
25 TABLET ORAL DAILY
Qty: 0 | Refills: 0 | Status: DISCONTINUED | OUTPATIENT
Start: 2019-04-05 | End: 2019-04-09

## 2019-04-05 RX ORDER — INSULIN LISPRO 100/ML
VIAL (ML) SUBCUTANEOUS
Qty: 0 | Refills: 0 | Status: DISCONTINUED | OUTPATIENT
Start: 2019-04-05 | End: 2019-04-09

## 2019-04-05 RX ORDER — INSULIN GLARGINE 100 [IU]/ML
72 INJECTION, SOLUTION SUBCUTANEOUS AT BEDTIME
Qty: 0 | Refills: 0 | Status: DISCONTINUED | OUTPATIENT
Start: 2019-04-05 | End: 2019-04-05

## 2019-04-05 RX ORDER — FENOFIBRATE,MICRONIZED 130 MG
145 CAPSULE ORAL DAILY
Qty: 0 | Refills: 0 | Status: DISCONTINUED | OUTPATIENT
Start: 2019-04-05 | End: 2019-04-09

## 2019-04-05 RX ORDER — SODIUM CHLORIDE 9 MG/ML
1000 INJECTION, SOLUTION INTRAVENOUS
Qty: 0 | Refills: 0 | Status: DISCONTINUED | OUTPATIENT
Start: 2019-04-05 | End: 2019-04-05

## 2019-04-05 RX ORDER — HEPARIN SODIUM 5000 [USP'U]/ML
5000 INJECTION INTRAVENOUS; SUBCUTANEOUS EVERY 8 HOURS
Qty: 0 | Refills: 0 | Status: COMPLETED | OUTPATIENT
Start: 2019-04-05 | End: 2019-04-08

## 2019-04-05 RX ORDER — HYDROCHLOROTHIAZIDE 25 MG
12.5 TABLET ORAL DAILY
Qty: 0 | Refills: 0 | Status: DISCONTINUED | OUTPATIENT
Start: 2019-04-05 | End: 2019-04-05

## 2019-04-05 RX ORDER — SODIUM CHLORIDE 9 MG/ML
1000 INJECTION INTRAMUSCULAR; INTRAVENOUS; SUBCUTANEOUS
Qty: 0 | Refills: 0 | Status: DISCONTINUED | OUTPATIENT
Start: 2019-04-05 | End: 2019-04-09

## 2019-04-05 RX ORDER — DEXTROSE 50 % IN WATER 50 %
25 SYRINGE (ML) INTRAVENOUS ONCE
Qty: 0 | Refills: 0 | Status: DISCONTINUED | OUTPATIENT
Start: 2019-04-05 | End: 2019-04-09

## 2019-04-05 RX ORDER — INSULIN LISPRO 100/ML
56 VIAL (ML) SUBCUTANEOUS
Qty: 0 | Refills: 0 | Status: DISCONTINUED | OUTPATIENT
Start: 2019-04-05 | End: 2019-04-07

## 2019-04-05 RX ORDER — DEXTROSE 50 % IN WATER 50 %
15 SYRINGE (ML) INTRAVENOUS ONCE
Qty: 0 | Refills: 0 | Status: DISCONTINUED | OUTPATIENT
Start: 2019-04-05 | End: 2019-04-09

## 2019-04-05 RX ORDER — DEXTROSE 50 % IN WATER 50 %
25 SYRINGE (ML) INTRAVENOUS ONCE
Qty: 0 | Refills: 0 | Status: DISCONTINUED | OUTPATIENT
Start: 2019-04-05 | End: 2019-04-05

## 2019-04-05 RX ORDER — FENOFIBRATE,MICRONIZED 130 MG
145 CAPSULE ORAL DAILY
Qty: 0 | Refills: 0 | Status: DISCONTINUED | OUTPATIENT
Start: 2019-04-05 | End: 2019-04-05

## 2019-04-05 RX ORDER — ONDANSETRON 8 MG/1
4 TABLET, FILM COATED ORAL EVERY 6 HOURS
Qty: 0 | Refills: 0 | Status: DISCONTINUED | OUTPATIENT
Start: 2019-04-05 | End: 2019-04-09

## 2019-04-05 RX ORDER — HYDROMORPHONE HYDROCHLORIDE 2 MG/ML
0.5 INJECTION INTRAMUSCULAR; INTRAVENOUS; SUBCUTANEOUS
Qty: 0 | Refills: 0 | Status: DISCONTINUED | OUTPATIENT
Start: 2019-04-05 | End: 2019-04-07

## 2019-04-05 RX ORDER — HYDROMORPHONE HYDROCHLORIDE 2 MG/ML
30 INJECTION INTRAMUSCULAR; INTRAVENOUS; SUBCUTANEOUS
Qty: 0 | Refills: 0 | Status: DISCONTINUED | OUTPATIENT
Start: 2019-04-05 | End: 2019-04-07

## 2019-04-05 RX ADMIN — HYDROMORPHONE HYDROCHLORIDE 0.5 MILLIGRAM(S): 2 INJECTION INTRAMUSCULAR; INTRAVENOUS; SUBCUTANEOUS at 10:30

## 2019-04-05 RX ADMIN — HYDROMORPHONE HYDROCHLORIDE 30 MILLILITER(S): 2 INJECTION INTRAMUSCULAR; INTRAVENOUS; SUBCUTANEOUS at 22:42

## 2019-04-05 RX ADMIN — HYDROMORPHONE HYDROCHLORIDE 0.5 MILLIGRAM(S): 2 INJECTION INTRAMUSCULAR; INTRAVENOUS; SUBCUTANEOUS at 10:15

## 2019-04-05 RX ADMIN — SODIUM CHLORIDE 125 MILLILITER(S): 9 INJECTION INTRAMUSCULAR; INTRAVENOUS; SUBCUTANEOUS at 11:00

## 2019-04-05 RX ADMIN — SODIUM CHLORIDE 100 MILLILITER(S): 9 INJECTION INTRAMUSCULAR; INTRAVENOUS; SUBCUTANEOUS at 00:37

## 2019-04-05 RX ADMIN — HYDROMORPHONE HYDROCHLORIDE 0.5 MILLIGRAM(S): 2 INJECTION INTRAMUSCULAR; INTRAVENOUS; SUBCUTANEOUS at 10:05

## 2019-04-05 RX ADMIN — HYDROMORPHONE HYDROCHLORIDE 0.5 MILLIGRAM(S): 2 INJECTION INTRAMUSCULAR; INTRAVENOUS; SUBCUTANEOUS at 11:00

## 2019-04-05 RX ADMIN — HYDROMORPHONE HYDROCHLORIDE 0.5 MILLIGRAM(S): 2 INJECTION INTRAMUSCULAR; INTRAVENOUS; SUBCUTANEOUS at 10:29

## 2019-04-05 RX ADMIN — Medication 5 MILLIGRAM(S): at 18:11

## 2019-04-05 RX ADMIN — ONDANSETRON 4 MILLIGRAM(S): 8 TABLET, FILM COATED ORAL at 10:00

## 2019-04-05 RX ADMIN — ONDANSETRON 4 MILLIGRAM(S): 8 TABLET, FILM COATED ORAL at 14:46

## 2019-04-05 RX ADMIN — HYDROMORPHONE HYDROCHLORIDE 0.5 MILLIGRAM(S): 2 INJECTION INTRAMUSCULAR; INTRAVENOUS; SUBCUTANEOUS at 10:14

## 2019-04-05 RX ADMIN — Medication 6: at 13:02

## 2019-04-05 RX ADMIN — HYDROMORPHONE HYDROCHLORIDE 0.5 MILLIGRAM(S): 2 INJECTION INTRAMUSCULAR; INTRAVENOUS; SUBCUTANEOUS at 10:48

## 2019-04-05 RX ADMIN — Medication 0.5 MILLIGRAM(S): at 10:50

## 2019-04-05 RX ADMIN — Medication 100 MILLIGRAM(S): at 17:45

## 2019-04-05 RX ADMIN — HYDROMORPHONE HYDROCHLORIDE 30 MILLILITER(S): 2 INJECTION INTRAMUSCULAR; INTRAVENOUS; SUBCUTANEOUS at 13:06

## 2019-04-05 RX ADMIN — GABAPENTIN 800 MILLIGRAM(S): 400 CAPSULE ORAL at 22:37

## 2019-04-05 RX ADMIN — HEPARIN SODIUM 5000 UNIT(S): 5000 INJECTION INTRAVENOUS; SUBCUTANEOUS at 22:37

## 2019-04-05 RX ADMIN — HYDROMORPHONE HYDROCHLORIDE 30 MILLILITER(S): 2 INJECTION INTRAMUSCULAR; INTRAVENOUS; SUBCUTANEOUS at 19:26

## 2019-04-05 RX ADMIN — CHLORHEXIDINE GLUCONATE 1 APPLICATION(S): 213 SOLUTION TOPICAL at 06:28

## 2019-04-05 RX ADMIN — HEPARIN SODIUM 5000 UNIT(S): 5000 INJECTION INTRAVENOUS; SUBCUTANEOUS at 14:56

## 2019-04-05 RX ADMIN — HYDROMORPHONE HYDROCHLORIDE 0.5 MILLIGRAM(S): 2 INJECTION INTRAMUSCULAR; INTRAVENOUS; SUBCUTANEOUS at 10:45

## 2019-04-05 RX ADMIN — ONDANSETRON 4 MILLIGRAM(S): 8 TABLET, FILM COATED ORAL at 21:05

## 2019-04-05 RX ADMIN — HYDROMORPHONE HYDROCHLORIDE 30 MILLILITER(S): 2 INJECTION INTRAMUSCULAR; INTRAVENOUS; SUBCUTANEOUS at 11:30

## 2019-04-05 RX ADMIN — SODIUM CHLORIDE 75 MILLILITER(S): 9 INJECTION INTRAMUSCULAR; INTRAVENOUS; SUBCUTANEOUS at 21:04

## 2019-04-05 RX ADMIN — INSULIN GLARGINE 72 UNIT(S): 100 INJECTION, SOLUTION SUBCUTANEOUS at 22:37

## 2019-04-05 NOTE — PROGRESS NOTE ADULT - SUBJECTIVE AND OBJECTIVE BOX
Pt seen/examined. Doing well. Pain controlled. No acute overnight complaints or events.    T(C): 36.6 (04-05-19 @ 04:21), Max: 37.3 (04-04-19 @ 20:40)  HR: 86 (04-05-19 @ 04:21) (86 - 102)  BP: 132/79 (04-05-19 @ 04:21) (127/77 - 172/84)  RR: 18 (04-05-19 @ 04:21) (17 - 20)  SpO2: 97% (04-05-19 @ 04:21) (95% - 100%)  Wt(kg): --    - Gen: NAD  - RUE: Forearm swelling, well healed skin graft noted with some areas of erythema; 2+ RP; rigid ulnar claw; M/R sensation intact    58yFemale s/p multiple surgeries following open R elbow fracture    - Pain control  - hold DVT ppx  - OOB/PT  - NPO/IVF  - OR today

## 2019-04-05 NOTE — PACU DISCHARGE NOTE - NAUSEA/VOMITING:
Osteoporosis in hip, worse. Lumbar spine improving. I recommend osteoporosis medication. Would need office visit to discuss. None

## 2019-04-05 NOTE — PROGRESS NOTE ADULT - SUBJECTIVE AND OBJECTIVE BOX
Patient is a 58y old  Female who presents with a chief complaint of Right forearm collection (06 Apr 2019 08:13)      HPI:  Patient  seen post op  NO sob or c hest pain no nausea or vomiting   Needs to do incentive   spirometry  10 x per hour     MEDICATIONS  (STANDING):  atorvastatin 40 milliGRAM(s) Oral at bedtime  ceFAZolin   IVPB 2000 milliGRAM(s) IV Intermittent every 8 hours  dextrose 5%. 1000 milliLiter(s) (50 mL/Hr) IV Continuous <Continuous>  dextrose 50% Injectable 25 Gram(s) IV Push once  docusate sodium 100 milliGRAM(s) Oral three times a day  fenofibrate Tablet 145 milliGRAM(s) Oral daily  gabapentin 800 milliGRAM(s) Oral three times a day  heparin  Injectable 5000 Unit(s) SubCutaneous every 8 hours  HYDROmorphone PCA (1 mG/mL) 30 milliLiter(s) PCA Continuous PCA Continuous  insulin glargine Injectable (LANTUS) 72 Unit(s) SubCutaneous at bedtime  insulin lispro (HumaLOG) corrective regimen sliding scale   SubCutaneous three times a day before meals  insulin lispro Injectable (HumaLOG) 56 Unit(s) SubCutaneous three times a day before meals  metoprolol succinate ER 25 milliGRAM(s) Oral daily  pantoprazole    Tablet 40 milliGRAM(s) Oral before breakfast  senna 2 Tablet(s) Oral at bedtime  sodium chloride 0.9%. 1000 milliLiter(s) (75 mL/Hr) IV Continuous <Continuous>    MEDICATIONS  (PRN):  acetaminophen   Tablet .. 650 milliGRAM(s) Oral every 6 hours PRN Temp greater or equal to 38C (100.4F)  dextrose 40% Gel 15 Gram(s) Oral once PRN Blood Glucose LESS THAN 70 milliGRAM(s)/deciliter  glucagon  Injectable 1 milliGRAM(s) IntraMuscular once PRN Glucose LESS THAN 70 milligrams/deciliter  HYDROmorphone PCA (1 mG/mL) Rescue Clinician Bolus 0.5 milliGRAM(s) IV Push every 15 minutes PRN for Pain Scale GREATER THAN 6  magnesium hydroxide Suspension 30 milliLiter(s) Oral daily PRN Constipation  metoclopramide Injectable 10 milliGRAM(s) IV Push every 6 hours PRN nausea and/or vomiting  naloxone Injectable 0.1 milliGRAM(s) IV Push every 3 minutes PRN For ANY of the following changes in patient status:  A. RR LESS THAN 10 breaths per minute, B. Oxygen saturation LESS THAN 90%, C. Sedation score of 6  ondansetron Injectable 4 milliGRAM(s) IV Push every 6 hours PRN Nausea  ondansetron Injectable 4 milliGRAM(s) IV Push every 6 hours PRN Nausea and/or Vomiting      Allergies    No Known Allergies    Intolerances                VITALS:                12.2   11.4  )-----------( 350      ( 04 Apr 2019 17:27 )             35.0     04 Apr 2019 17:27    136    |  98     |  26     ----------------------------<  265    4.5     |  23     |  1.42     Ca    9.8        04 Apr 2019 17:27      PT/INR - ( 04 Apr 2019 17:27 )   PT: 12.2 sec;   INR: 1.06 ratio         PTT - ( 04 Apr 2019 17:27 )  PTT:36.2 sec  Vital Signs Last 24 Hrs      PHYSICAL EXAM:  GENERAL: NAD, well nourished and conversant  HEAD:  Atraumatic  EYES: EOM, PERRLA, conjunctiva pink and sclera white  ENT: No tonsillar erythema, exudates, or enlargement, moist mucous membranes, good dentition, no lesions  NECK: Supple, No JVD, normal thyroid, carotids with normal upstrokes and no bruits  CHEST/LUNG: Clear to auscultation bilaterally, No rales, rhonchi, wheezing, or rubs  HEART: Regular rate and rhythm, No murmurs, rubs, or gallops  ABDOMEN: Soft, nondistended, no masses, guarding, tenderness or rebound, bowel sounds present  EXTREMITIES:  2+ Peripheral Pulses, No clubbing, cyanosis, or edema. No arthritis of shoulders, elbows, hands, hips, knees, ankles, or feet. No DJD C spine, T spine, or L/S spine  LYMPH: No lymphadenopathy noted  SKIN: No rashes or lesions  NERVOUS SYSTEM:  Alert & Oriented X3, normal cognitive function. Motor Strength 5/5 right upper and right lower.  5/5 left upper and left lower extremities, DTRs 2+ intact and symmetric    LABS:                          12.2   11.7  )-----------( 313      ( 05 Apr 2019 11:43 )             35.4     04-06    140  |  101  |  22  ----------------------------<  127<H>  4.1   |  26  |  1.11  04-05    135  |  100  |  26<H>  ----------------------------<  286<H>  5.3   |  18<L>  |  1.24  04-05    139  |  99  |  25<H>  ----------------------------<  126<H>  3.8   |  26  |  1.38<H>    Ca    8.8      06 Apr 2019 06:58  Ca    9.1      05 Apr 2019 11:43  Ca    9.6      05 Apr 2019 04:40      CAPILLARY BLOOD GLUCOSE      POCT Blood Glucose.: 127 mg/dL (06 Apr 2019 07:58)  POCT Blood Glucose.: 266 mg/dL (05 Apr 2019 22:30)  POCT Blood Glucose.: 271 mg/dL (05 Apr 2019 17:32)      RADIOLOGY & ADDITIONAL TESTS:      Consultant(s):    Care Discussed with Consultants/Other Providers [ ] YES  [ ] NO

## 2019-04-05 NOTE — PROGRESS NOTE ADULT - ASSESSMENT
Patient  seen post op  NO sob or c hest pain no nausea or vomiting   Needs to do incentive   spirometry  10 x per hour.    ·  POST-OP DIAGNOSIS:  Post-traumatic wound infection 05-Apr-2019 09:59:23  Jeremiah Castillo.  ·  PROCEDURES:  Irrigation and debridement, wound 05-Apr-2019 09:57:53  Jeremiah Castillo.

## 2019-04-05 NOTE — PROGRESS NOTE ADULT - SUBJECTIVE AND OBJECTIVE BOX
ORTHO POC NOTE      Resting without complaints. Pain controlled with PCA. No Chest Pain, SOB, N/V.    T(C): 36.4 (04-05-19 @ 14:26), Max: 37.3 (04-04-19 @ 20:40)  HR: 81 (04-05-19 @ 14:26) (81 - 102)  BP: 154/91 (04-05-19 @ 14:26) (127/77 - 177/79)  RR: 18 (04-05-19 @ 14:26) (14 - 20)  SpO2: 95% (04-05-19 @ 14:26) (95% - 100%)      Exam:   Alert and Oriented; No Acute Distress  Card: +S1/S2, RRR  Pulm: CTAB  Ext: RUE: VAC forearm in place with good suction, compartments soft, Right Hand 5th digit with no dull sensation,   dull sensation grossly intact for Right 1st-4th digit, 3rd-5th digit flexed   Calves soft, non-tender bilaterally      Postop Xray: In chart                            12.2   11.7  )-----------( 313      ( 05 Apr 2019 11:43 )             35.4    04-05    135  |  100  |  26<H>  ----------------------------<  286<H>  5.3   |  18<L>  |  1.24    Ca    9.1      05 Apr 2019 11:43          Patient is a 58y old  Female s/p R forearm I&D/VAC placement      Plan:  Appreciate ID consult  IV abx- ancef as per ID  PCA   VAC  DVT ppx- Heparin 5000 units SQ TID  PT  Venodynes/IS  Diabetic Diet  Moderate RISS  Home meds- ordered as per most recent home regimen after speaking with pts [facility]       Smiley Morfin PA-C  Orthopedic Surgery  [id number]

## 2019-04-06 DIAGNOSIS — E66.9 OBESITY, UNSPECIFIED: ICD-10-CM

## 2019-04-06 DIAGNOSIS — M86.9 OSTEOMYELITIS, UNSPECIFIED: ICD-10-CM

## 2019-04-06 LAB
ANION GAP SERPL CALC-SCNC: 13 MMOL/L — SIGNIFICANT CHANGE UP (ref 5–17)
BUN SERPL-MCNC: 22 MG/DL — SIGNIFICANT CHANGE UP (ref 7–23)
CALCIUM SERPL-MCNC: 8.8 MG/DL — SIGNIFICANT CHANGE UP (ref 8.4–10.5)
CHLORIDE SERPL-SCNC: 101 MMOL/L — SIGNIFICANT CHANGE UP (ref 96–108)
CO2 SERPL-SCNC: 26 MMOL/L — SIGNIFICANT CHANGE UP (ref 22–31)
CREAT SERPL-MCNC: 1.11 MG/DL — SIGNIFICANT CHANGE UP (ref 0.5–1.3)
GLUCOSE BLDC GLUCOMTR-MCNC: 127 MG/DL — HIGH (ref 70–99)
GLUCOSE BLDC GLUCOMTR-MCNC: 171 MG/DL — HIGH (ref 70–99)
GLUCOSE BLDC GLUCOMTR-MCNC: 218 MG/DL — HIGH (ref 70–99)
GLUCOSE SERPL-MCNC: 127 MG/DL — HIGH (ref 70–99)
HBA1C BLD-MCNC: 9.4 % — HIGH (ref 4–5.6)
HCT VFR BLD CALC: 33.3 % — LOW (ref 34.5–45)
HGB BLD-MCNC: 10.6 G/DL — LOW (ref 11.5–15.5)
MCHC RBC-ENTMCNC: 27.4 PG — SIGNIFICANT CHANGE UP (ref 27–34)
MCHC RBC-ENTMCNC: 31.8 GM/DL — LOW (ref 32–36)
MCV RBC AUTO: 86 FL — SIGNIFICANT CHANGE UP (ref 80–100)
PLATELET # BLD AUTO: 395 K/UL — SIGNIFICANT CHANGE UP (ref 150–400)
POTASSIUM SERPL-MCNC: 4.1 MMOL/L — SIGNIFICANT CHANGE UP (ref 3.5–5.3)
POTASSIUM SERPL-SCNC: 4.1 MMOL/L — SIGNIFICANT CHANGE UP (ref 3.5–5.3)
RBC # BLD: 3.87 M/UL — SIGNIFICANT CHANGE UP (ref 3.8–5.2)
RBC # FLD: 13.9 % — SIGNIFICANT CHANGE UP (ref 10.3–14.5)
SODIUM SERPL-SCNC: 140 MMOL/L — SIGNIFICANT CHANGE UP (ref 135–145)
WBC # BLD: 10 K/UL — SIGNIFICANT CHANGE UP (ref 3.8–10.5)
WBC # FLD AUTO: 10 K/UL — SIGNIFICANT CHANGE UP (ref 3.8–10.5)

## 2019-04-06 RX ORDER — SENNA PLUS 8.6 MG/1
2 TABLET ORAL AT BEDTIME
Qty: 0 | Refills: 0 | Status: DISCONTINUED | OUTPATIENT
Start: 2019-04-06 | End: 2019-04-09

## 2019-04-06 RX ORDER — DIPHENHYDRAMINE HCL 50 MG
12.5 CAPSULE ORAL ONCE
Qty: 0 | Refills: 0 | Status: DISCONTINUED | OUTPATIENT
Start: 2019-04-06 | End: 2019-04-09

## 2019-04-06 RX ORDER — FAMOTIDINE 10 MG/ML
20 INJECTION INTRAVENOUS ONCE
Qty: 0 | Refills: 0 | Status: DISCONTINUED | OUTPATIENT
Start: 2019-04-06 | End: 2019-04-09

## 2019-04-06 RX ORDER — ACETAMINOPHEN 500 MG
650 TABLET ORAL EVERY 6 HOURS
Qty: 0 | Refills: 0 | Status: DISCONTINUED | OUTPATIENT
Start: 2019-04-06 | End: 2019-04-09

## 2019-04-06 RX ADMIN — Medication 100 MILLIGRAM(S): at 21:36

## 2019-04-06 RX ADMIN — Medication 100 MILLIGRAM(S): at 23:52

## 2019-04-06 RX ADMIN — GABAPENTIN 800 MILLIGRAM(S): 400 CAPSULE ORAL at 21:36

## 2019-04-06 RX ADMIN — Medication 100 MILLIGRAM(S): at 08:12

## 2019-04-06 RX ADMIN — GABAPENTIN 800 MILLIGRAM(S): 400 CAPSULE ORAL at 06:14

## 2019-04-06 RX ADMIN — HYDROMORPHONE HYDROCHLORIDE 30 MILLILITER(S): 2 INJECTION INTRAMUSCULAR; INTRAVENOUS; SUBCUTANEOUS at 06:17

## 2019-04-06 RX ADMIN — HEPARIN SODIUM 5000 UNIT(S): 5000 INJECTION INTRAVENOUS; SUBCUTANEOUS at 21:36

## 2019-04-06 RX ADMIN — HYDROMORPHONE HYDROCHLORIDE 30 MILLILITER(S): 2 INJECTION INTRAMUSCULAR; INTRAVENOUS; SUBCUTANEOUS at 07:52

## 2019-04-06 RX ADMIN — ONDANSETRON 4 MILLIGRAM(S): 8 TABLET, FILM COATED ORAL at 07:54

## 2019-04-06 RX ADMIN — HYDROMORPHONE HYDROCHLORIDE 30 MILLILITER(S): 2 INJECTION INTRAMUSCULAR; INTRAVENOUS; SUBCUTANEOUS at 22:40

## 2019-04-06 RX ADMIN — GABAPENTIN 800 MILLIGRAM(S): 400 CAPSULE ORAL at 13:41

## 2019-04-06 RX ADMIN — Medication 100 MILLIGRAM(S): at 06:14

## 2019-04-06 RX ADMIN — HYDROMORPHONE HYDROCHLORIDE 30 MILLILITER(S): 2 INJECTION INTRAMUSCULAR; INTRAVENOUS; SUBCUTANEOUS at 02:14

## 2019-04-06 RX ADMIN — HEPARIN SODIUM 5000 UNIT(S): 5000 INJECTION INTRAVENOUS; SUBCUTANEOUS at 06:14

## 2019-04-06 RX ADMIN — SENNA PLUS 2 TABLET(S): 8.6 TABLET ORAL at 21:56

## 2019-04-06 RX ADMIN — HYDROMORPHONE HYDROCHLORIDE 30 MILLILITER(S): 2 INJECTION INTRAMUSCULAR; INTRAVENOUS; SUBCUTANEOUS at 17:42

## 2019-04-06 RX ADMIN — HEPARIN SODIUM 5000 UNIT(S): 5000 INJECTION INTRAVENOUS; SUBCUTANEOUS at 13:41

## 2019-04-06 RX ADMIN — PANTOPRAZOLE SODIUM 40 MILLIGRAM(S): 20 TABLET, DELAYED RELEASE ORAL at 06:14

## 2019-04-06 RX ADMIN — Medication 25 MILLIGRAM(S): at 06:14

## 2019-04-06 RX ADMIN — Medication 10 MILLIGRAM(S): at 23:33

## 2019-04-06 RX ADMIN — HYDROMORPHONE HYDROCHLORIDE 0.5 MILLIGRAM(S): 2 INJECTION INTRAMUSCULAR; INTRAVENOUS; SUBCUTANEOUS at 22:39

## 2019-04-06 RX ADMIN — HYDROMORPHONE HYDROCHLORIDE 30 MILLILITER(S): 2 INJECTION INTRAMUSCULAR; INTRAVENOUS; SUBCUTANEOUS at 10:28

## 2019-04-06 RX ADMIN — Medication 100 MILLIGRAM(S): at 00:04

## 2019-04-06 RX ADMIN — Medication 56 UNIT(S): at 19:50

## 2019-04-06 RX ADMIN — HYDROMORPHONE HYDROCHLORIDE 30 MILLILITER(S): 2 INJECTION INTRAMUSCULAR; INTRAVENOUS; SUBCUTANEOUS at 13:43

## 2019-04-06 RX ADMIN — HYDROMORPHONE HYDROCHLORIDE 30 MILLILITER(S): 2 INJECTION INTRAMUSCULAR; INTRAVENOUS; SUBCUTANEOUS at 19:19

## 2019-04-06 RX ADMIN — Medication 100 MILLIGRAM(S): at 17:28

## 2019-04-06 RX ADMIN — Medication 56 UNIT(S): at 08:09

## 2019-04-06 RX ADMIN — SODIUM CHLORIDE 75 MILLILITER(S): 9 INJECTION INTRAMUSCULAR; INTRAVENOUS; SUBCUTANEOUS at 06:14

## 2019-04-06 RX ADMIN — Medication 10 MILLIGRAM(S): at 08:36

## 2019-04-06 RX ADMIN — ATORVASTATIN CALCIUM 40 MILLIGRAM(S): 80 TABLET, FILM COATED ORAL at 21:36

## 2019-04-06 NOTE — PROGRESS NOTE ADULT - ASSESSMENT
Assessment: s/p I&D R forearm    Plan:  cont PCA  cont Abx  ID note appreciated: will consent and order PIC  RTOR 4/9  OT/PT [Fever] : no fever [Chills] : no chills [Fatigue] : no fatigue [Night Sweats] : no night sweats [Discharge] : no discharge [Pain] : no pain [Earache] : no earache [Hearing Loss] : no hearing loss [Sore Throat] : no sore throat [Chest Pain] : no chest pain [Palpitations] : no palpitations [Leg Claudication] : no leg claudication [Lower Ext Edema] : no lower extremity edema [Orthopnea] : no orthopnea [Shortness Of Breath] : no shortness of breath [Wheezing] : no wheezing [Cough] : no cough [Abdominal Pain] : no abdominal pain [Nausea] : no nausea [Constipation] : no constipation [Vomiting] : no vomiting [Heartburn] : no heartburn [Dysuria] : no dysuria [Incontinence] : no incontinence [Hematuria] : no hematuria [Joint Pain] : no joint pain [Joint Stiffness] : no joint stiffness [Muscle Pain] : no muscle pain [Itching] : no itching [Skin Rash] : no skin rash [Headache] : no headache [Dizziness] : no dizziness [Fainting] : no fainting

## 2019-04-06 NOTE — PROGRESS NOTE ADULT - SUBJECTIVE AND OBJECTIVE BOX
Patient is a 58y old  Female who presents with a chief complaint of Right forearm collection (06 Apr 2019 07:35)      Patient  went to the OR and tolerated surgery well .  Needs to do incentive spirometry.  Pain level is at3.     MEDICATIONS  (STANDING):  atorvastatin 40 milliGRAM(s) Oral at bedtime  ceFAZolin   IVPB 2000 milliGRAM(s) IV Intermittent every 8 hours  dextrose 5%. 1000 milliLiter(s) (50 mL/Hr) IV Continuous <Continuous>  dextrose 50% Injectable 25 Gram(s) IV Push once  docusate sodium 100 milliGRAM(s) Oral three times a day  fenofibrate Tablet 145 milliGRAM(s) Oral daily  gabapentin 800 milliGRAM(s) Oral three times a day  heparin  Injectable 5000 Unit(s) SubCutaneous every 8 hours  HYDROmorphone PCA (1 mG/mL) 30 milliLiter(s) PCA Continuous PCA Continuous  insulin glargine Injectable (LANTUS) 72 Unit(s) SubCutaneous at bedtime  insulin lispro (HumaLOG) corrective regimen sliding scale   SubCutaneous three times a day before meals  insulin lispro Injectable (HumaLOG) 56 Unit(s) SubCutaneous three times a day before meals  metoprolol succinate ER 25 milliGRAM(s) Oral daily  pantoprazole    Tablet 40 milliGRAM(s) Oral before breakfast  senna 2 Tablet(s) Oral at bedtime  sodium chloride 0.9%. 1000 milliLiter(s) (75 mL/Hr) IV Continuous <Continuous>    MEDICATIONS  (PRN):  acetaminophen   Tablet .. 650 milliGRAM(s) Oral every 6 hours PRN Temp greater or equal to 38C (100.4F)  dextrose 40% Gel 15 Gram(s) Oral once PRN Blood Glucose LESS THAN 70 milliGRAM(s)/deciliter  glucagon  Injectable 1 milliGRAM(s) IntraMuscular once PRN Glucose LESS THAN 70 milligrams/deciliter  HYDROmorphone PCA (1 mG/mL) Rescue Clinician Bolus 0.5 milliGRAM(s) IV Push every 15 minutes PRN for Pain Scale GREATER THAN 6  magnesium hydroxide Suspension 30 milliLiter(s) Oral daily PRN Constipation  metoclopramide Injectable 10 milliGRAM(s) IV Push every 6 hours PRN nausea and/or vomiting  naloxone Injectable 0.1 milliGRAM(s) IV Push every 3 minutes PRN For ANY of the following changes in patient status:  A. RR LESS THAN 10 breaths per minute, B. Oxygen saturation LESS THAN 90%, C. Sedation score of 6  ondansetron Injectable 4 milliGRAM(s) IV Push every 6 hours PRN Nausea  ondansetron Injectable 4 milliGRAM(s) IV Push every 6 hours PRN Nausea and/or Vomiting      Allergies    No Known Allergies    Intolerances            VITALS:     T(C): 36.4 (04-05-19 @ 14:26), Max: 37.3 (04-04-19 @ 20:40)  HR: 81 (04-05-19 @ 14:26) (81 - 102)  BP: 154/91 (04-05-19 @ 14:26) (127/77 - 177/79)  RR: 18 (04-05-19 @ 14:26) (14 - 20)  SpO2: 95% (04-05-19 @ 14:26) (95% - 100%      PHYSICAL EXAM:  GENERAL: NAD, well nourished and conversant  HEAD:  Atraumatic  EYES: EOM, PERRLA, conjunctiva pink and sclera white  ENT: No tonsillar erythema, exudates, or enlargement, moist mucous membranes, good dentition, no lesions  NECK: Supple, No JVD, normal thyroid, carotids with normal upstrokes and no bruits  CHEST/LUNG: Clear to auscultation bilaterally, No rales, rhonchi, wheezing, or rubs  HEART: Regular rate and rhythm, No murmurs, rubs, or gallops  ABDOMEN: Soft, nondistended, no masses, guarding, tenderness or rebound, bowel sounds present  EXTREMITIES:  2+ Peripheral Pulses, No clubbing, cyanosis, or edema. No arthritis of shoulders, elbows, hands, hips, knees, ankles, or feet. No DJD C spine, T spine, or L/S spine  LYMPH: No lymphadenopathy noted  SKIN: No rashes or lesions  NERVOUS SYSTEM:  Alert & Oriented X3, normal cognitive function. Motor Strength 5/5 right upper and right lower.  5/5 left upper and left lower extremities, DTRs 2+ intact and symmetric    LABS:                    140  |  101  |  22  ----------------------------<  127<H>  4.1   |  26  |  1.11  04-05    135  |  100  |  26<H>  ----------------------------<  286<H>  5.3   |  18<L>  |  1.24  04-05    139  |  99  |  25<H>  ----------------------------<  126<H>  3.8   |  26  |  1.38<H>      Ca    9.1      05 Apr 2019 11:43  Ca    9.6      05 Apr 2019 04:40      CAPILLARY BLOOD GLUCOSE      POCT Blood Glucose.: 127 mg/dL (06 Apr 2019 07:58)  POCT Blood Glucose.: 266 mg/dL (05 Apr 2019 22:30)  POCT Blood Glucose.: 271 mg/dL (05 Apr 2019 17:32)      RADIOLOGY & ADDITIONAL TESTS:      Consultant(s):    Care Discussed with Consultants/Other Providers [ ] YES  [ ] NO

## 2019-04-06 NOTE — PROGRESS NOTE ADULT - SUBJECTIVE AND OBJECTIVE BOX
Ortho POD #1    No complaints; Denies SOB/CP/N/V; Pain controlled;  on PCA   No acute events overnight.    T(C): 36.8 (04-06-19 @ 05:40)  T(F): 98.2 (04-06-19 @ 05:40)  HR: 93 (04-06-19 @ 05:40)  BP: 137/80 (04-06-19 @ 05:40)  RR: 18 (04-06-19 @ 05:40)  SpO2: 96% (04-06-19 @ 05:40)  Wt(kg): --    Physical Exam  Gen: NAD    RUE:   VAC in place  Drain yes [ ]  No [x ]  Sensation/motor (r/u/m nn) grossly in tact  + Radial pulse + wrist extension  Cap refill < 2 secs                          12.2   11.7<H> )-----------( 313      ( 05 Apr 2019 11:43 )             35.4     04-05    135  |  100  |  26<H>  ----------------------------<  286<H>  5.3   |  18<L>  |  1.24 Ortho POD #1    No complaints; Denies SOB/CP/N/V; Pain controlled;  on PCA   No acute events overnight.    T(C): 36.8 (04-06-19 @ 05:40)  T(F): 98.2 (04-06-19 @ 05:40)  HR: 93 (04-06-19 @ 05:40)  BP: 137/80 (04-06-19 @ 05:40)  RR: 18 (04-06-19 @ 05:40)  SpO2: 96% (04-06-19 @ 05:40)  Wt(kg): --    Physical Exam  Gen: NAD    RUE:   VAC in place  Drain yes [ ]  No [x ]  Sensation/motor (r/u/m nn) grossly in tact  + Radial pulse + wrist extension  Cap refill < 2 secs                          12.2   11.7<H> )-----------( 313      ( 05 Apr 2019 11:43 )             35.4     04-05    135  |  100  |  26<H>  ----------------------------<  286<H>  5.3   |  18<L>  |  1.24    OR Cx: Prelim: + GPC's  Blood Cx: NGTD  (4/6)

## 2019-04-06 NOTE — PHYSICAL THERAPY INITIAL EVALUATION ADULT - MANUAL MUSCLE TESTING RESULTS, REHAB EVAL
R elbow grossly at least 3-/5, R hand grossly 3/5; R shoulder grossly 3/5; LUE grossly at least 4/5; BLE grossly at least 5/5 throughout/grossly assessed due to

## 2019-04-06 NOTE — PROVIDER CONTACT NOTE (CRITICAL VALUE NOTIFICATION) - TEST AND RESULT REPORTED:
tissue culture reults from 4/5: + rare poly morphnuclear leukocytes per low power field. few gram + cocci in pairs per oil power field. 2. moderate poly morphnuclear leukocytes per low power field. moderate gram + cocci in pairs per oil power field.

## 2019-04-07 DIAGNOSIS — Z79.2 LONG TERM (CURRENT) USE OF ANTIBIOTICS: ICD-10-CM

## 2019-04-07 DIAGNOSIS — E11.65 TYPE 2 DIABETES MELLITUS WITH HYPERGLYCEMIA: ICD-10-CM

## 2019-04-07 DIAGNOSIS — A49.01 METHICILLIN SUSCEPTIBLE STAPHYLOCOCCUS AUREUS INFECTION, UNSPECIFIED SITE: ICD-10-CM

## 2019-04-07 LAB
-  AMPICILLIN/SULBACTAM: SIGNIFICANT CHANGE UP
-  AMPICILLIN/SULBACTAM: SIGNIFICANT CHANGE UP
-  CEFAZOLIN: SIGNIFICANT CHANGE UP
-  CEFAZOLIN: SIGNIFICANT CHANGE UP
-  CLINDAMYCIN: SIGNIFICANT CHANGE UP
-  CLINDAMYCIN: SIGNIFICANT CHANGE UP
-  ERYTHROMYCIN: SIGNIFICANT CHANGE UP
-  ERYTHROMYCIN: SIGNIFICANT CHANGE UP
-  GENTAMICIN: SIGNIFICANT CHANGE UP
-  GENTAMICIN: SIGNIFICANT CHANGE UP
-  OXACILLIN: SIGNIFICANT CHANGE UP
-  OXACILLIN: SIGNIFICANT CHANGE UP
-  PENICILLIN: SIGNIFICANT CHANGE UP
-  PENICILLIN: SIGNIFICANT CHANGE UP
-  RIFAMPIN: SIGNIFICANT CHANGE UP
-  RIFAMPIN: SIGNIFICANT CHANGE UP
-  TETRACYCLINE: SIGNIFICANT CHANGE UP
-  TETRACYCLINE: SIGNIFICANT CHANGE UP
-  TRIMETHOPRIM/SULFAMETHOXAZOLE: SIGNIFICANT CHANGE UP
-  TRIMETHOPRIM/SULFAMETHOXAZOLE: SIGNIFICANT CHANGE UP
-  VANCOMYCIN: SIGNIFICANT CHANGE UP
-  VANCOMYCIN: SIGNIFICANT CHANGE UP
ANION GAP SERPL CALC-SCNC: 10 MMOL/L — SIGNIFICANT CHANGE UP (ref 5–17)
BUN SERPL-MCNC: 20 MG/DL — SIGNIFICANT CHANGE UP (ref 7–23)
CALCIUM SERPL-MCNC: 8.7 MG/DL — SIGNIFICANT CHANGE UP (ref 8.4–10.5)
CHLORIDE SERPL-SCNC: 105 MMOL/L — SIGNIFICANT CHANGE UP (ref 96–108)
CO2 SERPL-SCNC: 25 MMOL/L — SIGNIFICANT CHANGE UP (ref 22–31)
CREAT SERPL-MCNC: 1.26 MG/DL — SIGNIFICANT CHANGE UP (ref 0.5–1.3)
GLUCOSE BLDC GLUCOMTR-MCNC: 106 MG/DL — HIGH (ref 70–99)
GLUCOSE BLDC GLUCOMTR-MCNC: 109 MG/DL — HIGH (ref 70–99)
GLUCOSE BLDC GLUCOMTR-MCNC: 133 MG/DL — HIGH (ref 70–99)
GLUCOSE BLDC GLUCOMTR-MCNC: 168 MG/DL — HIGH (ref 70–99)
GLUCOSE BLDC GLUCOMTR-MCNC: 76 MG/DL — SIGNIFICANT CHANGE UP (ref 70–99)
GLUCOSE BLDC GLUCOMTR-MCNC: 86 MG/DL — SIGNIFICANT CHANGE UP (ref 70–99)
GLUCOSE BLDC GLUCOMTR-MCNC: 86 MG/DL — SIGNIFICANT CHANGE UP (ref 70–99)
GLUCOSE SERPL-MCNC: 86 MG/DL — SIGNIFICANT CHANGE UP (ref 70–99)
METHOD TYPE: SIGNIFICANT CHANGE UP
METHOD TYPE: SIGNIFICANT CHANGE UP
POTASSIUM SERPL-MCNC: 4 MMOL/L — SIGNIFICANT CHANGE UP (ref 3.5–5.3)
POTASSIUM SERPL-SCNC: 4 MMOL/L — SIGNIFICANT CHANGE UP (ref 3.5–5.3)
SODIUM SERPL-SCNC: 140 MMOL/L — SIGNIFICANT CHANGE UP (ref 135–145)

## 2019-04-07 PROCEDURE — 99232 SBSQ HOSP IP/OBS MODERATE 35: CPT

## 2019-04-07 PROCEDURE — 71045 X-RAY EXAM CHEST 1 VIEW: CPT | Mod: 26

## 2019-04-07 PROCEDURE — 99255 IP/OBS CONSLTJ NEW/EST HI 80: CPT | Mod: GC

## 2019-04-07 RX ORDER — INSULIN LISPRO 100/ML
30 VIAL (ML) SUBCUTANEOUS
Qty: 0 | Refills: 0 | Status: DISCONTINUED | OUTPATIENT
Start: 2019-04-07 | End: 2019-04-09

## 2019-04-07 RX ORDER — ACETAMINOPHEN 500 MG
1000 TABLET ORAL ONCE
Qty: 0 | Refills: 0 | Status: COMPLETED | OUTPATIENT
Start: 2019-04-07 | End: 2019-04-07

## 2019-04-07 RX ORDER — INSULIN GLARGINE 100 [IU]/ML
60 INJECTION, SOLUTION SUBCUTANEOUS AT BEDTIME
Qty: 0 | Refills: 0 | Status: DISCONTINUED | OUTPATIENT
Start: 2019-04-07 | End: 2019-04-08

## 2019-04-07 RX ORDER — HYDROMORPHONE HYDROCHLORIDE 2 MG/ML
2 INJECTION INTRAMUSCULAR; INTRAVENOUS; SUBCUTANEOUS EVERY 4 HOURS
Qty: 0 | Refills: 0 | Status: DISCONTINUED | OUTPATIENT
Start: 2019-04-07 | End: 2019-04-09

## 2019-04-07 RX ORDER — MORPHINE SULFATE 50 MG/1
2 CAPSULE, EXTENDED RELEASE ORAL EVERY 4 HOURS
Qty: 0 | Refills: 0 | Status: DISCONTINUED | OUTPATIENT
Start: 2019-04-07 | End: 2019-04-09

## 2019-04-07 RX ORDER — LANOLIN ALCOHOL/MO/W.PET/CERES
5 CREAM (GRAM) TOPICAL AT BEDTIME
Qty: 0 | Refills: 0 | Status: DISCONTINUED | OUTPATIENT
Start: 2019-04-07 | End: 2019-04-09

## 2019-04-07 RX ORDER — HYDROMORPHONE HYDROCHLORIDE 2 MG/ML
4 INJECTION INTRAMUSCULAR; INTRAVENOUS; SUBCUTANEOUS EVERY 4 HOURS
Qty: 0 | Refills: 0 | Status: DISCONTINUED | OUTPATIENT
Start: 2019-04-07 | End: 2019-04-09

## 2019-04-07 RX ADMIN — INSULIN GLARGINE 60 UNIT(S): 100 INJECTION, SOLUTION SUBCUTANEOUS at 21:37

## 2019-04-07 RX ADMIN — Medication 10 MILLIGRAM(S): at 07:34

## 2019-04-07 RX ADMIN — HYDROMORPHONE HYDROCHLORIDE 30 MILLILITER(S): 2 INJECTION INTRAMUSCULAR; INTRAVENOUS; SUBCUTANEOUS at 07:37

## 2019-04-07 RX ADMIN — GABAPENTIN 800 MILLIGRAM(S): 400 CAPSULE ORAL at 21:28

## 2019-04-07 RX ADMIN — Medication 100 MILLIGRAM(S): at 07:35

## 2019-04-07 RX ADMIN — Medication 100 MILLIGRAM(S): at 13:14

## 2019-04-07 RX ADMIN — Medication 30 UNIT(S): at 18:50

## 2019-04-07 RX ADMIN — HYDROMORPHONE HYDROCHLORIDE 30 MILLILITER(S): 2 INJECTION INTRAMUSCULAR; INTRAVENOUS; SUBCUTANEOUS at 09:53

## 2019-04-07 RX ADMIN — GABAPENTIN 800 MILLIGRAM(S): 400 CAPSULE ORAL at 06:02

## 2019-04-07 RX ADMIN — Medication 100 MILLIGRAM(S): at 17:15

## 2019-04-07 RX ADMIN — HEPARIN SODIUM 5000 UNIT(S): 5000 INJECTION INTRAVENOUS; SUBCUTANEOUS at 21:28

## 2019-04-07 RX ADMIN — Medication 145 MILLIGRAM(S): at 13:14

## 2019-04-07 RX ADMIN — Medication 100 MILLIGRAM(S): at 06:02

## 2019-04-07 RX ADMIN — Medication 1000 MILLIGRAM(S): at 21:09

## 2019-04-07 RX ADMIN — Medication 100 MILLIGRAM(S): at 23:21

## 2019-04-07 RX ADMIN — HYDROMORPHONE HYDROCHLORIDE 30 MILLILITER(S): 2 INJECTION INTRAMUSCULAR; INTRAVENOUS; SUBCUTANEOUS at 06:05

## 2019-04-07 RX ADMIN — PANTOPRAZOLE SODIUM 40 MILLIGRAM(S): 20 TABLET, DELAYED RELEASE ORAL at 06:02

## 2019-04-07 RX ADMIN — HEPARIN SODIUM 5000 UNIT(S): 5000 INJECTION INTRAVENOUS; SUBCUTANEOUS at 13:14

## 2019-04-07 RX ADMIN — Medication 56 UNIT(S): at 08:37

## 2019-04-07 RX ADMIN — INSULIN GLARGINE 72 UNIT(S): 100 INJECTION, SOLUTION SUBCUTANEOUS at 02:20

## 2019-04-07 RX ADMIN — GABAPENTIN 800 MILLIGRAM(S): 400 CAPSULE ORAL at 13:14

## 2019-04-07 RX ADMIN — HEPARIN SODIUM 5000 UNIT(S): 5000 INJECTION INTRAVENOUS; SUBCUTANEOUS at 06:02

## 2019-04-07 RX ADMIN — Medication 25 MILLIGRAM(S): at 06:02

## 2019-04-07 RX ADMIN — Medication 400 MILLIGRAM(S): at 20:09

## 2019-04-07 RX ADMIN — HYDROMORPHONE HYDROCHLORIDE 30 MILLILITER(S): 2 INJECTION INTRAMUSCULAR; INTRAVENOUS; SUBCUTANEOUS at 02:22

## 2019-04-07 RX ADMIN — ATORVASTATIN CALCIUM 40 MILLIGRAM(S): 80 TABLET, FILM COATED ORAL at 21:28

## 2019-04-07 RX ADMIN — HYDROMORPHONE HYDROCHLORIDE 4 MILLIGRAM(S): 2 INJECTION INTRAMUSCULAR; INTRAVENOUS; SUBCUTANEOUS at 23:21

## 2019-04-07 NOTE — OCCUPATIONAL THERAPY INITIAL EVALUATION ADULT - LIVES WITH, PROFILE
spouse children/Pt lives with spouse &son in private house, 5 stairs to enter +rail, 1 flight of stairs to bed/bath b/l rail, walk in shower +rail, and reading glasses. Pt is right handed. Pt required help from  for showering, dressing, and IADLs. Pt able to eat, groom, and toilet independently./spouse

## 2019-04-07 NOTE — PROGRESS NOTE ADULT - ASSESSMENT
Patient is a 58y old  Female s/p R forearm I&D/VAC placement, Her next scheduled washout will be on 04/09/2019  Change diet and lifestyle to improve diabetic control.
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
## 2019-04-07 NOTE — PROGRESS NOTE ADULT - SUBJECTIVE AND OBJECTIVE BOX
-No further issues. Normotensive   Ortho POD #2    in NAD  on PCA  Pt c/o nausea w/ low BGL's    Vital Signs Last 24 Hrs  T(C): 36.9 (07 Apr 2019 05:02), Max: 37 (06 Apr 2019 21:53)  T(F): 98.5 (07 Apr 2019 05:02), Max: 98.6 (06 Apr 2019 21:53)  HR: 93 (07 Apr 2019 05:02) (84 - 96)  BP: 126/75 (07 Apr 2019 05:02) (111/66 - 137/77)  BP(mean): --  RR: 18 (07 Apr 2019 05:02) (18 - 18)  SpO2: 96% (07 Apr 2019 05:02) (95% - 97%)-    Physical Exam  Gen: NAD    RUE:   VAC in place  Drain yes [ ]  No [x ]  Sensation/ grossly in tact  poor  strength 2/2 pain  + Radial pulse + wrist extension  Cap refill < 2 secs                                     10.6   10.00 )-----------( 395      ( 06 Apr 2019 10:07 )             33.3       OR Cx: (+) Staph A.    Culture - Blood (04.04.19 @ 22:54)    Specimen Source: .Blood Blood-Peripheral    Culture Results:   No growth to date.

## 2019-04-07 NOTE — PROVIDER CONTACT NOTE (OTHER) - RECOMMENDATIONS
MD Ike Mckeon made aware, 56 units insulin home medication administered as ordered, cont to ofelia BS/ pt as per routine. MD Ike Mckeon made aware, states ok to give lantus at 0000 d/t late premeal insulin administration.

## 2019-04-07 NOTE — PROVIDER CONTACT NOTE (OTHER) - ASSESSMENT
pt VSS,  @ midnight, pt due for lantus, pt on prior night dropped over 100 points after administration of 56 units of home insulin + lantus administration. pt asymptomatic.

## 2019-04-07 NOTE — PROGRESS NOTE ADULT - ASSESSMENT
Assessment: s/p I&D R forearm    Plan:  d/c PCA  cont Abx  ID note appreciated: consented and PIC ordered  RTOR 4/9  OT/PT  poss Endo c/s for BGL's

## 2019-04-07 NOTE — PROGRESS NOTE ADULT - SUBJECTIVE AND OBJECTIVE BOX
RICARDO GILES 58y [id number]    Patient is a 58y old  Female who presents with a chief complaint of Right forearm collection (07 Apr 2019 07:12)      Follow Up/CC:  ID following for staph infection    Interval History/ROS: feels ok, no fever, pain controlled    Allergies    No Known Allergies    Intolerances        ANTIMICROBIALS:  ceFAZolin   IVPB 2000 every 8 hours      MEDICATIONS  (STANDING):  atorvastatin 40 milliGRAM(s) Oral at bedtime  ceFAZolin   IVPB 2000 milliGRAM(s) IV Intermittent every 8 hours  dextrose 5%. 1000 milliLiter(s) (50 mL/Hr) IV Continuous <Continuous>  dextrose 50% Injectable 25 Gram(s) IV Push once  diphenhydrAMINE   Injectable 12.5 milliGRAM(s) IV Push once  docusate sodium 100 milliGRAM(s) Oral three times a day  famotidine Injectable 20 milliGRAM(s) IV Push once  fenofibrate Tablet 145 milliGRAM(s) Oral daily  gabapentin 800 milliGRAM(s) Oral three times a day  heparin  Injectable 5000 Unit(s) SubCutaneous every 8 hours  HYDROmorphone PCA (1 mG/mL) 30 milliLiter(s) PCA Continuous PCA Continuous  insulin glargine Injectable (LANTUS) 72 Unit(s) SubCutaneous at bedtime  insulin lispro (HumaLOG) corrective regimen sliding scale   SubCutaneous three times a day before meals  insulin lispro Injectable (HumaLOG) 56 Unit(s) SubCutaneous three times a day before meals  metoprolol succinate ER 25 milliGRAM(s) Oral daily  pantoprazole    Tablet 40 milliGRAM(s) Oral before breakfast  senna 2 Tablet(s) Oral at bedtime  sodium chloride 0.9%. 1000 milliLiter(s) (75 mL/Hr) IV Continuous <Continuous>    MEDICATIONS  (PRN):  acetaminophen   Tablet .. 650 milliGRAM(s) Oral every 6 hours PRN Temp greater or equal to 38C (100.4F)  bisacodyl Suppository 10 milliGRAM(s) Rectal daily PRN If no bowel movement  dextrose 40% Gel 15 Gram(s) Oral once PRN Blood Glucose LESS THAN 70 milliGRAM(s)/deciliter  glucagon  Injectable 1 milliGRAM(s) IntraMuscular once PRN Glucose LESS THAN 70 milligrams/deciliter  HYDROmorphone PCA (1 mG/mL) Rescue Clinician Bolus 0.5 milliGRAM(s) IV Push every 15 minutes PRN for Pain Scale GREATER THAN 6  magnesium hydroxide Suspension 30 milliLiter(s) Oral daily PRN Constipation  metoclopramide Injectable 10 milliGRAM(s) IV Push every 6 hours PRN nausea and/or vomiting  naloxone Injectable 0.1 milliGRAM(s) IV Push every 3 minutes PRN For ANY of the following changes in patient status:  A. RR LESS THAN 10 breaths per minute, B. Oxygen saturation LESS THAN 90%, C. Sedation score of 6  ondansetron Injectable 4 milliGRAM(s) IV Push every 6 hours PRN Nausea  ondansetron Injectable 4 milliGRAM(s) IV Push every 6 hours PRN Nausea and/or Vomiting        Vital Signs Last 24 Hrs  T(C): 36.9 (07 Apr 2019 05:02), Max: 37 (06 Apr 2019 21:53)  T(F): 98.5 (07 Apr 2019 05:02), Max: 98.6 (06 Apr 2019 21:53)  HR: 93 (07 Apr 2019 05:02) (84 - 96)  BP: 126/75 (07 Apr 2019 05:02) (111/66 - 137/77)  BP(mean): --  RR: 18 (07 Apr 2019 05:02) (18 - 18)  SpO2: 96% (07 Apr 2019 05:02) (95% - 97%)    CBC Full  -  ( 06 Apr 2019 10:07 )  WBC Count : 10.00 K/uL  RBC Count : 3.87 M/uL  Hemoglobin : 10.6 g/dL  Hematocrit : 33.3 %  Platelet Count - Automated : 395 K/uL  Mean Cell Volume : 86.0 fl  Mean Cell Hemoglobin : 27.4 pg  Mean Cell Hemoglobin Concentration : 31.8 gm/dL  Auto Neutrophil # : x  Auto Lymphocyte # : x  Auto Monocyte # : x  Auto Eosinophil # : x  Auto Basophil # : x  Auto Neutrophil % : x  Auto Lymphocyte % : x  Auto Monocyte % : x  Auto Eosinophil % : x  Auto Basophil % : x    04-07    140  |  105  |  20  ----------------------------<  86  4.0   |  25  |  1.26    Ca    8.7      07 Apr 2019 06:38            MICROBIOLOGY:  .Surgical Swab right forearm  04-05-19   Numerous Staphylococcus aureus  See previous culture [id number]  --  --      .Tissue Other, right forearm  04-05-19   Numerous Staphylococcus aureus  See previous culture [id number]  --    Rare polymorphonuclear leukocytes per low power field  Few Gram positive cocci in pairs per oil power field      .Tissue Other, right forearm  04-05-19 --  --  --      .Surgical Swab right forearm  04-05-19   Numerous Staphylococcus aureus  --  --      .Surgical Swab right forearm  04-05-19   Numerous Staphylococcus aureus  See previous culture [id number]  --  --      .Blood Blood-Peripheral  04-04-19   No growth to date.  --  --      RADIOLOGY    < from: Xray Elbow AP + Lateral + Oblique, Right (04.04.19 @ 18:33) >  Excessive soft tissue swelling without radiographic evidence of   osteomyelitis or tracking soft tissue gas.  Correlate with pending MRI for further evaluation.      < end of copied text >

## 2019-04-07 NOTE — CONSULT NOTE ADULT - PROBLEM SELECTOR RECOMMENDATION 9
-Will decrease the dose of Lantus to 60 units at bedtime  -Humalog 30 units before meals, should be held if patient is not consuming her meal  -Correction scale ( moderate)   -BG goal inpatient of 100-180   -1800 ADA diet   -Will continue to follow inpatient     Discharge instructions  -Basal bolus dosing closer to discharge date  -Will need close outpatient follow up and will refer to endocrinology at [address] [place] [address] Neck [phone]

## 2019-04-07 NOTE — PROGRESS NOTE ADULT - ATTENDING COMMENTS
Alexis Gabriel  Attending Physician   Division of Infectious Disease  Pager #[phone]  After 5pm/weekend or no response, call #[phone]    Please call the ID service [phone] with questions or concerns over the weekend.

## 2019-04-07 NOTE — PROGRESS NOTE ADULT - SUBJECTIVE AND OBJECTIVE BOX
Patient is a 58y old  Female who presents with a chief complaint of Right forearm collection (06 Apr 2019 07:35)      Patient  went to the OR and tolerated surgery well .  Needs to do incentive spirometry.  Pain level is at3. endocrine to help manage diabetes   Her next scheduled washout will be on 04/09/2019  Continue IV antibiotics.    MEDICATIONS  (STANDING):  atorvastatin 40 milliGRAM(s) Oral at bedtime  ceFAZolin   IVPB 2000 milliGRAM(s) IV Intermittent every 8 hours  dextrose 5%. 1000 milliLiter(s) (50 mL/Hr) IV Continuous <Continuous>  dextrose 50% Injectable 25 Gram(s) IV Push once  docusate sodium 100 milliGRAM(s) Oral three times a day  fenofibrate Tablet 145 milliGRAM(s) Oral daily  gabapentin 800 milliGRAM(s) Oral three times a day  heparin  Injectable 5000 Unit(s) SubCutaneous every 8 hours  HYDROmorphone PCA (1 mG/mL) 30 milliLiter(s) PCA Continuous PCA Continuous  insulin glargine Injectable (LANTUS) 72 Unit(s) SubCutaneous at bedtime  insulin lispro (HumaLOG) corrective regimen sliding scale   SubCutaneous three times a day before meals  insulin lispro Injectable (HumaLOG) 56 Unit(s) SubCutaneous three times a day before meals  metoprolol succinate ER 25 milliGRAM(s) Oral daily  pantoprazole    Tablet 40 milliGRAM(s) Oral before breakfast  senna 2 Tablet(s) Oral at bedtime  sodium chloride 0.9%. 1000 milliLiter(s) (75 mL/Hr) IV Continuous <Continuous>    MEDICATIONS  (PRN):  acetaminophen   Tablet .. 650 milliGRAM(s) Oral every 6 hours PRN Temp greater or equal to 38C (100.4F)  dextrose 40% Gel 15 Gram(s) Oral once PRN Blood Glucose LESS THAN 70 milliGRAM(s)/deciliter  glucagon  Injectable 1 milliGRAM(s) IntraMuscular once PRN Glucose LESS THAN 70 milligrams/deciliter  HYDROmorphone PCA (1 mG/mL) Rescue Clinician Bolus 0.5 milliGRAM(s) IV Push every 15 minutes PRN for Pain Scale GREATER THAN 6  magnesium hydroxide Suspension 30 milliLiter(s) Oral daily PRN Constipation  metoclopramide Injectable 10 milliGRAM(s) IV Push every 6 hours PRN nausea and/or vomiting  naloxone Injectable 0.1 milliGRAM(s) IV Push every 3 minutes PRN For ANY of the following changes in patient status:  A. RR LESS THAN 10 breaths per minute, B. Oxygen saturation LESS THAN 90%, C. Sedation score of 6  ondansetron Injectable 4 milliGRAM(s) IV Push every 6 hours PRN Nausea  ondansetron Injectable 4 milliGRAM(s) IV Push every 6 hours PRN Nausea and/or Vomiting      Allergies    No Known Allergies    Intolerances            VITALS:     T(C): 36.4 (04-05-19 @ 14:26), Max: 37.3 (04-04-19 @ 20:40)  HR: 81 (04-05-19 @ 14:26) (81 - 102)  BP: 154/91 (04-05-19 @ 14:26) (127/77 - 177/79)  RR: 18 (04-05-19 @ 14:26) (14 - 20)  SpO2: 95% (04-05-19 @ 14:26) (95% - 100%      PHYSICAL EXAM:  GENERAL: NAD, well nourished and conversant  HEAD:  Atraumatic  EYES: EOM, PERRLA, conjunctiva pink and sclera white  ENT: No tonsillar erythema, exudates, or enlargement, moist mucous membranes, good dentition, no lesions  NECK: Supple, No JVD, normal thyroid, carotids with normal upstrokes and no bruits  CHEST/LUNG: Clear to auscultation bilaterally, No rales, rhonchi, wheezing, or rubs  HEART: Regular rate and rhythm, No murmurs, rubs, or gallops  ABDOMEN: Soft, nondistended, no masses, guarding, tenderness or rebound, bowel sounds present  EXTREMITIES:  2+ Peripheral Pulses, No clubbing, cyanosis, or edema. No arthritis of shoulders, elbows, hands, hips, knees, ankles, or feet. No DJD C spine, T spine, or L/S spine  LYMPH: No lymphadenopathy noted  SKIN: No rashes or lesions  NERVOUS SYSTEM:  Alert & Oriented X3, normal cognitive function. Motor Strength 5/5 right upper and right lower.  5/5 left upper and left lower extremities, DTRs 2+ intact and symmetric    LABS:                    140  |  101  |  22  ----------------------------<  127<H>  4.1   |  26  |  1.11  04-05    135  |  100  |  26<H>  ----------------------------<  286<H>  5.3   |  18<L>  |  1.24  04-05    139  |  99  |  25<H>  ----------------------------<  126<H>  3.8   |  26  |  1.38<H>      Ca    9.1      05 Apr 2019 11:43  Ca    9.6      05 Apr 2019 04:40      CAPILLARY BLOOD GLUCOSE      POCT Blood Glucose.: 127 mg/dL (06 Apr 2019 07:58)  POCT Blood Glucose.: 266 mg/dL (05 Apr 2019 22:30)  POCT Blood Glucose.: 271 mg/dL (05 Apr 2019 17:32)      RADIOLOGY & ADDITIONAL TESTS:      Consultant(s):    Care Discussed with Consultants/Other Providers [ ] YES  [ ] NO
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
## 2019-04-07 NOTE — OCCUPATIONAL THERAPY INITIAL EVALUATION ADULT - GENERAL OBSERVATIONS, REHAB EVAL
Pt encountered supine in bed, + PIV, + wound vac at R UE (Forearm), AO x 3 Pt encountered sitting in bedside chair, NAD, VSS, +IVL, + wound vac at R UE (Forearm)

## 2019-04-07 NOTE — PROVIDER CONTACT NOTE (OTHER) - ASSESSMENT
MD Ike Mckeon made aware, states ok to give lantus at 0000 d/t late premeal insulin administration. pt VSS, pt  premeal, pt having late dinner d/t waiting for  to arrive w/ dinner, pt asymptomatic, pt educated to eat dinner right after home insulin administration, pt refuse hospital SS.
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
## 2019-04-07 NOTE — OCCUPATIONAL THERAPY INITIAL EVALUATION ADULT - DIAGNOSIS, OT EVAL
Pt demonstrated decreased strength, balance, AROM RUE, decreased sensation in BUE, & functional mobility which influences pts functional performance.
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
## 2019-04-07 NOTE — PROGRESS NOTE ADULT - SUBJECTIVE AND OBJECTIVE BOX
Day _2__ of Anesthesia Pain Management Service    SUBJECTIVE:    Pain Scale Score	At rest: ___ 	With Activity: ___ 	[x ] Refer to charted pain scores    THERAPY:    [ ] IV PCA Morphine		[ ] 5 mg/mL	[ ] 1 mg/mL  [x ] IV PCA Hydromorphone	[ ] 5 mg/mL	[x ] 1 mg/mL  [ ] IV PCA Fentanyl		[ ] 50 micrograms/mL    Demand dose 0.25    lockout 6   (minutes) Continuous Rate 0      MEDICATIONS  (STANDING):  atorvastatin 40 milliGRAM(s) Oral at bedtime  ceFAZolin   IVPB 2000 milliGRAM(s) IV Intermittent every 8 hours  dextrose 5%. 1000 milliLiter(s) (50 mL/Hr) IV Continuous <Continuous>  dextrose 50% Injectable 25 Gram(s) IV Push once  diphenhydrAMINE   Injectable 12.5 milliGRAM(s) IV Push once  docusate sodium 100 milliGRAM(s) Oral three times a day  famotidine Injectable 20 milliGRAM(s) IV Push once  fenofibrate Tablet 145 milliGRAM(s) Oral daily  gabapentin 800 milliGRAM(s) Oral three times a day  heparin  Injectable 5000 Unit(s) SubCutaneous every 8 hours  HYDROmorphone PCA (1 mG/mL) 30 milliLiter(s) PCA Continuous PCA Continuous  insulin glargine Injectable (LANTUS) 72 Unit(s) SubCutaneous at bedtime  insulin lispro (HumaLOG) corrective regimen sliding scale   SubCutaneous three times a day before meals  insulin lispro Injectable (HumaLOG) 56 Unit(s) SubCutaneous three times a day before meals  metoprolol succinate ER 25 milliGRAM(s) Oral daily  pantoprazole    Tablet 40 milliGRAM(s) Oral before breakfast  senna 2 Tablet(s) Oral at bedtime  sodium chloride 0.9%. 1000 milliLiter(s) (75 mL/Hr) IV Continuous <Continuous>    MEDICATIONS  (PRN):  acetaminophen   Tablet .. 650 milliGRAM(s) Oral every 6 hours PRN Temp greater or equal to 38C (100.4F)  bisacodyl Suppository 10 milliGRAM(s) Rectal daily PRN If no bowel movement  dextrose 40% Gel 15 Gram(s) Oral once PRN Blood Glucose LESS THAN 70 milliGRAM(s)/deciliter  glucagon  Injectable 1 milliGRAM(s) IntraMuscular once PRN Glucose LESS THAN 70 milligrams/deciliter  HYDROmorphone PCA (1 mG/mL) Rescue Clinician Bolus 0.5 milliGRAM(s) IV Push every 15 minutes PRN for Pain Scale GREATER THAN 6  magnesium hydroxide Suspension 30 milliLiter(s) Oral daily PRN Constipation  metoclopramide Injectable 10 milliGRAM(s) IV Push every 6 hours PRN nausea and/or vomiting  naloxone Injectable 0.1 milliGRAM(s) IV Push every 3 minutes PRN For ANY of the following changes in patient status:  A. RR LESS THAN 10 breaths per minute, B. Oxygen saturation LESS THAN 90%, C. Sedation score of 6  ondansetron Injectable 4 milliGRAM(s) IV Push every 6 hours PRN Nausea  ondansetron Injectable 4 milliGRAM(s) IV Push every 6 hours PRN Nausea and/or Vomiting      OBJECTIVE:    Sedation Score:	[x ] Alert	[ ] Drowsy 	[ ] Arousable	[ ] Asleep	[ ] Unresponsive    Side Effects:	[x ] None	[ ] Nausea	[ ] Vomiting	[ ] Pruritus  		[ ] Other:    Vital Signs Last 24 Hrs  T(C): 36.9 (07 Apr 2019 05:02), Max: 37 (06 Apr 2019 21:53)  T(F): 98.5 (07 Apr 2019 05:02), Max: 98.6 (06 Apr 2019 21:53)  HR: 93 (07 Apr 2019 05:02) (84 - 96)  BP: 126/75 (07 Apr 2019 05:02) (111/66 - 137/77)  BP(mean): --  RR: 18 (07 Apr 2019 05:02) (18 - 18)  SpO2: 96% (07 Apr 2019 05:02) (95% - 97%)    ASSESSMENT/ PLAN    Therapy to  be:	[x ] Continue   [ ] Discontinued   [ ] Change to prn Analgesics    Documentation and Verification of current medications:   [X] Done	[ ] Not done, not eligible    Comments:

## 2019-04-07 NOTE — PROVIDER CONTACT NOTE (OTHER) - SITUATION
pt having home insulin dose given late d/t late dinner, RN requesting to move Lantus administration to midnight, pt refusing sliding scale d/t high dose home insulin administration.

## 2019-04-07 NOTE — PROVIDER CONTACT NOTE (OTHER) - RECOMMENDATIONS
MD Mckeon made aware, RN states will contact nursing ed about insulin/ lantus administration d/t BS 86
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
## 2019-04-07 NOTE — OCCUPATIONAL THERAPY INITIAL EVALUATION ADULT - ADL RETRAINING, OT EVAL
GOAL: Pt will complete grooming at sink with stand by assist within 2 weeks. GOAL: Pt will toilet with stand by assist within 2 weeks. GOAL: Pt will perform lower body dressing with min A within 2 weeks.

## 2019-04-08 ENCOUNTER — APPOINTMENT (OUTPATIENT)
Dept: INFECTIOUS DISEASE | Facility: CLINIC | Age: 59
End: 2019-04-08

## 2019-04-08 ENCOUNTER — TRANSCRIPTION ENCOUNTER (OUTPATIENT)
Age: 59
End: 2019-04-08

## 2019-04-08 DIAGNOSIS — E78.49 OTHER HYPERLIPIDEMIA: ICD-10-CM

## 2019-04-08 LAB
ANION GAP SERPL CALC-SCNC: 13 MMOL/L — SIGNIFICANT CHANGE UP (ref 5–17)
APTT BLD: 32.8 SEC — SIGNIFICANT CHANGE UP (ref 27.5–36.3)
BLD GP AB SCN SERPL QL: POSITIVE — SIGNIFICANT CHANGE UP
BUN SERPL-MCNC: 16 MG/DL — SIGNIFICANT CHANGE UP (ref 7–23)
CALCIUM SERPL-MCNC: 9.1 MG/DL — SIGNIFICANT CHANGE UP (ref 8.4–10.5)
CHLORIDE SERPL-SCNC: 103 MMOL/L — SIGNIFICANT CHANGE UP (ref 96–108)
CO2 SERPL-SCNC: 28 MMOL/L — SIGNIFICANT CHANGE UP (ref 22–31)
CREAT SERPL-MCNC: 1.02 MG/DL — SIGNIFICANT CHANGE UP (ref 0.5–1.3)
GLUCOSE BLDC GLUCOMTR-MCNC: 123 MG/DL — HIGH (ref 70–99)
GLUCOSE BLDC GLUCOMTR-MCNC: 152 MG/DL — HIGH (ref 70–99)
GLUCOSE BLDC GLUCOMTR-MCNC: 154 MG/DL — HIGH (ref 70–99)
GLUCOSE BLDC GLUCOMTR-MCNC: 263 MG/DL — HIGH (ref 70–99)
GLUCOSE SERPL-MCNC: 85 MG/DL — SIGNIFICANT CHANGE UP (ref 70–99)
HCT VFR BLD CALC: 33 % — LOW (ref 34.5–45)
HGB BLD-MCNC: 10.5 G/DL — LOW (ref 11.5–15.5)
INR BLD: 1.08 RATIO — SIGNIFICANT CHANGE UP (ref 0.88–1.16)
MCHC RBC-ENTMCNC: 27.3 PG — SIGNIFICANT CHANGE UP (ref 27–34)
MCHC RBC-ENTMCNC: 31.8 GM/DL — LOW (ref 32–36)
MCV RBC AUTO: 85.7 FL — SIGNIFICANT CHANGE UP (ref 80–100)
PLATELET # BLD AUTO: 405 K/UL — HIGH (ref 150–400)
POTASSIUM SERPL-MCNC: 3.9 MMOL/L — SIGNIFICANT CHANGE UP (ref 3.5–5.3)
POTASSIUM SERPL-SCNC: 3.9 MMOL/L — SIGNIFICANT CHANGE UP (ref 3.5–5.3)
PROTHROM AB SERPL-ACNC: 12.4 SEC — SIGNIFICANT CHANGE UP (ref 10–13.1)
RBC # BLD: 3.85 M/UL — SIGNIFICANT CHANGE UP (ref 3.8–5.2)
RBC # FLD: 13.7 % — SIGNIFICANT CHANGE UP (ref 10.3–14.5)
RH IG SCN BLD-IMP: NEGATIVE — SIGNIFICANT CHANGE UP
SODIUM SERPL-SCNC: 144 MMOL/L — SIGNIFICANT CHANGE UP (ref 135–145)
WBC # BLD: 9.31 K/UL — SIGNIFICANT CHANGE UP (ref 3.8–10.5)
WBC # FLD AUTO: 9.31 K/UL — SIGNIFICANT CHANGE UP (ref 3.8–10.5)

## 2019-04-08 PROCEDURE — 99233 SBSQ HOSP IP/OBS HIGH 50: CPT

## 2019-04-08 RX ORDER — INSULIN GLARGINE 100 [IU]/ML
48 INJECTION, SOLUTION SUBCUTANEOUS AT BEDTIME
Qty: 0 | Refills: 0 | Status: COMPLETED | OUTPATIENT
Start: 2019-04-08 | End: 2019-04-08

## 2019-04-08 RX ADMIN — Medication 100 MILLIGRAM(S): at 16:12

## 2019-04-08 RX ADMIN — HEPARIN SODIUM 5000 UNIT(S): 5000 INJECTION INTRAVENOUS; SUBCUTANEOUS at 21:55

## 2019-04-08 RX ADMIN — SENNA PLUS 2 TABLET(S): 8.6 TABLET ORAL at 21:55

## 2019-04-08 RX ADMIN — Medication 2: at 19:56

## 2019-04-08 RX ADMIN — HYDROMORPHONE HYDROCHLORIDE 2 MILLIGRAM(S): 2 INJECTION INTRAMUSCULAR; INTRAVENOUS; SUBCUTANEOUS at 18:35

## 2019-04-08 RX ADMIN — Medication 30 UNIT(S): at 14:24

## 2019-04-08 RX ADMIN — GABAPENTIN 800 MILLIGRAM(S): 400 CAPSULE ORAL at 05:13

## 2019-04-08 RX ADMIN — HEPARIN SODIUM 5000 UNIT(S): 5000 INJECTION INTRAVENOUS; SUBCUTANEOUS at 13:17

## 2019-04-08 RX ADMIN — ATORVASTATIN CALCIUM 40 MILLIGRAM(S): 80 TABLET, FILM COATED ORAL at 21:55

## 2019-04-08 RX ADMIN — Medication 100 MILLIGRAM(S): at 23:02

## 2019-04-08 RX ADMIN — GABAPENTIN 800 MILLIGRAM(S): 400 CAPSULE ORAL at 21:55

## 2019-04-08 RX ADMIN — HYDROMORPHONE HYDROCHLORIDE 4 MILLIGRAM(S): 2 INJECTION INTRAMUSCULAR; INTRAVENOUS; SUBCUTANEOUS at 06:13

## 2019-04-08 RX ADMIN — HEPARIN SODIUM 5000 UNIT(S): 5000 INJECTION INTRAVENOUS; SUBCUTANEOUS at 05:12

## 2019-04-08 RX ADMIN — HYDROMORPHONE HYDROCHLORIDE 4 MILLIGRAM(S): 2 INJECTION INTRAMUSCULAR; INTRAVENOUS; SUBCUTANEOUS at 00:21

## 2019-04-08 RX ADMIN — Medication 100 MILLIGRAM(S): at 13:16

## 2019-04-08 RX ADMIN — Medication 145 MILLIGRAM(S): at 13:16

## 2019-04-08 RX ADMIN — Medication 100 MILLIGRAM(S): at 21:55

## 2019-04-08 RX ADMIN — HYDROMORPHONE HYDROCHLORIDE 4 MILLIGRAM(S): 2 INJECTION INTRAMUSCULAR; INTRAVENOUS; SUBCUTANEOUS at 23:02

## 2019-04-08 RX ADMIN — PANTOPRAZOLE SODIUM 40 MILLIGRAM(S): 20 TABLET, DELAYED RELEASE ORAL at 05:14

## 2019-04-08 RX ADMIN — Medication 30 UNIT(S): at 09:40

## 2019-04-08 RX ADMIN — HYDROMORPHONE HYDROCHLORIDE 4 MILLIGRAM(S): 2 INJECTION INTRAMUSCULAR; INTRAVENOUS; SUBCUTANEOUS at 05:13

## 2019-04-08 RX ADMIN — GABAPENTIN 800 MILLIGRAM(S): 400 CAPSULE ORAL at 13:16

## 2019-04-08 RX ADMIN — Medication 100 MILLIGRAM(S): at 09:40

## 2019-04-08 RX ADMIN — Medication 30 UNIT(S): at 19:55

## 2019-04-08 RX ADMIN — Medication 25 MILLIGRAM(S): at 05:13

## 2019-04-08 RX ADMIN — HYDROMORPHONE HYDROCHLORIDE 2 MILLIGRAM(S): 2 INJECTION INTRAMUSCULAR; INTRAVENOUS; SUBCUTANEOUS at 16:10

## 2019-04-08 RX ADMIN — Medication 2: at 14:20

## 2019-04-08 NOTE — PROGRESS NOTE ADULT - ASSESSMENT
Assessment: s/p I&D R forearm    Plan:  cont Abx  OT/PT  Low blood glucose, endo recs appreciated  ID recs appreciated  RTOR planned for 4/9 for re-I&D and likely wound closure  F/u medicine for re-clearance for OR

## 2019-04-08 NOTE — PHYSICAL EXAM
[NI] : Normal [de-identified] : Right arm wound healed no sign of infection \par Right hand and forearm +swelling and stiffness, unable to fully extend fingers\par No swelling above the elbow.  No sign of infection [de-identified] : healed as above.  [de-identified] : decreased sensation in median and ulnar nerve right arm

## 2019-04-08 NOTE — PROGRESS NOTE ADULT - SUBJECTIVE AND OBJECTIVE BOX
Patient seen and examined. No complaints today. Pain controlled. No acute events overnight.     Vital Signs Last 24 Hrs  T(C): 36.4 (08 Apr 2019 05:08), Max: 37.2 (07 Apr 2019 16:24)  T(F): 97.5 (08 Apr 2019 05:08), Max: 98.9 (07 Apr 2019 16:24)  HR: 87 (08 Apr 2019 05:08) (87 - 99)  BP: 148/91 (08 Apr 2019 05:08) (120/87 - 149/85)  BP(mean): --  RR: 18 (08 Apr 2019 05:08) (18 - 18)  SpO2: 97% (08 Apr 2019 05:08) (97% - 99%)    Physical Exam  Gen: NAD    RUE:   Volar forearm wound vac in place, C/D/I, maintaining good suction, no erythema or skin breakdown  AIN/PIN/m/r intact, no ulnar nerve function distal to elbow, ulnar claw hand  SILT m/r, no sensation along ulnar border  WWP brisk cap refill  Compartments soft and compressible

## 2019-04-08 NOTE — HISTORY OF PRESENT ILLNESS
[FreeTextEntry1] : Pt s/p right elbow injury and ulnar nerve avulsion and latissimus flap to right elbow and nerve transfer.  Pt here because of right arm swelling.  Pt states she was doing so well and using her right arm very good.  States she thinks she might have torn a muscle doing physical therapy.  Pt states she saw Dr. Maloney .

## 2019-04-08 NOTE — PROGRESS NOTE ADULT - SUBJECTIVE AND OBJECTIVE BOX
Chief Complaint/Follow-up on: T2DM    Subjective: Reported that she has had history of DM x 5 years, managed by PCP. She on Lantus 72 and Humalog 56 units TID at home. She was noted to have osteomyelitis of R-elbow, and had a recent forearm washout on 04/05/2019. Her next scheduled washout will be on 04/09/2019    MEDICATIONS  (STANDING):  atorvastatin 40 milliGRAM(s) Oral at bedtime  ceFAZolin   IVPB 2000 milliGRAM(s) IV Intermittent every 8 hours  dextrose 5%. 1000 milliLiter(s) (50 mL/Hr) IV Continuous <Continuous>  dextrose 50% Injectable 25 Gram(s) IV Push once  diphenhydrAMINE   Injectable 12.5 milliGRAM(s) IV Push once  docusate sodium 100 milliGRAM(s) Oral three times a day  famotidine Injectable 20 milliGRAM(s) IV Push once  fenofibrate Tablet 145 milliGRAM(s) Oral daily  gabapentin 800 milliGRAM(s) Oral three times a day  heparin  Injectable 5000 Unit(s) SubCutaneous every 8 hours  insulin glargine Injectable (LANTUS) 60 Unit(s) SubCutaneous at bedtime  insulin lispro (HumaLOG) corrective regimen sliding scale   SubCutaneous three times a day before meals  insulin lispro Injectable (HumaLOG) 30 Unit(s) SubCutaneous three times a day before meals  metoprolol succinate ER 25 milliGRAM(s) Oral daily  pantoprazole    Tablet 40 milliGRAM(s) Oral before breakfast  senna 2 Tablet(s) Oral at bedtime  sodium chloride 0.9%. 1000 milliLiter(s) (30 mL/Hr) IV Continuous <Continuous>    MEDICATIONS  (PRN):  acetaminophen   Tablet .. 650 milliGRAM(s) Oral every 6 hours PRN Temp greater or equal to 38C (100.4F)  bisacodyl Suppository 10 milliGRAM(s) Rectal daily PRN If no bowel movement  dextrose 40% Gel 15 Gram(s) Oral once PRN Blood Glucose LESS THAN 70 milliGRAM(s)/deciliter  glucagon  Injectable 1 milliGRAM(s) IntraMuscular once PRN Glucose LESS THAN 70 milligrams/deciliter  HYDROmorphone   Tablet 2 milliGRAM(s) Oral every 4 hours PRN Moderate Pain (4 - 6)  HYDROmorphone   Tablet 4 milliGRAM(s) Oral every 4 hours PRN Severe Pain (7 - 10)  magnesium hydroxide Suspension 30 milliLiter(s) Oral daily PRN Constipation  melatonin 5 milliGRAM(s) Oral at bedtime PRN Sleep  metoclopramide Injectable 10 milliGRAM(s) IV Push every 6 hours PRN nausea and/or vomiting  morphine  - Injectable 2 milliGRAM(s) IV Push every 4 hours PRN breakthrough  ondansetron Injectable 4 milliGRAM(s) IV Push every 6 hours PRN Nausea and/or Vomiting      PHYSICAL EXAM:  VITALS: T(C): 37 (04-08-19 @ 07:50)  T(F): 98.6 (04-08-19 @ 07:50), Max: 98.9 (04-07-19 @ 16:24)  HR: 91 (04-08-19 @ 07:50) (87 - 99)  BP: 159/91 (04-08-19 @ 07:50) (120/87 - 159/91)  RR:  (18 - 18)  SpO2:  (97% - 99%)  Wt(kg): 95.33 kg  GENERAL: NAD, well-groomed, well-developed  EYES: No proptosis, no injection  HEENT:  Atraumatic, Normocephalic, moist mucous membranes  THYROID: Normal size, no palpable nodules  RESPIRATORY: Clear to auscultation bilaterally; No rales, rhonchi, wheezing, or rubs  CARDIOVASCULAR: Regular rate and rhythm; No murmurs; no peripheral edema  GI: Soft, nontender, non distended, normal bowel sounds  CUSHING'S SIGNS: no striae    POCT Blood Glucose.: 123 mg/dL (04-08-19 @ 09:36) Humalog 30    POCT Blood Glucose.: 168 mg/dL (04-07-19 @ 21:26) Lantus 60  POCT Blood Glucose.: 133 mg/dL (04-07-19 @ 18:47) Humalog 30  POCT Blood Glucose.: 76 mg/dL (04-07-19 @ 13:22)  POCT Blood Glucose.: 106 mg/dL (04-07-19 @ 08:27) Humalog 56  POCT Blood Glucose.: 86 mg/dL (04-07-19 @ 05:04)  POCT Blood Glucose.: 86 mg/dL (04-07-19 @ 01:47)  POCT Blood Glucose.: 109 mg/dL (04-07-19 @ 00:12)    POCT Blood Glucose.: 218 mg/dL (04-06-19 @ 19:46) Lantus 72  POCT Blood Glucose.: 171 mg/dL (04-06-19 @ 18:29)  POCT Blood Glucose.: 127 mg/dL (04-06-19 @ 07:58)  POCT Blood Glucose.: 266 mg/dL (04-05-19 @ 22:30)    POCT Blood Glucose.: 271 mg/dL (04-05-19 @ 17:32)    04-08    144  |  103  |  16  ----------------------------<  85  3.9   |  28  |  1.02    EGFR if : 70  EGFR if non : 61    Ca    9.1      04-08    Hemoglobin A1C, Whole Blood: 9.4 % <H> [4.0 - 5.6] (04-06-19 @ 10:07)  Hemoglobin A1C, Whole Blood: 9.1 % <H> [4.0 - 5.6] (04-05-19 @ 09:26) Chief Complaint/Follow-up on: T2DM    Subjective: Reported that she has had history of DM x 5 years, managed by PCP. She on Lantus 72 and Humalog 56 units TID at home. She was noted to have osteomyelitis of R-elbow, and had a recent forearm washout on 04/05/2019. Her next scheduled washout will be on 04/09/2019    Patient states she is very hot through the night due to temp settings. Has poor appetite but eating better. No nausea or diarrhea. Wants to f/u with Dr. Gruber.    MEDICATIONS  (STANDING):  atorvastatin 40 milliGRAM(s) Oral at bedtime  ceFAZolin   IVPB 2000 milliGRAM(s) IV Intermittent every 8 hours  dextrose 5%. 1000 milliLiter(s) (50 mL/Hr) IV Continuous <Continuous>  dextrose 50% Injectable 25 Gram(s) IV Push once  diphenhydrAMINE   Injectable 12.5 milliGRAM(s) IV Push once  docusate sodium 100 milliGRAM(s) Oral three times a day  famotidine Injectable 20 milliGRAM(s) IV Push once  fenofibrate Tablet 145 milliGRAM(s) Oral daily  gabapentin 800 milliGRAM(s) Oral three times a day  heparin  Injectable 5000 Unit(s) SubCutaneous every 8 hours  insulin glargine Injectable (LANTUS) 60 Unit(s) SubCutaneous at bedtime  insulin lispro (HumaLOG) corrective regimen sliding scale   SubCutaneous three times a day before meals  insulin lispro Injectable (HumaLOG) 30 Unit(s) SubCutaneous three times a day before meals  metoprolol succinate ER 25 milliGRAM(s) Oral daily  pantoprazole    Tablet 40 milliGRAM(s) Oral before breakfast  senna 2 Tablet(s) Oral at bedtime  sodium chloride 0.9%. 1000 milliLiter(s) (30 mL/Hr) IV Continuous <Continuous>    MEDICATIONS  (PRN):  acetaminophen   Tablet .. 650 milliGRAM(s) Oral every 6 hours PRN Temp greater or equal to 38C (100.4F)  bisacodyl Suppository 10 milliGRAM(s) Rectal daily PRN If no bowel movement  dextrose 40% Gel 15 Gram(s) Oral once PRN Blood Glucose LESS THAN 70 milliGRAM(s)/deciliter  glucagon  Injectable 1 milliGRAM(s) IntraMuscular once PRN Glucose LESS THAN 70 milligrams/deciliter  HYDROmorphone   Tablet 2 milliGRAM(s) Oral every 4 hours PRN Moderate Pain (4 - 6)  HYDROmorphone   Tablet 4 milliGRAM(s) Oral every 4 hours PRN Severe Pain (7 - 10)  magnesium hydroxide Suspension 30 milliLiter(s) Oral daily PRN Constipation  melatonin 5 milliGRAM(s) Oral at bedtime PRN Sleep  metoclopramide Injectable 10 milliGRAM(s) IV Push every 6 hours PRN nausea and/or vomiting  morphine  - Injectable 2 milliGRAM(s) IV Push every 4 hours PRN breakthrough  ondansetron Injectable 4 milliGRAM(s) IV Push every 6 hours PRN Nausea and/or Vomiting      PHYSICAL EXAM:  VITALS: T(C): 37 (04-08-19 @ 07:50)  T(F): 98.6 (04-08-19 @ 07:50), Max: 98.9 (04-07-19 @ 16:24)  HR: 91 (04-08-19 @ 07:50) (87 - 99)  BP: 159/91 (04-08-19 @ 07:50) (120/87 - 159/91)  RR:  (18 - 18)  SpO2:  (97% - 99%)  Wt(kg): 95.33 kg  GENERAL: NAD, well-groomed, well-developed  EYES: No proptosis, no injection  HEENT:  Atraumatic, Normocephalic, moist mucous membranes  THYROID: Normal size, no palpable nodules  RESPIRATORY: Clear to auscultation bilaterally; No rales, rhonchi, wheezing, or rubs  CARDIOVASCULAR: Regular rate and rhythm; No murmurs; no peripheral edema  GI: Soft, nontender, non distended, normal bowel sounds  CUSHING'S SIGNS: no striae    POCT Blood Glucose.: 123 mg/dL (04-08-19 @ 09:36) Humalog 30    POCT Blood Glucose.: 168 mg/dL (04-07-19 @ 21:26) Lantus 60  POCT Blood Glucose.: 133 mg/dL (04-07-19 @ 18:47) Humalog 30  POCT Blood Glucose.: 76 mg/dL (04-07-19 @ 13:22)  POCT Blood Glucose.: 106 mg/dL (04-07-19 @ 08:27) Humalog 56  POCT Blood Glucose.: 86 mg/dL (04-07-19 @ 05:04)  POCT Blood Glucose.: 86 mg/dL (04-07-19 @ 01:47)  POCT Blood Glucose.: 109 mg/dL (04-07-19 @ 00:12)    POCT Blood Glucose.: 218 mg/dL (04-06-19 @ 19:46) Lantus 72  POCT Blood Glucose.: 171 mg/dL (04-06-19 @ 18:29)  POCT Blood Glucose.: 127 mg/dL (04-06-19 @ 07:58)  POCT Blood Glucose.: 266 mg/dL (04-05-19 @ 22:30)    POCT Blood Glucose.: 271 mg/dL (04-05-19 @ 17:32)    04-08    144  |  103  |  16  ----------------------------<  85  3.9   |  28  |  1.02    EGFR if : 70  EGFR if non : 61    Ca    9.1      04-08    Hemoglobin A1C, Whole Blood: 9.4 % <H> [4.0 - 5.6] (04-06-19 @ 10:07)  Hemoglobin A1C, Whole Blood: 9.1 % <H> [4.0 - 5.6] (04-05-19 @ 09:26) Chief Complaint/Follow-up on: T2DM    Subjective: Reported that she has had history of DM x 5 years, managed by PCP. She on Lantus 72 and Humalog 56 units TID at home. She was noted to have osteomyelitis of R-elbow, and had a recent forearm washout on 04/05/2019. Her next scheduled washout will be on 04/09/2019    Patient states she is very hot through the night due to temp settings. Has poor appetite but eating better. No nausea or diarrhea. Wants to f/u with Dr. Gruber.    MEDICATIONS  (STANDING):  atorvastatin 40 milliGRAM(s) Oral at bedtime  ceFAZolin   IVPB 2000 milliGRAM(s) IV Intermittent every 8 hours  dextrose 5%. 1000 milliLiter(s) (50 mL/Hr) IV Continuous <Continuous>  dextrose 50% Injectable 25 Gram(s) IV Push once  diphenhydrAMINE   Injectable 12.5 milliGRAM(s) IV Push once  docusate sodium 100 milliGRAM(s) Oral three times a day  famotidine Injectable 20 milliGRAM(s) IV Push once  fenofibrate Tablet 145 milliGRAM(s) Oral daily  gabapentin 800 milliGRAM(s) Oral three times a day  heparin  Injectable 5000 Unit(s) SubCutaneous every 8 hours  insulin glargine Injectable (LANTUS) 60 Unit(s) SubCutaneous at bedtime  insulin lispro (HumaLOG) corrective regimen sliding scale   SubCutaneous three times a day before meals  insulin lispro Injectable (HumaLOG) 30 Unit(s) SubCutaneous three times a day before meals  metoprolol succinate ER 25 milliGRAM(s) Oral daily  pantoprazole    Tablet 40 milliGRAM(s) Oral before breakfast  senna 2 Tablet(s) Oral at bedtime  sodium chloride 0.9%. 1000 milliLiter(s) (30 mL/Hr) IV Continuous <Continuous>    MEDICATIONS  (PRN):  acetaminophen   Tablet .. 650 milliGRAM(s) Oral every 6 hours PRN Temp greater or equal to 38C (100.4F)  bisacodyl Suppository 10 milliGRAM(s) Rectal daily PRN If no bowel movement  dextrose 40% Gel 15 Gram(s) Oral once PRN Blood Glucose LESS THAN 70 milliGRAM(s)/deciliter  glucagon  Injectable 1 milliGRAM(s) IntraMuscular once PRN Glucose LESS THAN 70 milligrams/deciliter  HYDROmorphone   Tablet 2 milliGRAM(s) Oral every 4 hours PRN Moderate Pain (4 - 6)  HYDROmorphone   Tablet 4 milliGRAM(s) Oral every 4 hours PRN Severe Pain (7 - 10)  magnesium hydroxide Suspension 30 milliLiter(s) Oral daily PRN Constipation  melatonin 5 milliGRAM(s) Oral at bedtime PRN Sleep  metoclopramide Injectable 10 milliGRAM(s) IV Push every 6 hours PRN nausea and/or vomiting  morphine  - Injectable 2 milliGRAM(s) IV Push every 4 hours PRN breakthrough  ondansetron Injectable 4 milliGRAM(s) IV Push every 6 hours PRN Nausea and/or Vomiting      PHYSICAL EXAM:  VITALS: T(C): 37 (04-08-19 @ 07:50)  T(F): 98.6 (04-08-19 @ 07:50), Max: 98.9 (04-07-19 @ 16:24)  HR: 91 (04-08-19 @ 07:50) (87 - 99)  BP: 159/91 (04-08-19 @ 07:50) (120/87 - 159/91)  RR:  (18 - 18)  SpO2:  (97% - 99%)  Wt(kg): 95.33 kg  GENERAL: NAD, well-groomed, well-developed, morbidly obese female  EYES: No proptosis, no injection  HEENT:  Atraumatic, Normocephalic, moist mucous membranes  THYROID: Normal size, no palpable nodules, increased neck girth  RESPIRATORY: Clear to auscultation bilaterally; No rales, rhonchi, wheezing, or rubs  CARDIOVASCULAR: Regular rate and rhythm; No murmurs; trace peripheral edema, +2/2 DP and PT pulses  GI: Soft, nontender, non distended, normal bowel sounds  CUSHING'S SIGNS: no striae  EXT: Wound vac on R-forearm    POCT Blood Glucose.: 123 mg/dL (04-08-19 @ 09:36) Humalog 30    POCT Blood Glucose.: 168 mg/dL (04-07-19 @ 21:26) Lantus 60  POCT Blood Glucose.: 133 mg/dL (04-07-19 @ 18:47) Humalog 30  POCT Blood Glucose.: 76 mg/dL (04-07-19 @ 13:22)  POCT Blood Glucose.: 106 mg/dL (04-07-19 @ 08:27) Humalog 56  POCT Blood Glucose.: 86 mg/dL (04-07-19 @ 05:04)  POCT Blood Glucose.: 86 mg/dL (04-07-19 @ 01:47)  POCT Blood Glucose.: 109 mg/dL (04-07-19 @ 00:12)    POCT Blood Glucose.: 218 mg/dL (04-06-19 @ 19:46) Lantus 72  POCT Blood Glucose.: 171 mg/dL (04-06-19 @ 18:29)  POCT Blood Glucose.: 127 mg/dL (04-06-19 @ 07:58)  POCT Blood Glucose.: 266 mg/dL (04-05-19 @ 22:30)    POCT Blood Glucose.: 271 mg/dL (04-05-19 @ 17:32)    04-08    144  |  103  |  16  ----------------------------<  85  3.9   |  28  |  1.02    EGFR if : 70  EGFR if non : 61    Ca    9.1      04-08    Hemoglobin A1C, Whole Blood: 9.4 % <H> [4.0 - 5.6] (04-06-19 @ 10:07)  Hemoglobin A1C, Whole Blood: 9.1 % <H> [4.0 - 5.6] (04-05-19 @ 09:26)
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
## 2019-04-08 NOTE — PROGRESS NOTE ADULT - ASSESSMENT
58 year old female with long term history of DM here for elbow osteomyelitis, pending OR wash out on 04/09/2019 consulted for inpatient DM Management. She has an improved appetite today. Humalog should be used as needed only when patient is consuming a meal.

## 2019-04-08 NOTE — PROGRESS NOTE ADULT - ATTENDING COMMENTS
I am a non participating [employer] physician seeing Pt in coverage for Dr Fry I am a non participating [facility] physician seeing Pt in coverage for Dr Fry. The above patient examination was reviewed with Dr. Munguia and I agree with his evaluation, assessment and treatment plan.  Jeremiah Fry M.D.
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
## 2019-04-09 ENCOUNTER — RESULT REVIEW (OUTPATIENT)
Age: 59
End: 2019-04-09

## 2019-04-09 LAB
ANION GAP SERPL CALC-SCNC: 13 MMOL/L — SIGNIFICANT CHANGE UP (ref 5–17)
APTT BLD: 34.4 SEC — SIGNIFICANT CHANGE UP (ref 27.5–36.3)
BLD GP AB SCN SERPL QL: POSITIVE — SIGNIFICANT CHANGE UP
BUN SERPL-MCNC: 20 MG/DL — SIGNIFICANT CHANGE UP (ref 7–23)
CALCIUM SERPL-MCNC: 8.8 MG/DL — SIGNIFICANT CHANGE UP (ref 8.4–10.5)
CHLORIDE SERPL-SCNC: 103 MMOL/L — SIGNIFICANT CHANGE UP (ref 96–108)
CO2 SERPL-SCNC: 26 MMOL/L — SIGNIFICANT CHANGE UP (ref 22–31)
CREAT SERPL-MCNC: 1.02 MG/DL — SIGNIFICANT CHANGE UP (ref 0.5–1.3)
CULTURE RESULTS: SIGNIFICANT CHANGE UP
CULTURE RESULTS: SIGNIFICANT CHANGE UP
GLUCOSE BLDC GLUCOMTR-MCNC: 117 MG/DL — HIGH (ref 70–99)
GLUCOSE BLDC GLUCOMTR-MCNC: 224 MG/DL — HIGH (ref 70–99)
GLUCOSE BLDC GLUCOMTR-MCNC: 301 MG/DL — HIGH (ref 70–99)
GLUCOSE SERPL-MCNC: 135 MG/DL — HIGH (ref 70–99)
GRAM STN FLD: SIGNIFICANT CHANGE UP
HCT VFR BLD CALC: 31.1 % — LOW (ref 34.5–45)
HGB BLD-MCNC: 10.6 G/DL — LOW (ref 11.5–15.5)
INR BLD: 1.09 RATIO — SIGNIFICANT CHANGE UP (ref 0.88–1.16)
MCHC RBC-ENTMCNC: 29 PG — SIGNIFICANT CHANGE UP (ref 27–34)
MCHC RBC-ENTMCNC: 34 GM/DL — SIGNIFICANT CHANGE UP (ref 32–36)
MCV RBC AUTO: 85.2 FL — SIGNIFICANT CHANGE UP (ref 80–100)
PLATELET # BLD AUTO: 385 K/UL — SIGNIFICANT CHANGE UP (ref 150–400)
POTASSIUM SERPL-MCNC: 3.9 MMOL/L — SIGNIFICANT CHANGE UP (ref 3.5–5.3)
POTASSIUM SERPL-SCNC: 3.9 MMOL/L — SIGNIFICANT CHANGE UP (ref 3.5–5.3)
PROTHROM AB SERPL-ACNC: 12.6 SEC — SIGNIFICANT CHANGE UP (ref 10–12.9)
RBC # BLD: 3.64 M/UL — LOW (ref 3.8–5.2)
RBC # FLD: 14 % — SIGNIFICANT CHANGE UP (ref 10.3–14.5)
RH IG SCN BLD-IMP: NEGATIVE — SIGNIFICANT CHANGE UP
SODIUM SERPL-SCNC: 142 MMOL/L — SIGNIFICANT CHANGE UP (ref 135–145)
SPECIMEN SOURCE: SIGNIFICANT CHANGE UP
WBC # BLD: 10.2 K/UL — SIGNIFICANT CHANGE UP (ref 3.8–10.5)
WBC # FLD AUTO: 10.2 K/UL — SIGNIFICANT CHANGE UP (ref 3.8–10.5)

## 2019-04-09 PROCEDURE — 88305 TISSUE EXAM BY PATHOLOGIST: CPT | Mod: 26

## 2019-04-09 PROCEDURE — 99232 SBSQ HOSP IP/OBS MODERATE 35: CPT

## 2019-04-09 RX ORDER — INSULIN LISPRO 100/ML
VIAL (ML) SUBCUTANEOUS
Qty: 0 | Refills: 0 | Status: DISCONTINUED | OUTPATIENT
Start: 2019-04-09 | End: 2019-04-12

## 2019-04-09 RX ORDER — OXYCODONE HYDROCHLORIDE 5 MG/1
5 TABLET ORAL EVERY 4 HOURS
Qty: 0 | Refills: 0 | Status: DISCONTINUED | OUTPATIENT
Start: 2019-04-09 | End: 2019-04-09

## 2019-04-09 RX ORDER — DEXTROSE 50 % IN WATER 50 %
15 SYRINGE (ML) INTRAVENOUS ONCE
Qty: 0 | Refills: 0 | Status: DISCONTINUED | OUTPATIENT
Start: 2019-04-09 | End: 2019-04-12

## 2019-04-09 RX ORDER — CEFAZOLIN SODIUM 1 G
2000 VIAL (EA) INJECTION EVERY 8 HOURS
Qty: 0 | Refills: 0 | Status: DISCONTINUED | OUTPATIENT
Start: 2019-04-09 | End: 2019-04-12

## 2019-04-09 RX ORDER — DEXTROSE 50 % IN WATER 50 %
12.5 SYRINGE (ML) INTRAVENOUS ONCE
Qty: 0 | Refills: 0 | Status: DISCONTINUED | OUTPATIENT
Start: 2019-04-09 | End: 2019-04-12

## 2019-04-09 RX ORDER — DOCUSATE SODIUM 100 MG
100 CAPSULE ORAL THREE TIMES A DAY
Qty: 0 | Refills: 0 | Status: DISCONTINUED | OUTPATIENT
Start: 2019-04-09 | End: 2019-04-12

## 2019-04-09 RX ORDER — INSULIN GLARGINE 100 [IU]/ML
48 INJECTION, SOLUTION SUBCUTANEOUS AT BEDTIME
Qty: 0 | Refills: 0 | Status: DISCONTINUED | OUTPATIENT
Start: 2019-04-09 | End: 2019-04-09

## 2019-04-09 RX ORDER — GABAPENTIN 400 MG/1
800 CAPSULE ORAL THREE TIMES A DAY
Qty: 0 | Refills: 0 | Status: DISCONTINUED | OUTPATIENT
Start: 2019-04-09 | End: 2019-04-12

## 2019-04-09 RX ORDER — ACETAMINOPHEN 500 MG
1000 TABLET ORAL ONCE
Qty: 0 | Refills: 0 | Status: COMPLETED | OUTPATIENT
Start: 2019-04-09 | End: 2019-04-09

## 2019-04-09 RX ORDER — FENOFIBRATE,MICRONIZED 130 MG
145 CAPSULE ORAL DAILY
Qty: 0 | Refills: 0 | Status: DISCONTINUED | OUTPATIENT
Start: 2019-04-09 | End: 2019-04-12

## 2019-04-09 RX ORDER — OXYCODONE HYDROCHLORIDE 5 MG/1
2.5 TABLET ORAL EVERY 4 HOURS
Qty: 0 | Refills: 0 | Status: DISCONTINUED | OUTPATIENT
Start: 2019-04-09 | End: 2019-04-09

## 2019-04-09 RX ORDER — HYDROMORPHONE HYDROCHLORIDE 2 MG/ML
0.5 INJECTION INTRAMUSCULAR; INTRAVENOUS; SUBCUTANEOUS
Qty: 0 | Refills: 0 | Status: DISCONTINUED | OUTPATIENT
Start: 2019-04-09 | End: 2019-04-09

## 2019-04-09 RX ORDER — ONDANSETRON 8 MG/1
4 TABLET, FILM COATED ORAL ONCE
Qty: 0 | Refills: 0 | Status: DISCONTINUED | OUTPATIENT
Start: 2019-04-09 | End: 2019-04-09

## 2019-04-09 RX ORDER — INSULIN GLARGINE 100 [IU]/ML
56 INJECTION, SOLUTION SUBCUTANEOUS AT BEDTIME
Qty: 0 | Refills: 0 | Status: DISCONTINUED | OUTPATIENT
Start: 2019-04-09 | End: 2019-04-12

## 2019-04-09 RX ORDER — HEPARIN SODIUM 5000 [USP'U]/ML
5000 INJECTION INTRAVENOUS; SUBCUTANEOUS EVERY 8 HOURS
Qty: 0 | Refills: 0 | Status: DISCONTINUED | OUTPATIENT
Start: 2019-04-09 | End: 2019-04-12

## 2019-04-09 RX ORDER — HYDROMORPHONE HYDROCHLORIDE 2 MG/ML
30 INJECTION INTRAMUSCULAR; INTRAVENOUS; SUBCUTANEOUS
Qty: 0 | Refills: 0 | Status: DISCONTINUED | OUTPATIENT
Start: 2019-04-09 | End: 2019-04-10

## 2019-04-09 RX ORDER — SENNA PLUS 8.6 MG/1
2 TABLET ORAL AT BEDTIME
Qty: 0 | Refills: 0 | Status: DISCONTINUED | OUTPATIENT
Start: 2019-04-09 | End: 2019-04-12

## 2019-04-09 RX ORDER — SODIUM CHLORIDE 9 MG/ML
1000 INJECTION INTRAMUSCULAR; INTRAVENOUS; SUBCUTANEOUS
Qty: 0 | Refills: 0 | Status: DISCONTINUED | OUTPATIENT
Start: 2019-04-09 | End: 2019-04-12

## 2019-04-09 RX ORDER — ACETAMINOPHEN 500 MG
975 TABLET ORAL EVERY 8 HOURS
Qty: 0 | Refills: 0 | Status: DISCONTINUED | OUTPATIENT
Start: 2019-04-09 | End: 2019-04-09

## 2019-04-09 RX ORDER — INSULIN LISPRO 100/ML
VIAL (ML) SUBCUTANEOUS
Qty: 0 | Refills: 0 | Status: DISCONTINUED | OUTPATIENT
Start: 2019-04-09 | End: 2019-04-09

## 2019-04-09 RX ORDER — PANTOPRAZOLE SODIUM 20 MG/1
40 TABLET, DELAYED RELEASE ORAL
Qty: 0 | Refills: 0 | Status: DISCONTINUED | OUTPATIENT
Start: 2019-04-09 | End: 2019-04-12

## 2019-04-09 RX ORDER — NALOXONE HYDROCHLORIDE 4 MG/.1ML
0.1 SPRAY NASAL
Qty: 0 | Refills: 0 | Status: DISCONTINUED | OUTPATIENT
Start: 2019-04-09 | End: 2019-04-10

## 2019-04-09 RX ORDER — INSULIN LISPRO 100/ML
24 VIAL (ML) SUBCUTANEOUS
Qty: 0 | Refills: 0 | Status: DISCONTINUED | OUTPATIENT
Start: 2019-04-09 | End: 2019-04-10

## 2019-04-09 RX ORDER — LANOLIN ALCOHOL/MO/W.PET/CERES
3 CREAM (GRAM) TOPICAL AT BEDTIME
Qty: 0 | Refills: 0 | Status: DISCONTINUED | OUTPATIENT
Start: 2019-04-09 | End: 2019-04-12

## 2019-04-09 RX ORDER — INSULIN LISPRO 100/ML
VIAL (ML) SUBCUTANEOUS AT BEDTIME
Qty: 0 | Refills: 0 | Status: DISCONTINUED | OUTPATIENT
Start: 2019-04-09 | End: 2019-04-12

## 2019-04-09 RX ORDER — ATORVASTATIN CALCIUM 80 MG/1
40 TABLET, FILM COATED ORAL AT BEDTIME
Qty: 0 | Refills: 0 | Status: DISCONTINUED | OUTPATIENT
Start: 2019-04-09 | End: 2019-04-12

## 2019-04-09 RX ORDER — INSULIN LISPRO 100/ML
VIAL (ML) SUBCUTANEOUS AT BEDTIME
Qty: 0 | Refills: 0 | Status: DISCONTINUED | OUTPATIENT
Start: 2019-04-09 | End: 2019-04-09

## 2019-04-09 RX ORDER — MORPHINE SULFATE 50 MG/1
2 CAPSULE, EXTENDED RELEASE ORAL EVERY 4 HOURS
Qty: 0 | Refills: 0 | Status: DISCONTINUED | OUTPATIENT
Start: 2019-04-09 | End: 2019-04-09

## 2019-04-09 RX ORDER — GLUCAGON INJECTION, SOLUTION 0.5 MG/.1ML
1 INJECTION, SOLUTION SUBCUTANEOUS ONCE
Qty: 0 | Refills: 0 | Status: DISCONTINUED | OUTPATIENT
Start: 2019-04-09 | End: 2019-04-12

## 2019-04-09 RX ORDER — POLYETHYLENE GLYCOL 3350 17 G/17G
17 POWDER, FOR SOLUTION ORAL DAILY
Qty: 0 | Refills: 0 | Status: DISCONTINUED | OUTPATIENT
Start: 2019-04-09 | End: 2019-04-12

## 2019-04-09 RX ORDER — ONDANSETRON 8 MG/1
4 TABLET, FILM COATED ORAL EVERY 6 HOURS
Qty: 0 | Refills: 0 | Status: DISCONTINUED | OUTPATIENT
Start: 2019-04-09 | End: 2019-04-12

## 2019-04-09 RX ORDER — METOCLOPRAMIDE HCL 10 MG
10 TABLET ORAL EVERY 8 HOURS
Qty: 0 | Refills: 0 | Status: DISCONTINUED | OUTPATIENT
Start: 2019-04-09 | End: 2019-04-12

## 2019-04-09 RX ORDER — SODIUM CHLORIDE 9 MG/ML
1000 INJECTION, SOLUTION INTRAVENOUS
Qty: 0 | Refills: 0 | Status: DISCONTINUED | OUTPATIENT
Start: 2019-04-09 | End: 2019-04-12

## 2019-04-09 RX ORDER — MORPHINE SULFATE 50 MG/1
1 CAPSULE, EXTENDED RELEASE ORAL
Qty: 0 | Refills: 0 | Status: DISCONTINUED | OUTPATIENT
Start: 2019-04-09 | End: 2019-04-09

## 2019-04-09 RX ORDER — METOPROLOL TARTRATE 50 MG
25 TABLET ORAL DAILY
Qty: 0 | Refills: 0 | Status: DISCONTINUED | OUTPATIENT
Start: 2019-04-09 | End: 2019-04-12

## 2019-04-09 RX ORDER — DEXTROSE 50 % IN WATER 50 %
25 SYRINGE (ML) INTRAVENOUS ONCE
Qty: 0 | Refills: 0 | Status: DISCONTINUED | OUTPATIENT
Start: 2019-04-09 | End: 2019-04-12

## 2019-04-09 RX ORDER — HYDROMORPHONE HYDROCHLORIDE 2 MG/ML
0.5 INJECTION INTRAMUSCULAR; INTRAVENOUS; SUBCUTANEOUS
Qty: 0 | Refills: 0 | Status: DISCONTINUED | OUTPATIENT
Start: 2019-04-09 | End: 2019-04-10

## 2019-04-09 RX ORDER — MAGNESIUM HYDROXIDE 400 MG/1
30 TABLET, CHEWABLE ORAL DAILY
Qty: 0 | Refills: 0 | Status: DISCONTINUED | OUTPATIENT
Start: 2019-04-09 | End: 2019-04-12

## 2019-04-09 RX ORDER — INSULIN LISPRO 100/ML
30 VIAL (ML) SUBCUTANEOUS
Qty: 0 | Refills: 0 | Status: DISCONTINUED | OUTPATIENT
Start: 2019-04-09 | End: 2019-04-09

## 2019-04-09 RX ORDER — SODIUM CHLORIDE 9 MG/ML
1000 INJECTION, SOLUTION INTRAVENOUS
Qty: 0 | Refills: 0 | Status: DISCONTINUED | OUTPATIENT
Start: 2019-04-09 | End: 2019-04-09

## 2019-04-09 RX ADMIN — GABAPENTIN 800 MILLIGRAM(S): 400 CAPSULE ORAL at 22:21

## 2019-04-09 RX ADMIN — HYDROMORPHONE HYDROCHLORIDE 0.5 MILLIGRAM(S): 2 INJECTION INTRAMUSCULAR; INTRAVENOUS; SUBCUTANEOUS at 14:15

## 2019-04-09 RX ADMIN — Medication 4: at 22:36

## 2019-04-09 RX ADMIN — GABAPENTIN 800 MILLIGRAM(S): 400 CAPSULE ORAL at 06:11

## 2019-04-09 RX ADMIN — Medication 100 MILLIGRAM(S): at 22:19

## 2019-04-09 RX ADMIN — HYDROMORPHONE HYDROCHLORIDE 0.5 MILLIGRAM(S): 2 INJECTION INTRAMUSCULAR; INTRAVENOUS; SUBCUTANEOUS at 13:15

## 2019-04-09 RX ADMIN — HYDROMORPHONE HYDROCHLORIDE 0.5 MILLIGRAM(S): 2 INJECTION INTRAMUSCULAR; INTRAVENOUS; SUBCUTANEOUS at 12:35

## 2019-04-09 RX ADMIN — Medication 400 MILLIGRAM(S): at 00:11

## 2019-04-09 RX ADMIN — HYDROMORPHONE HYDROCHLORIDE 0.5 MILLIGRAM(S): 2 INJECTION INTRAMUSCULAR; INTRAVENOUS; SUBCUTANEOUS at 13:49

## 2019-04-09 RX ADMIN — INSULIN GLARGINE 56 UNIT(S): 100 INJECTION, SOLUTION SUBCUTANEOUS at 22:35

## 2019-04-09 RX ADMIN — GABAPENTIN 800 MILLIGRAM(S): 400 CAPSULE ORAL at 16:08

## 2019-04-09 RX ADMIN — ATORVASTATIN CALCIUM 40 MILLIGRAM(S): 80 TABLET, FILM COATED ORAL at 22:20

## 2019-04-09 RX ADMIN — HYDROMORPHONE HYDROCHLORIDE 0.5 MILLIGRAM(S): 2 INJECTION INTRAMUSCULAR; INTRAVENOUS; SUBCUTANEOUS at 13:00

## 2019-04-09 RX ADMIN — INSULIN GLARGINE 48 UNIT(S): 100 INJECTION, SOLUTION SUBCUTANEOUS at 00:24

## 2019-04-09 RX ADMIN — HEPARIN SODIUM 5000 UNIT(S): 5000 INJECTION INTRAVENOUS; SUBCUTANEOUS at 23:45

## 2019-04-09 RX ADMIN — HEPARIN SODIUM 5000 UNIT(S): 5000 INJECTION INTRAVENOUS; SUBCUTANEOUS at 15:38

## 2019-04-09 RX ADMIN — HYDROMORPHONE HYDROCHLORIDE 30 MILLILITER(S): 2 INJECTION INTRAMUSCULAR; INTRAVENOUS; SUBCUTANEOUS at 19:24

## 2019-04-09 RX ADMIN — SODIUM CHLORIDE 75 MILLILITER(S): 9 INJECTION INTRAMUSCULAR; INTRAVENOUS; SUBCUTANEOUS at 00:14

## 2019-04-09 RX ADMIN — SODIUM CHLORIDE 125 MILLILITER(S): 9 INJECTION INTRAMUSCULAR; INTRAVENOUS; SUBCUTANEOUS at 22:12

## 2019-04-09 RX ADMIN — Medication 100 MILLIGRAM(S): at 16:08

## 2019-04-09 RX ADMIN — Medication 10 MILLIGRAM(S): at 16:54

## 2019-04-09 RX ADMIN — Medication 100 MILLIGRAM(S): at 23:45

## 2019-04-09 RX ADMIN — HYDROMORPHONE HYDROCHLORIDE 0.5 MILLIGRAM(S): 2 INJECTION INTRAMUSCULAR; INTRAVENOUS; SUBCUTANEOUS at 12:30

## 2019-04-09 RX ADMIN — HYDROMORPHONE HYDROCHLORIDE 0.5 MILLIGRAM(S): 2 INJECTION INTRAMUSCULAR; INTRAVENOUS; SUBCUTANEOUS at 12:20

## 2019-04-09 RX ADMIN — HYDROMORPHONE HYDROCHLORIDE 4 MILLIGRAM(S): 2 INJECTION INTRAMUSCULAR; INTRAVENOUS; SUBCUTANEOUS at 00:02

## 2019-04-09 RX ADMIN — PANTOPRAZOLE SODIUM 40 MILLIGRAM(S): 20 TABLET, DELAYED RELEASE ORAL at 06:11

## 2019-04-09 RX ADMIN — HYDROMORPHONE HYDROCHLORIDE 30 MILLILITER(S): 2 INJECTION INTRAMUSCULAR; INTRAVENOUS; SUBCUTANEOUS at 14:16

## 2019-04-09 RX ADMIN — HYDROMORPHONE HYDROCHLORIDE 30 MILLILITER(S): 2 INJECTION INTRAMUSCULAR; INTRAVENOUS; SUBCUTANEOUS at 22:25

## 2019-04-09 RX ADMIN — Medication 1000 MILLIGRAM(S): at 01:51

## 2019-04-09 RX ADMIN — Medication 10 MILLIGRAM(S): at 00:06

## 2019-04-09 RX ADMIN — Medication 2: at 15:48

## 2019-04-09 RX ADMIN — SENNA PLUS 2 TABLET(S): 8.6 TABLET ORAL at 22:19

## 2019-04-09 RX ADMIN — SODIUM CHLORIDE 125 MILLILITER(S): 9 INJECTION INTRAMUSCULAR; INTRAVENOUS; SUBCUTANEOUS at 14:24

## 2019-04-09 RX ADMIN — Medication 25 MILLIGRAM(S): at 06:11

## 2019-04-09 RX ADMIN — HYDROMORPHONE HYDROCHLORIDE 30 MILLILITER(S): 2 INJECTION INTRAMUSCULAR; INTRAVENOUS; SUBCUTANEOUS at 16:05

## 2019-04-09 RX ADMIN — HYDROMORPHONE HYDROCHLORIDE 0.5 MILLIGRAM(S): 2 INJECTION INTRAMUSCULAR; INTRAVENOUS; SUBCUTANEOUS at 12:54

## 2019-04-09 NOTE — BRIEF OPERATIVE NOTE - NSICDXBRIEFPROCEDURE_GEN_ALL_CORE_FT
PROCEDURES:  Irrigation and debridement, wound 05-Apr-2019 09:57:53  Jeremiah Castillo
PROCEDURES:  Irrigation and debridement, wound 05-Apr-2019 09:57:53  Jeremiah aCstillo

## 2019-04-09 NOTE — PROGRESS NOTE ADULT - SUBJECTIVE AND OBJECTIVE BOX
Chief Complaint/Follow-up on:     Subjective:    MEDICATIONS  (STANDING):  atorvastatin 40 milliGRAM(s) Oral at bedtime  ceFAZolin   IVPB 2000 milliGRAM(s) IV Intermittent every 8 hours  dextrose 5%. 1000 milliLiter(s) (50 mL/Hr) IV Continuous <Continuous>  dextrose 50% Injectable 12.5 Gram(s) IV Push once  dextrose 50% Injectable 25 Gram(s) IV Push once  dextrose 50% Injectable 25 Gram(s) IV Push once  docusate sodium 100 milliGRAM(s) Oral three times a day  fenofibrate Tablet 145 milliGRAM(s) Oral daily  gabapentin 800 milliGRAM(s) Oral three times a day  heparin  Injectable 5000 Unit(s) SubCutaneous every 8 hours  HYDROmorphone PCA (1 mG/mL) 30 milliLiter(s) PCA Continuous PCA Continuous  insulin glargine Injectable (LANTUS) 48 Unit(s) SubCutaneous at bedtime  insulin lispro (HumaLOG) corrective regimen sliding scale   SubCutaneous three times a day before meals  insulin lispro (HumaLOG) corrective regimen sliding scale   SubCutaneous at bedtime  insulin lispro Injectable (HumaLOG) 30 Unit(s) SubCutaneous three times a day with meals  metoprolol succinate ER 25 milliGRAM(s) Oral daily  pantoprazole    Tablet 40 milliGRAM(s) Oral before breakfast  polyethylene glycol 3350 17 Gram(s) Oral daily  senna 2 Tablet(s) Oral at bedtime  sodium chloride 0.9%. 1000 milliLiter(s) (125 mL/Hr) IV Continuous <Continuous>    MEDICATIONS  (PRN):  dextrose 40% Gel 15 Gram(s) Oral once PRN Blood Glucose LESS THAN 70 milliGRAM(s)/deciliter  glucagon  Injectable 1 milliGRAM(s) IntraMuscular once PRN Glucose LESS THAN 70 milligrams/deciliter  HYDROmorphone PCA (1 mG/mL) Rescue Clinician Bolus 0.5 milliGRAM(s) IV Push every 15 minutes PRN for Pain Scale GREATER THAN 6  magnesium hydroxide Suspension 30 milliLiter(s) Oral daily PRN Constipation  melatonin 3 milliGRAM(s) Oral at bedtime PRN Insomnia  naloxone Injectable 0.1 milliGRAM(s) IV Push every 3 minutes PRN For ANY of the following changes in patient status:  A. RR LESS THAN 10 breaths per minute, B. Oxygen saturation LESS THAN 90%, C. Sedation score of 6  ondansetron Injectable 4 milliGRAM(s) IV Push every 6 hours PRN Nausea and/or Vomiting      PHYSICAL EXAM:  VITALS: T(C): 36.5 (04-09-19 @ 16:10)  T(F): 97.7 (04-09-19 @ 16:10), Max: 98.6 (04-09-19 @ 00:54)  HR: 83 (04-09-19 @ 16:10) (79 - 98)  BP: 138/86 (04-09-19 @ 16:10) (134/86 - 161/88)  RR:  (14 - 20)  SpO2:  (96% - 99%)  Wt(kg): --  GENERAL: NAD, well-groomed, well-developed  EYES: No proptosis, no injection  HEENT:  Atraumatic, Normocephalic, moist mucous membranes  THYROID: Normal size, no palpable nodules  RESPIRATORY: Clear to auscultation bilaterally; No rales, rhonchi, wheezing, or rubs  CARDIOVASCULAR: Regular rate and rhythm; No murmurs; no peripheral edema  GI: Soft, nontender, non distended, normal bowel sounds  CUSHING'S SIGNS: no striae    POCT Blood Glucose.: 224 mg/dL (04-09-19 @ 15:34)  POCT Blood Glucose.: 117 mg/dL (04-09-19 @ 06:16)  POCT Blood Glucose.: 263 mg/dL (04-08-19 @ 23:25)  POCT Blood Glucose.: 154 mg/dL (04-08-19 @ 19:45)  POCT Blood Glucose.: 152 mg/dL (04-08-19 @ 14:08)  POCT Blood Glucose.: 123 mg/dL (04-08-19 @ 09:36)  POCT Blood Glucose.: 168 mg/dL (04-07-19 @ 21:26)  POCT Blood Glucose.: 133 mg/dL (04-07-19 @ 18:47)  POCT Blood Glucose.: 76 mg/dL (04-07-19 @ 13:22)  POCT Blood Glucose.: 106 mg/dL (04-07-19 @ 08:27)  POCT Blood Glucose.: 86 mg/dL (04-07-19 @ 05:04)  POCT Blood Glucose.: 86 mg/dL (04-07-19 @ 01:47)  POCT Blood Glucose.: 109 mg/dL (04-07-19 @ 00:12)  POCT Blood Glucose.: 218 mg/dL (04-06-19 @ 19:46)  POCT Blood Glucose.: 171 mg/dL (04-06-19 @ 18:29)    04-09    142  |  103  |  20  ----------------------------<  135<H>  3.9   |  26  |  1.02    EGFR if : 70  EGFR if non : 61    Ca    8.8      04-09      Hemoglobin A1C, Whole Blood: 9.4 % <H> [4.0 - 5.6] (04-06-19 @ 10:07)  Hemoglobin A1C, Whole Blood: 9.1 % <H> [4.0 - 5.6] (04-05-19 @ 09:26)

## 2019-04-09 NOTE — BRIEF OPERATIVE NOTE - NSICDXBRIEFPOSTOP_GEN_ALL_CORE_FT
POST-OP DIAGNOSIS:  Post-traumatic wound infection 05-Apr-2019 09:59:23  Jeremiah Castillo
POST-OP DIAGNOSIS:  Post-traumatic wound infection 05-Apr-2019 09:59:23  Jeremiah Castillo

## 2019-04-09 NOTE — PROGRESS NOTE ADULT - ATTENDING COMMENTS
I am a non participating [facility] physician seeing Pt in coverage for Dr Fry. The above patient examination was reviewed with Dr. Munguia and I agree with his evaluation, assessment and treatment plan.  Jeremiah Fry M.D. She is now status post second OR for incision and drainage of her right forearm fluid collection, performed on April 9, 2019. The patient complains of severe incisional arm pain, immediately postop. No medical complications post-op to date and to proceed with physical therapy, as tolerated. Continues pulmonary toilet to lessen atelectasis, leg exercises as taught to lessen the risk of DVT and supervised pain medications for post-op pain control.
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
## 2019-04-09 NOTE — PROGRESS NOTE ADULT - ASSESSMENT
58 year old female with long term history of DM here for elbow osteomyelitis s/p wash out on 04/09/2019, endocrine consulted for uncontrolled DM.

## 2019-04-09 NOTE — PROGRESS NOTE ADULT - SUBJECTIVE AND OBJECTIVE BOX
infectious diseases progress note:    Patient is a 58y old  Female who presents with a chief complaint of Right forearm collection (09 Apr 2019 06:31)        Effusion of right elbow        ROS:  CONSTITUTIONAL:  Negative fever or chills, feels well, good appetite  EYES:  Negative  blurry vision or double vision  CARDIOVASCULAR:  Negative for chest pain or palpitations  RESPIRATORY:  Negative for cough, wheezing, or SOB   GASTROINTESTINAL:  Negative for nausea, vomiting, diarrhea, constipation, or abdominal pain  GENITOURINARY:  Negative frequency, urgency or dysuria  NEUROLOGIC:  No headache, confusion, dizziness, lightheadedness    Allergies    No Known Allergies    Intolerances        ANTIBIOTICS/RELEVANT:  antimicrobials  ceFAZolin   IVPB 2000 milliGRAM(s) IV Intermittent every 8 hours    immunologic:    OTHER:  acetaminophen   Tablet .. 650 milliGRAM(s) Oral every 6 hours PRN  atorvastatin 40 milliGRAM(s) Oral at bedtime  bisacodyl Suppository 10 milliGRAM(s) Rectal daily PRN  dextrose 40% Gel 15 Gram(s) Oral once PRN  dextrose 5%. 1000 milliLiter(s) IV Continuous <Continuous>  dextrose 50% Injectable 25 Gram(s) IV Push once  diphenhydrAMINE   Injectable 12.5 milliGRAM(s) IV Push once  docusate sodium 100 milliGRAM(s) Oral three times a day  famotidine Injectable 20 milliGRAM(s) IV Push once  fenofibrate Tablet 145 milliGRAM(s) Oral daily  gabapentin 800 milliGRAM(s) Oral three times a day  glucagon  Injectable 1 milliGRAM(s) IntraMuscular once PRN  HYDROmorphone   Tablet 2 milliGRAM(s) Oral every 4 hours PRN  HYDROmorphone   Tablet 4 milliGRAM(s) Oral every 4 hours PRN  insulin lispro (HumaLOG) corrective regimen sliding scale   SubCutaneous three times a day before meals  insulin lispro Injectable (HumaLOG) 30 Unit(s) SubCutaneous three times a day before meals  magnesium hydroxide Suspension 30 milliLiter(s) Oral daily PRN  melatonin 5 milliGRAM(s) Oral at bedtime PRN  metoclopramide Injectable 10 milliGRAM(s) IV Push every 6 hours PRN  metoprolol succinate ER 25 milliGRAM(s) Oral daily  morphine  - Injectable 2 milliGRAM(s) IV Push every 4 hours PRN  ondansetron Injectable 4 milliGRAM(s) IV Push every 6 hours PRN  pantoprazole    Tablet 40 milliGRAM(s) Oral before breakfast  senna 2 Tablet(s) Oral at bedtime  sodium chloride 0.9%. 1000 milliLiter(s) IV Continuous <Continuous>      Objective:  Vital Signs Last 24 Hrs  T(C): 36.4 (09 Apr 2019 05:19), Max: 37 (08 Apr 2019 07:50)  T(F): 97.5 (09 Apr 2019 05:19), Max: 98.6 (08 Apr 2019 07:50)  HR: 79 (09 Apr 2019 05:19) (79 - 96)  BP: 148/84 (09 Apr 2019 05:19) (134/86 - 161/88)  BP(mean): --  RR: 18 (09 Apr 2019 05:19) (18 - 18)  SpO2: 98% (09 Apr 2019 05:19) (98% - 98%)    PHYSICAL EXAM:   o acute distress  Eyes:ESTHER, EOMI  Ear/Nose/Throat: no oral lesion, no sinus tenderness on percussion	  Neck:no JVD, no lymphadenopathy, supple  Respiratory: CTA jaime  Cardiovascular: S1S2 RRR, no murmurs  Gastrointestinal:soft, (+) BS, no HSM  Extremities:no e/e/c        LABS:                        10.6   10.2  )-----------( 385      ( 09 Apr 2019 04:47 )             31.1     04-09    142  |  103  |  20  ----------------------------<  135<H>  3.9   |  26  |  1.02    Ca    8.8      09 Apr 2019 04:47      PT/INR - ( 09 Apr 2019 04:47 )   PT: 12.6 sec;   INR: 1.09 ratio         PTT - ( 09 Apr 2019 04:47 )  PTT:34.4 sec        MICROBIOLOGY:    RECENT CULTURES:  04-05 @ 17:16 .Surgical Swab right forearm                Numerous Staphylococcus aureus  See previous culture [id number]    04-05 @ 17:12 .Tissue Other, right forearm   KRYSTYNA    Rare polymorphonuclear leukocytes per low power field  Few Gram positive cocci in pairs per oil power field    Staphylococcus aureus  Staphylococcus aureus     Numerous Staphylococcus aureus  See previous culture [id number]    04-05 @ 16:57 .Tissue Other, right forearm                Testing in progress    04-05 @ 16:46 .Other Other, right forearm   KRYSTYNA      Staphylococcus aureus  Staphylococcus aureus     Testing in progress    04-05 @ 16:40 .Other Other, right forearm                Testing in progress    04-04 @ 22:54 .Blood Blood-Peripheral                No growth to date.          RESPIRATORY CULTURES:              RADIOLOGY & ADDITIONAL STUDIES:        Pager [phone]  After 5 pm/weekends or if no response :[phone]

## 2019-04-09 NOTE — PRE-ANESTHESIA EVALUATION ADULT - NSANTHOSAYNRD_GEN_A_CORE
No. HAYDER screening performed.  STOP BANG Legend: 0-2 = LOW Risk; 3-4 = INTERMEDIATE Risk; 5-8 = HIGH Risk
No. HAYDER screening performed.  STOP BANG Legend: 0-2 = LOW Risk; 3-4 = INTERMEDIATE Risk; 5-8 = HIGH Risk

## 2019-04-09 NOTE — PROGRESS NOTE ADULT - ATTENDING COMMENTS
Alexis Gabriel  Attending Physician   Division of Infectious Disease  Pager #[phone]  After 5pm/weekend or no response, call #[phone]    Please call the ID service [phone] with questions or concerns over the weekend.

## 2019-04-09 NOTE — BRIEF OPERATIVE NOTE - NSICDXBRIEFPREOP_GEN_ALL_CORE_FT
PRE-OP DIAGNOSIS:  Post-traumatic wound infection 05-Apr-2019 09:59:13  Jeremiah Castillo
PRE-OP DIAGNOSIS:  Post-traumatic wound infection 05-Apr-2019 09:59:13  Jeremiah Castillo

## 2019-04-09 NOTE — PROGRESS NOTE ADULT - ASSESSMENT
58 yr. pt has fracture that was treated  with an external fixator that developed a pin site MRSA about one year ago.  It was do to MRSA and she was treated with  debridement an prolonged home IV ab.  Pt will well after completing ab.  Represented to ortho with pain , swelling and elevated esr.   MRI demonstrated a collection  and chronic ulnar loss of function  Pt went for a debridement this am  of the same site that has infected last year ( external fix ).  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  cultures positve for mssa  discussed with Dr. Mcguire.  Joyce debridement to ro occult sequestrum would require a plate whci I would try to aviod in the setting of active infection  plan 6 weeks of IV therapy  if she relapses , will need further dbridement even with risk of fracture

## 2019-04-09 NOTE — PROGRESS NOTE ADULT - SUBJECTIVE AND OBJECTIVE BOX
seen in PACU  c/o severe incisional pain    T(C): 36.4 (04-09-19 @ 12:00)  T(F): 97.5 (04-09-19 @ 12:00)  HR: 87 (04-09-19 @ 12:45)  BP: 150/68 (04-09-19 @ 12:45)  RR: 15 (04-09-19 @ 12:30)  SpO2: 98% (04-09-19 @ 12:45)  Wt(kg): --    Physical Exam  Gen: NAD    RUE:   Dressing CDI; Posy in place  HV x 1: 15cc  moving all digits  decreased sensation along ulnar / medial tracts  2+ Radial pulse w/ wrist extension  Cap refill < 2 secs                A/P: 58y Female s/p I& D R Forearm w/ Closure    - Follow up  OR Cx      cont Ancef  -Pain control;  changed BACK to PCA  -DVT ppx; sub-Q Heparin  - Continue drain  -Am labs  -PT eval: NWB : shoulder / wrist  / digital ROM  -Dispo planning: anticipate home      ***See Above  Gianfranco DURÁN  Orthopedics  B: [id number]  S: [phone]

## 2019-04-09 NOTE — PROGRESS NOTE ADULT - ASSESSMENT
Patient is a 58y old  Female s/p R forearm I&D/VAC placement, Her next scheduled washout will be on 04/09/2019  Change diet and lifestyle to improve diabetic control. Patient is a 58y old  Female s/p R forearm I&D/VAC placement.  Change diet and lifestyle to improve diabetic control has been made. She is now status post second OR for incision and drainage of her right forearm fluid collection, performed on April 9, 2019. The patient complains of severe incisional arm pain, immediately postop.

## 2019-04-09 NOTE — PRE-ANESTHESIA EVALUATION ADULT - NSANTHPMHFT_GEN_ALL_CORE
chart/endo/IM/ID notes, prior anesthesia record reviewed. hx poorly controlled dm a1c ~10,m fs cdfod=722. htn. morbid obesity. et > 1 flight no cp/sob

## 2019-04-09 NOTE — PROGRESS NOTE ADULT - SUBJECTIVE AND OBJECTIVE BOX
Patient seen and examined. No complaints today. Pain controlled. No acute events overnight.     Vital Signs Last 24 Hrs  T(C): 36.4 (09 Apr 2019 05:19), Max: 37 (08 Apr 2019 07:50)  T(F): 97.5 (09 Apr 2019 05:19), Max: 98.6 (08 Apr 2019 07:50)  HR: 79 (09 Apr 2019 05:19) (79 - 96)  BP: 148/84 (09 Apr 2019 05:19) (134/86 - 161/88)  BP(mean): --  RR: 18 (09 Apr 2019 05:19) (18 - 18)  SpO2: 98% (09 Apr 2019 05:19) (98% - 98%)    Physical Exam  Gen: NAD    RUE:   Volar forearm wound vac in place, C/D/I, maintaining good suction, no erythema or skin breakdown  AIN/PIN/m/r intact, no ulnar nerve function distal to elbow, ulnar claw hand  SILT m/r, no sensation along ulnar border  WWP brisk cap refill  Compartments soft and compressible

## 2019-04-09 NOTE — PROGRESS NOTE ADULT - ASSESSMENT
Assessment: s/p I&D R forearm    Plan:  Cont abx, ID recs appreciated, on ancef, OR cultures growing S. aureus  PT/OT  NPO/IVF  Hold chemical DVT ppx  RTOR today for re-I&D and likely wound closure  Preop labs  Med clearance noted in the chart  Endo recs appreciated

## 2019-04-10 ENCOUNTER — TRANSCRIPTION ENCOUNTER (OUTPATIENT)
Age: 59
End: 2019-04-10

## 2019-04-10 LAB
CULTURE RESULTS: SIGNIFICANT CHANGE UP
GLUCOSE BLDC GLUCOMTR-MCNC: 132 MG/DL — HIGH (ref 70–99)
GLUCOSE BLDC GLUCOMTR-MCNC: 179 MG/DL — HIGH (ref 70–99)
GLUCOSE BLDC GLUCOMTR-MCNC: 245 MG/DL — HIGH (ref 70–99)
GLUCOSE BLDC GLUCOMTR-MCNC: 250 MG/DL — HIGH (ref 70–99)
NIGHT BLUE STAIN TISS: SIGNIFICANT CHANGE UP
ORGANISM # SPEC MICROSCOPIC CNT: SIGNIFICANT CHANGE UP
SPECIMEN SOURCE: SIGNIFICANT CHANGE UP

## 2019-04-10 PROCEDURE — 99232 SBSQ HOSP IP/OBS MODERATE 35: CPT

## 2019-04-10 RX ORDER — DIPHENHYDRAMINE HCL 50 MG
25 CAPSULE ORAL EVERY 6 HOURS
Qty: 0 | Refills: 0 | Status: DISCONTINUED | OUTPATIENT
Start: 2019-04-10 | End: 2019-04-12

## 2019-04-10 RX ORDER — INSULIN LISPRO 100/ML
30 VIAL (ML) SUBCUTANEOUS
Qty: 0 | Refills: 0 | Status: DISCONTINUED | OUTPATIENT
Start: 2019-04-10 | End: 2019-04-11

## 2019-04-10 RX ORDER — OXYCODONE HYDROCHLORIDE 5 MG/1
10 TABLET ORAL EVERY 4 HOURS
Qty: 0 | Refills: 0 | Status: DISCONTINUED | OUTPATIENT
Start: 2019-04-10 | End: 2019-04-12

## 2019-04-10 RX ORDER — OXYCODONE HYDROCHLORIDE 5 MG/1
5 TABLET ORAL EVERY 4 HOURS
Qty: 0 | Refills: 0 | Status: DISCONTINUED | OUTPATIENT
Start: 2019-04-10 | End: 2019-04-12

## 2019-04-10 RX ORDER — OXYCODONE AND ACETAMINOPHEN 5; 325 MG/1; MG/1
1 TABLET ORAL EVERY 4 HOURS
Qty: 0 | Refills: 0 | Status: DISCONTINUED | OUTPATIENT
Start: 2019-04-10 | End: 2019-04-10

## 2019-04-10 RX ADMIN — POLYETHYLENE GLYCOL 3350 17 GRAM(S): 17 POWDER, FOR SOLUTION ORAL at 12:39

## 2019-04-10 RX ADMIN — INSULIN GLARGINE 56 UNIT(S): 100 INJECTION, SOLUTION SUBCUTANEOUS at 22:45

## 2019-04-10 RX ADMIN — Medication 10 MILLIGRAM(S): at 01:36

## 2019-04-10 RX ADMIN — Medication 100 MILLIGRAM(S): at 22:44

## 2019-04-10 RX ADMIN — ATORVASTATIN CALCIUM 40 MILLIGRAM(S): 80 TABLET, FILM COATED ORAL at 22:45

## 2019-04-10 RX ADMIN — OXYCODONE AND ACETAMINOPHEN 1 TABLET(S): 5; 325 TABLET ORAL at 13:08

## 2019-04-10 RX ADMIN — Medication 100 MILLIGRAM(S): at 14:59

## 2019-04-10 RX ADMIN — HEPARIN SODIUM 5000 UNIT(S): 5000 INJECTION INTRAVENOUS; SUBCUTANEOUS at 06:31

## 2019-04-10 RX ADMIN — Medication 30 UNIT(S): at 19:54

## 2019-04-10 RX ADMIN — Medication 2: at 19:54

## 2019-04-10 RX ADMIN — GABAPENTIN 800 MILLIGRAM(S): 400 CAPSULE ORAL at 22:48

## 2019-04-10 RX ADMIN — Medication 24 UNIT(S): at 09:23

## 2019-04-10 RX ADMIN — Medication 4: at 14:58

## 2019-04-10 RX ADMIN — GABAPENTIN 800 MILLIGRAM(S): 400 CAPSULE ORAL at 06:31

## 2019-04-10 RX ADMIN — PANTOPRAZOLE SODIUM 40 MILLIGRAM(S): 20 TABLET, DELAYED RELEASE ORAL at 06:31

## 2019-04-10 RX ADMIN — Medication 145 MILLIGRAM(S): at 12:38

## 2019-04-10 RX ADMIN — GABAPENTIN 800 MILLIGRAM(S): 400 CAPSULE ORAL at 14:59

## 2019-04-10 RX ADMIN — HEPARIN SODIUM 5000 UNIT(S): 5000 INJECTION INTRAVENOUS; SUBCUTANEOUS at 14:59

## 2019-04-10 RX ADMIN — HEPARIN SODIUM 5000 UNIT(S): 5000 INJECTION INTRAVENOUS; SUBCUTANEOUS at 22:45

## 2019-04-10 RX ADMIN — Medication 100 MILLIGRAM(S): at 07:24

## 2019-04-10 RX ADMIN — Medication 100 MILLIGRAM(S): at 17:25

## 2019-04-10 RX ADMIN — Medication 24 UNIT(S): at 14:58

## 2019-04-10 RX ADMIN — HYDROMORPHONE HYDROCHLORIDE 30 MILLILITER(S): 2 INJECTION INTRAMUSCULAR; INTRAVENOUS; SUBCUTANEOUS at 07:25

## 2019-04-10 RX ADMIN — HYDROMORPHONE HYDROCHLORIDE 30 MILLILITER(S): 2 INJECTION INTRAMUSCULAR; INTRAVENOUS; SUBCUTANEOUS at 03:07

## 2019-04-10 RX ADMIN — Medication 25 MILLIGRAM(S): at 06:31

## 2019-04-10 RX ADMIN — OXYCODONE HYDROCHLORIDE 10 MILLIGRAM(S): 5 TABLET ORAL at 22:45

## 2019-04-10 RX ADMIN — Medication 100 MILLIGRAM(S): at 06:31

## 2019-04-10 RX ADMIN — OXYCODONE AND ACETAMINOPHEN 1 TABLET(S): 5; 325 TABLET ORAL at 12:38

## 2019-04-10 RX ADMIN — OXYCODONE HYDROCHLORIDE 10 MILLIGRAM(S): 5 TABLET ORAL at 23:15

## 2019-04-10 NOTE — PHYSICAL THERAPY INITIAL EVALUATION ADULT - DID THE PATIENT HAVE SURGERY?
yes/Irrigation and debridement of R forearm, wound
s/p Irrigation and debridement of R forearm, wound, with primary closure./yes

## 2019-04-10 NOTE — PROGRESS NOTE ADULT - SUBJECTIVE AND OBJECTIVE BOX
Patient seen and examined. C/o yee-incisional forearm pain. No acute events overnight.     Vital Signs Last 24 Hrs  T(C): 36.6 (10 Apr 2019 06:05), Max: 37.2 (09 Apr 2019 22:10)  T(F): 97.8 (10 Apr 2019 06:05), Max: 99 (09 Apr 2019 22:10)  HR: 85 (10 Apr 2019 06:05) (79 - 98)  BP: 125/82 (10 Apr 2019 06:05) (89/55 - 158/74)  BP(mean): 108 (09 Apr 2019 15:00) (92 - 108)  RR: 18 (10 Apr 2019 06:05) (14 - 20)  SpO2: 97% (10 Apr 2019 06:05) (95% - 99%)    Physical Exam  Gen: NAD    RUE:   Dressing CDI; Posey block in place  HV x 1: 10/10 cc S/S  AIN/PIN/m/r intact, no ulnar nerve function distal to elbow, ulnar claw hand  SILT m/r, no sensation along ulnar border  WWP brisk cap refill  Compartments soft and compressible    A/P: 58y Female s/p I& D R Forearm w/ Closure    - Follow up  OR Cx      cont Ancef  -Pain control;  on pca  -DVT ppx; sub-Q Heparin  - Continue drain  -Am labs  -PT eval: NWB : shoulder / wrist  / digital ROM  -Dispo planning: anticipate home

## 2019-04-10 NOTE — PHYSICAL THERAPY INITIAL EVALUATION ADULT - PERTINENT HX OF CURRENT PROBLEM, REHAB EVAL
58y Female community ambulatory presents c/o right forearm pain and swelling onset last weekend after physical therapy with some noted erythema to the forearm. 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
58y Female community ambulatory presents c/o right forearm pain and swelling onset last weekend after physical therapy with some noted erythema to the forearm. 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

## 2019-04-10 NOTE — DIETITIAN INITIAL EVALUATION ADULT. - OTHER INFO
Pt seen for length of stay on 7TOW. Pt reports good po intake in-patient x 7 days. No c/o nausea, vomiting, diarrhea, or constipation, denies chewing/swallowing difficulties. Pt dosing wt noted as 210 lbs. Per chart, pt with wt of 222 lbs noted in 6/2017, indicative of non-significant 12 lb wt loss > 1.5 years. Pt last HgbA1c 9.4% indicative of uncontrolled BG PTA. Pt reports she had gotten her HgbA1c down to ~7.5% in December/January, however she knows it has gone up since. Pt reports taking insulin as determined by MD at home, pre-meal bolus is novolog and she takes tresiba once a day as well. She also takes trulicity. No oral meds. Pt reports she follows up with her PCP to manage her DM, however her PCP has recommended for her to see an endocrinologist for further DM management. Pt has yet to follow this recommendation. Pt reports SMBG 3x/daily PTA with her usual pre-meal range 190-220 and no hypoglycemia events noted. See recommendations below.

## 2019-04-10 NOTE — PROGRESS NOTE ADULT - ATTENDING COMMENTS
Alexis Gabriel  Attending Physician   Division of Infectious Disease  Pager #[phone]  After 5pm/weekend or no response, call #[phone]    Please call the ID service [phone] with questions or concerns over the weekend.

## 2019-04-10 NOTE — DIETITIAN INITIAL EVALUATION ADULT. - NS AS NUTRI INTERV MEALS SNACK3
Continue Consistent Carbohydrate (with evening snacks) therapeutic diet. Provide/review nutrition education as indicated./General/healthful diet

## 2019-04-10 NOTE — OCCUPATIONAL THERAPY INITIAL EVALUATION ADULT - NS ASR FOLLOW COMMAND OT EVAL
able to follow multistep instructions/100% of the time
able to follow multistep instructions/100% of the time

## 2019-04-10 NOTE — PHYSICAL THERAPY INITIAL EVALUATION ADULT - PRECAUTIONS/LIMITATIONS, REHAB EVAL
CONTINUE: Pt now s/p surgery mentioned above./surgical precautions
pt is nows/p Irrigation and debridement of R forearm, wound, with primary closure.

## 2019-04-10 NOTE — DISCHARGE NOTE PROVIDER - HOSPITAL COURSE
History of Present Illness:     58y Female community ambulatory presents c/o right forearm pain and swelling onset last weekend after physical therapy with some noted erythema to the forearm. The patient has a history of multiple surgeries on her right forearm and elbow after an injury. She was sent outpatient for an MRI after the noted swelling which showed a fluid collection and was sent to the ED. 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 Denies HS/LOC. Denies numbness/tingling. No other pain/injuries. Denies fevers/chills.         04/04/19: Patient arrived to [facility] [place] with complaints of Right forearm fluid collection. Patient evaluated by internal medicine team for surgical clearance for an irrigation and debridement of the right forearm.     04/05/19: Patient underwent irrigation and debridement with wound-vac placement of right forearm. Patient tolerated procedure well. Patient evaluated by infectious disease team for antibiotic reccomendations.    04/07/19: Patient underwent bedside insertion of peripheral intravenous central catheter. Patient tolerated procedure well.    04/09/19: Patient underwent primary closure of right forearm wound. Patient tolerated procedure well. Patient evaluated and treated by physical therapy whom recommended home with outpatient physical therapy for discahrge. History of Present Illness:     58y Female community ambulatory presents c/o right forearm pain and swelling onset last weekend after physical therapy with some noted erythema to the forearm. The patient has a history of multiple surgeries on her right forearm and elbow after an injury. She was sent outpatient for an MRI after the noted swelling which showed a fluid collection and was sent to the ED. 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 Denies HS/LOC. Denies numbness/tingling. No other pain/injuries. Denies fevers/chills.         04/04/19: Patient arrived to [facility] emergency department with complaints of Right forearm fluid collection. Patient evaluated by internal medicine team for surgical clearance for an irrigation and debridement of the right forearm.     04/05/19: Patient underwent irrigation and debridement with wound-vac placement of right forearm. Patient tolerated procedure well. Patient evaluated by infectious disease team for antibiotic reccomendations.    04/07/19: Patient underwent bedside insertion of peripherally inserted central catheter. Patient tolerated procedure well.    04/09/19: Patient underwent primary closure of right forearm wound. Patient tolerated procedure well. Patient evaluated and treated by physical therapy whom recommended home with outpatient physical therapy for discharge.     Remainder of hospital stay unremarkable. Patient stable and ready for discharge when drains d/c'd. History of Present Illness:     58y Female community ambulatory presents c/o right forearm pain and swelling onset last weekend after physical therapy with some noted erythema to the forearm. The patient has a history of multiple surgeries on her right forearm and elbow after an injury. She was sent outpatient for an MRI after the noted swelling which showed a fluid collection and was sent to the ED. 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 Denies HS/LOC. Denies numbness/tingling. No other pain/injuries. Denies fevers/chills.         04/04/19: Patient arrived to [facility] emergency department with complaints of Right forearm fluid collection. Patient evaluated by internal medicine team for surgical clearance for an irrigation and debridement of the right forearm.     04/05/19: Patient underwent irrigation and debridement with wound-vac placement of right forearm. Patient tolerated procedure well. Patient evaluated by infectious disease team for antibiotic reccomendations.    04/07/19: Patient underwent bedside insertion of peripherally inserted central catheter. Patient tolerated procedure well.    04/09/19: Patient underwent primary closure of right forearm wound. Patient tolerated procedure well. Patient was evaluated perioperatively by house Endocrinology and patient was advised to resume previous home anti-diabetic  regime and follow up with her private Endocrinologist once discharged from hospital.  Patient evaluated and treated by physical therapy whom recommended home with outpatient physical therapy for discharge.     Remainder of hospital stay unremarkable. Patient stable and ready for discharge when drains d/c'd. History of Present Illness:     58y Female community ambulatory presents c/o right forearm pain and swelling onset last weekend after physical therapy with some noted erythema to the forearm. The patient has a history of multiple surgeries on her right forearm and elbow after an injury. She was sent outpatient for an MRI after the noted swelling which showed a fluid collection and was sent to the ED. 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 Denies HS/LOC. Denies numbness/tingling. No other pain/injuries. Denies fevers/chills.         04/04/19: Patient arrived to [facility] emergency department with complaints of Right forearm fluid collection. Patient evaluated by internal medicine team for surgical clearance for an irrigation and debridement of the right forearm.     04/05/19: Patient underwent irrigation and debridement with wound-vac placement of right forearm. Patient tolerated procedure well. Patient evaluated by infectious disease team for antibiotic reccomendations.    04/07/19: Patient evaluated by [facility] who advised  PICC line insertion and 6 weeks IV antibiotics. Patient underwent bedside insertion of peripherally inserted central catheter. Patient tolerated procedure well.    04/09/19: Patient underwent primary closure of right forearm wound. Patient tolerated procedure well. Patient was evaluated perioperatively by house Endocrinology and patient was advised to resume previous home anti-diabetic  regime and follow up with her private Endocrinologist once discharged from hospital.  Patient evaluated and treated by physical therapy whom recommended home with outpatient physical therapy for discharge.     Remainder of hospital stay unremarkable. Patient stable and ready for discharge when drains d/c'd. Patient is to follow up with LUIS ALBERTO Torres in 2 weeks and follow up with Dr Maloney post operative day #14 (4/23/19) for wound check and suture removal.

## 2019-04-10 NOTE — DIETITIAN INITIAL EVALUATION ADULT. - NS AS NUTRI INTERV ED CONTENT
Pt states "I know what to do, I just don't do it." Pt lists carbohydrate containing foods, reviewed strategies for portion control and replacing high carbohydrate foods with lower carbohydrate foods. Encouraged continued avoidance of sugar sweetened beverages and physical activity. Reinforced importance of SMBG. Encouraged pt to follow up with endocrinologist and RD as outpatient. Pt declines written materials, made aware RD remains available./Purpose of the nutrition education/Other (specify)

## 2019-04-10 NOTE — PHYSICAL THERAPY INITIAL EVALUATION ADULT - GAIT DEVIATIONS NOTED, PT EVAL
decreased weight-shifting ability/decreased step length/decreased holly
able to walk and talk, no loss of balance or gait deviations noted

## 2019-04-10 NOTE — OCCUPATIONAL THERAPY INITIAL EVALUATION ADULT - RANGE OF MOTION EXAMINATION, UPPER EXTREMITY
RUE AROM shoulder flexion 90 degrees, elbow flexion 1/2 range, elbow extension 3/4 range, wrist flexion/extenion 1/4th range. Digits 3-5 in ulnar claw flexion, almost fixed position./Left UE Active ROM was WFL (within functional limits)
RUE AROM shoulder flexion 90 degrees, elbow flexion 1/2 range, elbow extension 3/4 range, wrist flexion/extenion 1/4th range. Digits 3-5 in ulnar claw flexion, almost fixed position./Left UE Active ROM was WFL (within functional limits)

## 2019-04-10 NOTE — PROGRESS NOTE ADULT - SUBJECTIVE AND OBJECTIVE BOX
Day 1 of Anesthesia Pain Management Service    SUBJECTIVE: Patient states  IV PCA is not relieving pain     Pain Scale Score:	[X] Refer to charted pain scores    THERAPY:    [ ] IV PCA Morphine		[ ] 5 mg/mL	[ ] 1 mg/mL  [X] IV PCA Hydromorphone	[ ] 5 mg/mL	[X] 1 mg/mL  [ ] IV PCA Fentanyl		[ ] 50 micrograms/mL    Demand dose: 0.2 mg     Lockout: 6 minutes   Continuous Rate: 0 mg/hr  4 Hour Limit: 4 mg    MEDICATIONS  (STANDING):  atorvastatin 40 milliGRAM(s) Oral at bedtime  ceFAZolin   IVPB 2000 milliGRAM(s) IV Intermittent every 8 hours  dextrose 5%. 1000 milliLiter(s) (50 mL/Hr) IV Continuous <Continuous>  dextrose 50% Injectable 12.5 Gram(s) IV Push once  dextrose 50% Injectable 25 Gram(s) IV Push once  dextrose 50% Injectable 25 Gram(s) IV Push once  docusate sodium 100 milliGRAM(s) Oral three times a day  fenofibrate Tablet 145 milliGRAM(s) Oral daily  gabapentin 800 milliGRAM(s) Oral three times a day  heparin  Injectable 5000 Unit(s) SubCutaneous every 8 hours  insulin glargine Injectable (LANTUS) 56 Unit(s) SubCutaneous at bedtime  insulin lispro (HumaLOG) corrective regimen sliding scale   SubCutaneous three times a day before meals  insulin lispro (HumaLOG) corrective regimen sliding scale   SubCutaneous at bedtime  insulin lispro Injectable (HumaLOG) 24 Unit(s) SubCutaneous three times a day before meals  metoprolol succinate ER 25 milliGRAM(s) Oral daily  pantoprazole    Tablet 40 milliGRAM(s) Oral before breakfast  polyethylene glycol 3350 17 Gram(s) Oral daily  senna 2 Tablet(s) Oral at bedtime  sodium chloride 0.9%. 1000 milliLiter(s) (125 mL/Hr) IV Continuous <Continuous>    MEDICATIONS  (PRN):  dextrose 40% Gel 15 Gram(s) Oral once PRN Blood Glucose LESS THAN 70 milliGRAM(s)/deciliter  glucagon  Injectable 1 milliGRAM(s) IntraMuscular once PRN Glucose LESS THAN 70 milligrams/deciliter  magnesium hydroxide Suspension 30 milliLiter(s) Oral daily PRN Constipation  melatonin 3 milliGRAM(s) Oral at bedtime PRN Insomnia  metoclopramide Injectable 10 milliGRAM(s) IV Push every 8 hours PRN nausea / vomitting  ondansetron Injectable 4 milliGRAM(s) IV Push every 6 hours PRN Nausea and/or Vomiting  oxyCODONE    5 mG/acetaminophen 325 mG 1 Tablet(s) Oral every 4 hours PRN Moderate Pain (4 - 6)      OBJECTIVE:    Sedation Score:	[ X] Alert	[ ] Drowsy 	[ ] Arousable	[ ] Asleep	[ ] Unresponsive    Side Effects:	[X ] None	[ ] Nausea	[ ] Vomiting	[ ] Pruritus  		[ ] Other:    Vital Signs Last 24 Hrs  T(C): 36.6 (10 Apr 2019 06:05), Max: 37.2 (09 Apr 2019 22:10)  T(F): 97.8 (10 Apr 2019 06:05), Max: 99 (09 Apr 2019 22:10)  HR: 85 (10 Apr 2019 06:05) (79 - 98)  BP: 125/82 (10 Apr 2019 06:05) (89/55 - 158/74)  BP(mean): 108 (09 Apr 2019 15:00) (92 - 108)  RR: 18 (10 Apr 2019 06:05) (14 - 20)  SpO2: 97% (10 Apr 2019 06:05) (95% - 99%)    ASSESSMENT/ PLAN    Therapy to  be:               [] Continued   [X ] Discontinued   [X ] Changed to PRN Analgesics    Documentation and Verification of current medications:   [X] Done	[ ] Not done, not eligible    Comments:  Pt states IVPCA is not relieving pain. Plan to change to PRN Oxycodone

## 2019-04-10 NOTE — DISCHARGE NOTE PROVIDER - NSDCACTIVITY_GEN_ALL_CORE
Stairs allowed/Walking - Indoors allowed/No heavy lifting/straining/Walking - Outdoors allowed Do not make important decisions/Stairs allowed/Walking - Indoors allowed/No heavy lifting/straining/Do not drive or operate machinery/Walking - Outdoors allowed

## 2019-04-10 NOTE — PROGRESS NOTE ADULT - SUBJECTIVE AND OBJECTIVE BOX
Diabetes Follow up note:  Interval Hx:        Review of Systems:  General:  GI: Tolerating POs without any N/V/D/ABD PAIN.  CV: No CP/SOB  ENDO: No S&Sx of hypoglycemia  MEDS:  atorvastatin 40 milliGRAM(s) Oral at bedtime    fenofibrate Tablet 145 milliGRAM(s) Oral daily    insulin glargine Injectable (LANTUS) 56 Unit(s) SubCutaneous at bedtime  insulin lispro (HumaLOG) corrective regimen sliding scale   SubCutaneous three times a day before meals  insulin lispro (HumaLOG) corrective regimen sliding scale   SubCutaneous at bedtime  insulin lispro Injectable (HumaLOG) 24 Unit(s) SubCutaneous three times a day before meals    ceFAZolin   IVPB 2000 milliGRAM(s) IV Intermittent every 8 hours    Allergies    No Known Allergies        PE:  General:  Vital Signs Last 24 Hrs  T(C): 36.8 (10 Apr 2019 10:32), Max: 37.2 (09 Apr 2019 22:10)  T(F): 98.3 (10 Apr 2019 10:32), Max: 99 (09 Apr 2019 22:10)  HR: 87 (10 Apr 2019 10:32) (80 - 95)  BP: 137/82 (10 Apr 2019 10:32) (89/55 - 155/75)  BP(mean): 108 (09 Apr 2019 15:00) (108 - 108)  RR: 18 (10 Apr 2019 10:32) (14 - 18)  SpO2: 94% (10 Apr 2019 10:32) (94% - 99%)  Abd: Soft, NT,ND,   Extremities: Warm  Neuro: A&O X3    LABS:    POCT Blood Glucose.: 245 mg/dL (04-10-19 @ 14:31)  POCT Blood Glucose.: 132 mg/dL (04-10-19 @ 09:01)  POCT Blood Glucose.: 301 mg/dL (04-09-19 @ 22:28)  POCT Blood Glucose.: 224 mg/dL (04-09-19 @ 15:34)  POCT Blood Glucose.: 117 mg/dL (04-09-19 @ 06:16)  POCT Blood Glucose.: 263 mg/dL (04-08-19 @ 23:25)  POCT Blood Glucose.: 154 mg/dL (04-08-19 @ 19:45)  POCT Blood Glucose.: 152 mg/dL (04-08-19 @ 14:08)  POCT Blood Glucose.: 123 mg/dL (04-08-19 @ 09:36)  POCT Blood Glucose.: 168 mg/dL (04-07-19 @ 21:26)  POCT Blood Glucose.: 133 mg/dL (04-07-19 @ 18:47)                            10.6   10.2  )-----------( 385      ( 09 Apr 2019 04:47 )             31.1       04-09    142  |  103  |  20  ----------------------------<  135<H>  3.9   |  26  |  1.02    Ca    8.8      09 Apr 2019 04:47        Thyroid Function Tests:      Hemoglobin A1C, Whole Blood: 9.4 % <H> [4.0 - 5.6] (04-06-19 @ 10:07)  Hemoglobin A1C, Whole Blood: 9.1 % <H> [4.0 - 5.6] (04-05-19 @ 09:26)            Contact number: aline [phone] or [phone] Diabetes Follow up note:  Interval Hx:  58 year old female w/uncontrolled T2DM w/retinopathy, neuropathy here for elbow osteomyelitis s/p I & D. Pt reports improved PO intake today. Fasting glucose at goal but pre-lunch glucose in mid 200s. No hypoglycemia symptoms. Reports had recently gotten A1C down to 7.1% but has been elevated in setting of recent infection. Pt discussed how she has cut down on pasta and italian bread recently.       Review of Systems:  General: + arm pain  GI: Tolerating POs without any N/V/D/ABD PAIN.  CV: No CP/SOB  ENDO: No S&Sx of hypoglycemia  MEDS:  atorvastatin 40 milliGRAM(s) Oral at bedtime    fenofibrate Tablet 145 milliGRAM(s) Oral daily    insulin glargine Injectable (LANTUS) 56 Unit(s) SubCutaneous at bedtime  insulin lispro (HumaLOG) corrective regimen sliding scale   SubCutaneous three times a day before meals  insulin lispro (HumaLOG) corrective regimen sliding scale   SubCutaneous at bedtime  insulin lispro Injectable (HumaLOG) 24 Unit(s) SubCutaneous three times a day before meals    ceFAZolin   IVPB 2000 milliGRAM(s) IV Intermittent every 8 hours    Allergies    No Known Allergies        PE:  General: Female sitting in chair. NAD.   Vital Signs Last 24 Hrs  T(C): 36.8 (10 Apr 2019 10:32), Max: 37.2 (09 Apr 2019 22:10)  T(F): 98.3 (10 Apr 2019 10:32), Max: 99 (09 Apr 2019 22:10)  HR: 87 (10 Apr 2019 10:32) (80 - 95)  BP: 137/82 (10 Apr 2019 10:32) (89/55 - 155/75)  BP(mean): 108 (09 Apr 2019 15:00) (108 - 108)  RR: 18 (10 Apr 2019 10:32) (14 - 18)  SpO2: 94% (10 Apr 2019 10:32) (94% - 99%)  Abd: Soft, NT,ND, Obese.   Extremities: Warm. R arm w/ace wrap and drain c/d/i  Neuro: A&O X3    LABS:    POCT Blood Glucose.: 245 mg/dL (04-10-19 @ 14:31)  POCT Blood Glucose.: 132 mg/dL (04-10-19 @ 09:01)  POCT Blood Glucose.: 301 mg/dL (04-09-19 @ 22:28)  POCT Blood Glucose.: 224 mg/dL (04-09-19 @ 15:34)  POCT Blood Glucose.: 117 mg/dL (04-09-19 @ 06:16)  POCT Blood Glucose.: 263 mg/dL (04-08-19 @ 23:25)  POCT Blood Glucose.: 154 mg/dL (04-08-19 @ 19:45)  POCT Blood Glucose.: 152 mg/dL (04-08-19 @ 14:08)  POCT Blood Glucose.: 123 mg/dL (04-08-19 @ 09:36)  POCT Blood Glucose.: 168 mg/dL (04-07-19 @ 21:26)  POCT Blood Glucose.: 133 mg/dL (04-07-19 @ 18:47)                            10.6   10.2  )-----------( 385      ( 09 Apr 2019 04:47 )             31.1       04-09    142  |  103  |  20  ----------------------------<  135<H>  3.9   |  26  |  1.02    Ca    8.8      09 Apr 2019 04:47        Hemoglobin A1C, Whole Blood: 9.4 % <H> [4.0 - 5.6] (04-06-19 @ 10:07)  Hemoglobin A1C, Whole Blood: 9.1 % <H> [4.0 - 5.6] (04-05-19 @ 09:26)            Contact number: aline [phone] or [phone]

## 2019-04-10 NOTE — PROGRESS NOTE ADULT - ASSESSMENT
58 year old female with long term history of DM here for elbow osteomyelitis s/p wash out on 04/09/2019, endocrine consulted for uncontrolled DM. PO intake improved w/elevated BG levels. Will increase mealtime insulin to improve glycemic control. Tolerating POs. No hypoglycemia. BG goal (100-180mg/dl).

## 2019-04-10 NOTE — DIETITIAN INITIAL EVALUATION ADULT. - PERTINENT MEDS FT
lantus, humalog SS, humalog, dulcolax, lipitor, colace, Mg hydroxide, reglan, zofran, miralax, senna

## 2019-04-10 NOTE — DIETITIAN INITIAL EVALUATION ADULT. - ADHERENCE
poor/Pt diet recall indicative of excessive carbohydrate intake. Regular consumption of bagels, croissants, and pasta, and while she has cut back on the portions and she has switched mostly to whole grain, she agrees she still eats too much of these foods. Pt reports no soda consumption PTA, however reports trying to order a soda while she is here to "help her with her meal," however it was not allowed on her therapeutic diet.

## 2019-04-10 NOTE — DISCHARGE NOTE PROVIDER - CARE PROVIDER_API CALL
Karyna Maloney)  Orthopaedic Surgery  [address], [address]  [place] [address]  Phone: [phone]  Fax: [phone]  Follow Up Time:     Juanpablo Torres)  Infectious Disease; Internal Medicine  [address] [place] [id number]  Phone: [phone]  Fax: [phone]  Follow Up Time:
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
## 2019-04-10 NOTE — PROGRESS NOTE ADULT - ATTENDING COMMENTS
She is now status post second OR for incision and drainage of her right forearm fluid collection, performed on April 9, 2019. The patient complains of severe incisional arm pain, immediately postop. No medical complications post-op to date and to proceed with physical therapy, as tolerated. Continues pulmonary toilet to lessen atelectasis, leg exercises as taught to lessen the risk of DVT and supervised pain medications for post-op pain control. She is now status post second OR for incision and drainage of her right forearm fluid collection, performed on April 9, 2019. The patient complains of severe incisional arm pain, immediately postop. No medical complications post-op to date and to proceed with physical therapy, as tolerated. Continues pulmonary toilet to lessen atelectasis, leg exercises as taught to lessen the risk of DVT and supervised pain medications for post-op pain control. I am a non participating [employer] physician seeing Pt in coverage for Dr Fry She is now status post second OR for incision and drainage of her right forearm fluid collection, performed on April 9, 2019. The patient complains of severe incisional arm pain, immediately postop. No medical complications post-op to date and to proceed with physical therapy, as tolerated. Continues pulmonary toilet to lessen atelectasis, leg exercises as taught to lessen the risk of DVT and supervised pain medications for post-op pain control. I am a non participating [employer] physician seeing Pt in coverage for Dr Fry. The above patient examination was reviewed with Dr. Munguia and I agree with his evaluation, assessment and treatment plan.  Jeremiah Fry M.D.

## 2019-04-10 NOTE — PROGRESS NOTE ADULT - ASSESSMENT
58 yr. pt has fracture that was treated  with an external fixator that developed a pin site MRSA about one year ago.  It was do to MRSA and she was treated with  debridement an prolonged home IV ab.  Pt will well after completing ab.  Represented to ortho with pain , swelling and elevated esr.   MRI demonstrated a collection  and chronic ulnar loss of function  Pt went for a debridement this am  of the same site that has infected last year ( external fix ).  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  cultures positve for mssa  discussed with Dr. Mcguire.  Joyce debridement to ro occult sequestrum would require a plate whci I would try to aviod in the setting of active infection  plan 6 weeks of IV therapy  if she relapses , will need further dbridement even with risk of fracture  pt to see me in the office  will likely go to po ab after IV

## 2019-04-10 NOTE — PROGRESS NOTE ADULT - ASSESSMENT
Patient is a 58y old  Female s/p R forearm I&D/VAC placement.  Change diet and lifestyle to improve diabetic control has been made. She is now status post second OR for incision and drainage of her right forearm fluid collection, performed on April 9, 2019. The patient complains of severe incisional arm pain,

## 2019-04-10 NOTE — PHYSICAL THERAPY INITIAL EVALUATION ADULT - ADDITIONAL COMMENTS
Pt lives with spouse in a private home with 5 stairs to enter (+ bilateral handrails), full flight to basement and full flight to bedroom (+bilateral handrails). Pt states prior to admission she was independent with all functional mobility without AD. Pt states prior to admission her spouse would assist her with ADLs. Pt states prior to admission she was receiving outpatient PT and OT services.
Pt lives with spouse in a private home with 5 stairs to enter (+ bilateral handrails), full flight to basement and full flight to bedroom (+bilateral handrails). Pt states prior to admission she was independent with all functional mobility without AD. Pt states prior to admission her spouse would assist her with ADLs. Pt states prior to admission she was receiving outpatient PT and OT services.

## 2019-04-10 NOTE — DISCHARGE NOTE PROVIDER - NSDCFUADDINST_GEN_ALL_CORE_FT
Please follow up with Dr. Maloney in ******.   Please keep dressings clean, dry, and intact.   Continue to Please follow up with Dr. Maloney in 10-12 days for wound check and suture removal. Please keep dressings clean, dry, and intact, elevate in Posey block.   Continue antibiotic via PICC line for an additional 40 days, with weekly blood draws for CBC, CMP, SED, CRP with results sent to Dr Torres. Follow up with infectious disease Dr Torres in 2 weeks in his office. Follow up with your Endocrinologist Please follow up with Dr. Maloney in 10-12 days for wound check and suture removal. Please keep dressings clean, dry, and intact, elevate in Posey block.   Continue antibiotic via PICC line for an additional 40 days, with weekly blood draws for CBC, CMP, SED, CRP with results sent to Dr Torres. Follow up with infectious disease Dr Torres in 2 weeks in his office. Follow up with your Endocrinologist Dr. KENDRICK Gruber [phone] in 1 week

## 2019-04-10 NOTE — DIETITIAN INITIAL EVALUATION ADULT. - NS AS NUTRI INTERV MEALS SNACK
Recommend continue current Consistent Carbohydrate (with evening snacks) therapeutic diet and Promote adequate BG control, recommend adjust insulin as needed./General/healthful diet

## 2019-04-10 NOTE — PHYSICAL THERAPY INITIAL EVALUATION ADULT - DIAGNOSIS, PT EVAL
Decreased strength, flexibility and ROM of RUE, inc pain
Decreased strength, flexibility and ROM of RUE, inc pain
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
## 2019-04-10 NOTE — OCCUPATIONAL THERAPY INITIAL EVALUATION ADULT - RANGE OF MOTION EXAMINATION, LOWER EXTREMITY
bilateral LE Active ROM was WFL  (within functional limits)
bilateral LE Active ROM was WFL  (within functional limits)

## 2019-04-10 NOTE — PROGRESS NOTE ADULT - SUBJECTIVE AND OBJECTIVE BOX
infectious diseases progress note:    Patient is a 58y old  Female who presents with a chief complaint of Right forearm collection (10 Apr 2019 06:09)        Effusion of right elbow        ROS:  CONSTITUTIONAL:  Negative fever or chills, feels well, good appetite  EYES:  Negative  blurry vision or double vision  CARDIOVASCULAR:  Negative for chest pain or palpitations  RESPIRATORY:  Negative for cough, wheezing, or SOB   GASTROINTESTINAL:  Negative for nausea, vomiting, diarrhea, constipation, or abdominal pain  GENITOURINARY:  Negative frequency, urgency or dysuria  NEUROLOGIC:  No headache, confusion, dizziness, lightheadedness    Allergies    No Known Allergies    Intolerances        ANTIBIOTICS/RELEVANT:  antimicrobials  ceFAZolin   IVPB 2000 milliGRAM(s) IV Intermittent every 8 hours    immunologic:    OTHER:  atorvastatin 40 milliGRAM(s) Oral at bedtime  dextrose 40% Gel 15 Gram(s) Oral once PRN  dextrose 5%. 1000 milliLiter(s) IV Continuous <Continuous>  dextrose 50% Injectable 12.5 Gram(s) IV Push once  dextrose 50% Injectable 25 Gram(s) IV Push once  dextrose 50% Injectable 25 Gram(s) IV Push once  docusate sodium 100 milliGRAM(s) Oral three times a day  fenofibrate Tablet 145 milliGRAM(s) Oral daily  gabapentin 800 milliGRAM(s) Oral three times a day  glucagon  Injectable 1 milliGRAM(s) IntraMuscular once PRN  heparin  Injectable 5000 Unit(s) SubCutaneous every 8 hours  HYDROmorphone PCA (1 mG/mL) 30 milliLiter(s) PCA Continuous PCA Continuous  HYDROmorphone PCA (1 mG/mL) Rescue Clinician Bolus 0.5 milliGRAM(s) IV Push every 15 minutes PRN  insulin glargine Injectable (LANTUS) 56 Unit(s) SubCutaneous at bedtime  insulin lispro (HumaLOG) corrective regimen sliding scale   SubCutaneous three times a day before meals  insulin lispro (HumaLOG) corrective regimen sliding scale   SubCutaneous at bedtime  insulin lispro Injectable (HumaLOG) 24 Unit(s) SubCutaneous three times a day before meals  magnesium hydroxide Suspension 30 milliLiter(s) Oral daily PRN  melatonin 3 milliGRAM(s) Oral at bedtime PRN  metoclopramide Injectable 10 milliGRAM(s) IV Push every 8 hours PRN  metoprolol succinate ER 25 milliGRAM(s) Oral daily  naloxone Injectable 0.1 milliGRAM(s) IV Push every 3 minutes PRN  ondansetron Injectable 4 milliGRAM(s) IV Push every 6 hours PRN  pantoprazole    Tablet 40 milliGRAM(s) Oral before breakfast  polyethylene glycol 3350 17 Gram(s) Oral daily  senna 2 Tablet(s) Oral at bedtime  sodium chloride 0.9%. 1000 milliLiter(s) IV Continuous <Continuous>      Objective:  Vital Signs Last 24 Hrs  T(C): 36.6 (10 Apr 2019 06:05), Max: 37.2 (09 Apr 2019 22:10)  T(F): 97.8 (10 Apr 2019 06:05), Max: 99 (09 Apr 2019 22:10)  HR: 85 (10 Apr 2019 06:05) (79 - 98)  BP: 125/82 (10 Apr 2019 06:05) (89/55 - 158/74)  BP(mean): 108 (09 Apr 2019 15:00) (92 - 108)  RR: 18 (10 Apr 2019 06:05) (14 - 20)  SpO2: 97% (10 Apr 2019 06:05) (95% - 99%)    PHYSICAL EXAM:     Eyes:ESTHER, EOMI  Ear/Nose/Throat: no oral lesion, no sinus tenderness on percussion	  Neck:no JVD, no lymphadenopathy, supple  Respiratory: CTA jaime  Cardiovascular: S1S2 RRR, no murmurs  Gastrointestinal:soft, (+) BS, no HSM  Extremities:no imn dressing         LABS:                        10.6   10.2  )-----------( 385      ( 09 Apr 2019 04:47 )             31.1     04-09    142  |  103  |  20  ----------------------------<  135<H>  3.9   |  26  |  1.02    Ca    8.8      09 Apr 2019 04:47      PT/INR - ( 09 Apr 2019 04:47 )   PT: 12.6 sec;   INR: 1.09 ratio         PTT - ( 09 Apr 2019 04:47 )  PTT:34.4 sec        MICROBIOLOGY:    RECENT CULTURES:  04-09 @ 18:54 .Tissue       No polymorphonuclear cells seen per low power field  No organisms seen per oil power field           Testing in progress    04-09 @ 18:53 .Other                Testing in progress    04-09 @ 18:51 .Other                Testing in progress    04-05 @ 17:16 .Surgical Swab right forearm                Numerous Staphylococcus aureus  See previous culture [id number]    04-05 @ 17:12 .Tissue Other, right forearm   KRYSTYNA    Rare polymorphonuclear leukocytes per low power field  Few Gram positive cocci in pairs per oil power field    Staphylococcus aureus  Staphylococcus aureus     Numerous Staphylococcus aureus  See previous culture [id number]    04-05 @ 16:57 .Tissue Other, right forearm                Testing in progress    04-05 @ 16:46 .Other Other, right forearm   KRYSTYNA      Staphylococcus aureus  Staphylococcus aureus     Testing in progress    04-05 @ 16:40 .Other Other, right forearm                Testing in progress    04-04 @ 22:54 .Blood Blood-Peripheral                No growth at 5 days.          RESPIRATORY CULTURES:              RADIOLOGY & ADDITIONAL STUDIES:        Pager [phone]  After 5 pm/weekends or if no response :[phone]

## 2019-04-10 NOTE — DIETITIAN INITIAL EVALUATION ADULT. - PHYSICAL APPEARANCE
BMI 41.0, Pt visually appears well nourished with no signs of muscle wasting or fat depletion./obese/other (specify)/well nourished

## 2019-04-10 NOTE — OCCUPATIONAL THERAPY INITIAL EVALUATION ADULT - LIVES WITH, PROFILE
children/Pt lives with spouse &son in private house, 5 stairs to enter +rail, 1 flight of stairs to bed/bath b/l rail, walk in shower +rail, and reading glasses. Pt is right handed. Pt required help from  for showering, dressing, and IADLs. Pt able to eat, groom, and toilet independently./spouse

## 2019-04-10 NOTE — OCCUPATIONAL THERAPY INITIAL EVALUATION ADULT - PRECAUTIONS/LIMITATIONS, REHAB EVAL
Irrigation and debridement of R forearm, wound, 04/05, Pt now s/p R forearm re-exploration, I+D, primary delayed closure
Irrigation and debridement of R forearm, wound, 04/05

## 2019-04-10 NOTE — OCCUPATIONAL THERAPY INITIAL EVALUATION ADULT - PERTINENT HX OF CURRENT PROBLEM, REHAB EVAL
58y Female community ambulatory presents c/o right forearm pain and swelling onset last weekend after physical therapy with some noted erythema to the forearm. 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
58y Female community ambulatory presents c/o right forearm pain and swelling onset last weekend after physical therapy with some noted erythema to the forearm. 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

## 2019-04-10 NOTE — DIETITIAN INITIAL EVALUATION ADULT. - SIGNS/SYMPTOMS
pt diet recall, HgbA1c 9.4%, BMI 41.0, pt with previous knowledge of nutrition recommendations BMI 41.0

## 2019-04-11 LAB
GLUCOSE BLDC GLUCOMTR-MCNC: 139 MG/DL — HIGH (ref 70–99)
GLUCOSE BLDC GLUCOMTR-MCNC: 208 MG/DL — HIGH (ref 70–99)
GLUCOSE BLDC GLUCOMTR-MCNC: 213 MG/DL — HIGH (ref 70–99)
GLUCOSE BLDC GLUCOMTR-MCNC: 236 MG/DL — HIGH (ref 70–99)

## 2019-04-11 PROCEDURE — 99232 SBSQ HOSP IP/OBS MODERATE 35: CPT

## 2019-04-11 RX ORDER — POLYETHYLENE GLYCOL 3350 17 G/17G
17 POWDER, FOR SOLUTION ORAL
Qty: 0 | Refills: 0 | COMMUNITY
Start: 2019-04-11

## 2019-04-11 RX ORDER — CEFAZOLIN SODIUM 1 G
2 VIAL (EA) INJECTION
Qty: 0 | Refills: 0 | COMMUNITY
Start: 2019-04-11

## 2019-04-11 RX ORDER — LIDOCAINE 4 G/100G
1 CREAM TOPICAL DAILY
Qty: 0 | Refills: 0 | Status: DISCONTINUED | OUTPATIENT
Start: 2019-04-11 | End: 2019-04-12

## 2019-04-11 RX ORDER — INSULIN LISPRO 100/ML
34 VIAL (ML) SUBCUTANEOUS
Qty: 0 | Refills: 0 | Status: DISCONTINUED | OUTPATIENT
Start: 2019-04-11 | End: 2019-04-12

## 2019-04-11 RX ORDER — ACETAMINOPHEN 500 MG
1000 TABLET ORAL ONCE
Qty: 0 | Refills: 0 | Status: COMPLETED | OUTPATIENT
Start: 2019-04-11 | End: 2019-04-12

## 2019-04-11 RX ORDER — OXYCODONE HYDROCHLORIDE 5 MG/1
1 TABLET ORAL
Qty: 0 | Refills: 0 | COMMUNITY
Start: 2019-04-11

## 2019-04-11 RX ORDER — CEFAZOLIN SODIUM 1 G
1 VIAL (EA) INJECTION
Qty: 120 | Refills: 0 | OUTPATIENT
Start: 2019-04-11 | End: 2019-05-20

## 2019-04-11 RX ORDER — CEFAZOLIN SODIUM 1 G
2 VIAL (EA) INJECTION
Qty: 240 | Refills: 0 | OUTPATIENT
Start: 2019-04-11 | End: 2019-05-20

## 2019-04-11 RX ORDER — ACETAMINOPHEN 500 MG
1000 TABLET ORAL ONCE
Qty: 0 | Refills: 0 | Status: COMPLETED | OUTPATIENT
Start: 2019-04-11 | End: 2019-04-11

## 2019-04-11 RX ADMIN — ATORVASTATIN CALCIUM 40 MILLIGRAM(S): 80 TABLET, FILM COATED ORAL at 22:19

## 2019-04-11 RX ADMIN — Medication 145 MILLIGRAM(S): at 13:20

## 2019-04-11 RX ADMIN — HEPARIN SODIUM 5000 UNIT(S): 5000 INJECTION INTRAVENOUS; SUBCUTANEOUS at 13:21

## 2019-04-11 RX ADMIN — OXYCODONE HYDROCHLORIDE 10 MILLIGRAM(S): 5 TABLET ORAL at 12:25

## 2019-04-11 RX ADMIN — Medication 400 MILLIGRAM(S): at 01:33

## 2019-04-11 RX ADMIN — Medication 100 MILLIGRAM(S): at 22:19

## 2019-04-11 RX ADMIN — OXYCODONE HYDROCHLORIDE 10 MILLIGRAM(S): 5 TABLET ORAL at 22:45

## 2019-04-11 RX ADMIN — PANTOPRAZOLE SODIUM 40 MILLIGRAM(S): 20 TABLET, DELAYED RELEASE ORAL at 06:22

## 2019-04-11 RX ADMIN — Medication 100 MILLIGRAM(S): at 22:18

## 2019-04-11 RX ADMIN — OXYCODONE HYDROCHLORIDE 10 MILLIGRAM(S): 5 TABLET ORAL at 06:50

## 2019-04-11 RX ADMIN — OXYCODONE HYDROCHLORIDE 5 MILLIGRAM(S): 5 TABLET ORAL at 17:51

## 2019-04-11 RX ADMIN — OXYCODONE HYDROCHLORIDE 10 MILLIGRAM(S): 5 TABLET ORAL at 10:54

## 2019-04-11 RX ADMIN — Medication 100 MILLIGRAM(S): at 00:05

## 2019-04-11 RX ADMIN — HEPARIN SODIUM 5000 UNIT(S): 5000 INJECTION INTRAVENOUS; SUBCUTANEOUS at 06:22

## 2019-04-11 RX ADMIN — OXYCODONE HYDROCHLORIDE 5 MILLIGRAM(S): 5 TABLET ORAL at 17:21

## 2019-04-11 RX ADMIN — OXYCODONE HYDROCHLORIDE 10 MILLIGRAM(S): 5 TABLET ORAL at 06:22

## 2019-04-11 RX ADMIN — GABAPENTIN 800 MILLIGRAM(S): 400 CAPSULE ORAL at 06:22

## 2019-04-11 RX ADMIN — GABAPENTIN 800 MILLIGRAM(S): 400 CAPSULE ORAL at 22:19

## 2019-04-11 RX ADMIN — Medication 25 MILLIGRAM(S): at 06:22

## 2019-04-11 RX ADMIN — GABAPENTIN 800 MILLIGRAM(S): 400 CAPSULE ORAL at 13:20

## 2019-04-11 RX ADMIN — Medication 100 MILLIGRAM(S): at 15:04

## 2019-04-11 RX ADMIN — Medication 34 UNIT(S): at 19:30

## 2019-04-11 RX ADMIN — Medication 25 MILLIGRAM(S): at 00:04

## 2019-04-11 RX ADMIN — OXYCODONE HYDROCHLORIDE 10 MILLIGRAM(S): 5 TABLET ORAL at 22:16

## 2019-04-11 RX ADMIN — Medication 100 MILLIGRAM(S): at 06:22

## 2019-04-11 RX ADMIN — SENNA PLUS 2 TABLET(S): 8.6 TABLET ORAL at 22:19

## 2019-04-11 RX ADMIN — Medication 100 MILLIGRAM(S): at 07:47

## 2019-04-11 RX ADMIN — Medication 1000 MILLIGRAM(S): at 01:48

## 2019-04-11 RX ADMIN — Medication 0: at 22:20

## 2019-04-11 RX ADMIN — Medication 30 UNIT(S): at 13:21

## 2019-04-11 RX ADMIN — INSULIN GLARGINE 56 UNIT(S): 100 INJECTION, SOLUTION SUBCUTANEOUS at 22:18

## 2019-04-11 RX ADMIN — Medication 4: at 19:30

## 2019-04-11 RX ADMIN — Medication 30 UNIT(S): at 09:54

## 2019-04-11 RX ADMIN — Medication 100 MILLIGRAM(S): at 13:20

## 2019-04-11 RX ADMIN — Medication 4: at 13:20

## 2019-04-11 RX ADMIN — HEPARIN SODIUM 5000 UNIT(S): 5000 INJECTION INTRAVENOUS; SUBCUTANEOUS at 22:19

## 2019-04-11 RX ADMIN — Medication 25 MILLIGRAM(S): at 22:22
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
## 2019-04-11 NOTE — PROGRESS NOTE ADULT - ASSESSMENT
S/P I&D R forearm with vac 4/5        S/P primary closure  4/9      Plan    Discuss with attending re: drain  OOB  Con't IV antibiotics  D/C planning       Latisha Barron PA-C   Beeper    [phone]

## 2019-04-11 NOTE — PROGRESS NOTE ADULT - ATTENDING COMMENTS
Patient for DC Home  continue current meds  PO as tolerated  activity as tolerated  follow up with ortho   I am a non participating [employer] physician seeing Pt in coverage for Dr Fry Patient for DC Home  continue current meds  PO as tolerated  activity as tolerated  follow up with ortho   I am a non participating [employer] physician seeing Pt in coverage for Dr Fry. The above patient examination was reviewed with Dr. Munguia and I agree with his evaluation, assessment and treatment plan.  Jeremiah Fry M.D.

## 2019-04-11 NOTE — CHART NOTE - NSCHARTNOTEFT_GEN_A_CORE
Removed HV Drain from RUE. Patient dressing left C/D/I.  Patient in no acute distress afterwards.    Hai Hernández PA-C    Pager# [phone]

## 2019-04-11 NOTE — PROGRESS NOTE ADULT - SUBJECTIVE AND OBJECTIVE BOX
Diabetes Follow up note:  Interval Hx:  58 year old female w/uncontrolled T2DM w/retinopathy, neuropathy here for elbow osteomyelitis s/p I & D. BG values mid 100s to mid 200s on present insulin regimen. Pt had drain from arm removed today. Hopeful to be discharged today. Tolerating POs.       Review of Systems:  General: "I am feeling better"  GI: Tolerating POs without any N/V/D/ABD PAIN.  CV: No CP/SOB  ENDO: No S&Sx of hypoglycemia  MEDS:  atorvastatin 40 milliGRAM(s) Oral at bedtime    fenofibrate Tablet 145 milliGRAM(s) Oral daily    insulin glargine Injectable (LANTUS) 56 Unit(s) SubCutaneous at bedtime  insulin lispro (HumaLOG) corrective regimen sliding scale   SubCutaneous three times a day before meals  insulin lispro (HumaLOG) corrective regimen sliding scale   SubCutaneous at bedtime  insulin lispro Injectable (HumaLOG) 30 Unit(s) SubCutaneous three times a day before meals    ceFAZolin   IVPB 2000 milliGRAM(s) IV Intermittent every 8 hours    Allergies    No Known Allergies        PE:  General: Female sitting in chair. NAD>   Vital Signs Last 24 Hrs  T(C): 37.1 (11 Apr 2019 04:26), Max: 37.1 (11 Apr 2019 04:26)  T(F): 98.7 (11 Apr 2019 04:26), Max: 98.7 (11 Apr 2019 04:26)  HR: 81 (11 Apr 2019 04:26) (81 - 97)  BP: 123/76 (11 Apr 2019 04:26) (123/76 - 150/82)  BP(mean): --  RR: 17 (11 Apr 2019 04:26) (17 - 18)  SpO2: 97% (11 Apr 2019 04:26) (97% - 98%)  Abd: Soft, NT,ND, Obese.   Extremities: Warm. R arm dsg c/d/i  Neuro: A&O X3    LABS:    POCT Blood Glucose.: 213 mg/dL (04-11-19 @ 12:51)  POCT Blood Glucose.: 139 mg/dL (04-11-19 @ 09:51)  POCT Blood Glucose.: 250 mg/dL (04-10-19 @ 22:09)  POCT Blood Glucose.: 179 mg/dL (04-10-19 @ 19:40)  POCT Blood Glucose.: 245 mg/dL (04-10-19 @ 14:31)  POCT Blood Glucose.: 132 mg/dL (04-10-19 @ 09:01)  POCT Blood Glucose.: 301 mg/dL (04-09-19 @ 22:28)  POCT Blood Glucose.: 224 mg/dL (04-09-19 @ 15:34)  POCT Blood Glucose.: 117 mg/dL (04-09-19 @ 06:16)  POCT Blood Glucose.: 263 mg/dL (04-08-19 @ 23:25)  POCT Blood Glucose.: 154 mg/dL (04-08-19 @ 19:45)                  Hemoglobin A1C, Whole Blood: 9.4 % <H> [4.0 - 5.6] (04-06-19 @ 10:07)  Hemoglobin A1C, Whole Blood: 9.1 % <H> [4.0 - 5.6] (04-05-19 @ 09:26)            Contact number: aline [phone] or [phone]

## 2019-04-11 NOTE — PROGRESS NOTE ADULT - SUBJECTIVE AND OBJECTIVE BOX
Post op Day 2 from primary closure    Patient resting without complaints.       T(C): 37.1 (04-11-19 @ 04:26), Max: 37.1 (04-11-19 @ 04:26)  HR: 81 (04-11-19 @ 04:26) (81 - 97)  BP: 123/76 (04-11-19 @ 04:26) (123/76 - 150/82)  RR: 17 (04-11-19 @ 04:26) (17 - 18)  SpO2: 97% (04-11-19 @ 04:26) (94% - 98%)  Wt(kg): --    Exam:                RUE:   Dressing CDI; Posey block in place  HV x 1: 0/10 cc S/S  AIN/PIN/m/r intact, no ulnar nerve function distal to elbow, ulnar claw hand  SILT m/r, no sensation along ulnar border  WWP brisk cap refill  Compartments soft and compressible

## 2019-04-12 ENCOUNTER — TRANSCRIPTION ENCOUNTER (OUTPATIENT)
Age: 59
End: 2019-04-12

## 2019-04-12 VITALS
SYSTOLIC BLOOD PRESSURE: 139 MMHG | OXYGEN SATURATION: 100 % | HEART RATE: 89 BPM | RESPIRATION RATE: 18 BRPM | TEMPERATURE: 98 F | DIASTOLIC BLOOD PRESSURE: 84 MMHG

## 2019-04-12 LAB — GLUCOSE BLDC GLUCOMTR-MCNC: 171 MG/DL — HIGH (ref 70–99)

## 2019-04-12 PROCEDURE — 83036 HEMOGLOBIN GLYCOSYLATED A1C: CPT

## 2019-04-12 PROCEDURE — 86880 COOMBS TEST DIRECT: CPT

## 2019-04-12 PROCEDURE — 87102 FUNGUS ISOLATION CULTURE: CPT

## 2019-04-12 PROCEDURE — 73080 X-RAY EXAM OF ELBOW: CPT

## 2019-04-12 PROCEDURE — C1751: CPT

## 2019-04-12 PROCEDURE — 86140 C-REACTIVE PROTEIN: CPT

## 2019-04-12 PROCEDURE — 85730 THROMBOPLASTIN TIME PARTIAL: CPT

## 2019-04-12 PROCEDURE — 36569 INSJ PICC 5 YR+ W/O IMAGING: CPT

## 2019-04-12 PROCEDURE — 85027 COMPLETE CBC AUTOMATED: CPT

## 2019-04-12 PROCEDURE — 87075 CULTR BACTERIA EXCEPT BLOOD: CPT

## 2019-04-12 PROCEDURE — 99285 EMERGENCY DEPT VISIT HI MDM: CPT

## 2019-04-12 PROCEDURE — 87186 SC STD MICRODIL/AGAR DIL: CPT

## 2019-04-12 PROCEDURE — 86803 HEPATITIS C AB TEST: CPT

## 2019-04-12 PROCEDURE — 85652 RBC SED RATE AUTOMATED: CPT

## 2019-04-12 PROCEDURE — 93005 ELECTROCARDIOGRAM TRACING: CPT

## 2019-04-12 PROCEDURE — 86901 BLOOD TYPING SEROLOGIC RH(D): CPT

## 2019-04-12 PROCEDURE — 87040 BLOOD CULTURE FOR BACTERIA: CPT

## 2019-04-12 PROCEDURE — 87015 SPECIMEN INFECT AGNT CONCNTJ: CPT

## 2019-04-12 PROCEDURE — 97161 PT EVAL LOW COMPLEX 20 MIN: CPT

## 2019-04-12 PROCEDURE — 85610 PROTHROMBIN TIME: CPT

## 2019-04-12 PROCEDURE — 87116 MYCOBACTERIA CULTURE: CPT

## 2019-04-12 PROCEDURE — 97164 PT RE-EVAL EST PLAN CARE: CPT

## 2019-04-12 PROCEDURE — 73090 X-RAY EXAM OF FOREARM: CPT

## 2019-04-12 PROCEDURE — 82962 GLUCOSE BLOOD TEST: CPT

## 2019-04-12 PROCEDURE — 97535 SELF CARE MNGMENT TRAINING: CPT

## 2019-04-12 PROCEDURE — 76000 FLUOROSCOPY <1 HR PHYS/QHP: CPT

## 2019-04-12 PROCEDURE — 88304 TISSUE EXAM BY PATHOLOGIST: CPT

## 2019-04-12 PROCEDURE — 87206 SMEAR FLUORESCENT/ACID STAI: CPT

## 2019-04-12 PROCEDURE — 86900 BLOOD TYPING SEROLOGIC ABO: CPT

## 2019-04-12 PROCEDURE — 71045 X-RAY EXAM CHEST 1 VIEW: CPT

## 2019-04-12 PROCEDURE — 80048 BASIC METABOLIC PNL TOTAL CA: CPT

## 2019-04-12 PROCEDURE — 87070 CULTURE OTHR SPECIMN AEROBIC: CPT

## 2019-04-12 PROCEDURE — 97110 THERAPEUTIC EXERCISES: CPT

## 2019-04-12 PROCEDURE — 88305 TISSUE EXAM BY PATHOLOGIST: CPT

## 2019-04-12 PROCEDURE — 86870 RBC ANTIBODY IDENTIFICATION: CPT

## 2019-04-12 PROCEDURE — 86850 RBC ANTIBODY SCREEN: CPT

## 2019-04-12 PROCEDURE — 81025 URINE PREGNANCY TEST: CPT

## 2019-04-12 PROCEDURE — 97530 THERAPEUTIC ACTIVITIES: CPT

## 2019-04-12 PROCEDURE — 86922 COMPATIBILITY TEST ANTIGLOB: CPT

## 2019-04-12 PROCEDURE — 97165 OT EVAL LOW COMPLEX 30 MIN: CPT

## 2019-04-12 RX ORDER — TRAMADOL HYDROCHLORIDE 50 MG/1
1 TABLET ORAL
Qty: 14 | Refills: 0 | OUTPATIENT
Start: 2019-04-12 | End: 2019-04-18

## 2019-04-12 RX ORDER — OXYCODONE HYDROCHLORIDE 5 MG/1
1 TABLET ORAL
Qty: 50 | Refills: 0 | OUTPATIENT
Start: 2019-04-12

## 2019-04-12 RX ORDER — TRAMADOL HYDROCHLORIDE 50 MG/1
1 TABLET ORAL
Qty: 0 | Refills: 0 | COMMUNITY

## 2019-04-12 RX ADMIN — HEPARIN SODIUM 5000 UNIT(S): 5000 INJECTION INTRAVENOUS; SUBCUTANEOUS at 06:07

## 2019-04-12 RX ADMIN — Medication 1000 MILLIGRAM(S): at 00:30

## 2019-04-12 RX ADMIN — Medication 100 MILLIGRAM(S): at 06:03

## 2019-04-12 RX ADMIN — GABAPENTIN 800 MILLIGRAM(S): 400 CAPSULE ORAL at 06:07

## 2019-04-12 RX ADMIN — Medication 34 UNIT(S): at 11:11

## 2019-04-12 RX ADMIN — Medication 145 MILLIGRAM(S): at 11:11

## 2019-04-12 RX ADMIN — LIDOCAINE 1 PATCH: 4 CREAM TOPICAL at 00:01

## 2019-04-12 RX ADMIN — Medication 2: at 11:11

## 2019-04-12 RX ADMIN — Medication 25 MILLIGRAM(S): at 06:08

## 2019-04-12 RX ADMIN — PANTOPRAZOLE SODIUM 40 MILLIGRAM(S): 20 TABLET, DELAYED RELEASE ORAL at 06:09

## 2019-04-12 RX ADMIN — LIDOCAINE 1 PATCH: 4 CREAM TOPICAL at 11:16

## 2019-04-12 RX ADMIN — Medication 400 MILLIGRAM(S): at 00:02

## 2019-04-12 RX ADMIN — Medication 100 MILLIGRAM(S): at 06:07

## 2019-04-12 RX ADMIN — Medication 10 MILLIGRAM(S): at 06:33

## 2019-04-12 NOTE — PROGRESS NOTE ADULT - ATTENDING COMMENTS
Patient for DC Home  continue current meds  PO as tolerated  activity as tolerated  follow up with ortho   I am a non participating [employer] physician seeing Pt in coverage for Dr Fry. The above patient examination was reviewed with Dr. Munguia and I agree with his evaluation, assessment and treatment plan.  Jeremiah Fry M.D.

## 2019-04-12 NOTE — PROVIDER CONTACT NOTE (OTHER) - SITUATION
pt stated that she did not get relief from the oxycodone and she wanted iv tylenol. pt stated she takes lidoderm patch for her back at home because she has back pain

## 2019-04-12 NOTE — PROGRESS NOTE ADULT - PROBLEM SELECTOR PROBLEM 2
Staph aureus infection
T2DM (type 2 diabetes mellitus)
T2DM (type 2 diabetes mellitus)
Essential hypertension
Staph aureus infection
T2DM (type 2 diabetes mellitus)
Essential hypertension
Staph aureus infection

## 2019-04-12 NOTE — PROGRESS NOTE ADULT - ASSESSMENT
Patient is a 58y old  Female s/p R forearm I&D/VAC placement.  Change diet and lifestyle to improve diabetic control has been made. She is now status post second OR for incision and drainage of her right forearm fluid collection, performed on April 9, 2019. The patient complains of severe incisional arm pain, Patient is receiving IV antibiotics and to be d/c home with PICC line in  place. Medically stable for discharge.

## 2019-04-12 NOTE — PROGRESS NOTE ADULT - PROBLEM SELECTOR PROBLEM 1
Osteomyelitis of right ulna
Elbow effusion, right
Osteomyelitis of right ulna
Type 2 diabetes mellitus with hyperglycemia, unspecified whether long term insulin use
Osteomyelitis of right ulna

## 2019-04-12 NOTE — PROGRESS NOTE ADULT - PROBLEM SELECTOR PROBLEM 4
Essential hypertension
Obesity

## 2019-04-12 NOTE — PROVIDER CONTACT NOTE (OTHER) - ACTION/TREATMENT ORDERED:
MD Mckeon made aware, RN notified nursing ed + administration about low BS, will cont to ofelia connell
MD Mckeon states to give lantus and recheck sugar in am. MD states to notify him w/ BS value. will cont to ofelia.
PA stated it was ok to start heparin at 2200 on 4-9-19. will monitor.
pa ordered iv tylenol and lidoderm patch for her back. will monitor.
MD Ike Mckeon made aware, 56 units insulin home medication administered as ordered, cont to ofelia BS/ pt as per routine.

## 2019-04-12 NOTE — DISCHARGE NOTE NURSING/CASE MANAGEMENT/SOCIAL WORK - NSDCDPATPORTLINK_GEN_ALL_CORE
You can access the [facility] Patient Portal, offered by [facility], by registering with the following website: [email or web address]

## 2019-04-12 NOTE — PROGRESS NOTE ADULT - PROVIDER SPECIALTY LIST ADULT
Anesthesia
Anesthesia
Endocrinology
Infectious Disease
Infectious Disease
Internal Medicine
Orthopedics
Infectious Disease
Internal Medicine
Internal Medicine

## 2019-04-12 NOTE — PROGRESS NOTE ADULT - PROBLEM SELECTOR PLAN 1
-s/p rt forearm I+D  -cont ancef  -local care per ortho
scheduled for repeat I&D  Medically stable to proceed with next wash out  continue abx ID following
tolerated washout with wound closure  continue wound care as per ortho  continue abx ID following
***See Above  Gianfranco DURÁN  Orthopedics  B: 1409/1337  S: [phone]
***See Above  Gianfranco DURÁN  Orthopedics  B: 1409/1337  S: [phone]
- FS goal 100-180  - Decrease Lantus to 56 units sq QHS  - Decrease Humalog to 24 units sq TID AC   - Continue Humalog moderate SSI TID AC/QHS  -Diabetic diet  Outpatient Plan  - Recommend basal-bolus insulin, final doses to be determined  -Continue home dose Trulicity  - F/u with Rosalie Yasmani as per patient preference, she will call him for appt  .
-s/p rt forearm I+D  -cont ancef  -local care per ortho
-test BG AC/HS  -c/w Lantus 56 units QHS  -Increase Humalog 34 units AC meals  -c/w Humalog moderate correction scale AC and Mod HS scale  -Outpatient Plan  - Recommend basal-bolus insulin, home doses Toujeo 72 qhs and Novolog 56 w/meals  -Continue home dose Trulicity  - F/u with Rosalie Gruber as per patient preference, she will call him for erica  -discussed w/pt and team  pager: [phone].
antibiotics as per Dr Torres
scheduled for repeat I&D  Medically stable to proceed with next wash out  continue abx ID following
tolerated washout with wound closure  continue wound care as per ortho  continue abx ID following
tolerated washout with wound closure  continue wound care as per ortho  continue abx following I&D  PICC line in place.
·  POST-OP DIAGNOSIS:  Post-traumatic wound infection 05-Apr-2019 09:59:23  Jeremiah Castillo.  ·  PROCEDURES:  Irrigation and debridement, wound 05-Apr-2019 09:57:53  Jeremiah Castillo.
·  POST-OP DIAGNOSIS:  Post-traumatic wound infection 05-Apr-2019 09:59:23  Jeremiah Castillo.  ·  PROCEDURES:  Medically stable to proceed with next adelita out  Irrigation and debridement, wound 05-Apr-2019 09:57:53  Jeremiah Castillo.
-test BG AC/HS  -c/w Lantus 56 units QHS  -Increase Humalog 30 units AC meals  -c/w Humalog moderate correction scale AC and Mod HS scale  -Outpatient Plan  - Recommend basal-bolus insulin, final doses to be determined  -Continue home dose Trulicity  - F/u with Rosalie Gruber as per patient preference, she will call him for erica  pager: [phone]
- FS at goal, AM BMP with glucose 85 this morning  - Patient will likely be NPO overnight tonight. If NPO, recommend reduce Lantus to 48 units QHS tonight x 1 dose. Then tomorrow, recommend Lantus 58 units QHS  - Recommend continue Humalog 30 units TID AC and to be given only if eating a meal. Continue Humalog moderate SSI TID AC. When NPO, recommend Humalog moderate SSI q6 hours.    Outpatient Plan  - Recommend basal-bolus insulin, final doses to be determined  - Continue home dose Trulicity  - F/u with Yasmani as per patient preference, she will call him for apt  - Also provided her with my card for f/u apt incase she needs to switch as per insurance coverage etc.
-s/p rt forearm I+D  -cont ancef  -local care per ortho
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
## 2019-04-12 NOTE — PROGRESS NOTE ADULT - PROBLEM SELECTOR PROBLEM 3
Long term (current) use of antibiotics
Obesity
Obesity
Long term (current) use of antibiotics
Obesity
T2DM (type 2 diabetes mellitus)
Other hyperlipidemia
Long term (current) use of antibiotics

## 2019-04-12 NOTE — PROGRESS NOTE ADULT - PROBLEM SELECTOR PLAN 4
Continue presents meds
Continue presents meds  adjust meds as needed
Diet exercise and weight loss

## 2019-04-12 NOTE — PROGRESS NOTE ADULT - PROBLEM SELECTOR PLAN 2
-suspect MSSA  -f/u cx sensitivities   -cont ancef
Sliding scale insulin coverage with regular Insulin  Endocrine to help manage lifestyle and insulin dosage
Sliding scale insulin coverage with regular Insulin  Endocrine to help manage lifestyle and insulin dosage
-suspect MSSA  -f/u cx sensitivities   -cont ancef
Blood pressure under control
Sliding scale insulin coverage with regular Insulin
Sliding scale insulin coverage with regular Insulin  Endocrine to help manage lifestyle and insulin dosage
- BP is uncontrolled, goal <130/80  - If BP is persistently above goal, then recommend increasing Metoprolol to 50 mg QD
-suspect MSSA  -f/u cx sensitivities   -cont ancef

## 2019-04-12 NOTE — PROGRESS NOTE ADULT - REASON FOR ADMISSION
Right forearm collection

## 2019-04-12 NOTE — PROGRESS NOTE ADULT - PROBLEM SELECTOR PLAN 3
- Continue Atorvastatin 40 mg QHS and Fenofibrate 145 mg QD    Our service will follow    Gage Andrade DO  Pager: [phone]
nutrition f/u as an out pt  low tad diet
nutrition f/u as an out pt  low tda diet
-plan picc + 6 weeks abx
-plan picc + 6 weeks abx
Diet exercise and weight loss to try and get BMI
Diet exercise and weight loss to try and get BMI
Sliding scale insulin  coverage
nutrition f/u as an out pt  low tad diet
-plan picc + 6 weeks abx

## 2019-04-12 NOTE — PROGRESS NOTE ADULT - SUBJECTIVE AND OBJECTIVE BOX
Post op Day 2 from primary closure    Patient resting without complaints.       T(C): 37.1 (04-11-19 @ 04:26), Max: 37.1 (04-11-19 @ 04:26)  HR: 81 (04-11-19 @ 04:26) (81 - 97)  BP: 123/76 (04-11-19 @ 04:26) (123/76 - 150/82)  RR: 17 (04-11-19 @ 04:26) (17 - 18)  SpO2: 97% (04-11-19 @ 04:26) (94% - 98%)  Wt(kg): --    Exam:                RUE:   Dressing CDI; Debora block at bedside encouraged use  AIN/PIN/m/r intact, no ulnar nerve function distal to elbow, ulnar claw hand  SILT m/r, no sensation along ulnar border  WWP brisk cap refill  Compartments soft and compressible           Assessment and Plan:   · Assessment		  S/P I&D R forearm with vac 4/5        S/P primary closure  4/9      Plan    drain DC'd yesterday  OOB  Con't IV antibiotics  D/C planning

## 2019-04-14 LAB
CULTURE RESULTS: NO GROWTH — SIGNIFICANT CHANGE UP
SPECIMEN SOURCE: SIGNIFICANT CHANGE UP

## 2019-04-15 LAB — SURGICAL PATHOLOGY STUDY: SIGNIFICANT CHANGE UP

## 2019-04-17 LAB — SURGICAL PATHOLOGY STUDY: SIGNIFICANT CHANGE UP

## 2019-05-04 LAB
CULTURE RESULTS: SIGNIFICANT CHANGE UP
SPECIMEN SOURCE: SIGNIFICANT CHANGE UP

## 2019-05-06 ENCOUNTER — APPOINTMENT (OUTPATIENT)
Dept: INFECTIOUS DISEASE | Facility: CLINIC | Age: 59
End: 2019-05-06
Payer: COMMERCIAL

## 2019-05-06 VITALS
SYSTOLIC BLOOD PRESSURE: 178 MMHG | DIASTOLIC BLOOD PRESSURE: 92 MMHG | HEART RATE: 98 BPM | TEMPERATURE: 97.3 F | OXYGEN SATURATION: 98 % | HEIGHT: 60 IN

## 2019-05-06 DIAGNOSIS — M86.232: ICD-10-CM

## 2019-05-06 PROCEDURE — 99213 OFFICE O/P EST LOW 20 MIN: CPT

## 2019-05-06 NOTE — PHYSICAL EXAM
[Sclera] : the sclera and conjunctiva were normal [Extraocular Movements] : extraocular movements were intact [PERRL With Normal Accommodation] : pupils were equal in size, round, reactive to light [Outer Ear] : the ears and nose were normal in appearance [Oropharynx] : the oropharynx was normal with no thrush [Neck Appearance] : the appearance of the neck was normal [Neck Cervical Mass (___cm)] : no neck mass was observed [Jugular Venous Distention Increased] : there was no jugular-venous distention [] : no respiratory distress [Thyroid Diffuse Enlargement] : the thyroid was not enlarged [Auscultation Breath Sounds / Voice Sounds] : lungs were clear to auscultation bilaterally [FreeTextEntry1] : wd healed

## 2019-05-06 NOTE — HISTORY OF PRESENT ILLNESS
[FreeTextEntry1] : 3 weeks into treatment for Om due to mssa.   recurrent treated with debridement of extern fix sites.

## 2019-05-06 NOTE — ASSESSMENT
[FreeTextEntry1] : doing well plan prolonged po Keflex after completing 6 weeks of IV  may need furthr debiriemnt if thuis does not work   discussed with her and DR. Mcguire

## 2019-05-08 LAB
CULTURE RESULTS: SIGNIFICANT CHANGE UP
SPECIMEN SOURCE: SIGNIFICANT CHANGE UP

## 2019-05-09 LAB — ERYTHROCYTE [SEDIMENTATION RATE] IN BLOOD BY WESTERGREN METHOD: 32 MM/HR

## 2019-05-23 RX ORDER — CEPHALEXIN 500 MG/1
500 CAPSULE ORAL
Qty: 120 | Refills: 0 | Status: ACTIVE | COMMUNITY
Start: 2019-05-22

## 2019-05-25 LAB
CULTURE RESULTS: SIGNIFICANT CHANGE UP
SPECIMEN SOURCE: SIGNIFICANT CHANGE UP

## 2019-05-29 LAB
CULTURE RESULTS: SIGNIFICANT CHANGE UP
SPECIMEN SOURCE: SIGNIFICANT CHANGE UP

## 2019-10-04 NOTE — PROGRESS NOTE ADULT - PROBLEM SELECTOR PLAN 1
Topical Anesthesia?: LMX -Test BG ac and hs  -Start lantus 35 units bid (6pm and 6am). 70% of home dose.  -Start  Humalog 10 units ac meals (might need adjustments as PO improves PO intake)  -Start Humalog moderate correction scales ac and hs  -Consider antibiotic infusion in NS instead of D5  -Plan discussed with pt/team/staff. Will adjust regime as needed. -Test BG ac and hs  -Start lantus 35 units bid (6pm and 6am). 70% of home dose.  -Start  Humalog 10 units ac meals (might need adjustments as pt improves PO intake)    -Start Humalog moderate correction scales ac and hs  -Consider antibiotic infusion in NS instead of D5  -Will adjust regime as needed. -Test BG ac and hs  -Start lantus 35 units bid (6pm and 6am). 70% of home dose.  -Start  Humalog 10 units ac meals (might need adjustments as pt improves PO intake)  -Start Humalog moderate correction scales ac and hs  -Consider antibiotic infusion in NS instead of D5  -Will adjust regime as needed. Plan discussed with pt/staff and team

## 2020-05-26 NOTE — CONSULT NOTE ADULT - SUBJECTIVE AND OBJECTIVE BOX
HPI:  57 yr F with T2DM A1C 8.2 with PMH of crush injury to R arm s/p surgery 2017 now here with R ulnar OM s/p debridement. Patient has had DM for about 7 years and she follows with PMD Dr Barriga. She takes Tresiba 72U HS and Novolog 52U before meals. Her A1C has been well controlled around 6.0 in Dec 2017). Lately her glucose levels have been high likely due to infection. Patient was taking Farxiga in addition to insulin up until 2 weeks ago when it was discontinued due to her kidney function Her DM is complicated by cataracts, neuropathy, nephropathy (CKD III). NO macrovascular complications. She has never been hospitalized for uncontrolled DM. Her glucose levels at home at -250 prelunch 300s Predinner 160s. Hypoglycemia is infrequent. For breakfast she usually has eggs, bagel Lunch: pizza, sandwich Dinner: steak, chicken. She does not drink fruit juice and has diet soda     PAST MEDICAL & SURGICAL HISTORY:  Ulnar nerve injury  Obesity  Osteomyelitis of right ulna  T2DM (type 2 diabetes mellitus)  HLD (hyperlipidemia)  Renal stones: sepsis - 3 yrs ago  Essential hypertension  S/P : 25 y/o  S/P ORIF (open reduction internal fixation) fracture: Right Elbow      FAMILY HISTORY:  Mother: T2DM      Social History: Ex smoker, nondrinker    Outpatient Medications:  · 	ascorbic acid 500 mg oral tablet: Last Dose Taken:  , 1 tab(s) orally once a day  · 	Multiple Vitamins oral tablet: Last Dose Taken:  , 1 tab(s) orally once a day  · 	docusate sodium 100 mg oral capsule: Last Dose Taken:  , 1 cap(s) orally 3 times a day, As needed, Constipation  · 	gabapentin 300 mg oral capsule: Last Dose Taken:  , 2 cap(s) orally 3 times a day  · 	NovoLOG FlexPen 100 units/mL subcutaneous solution: Last Dose Taken:  , 52 unit(s) subcutaneous 3 times a day  · 	Tresiba FlexTouch 100 units/mL subcutaneous solution: Last Dose Taken:  , 72 unit(s) subcutaneous once a day (at bedtime)  · 	traMADol 50 mg oral tablet: Last Dose Taken:  , 1 tab(s) orally 2 times a day  · 	Tylenol PM: Last Dose Taken:  , 1000 milligram(s) orally once a day (at bedtime)  · 	Ecotrin Adult Low Strength 81 mg oral delayed release tablet: Last Dose Taken:  , 1 tab(s) orally once a day x 6 weeks  · 	bisoprolol-hydroCHLOROthiazide 5 mg-6.25 mg oral tablet: Last Dose Taken:  , 1 tab(s) orally once a day  · 	Crestor 10 mg oral tablet: Last Dose Taken:  , 1 tab(s) orally once a day (at bedtime)  · 	NexIUM 40 mg oral delayed release capsule: Last Dose Taken:  , 1 cap(s) orally once a day  · 	TriCor 160 mg oral tablet: Last Dose Taken:  , 1 tab(s) orally once a day      MEDICATIONS  (STANDING):  acetaminophen  IVPB. 1000 milliGRAM(s) IV Intermittent once  ascorbic acid 500 milliGRAM(s) Oral daily  aspirin enteric coated 81 milliGRAM(s) Oral daily  atorvastatin 40 milliGRAM(s) Oral at bedtime  ceFAZolin   IVPB      ceFAZolin   IVPB 2000 milliGRAM(s) IV Intermittent every 8 hours  dextrose 5%. 1000 milliLiter(s) (50 mL/Hr) IV Continuous <Continuous>  dextrose 50% Injectable 12.5 Gram(s) IV Push once  dextrose 50% Injectable 25 Gram(s) IV Push once  dextrose 50% Injectable 25 Gram(s) IV Push once  fenofibrate Tablet 145 milliGRAM(s) Oral daily  gabapentin 600 milliGRAM(s) Oral three times a day  heparin  Injectable 5000 Unit(s) SubCutaneous every 8 hours  insulin lispro (HumaLOG) corrective regimen sliding scale   SubCutaneous three times a day before meals  insulin lispro (HumaLOG) corrective regimen sliding scale   SubCutaneous at bedtime  multivitamin 1 Tablet(s) Oral daily  pantoprazole    Tablet 40 milliGRAM(s) Oral before breakfast  traMADol 50 milliGRAM(s) Oral two times a day    MEDICATIONS  (PRN):  acetaminophen   Tablet 650 milliGRAM(s) Oral every 6 hours PRN For Temp greater than 38 C (100.4 F)  acetaminophen   Tablet. 650 milliGRAM(s) Oral every 6 hours PRN Mild Pain (1 - 3)  aluminum hydroxide/magnesium hydroxide/simethicone Suspension 30 milliLiter(s) Oral four times a day PRN Indigestion  dextrose Gel 1 Dose(s) Oral once PRN Blood Glucose LESS THAN 70 milliGRAM(s)/deciliter  diphenhydrAMINE   Capsule 25 milliGRAM(s) Oral every 6 hours PRN Rash and/or Itching  docusate sodium 100 milliGRAM(s) Oral three times a day PRN Constipation  glucagon  Injectable 1 milliGRAM(s) IntraMuscular once PRN Glucose LESS THAN 70 milligrams/deciliter  HYDROmorphone  Injectable 1 milliGRAM(s) IV Push every 4 hours PRN severe breakthrough pain  HYDROmorphone  Injectable 0.3 milliGRAM(s) IV Push every 10 minutes PRN Severe Pain (7 - 10)  ondansetron Injectable 4 milliGRAM(s) IV Push once PRN Nausea and/or Vomiting  ondansetron Injectable 4 milliGRAM(s) IV Push every 6 hours PRN Nausea and/or Vomiting  oxyCODONE    IR 5 milliGRAM(s) Oral every 4 hours PRN Moderate Pain (4 - 6)  oxyCODONE    IR 10 milliGRAM(s) Oral every 4 hours PRN Severe Pain (7 - 10)      Allergies    No Known Allergies    Intolerances      Review of Systems:  Constitutional: No fever  Eyes: No blurry vision  Neuro: No tremors  HEENT: No pain  Cardiovascular: No chest pain, palpitations  Respiratory: No SOB, no cough  GI: No nausea, vomiting, abdominal pain  : No dysuria  Skin: no rash  Psych: no depression  Endocrine: no polyuria, polydipsia  Hem/lymph: +R arm swelling    ALL OTHER SYSTEMS REVIEWED AND NEGATIVE        PHYSICAL EXAM:  VITALS: T(C): 36.7 (18 @ 13:32)  T(F): 98.1 (18 @ 13:32), Max: 98.2 (18 @ 08:00)  HR: 88 (18 @ 13:32) (79 - 88)  BP: 157/84 (18 @ 13:32) (101/66 - 159/74)  RR:  (18 - 18)  SpO2:  (95% - 98%)  Wt(kg): --  GENERAL: NAD, well-groomed, well-developed  EYES: No proptosis, no lid lag, anicteric  HEENT:  Atraumatic, Normocephalic, moist mucous membranes  THYROID: Normal size, no palpable nodules  RESPIRATORY: Clear to auscultation bilaterally; No rales, rhonchi, wheezing, or rubs  CARDIOVASCULAR: Regular rate and rhythm; No murmurs; + peripheral edema  GI: Soft, nontender, non distended, normal bowel sounds  SKIN: +R arm dressing clean and dry  PSYCH: Alert and oriented x 3, normal affect, normal mood      POCT Blood Glucose.: 403 mg/dL (18 @ 13:28)  POCT Blood Glucose.: 249 mg/dL (18 @ 09:10)  POCT Blood Glucose.: 395 mg/dL (18 @ 21:48)  POCT Blood Glucose.: 355 mg/dL (18 @ 19:19)  POCT Blood Glucose.: 264 mg/dL (18 @ 13:50)  POCT Blood Glucose.: 250 mg/dL (18 @ 08:34)  POCT Blood Glucose.: 380 mg/dL (18 @ 22:21)  POCT Blood Glucose.: 336 mg/dL (18 @ 21:13)  POCT Blood Glucose.: 364 mg/dL (18 @ 18:54)  POCT Blood Glucose.: 286 mg/dL (18 @ 13:12)  POCT Blood Glucose.: 197 mg/dL (18 @ 09:04)  POCT Blood Glucose.: 346 mg/dL (18 @ 21:05)  POCT Blood Glucose.: 256 mg/dL (18 @ 15:22)                            11.1   7.0   )-----------( 266      ( 2018 18:22 )             32.7           135  |  97  |  27<H>  ----------------------------<  336<H>  4.4   |  24  |  1.38<H>    EGFR if : 49<L>  EGFR if non : 42<L>    Ca    9.4                Hemoglobin A1C, Whole Blood: 8.2 % <H> [4.0 - 5.6] (18 @ 00:59)
Patient is a 57y old  Female who presents with a chief complaint of   HPI: admitted for debridement of Om       PAST MEDICAL & SURGICAL HISTORY:  Ulnar nerve injury  Obesity  Osteomyelitis of right ulna  T2DM (type 2 diabetes mellitus)  HLD (hyperlipidemia)  Renal stones: sepsis - 3 yrs ago  Essential hypertension  S/P : 25 y/o  S/P ORIF (open reduction internal fixation) fracture: Right Elbow      Social history:    FAMILY HISTORY:  No pertinent family history in first degree relatives    REVIEW OF SYSTEMS  General:	Denies any malaise fatigue or chills. Fevers absent    Skin:No rash  	  Ophthalmologic:Denies any visual complaints,discharge redness or photophobia  	  ENMT:No nasal discharge,headache,sinus congestion or throat pain.No dental complaints    Respiratory and Thorax:No cough,sputum or chest pain.Denies shortness of breath  	  Cardiovascular:	No chest pain,palpitaions or dizziness    Gastrointestinal:	NO nausea,abdominal pain or diarrhea.    Genitourinary:	No dysuria,frequency. No flank pain    Musculoskeletal:	No joint swelling or pain.No weakness    Neurological:No confusion,diziness.No extremity weakness.No bladder or bowel incontinence	    Psychiatric:No delusions or hallucinations	    Hematology/Lymphatics:	No LN swelling.No gum bleeding     Endocrine:	No recent weight gain or loss.No abnormal heat/cold intolerance    Allergic/Immunologic:	No hives or rash   Allergies    No Known Allergies    Intolerances        Antimicrobials:    ceFAZolin   IVPB 2000 milliGRAM(s) IV Intermittent once        Vital Signs Last 24 Hrs  T(C): 36.7 (2018 09:14), Max: 36.7 (2018 09:14)  T(F): 98.1 (2018 09:14), Max: 98.1 (2018 09:14)  HR: 82 (2018 09:14) (82 - 82)  BP: 129/82 (2018 09:14) (129/82 - 129/82)  BP(mean): --  RR: 16 (2018 09:14) (16 - 16)  SpO2: 95% (2018 09:14) (95% - 95%)    PHYSICAL EXAM:Pleasant patient in no acute distress.      Constitutional:Comfortable.Awake and alert  No cachexia     Eyes:PERRL EOMI.NO discharge or conjunctival injection    ENMT:No sinus tenderness.No thrush.No pharyngeal exudate or erythema.Fair dental hygiene    Neck:Supple,No LN,no JVD       small sinus over humerus of right arm   Rectal:    Extremities:No cyanosis,clubbing or edema.    Vascular:peripheral pulses felt         Musculoskeletal:No joint swelling or LOM                       RECENT CULTURES:      MICROBIOLOGY:          Radiology:      Assessment:        Recommendations and Plan:    Pager [phone]  After 5 pm/weekends or if no response :[phone]
1 person assist

## 2020-10-13 NOTE — PATIENT PROFILE ADULT. - NSALCOHOLTYPE_GEN__A_CORE_SD
[Dear  ___] : Dear  [unfilled], [Consult Letter:] : I had the pleasure of evaluating your patient, [unfilled]. [Please see my note below.] : Please see my note below. [Consult Closing:] : Thank you very much for allowing me to participate in the care of this patient.  If you have any questions, please do not hesitate to contact me. [Sincerely,] : Sincerely, [DrBubba  ___] : Dr. RAMOS [( Thank you for referring [unfilled] for consultation for _____ )] : Thank you for referring [unfilled] for consultation for [unfilled] [FreeTextEntry2] : Dr. Awais Peres\par [address] [facility], [place] [address]\par [phone]\par \par Cardiologist:\par Dr. Milo Loza\par [address] [address], [place] [address]\par [phone] [FreeTextEntry3] : Socrates Bermudez MD, FICS, FACS\par , Surgical Oncology\par [facility] [employer] [facility]\par [address]\[place]- [address]\par \par [address]\[place]- [address]\par \par (mob) [phone]\par (o) [phone]\par (f) [phone]  june/wine

## 2020-12-23 NOTE — ANESTHESIA FOLLOW-UP NOTE - ANESTHESIOLOGIST NAME
[FreeTextEntry1] : Hemodynamically stable with acceptable BP\par Cardiac status stable\par Lab profiles drawn in office and sent Edgar

## 2021-02-12 NOTE — PHYSICAL THERAPY INITIAL EVALUATION ADULT - ACTIVE RANGE OF MOTION EXAMINATION, REHAB EVAL
oral bilateral  lower extremity Active ROM was WFL (within functional limits)/Decr. R shoulder flex to ~5-10 deg.; R elbow NA (maintained in 90 deg. elbow flex due to external fixator); partial R  squeeze/Left UE Active ROM was WFL (within functional limits)

## 2021-06-12 NOTE — PRE-OP CHECKLIST - HEART RATE (BEATS/MIN)
Attempt 2: Care Guide called patient.  If this patient is returning my call, please transfer to [facility] at ext [phone].  
84

## 2021-12-08 NOTE — BRIEF OPERATIVE NOTE - OPERATION/FINDINGS
procedure: right elbow removal of external fixation, manipulation of right elbow under anesthesia  see full operative note dictation
Yes

## 2022-03-02 NOTE — PRE-OP CHECKLIST - BLOOD AVAILABLE
yes Patient Specific Counseling (Will Not Stick From Patient To Patient): Given scalp involvement, more likely psoriasis Detail Level: Generalized

## 2022-03-19 NOTE — PROGRESS NOTE ADULT - PROBLEM SELECTOR PLAN 1
-Test BG ac and hs  -Continue lantus 35 units bid (6pm and 6am).   -Continue Humalog 20 units ac meals   -Continue Humalog moderate correction scales ac and hs  -IF NPO GIVE: 28 units of Lantus the night before and hold Lantus in am the day of procedure and change correction scale to moderate dose q6h.  -Re assess pt's pain complaint.  -Plan discussed with pt and team  -Consider antibiotic infusion in NS instead of D5  -Will adjust regime as needed. Plan discussed with pt/staff and team  Beeper [phone] or  warm

## 2022-08-09 NOTE — DIETITIAN INITIAL EVALUATION ADULT. - ETIOLOGY
pt not ready for diet/lifestyle changes h/o excess energy intake and sedentary lifestyle Wound Care: Petrolatum

## 2022-10-07 NOTE — DISCHARGE NOTE ADULT - NSCORESITESY/N_GEN_A_CORE_RD
No MEDICATIONS  (PRN):  ALBUTerol    90 MICROgram(s) HFA Inhaler 2 Puff(s) Inhalation every 6 hours PRN Wheezing  OLANZapine 2.5 milliGRAM(s) Oral two times a day PRN agitation  OLANZapine Injectable 2.5 milliGRAM(s) IntraMuscular once PRN severe agitation  polyethylene glycol 3350 17 Gram(s) Oral at bedtime PRN Constipation  senna 2 Tablet(s) Oral at bedtime PRN Constipation  simethicone 80 milliGRAM(s) Chew three times a day PRN Upset Stomach

## 2023-05-03 NOTE — DISCHARGE NOTE NURSING/CASE MANAGEMENT/SOCIAL WORK - MODE OF TRANSPORTATION
Complete NF. No symptoms. Daughter demanding treatment plan for recurrent UTIs. She had been on prophyhlactic keflex, was stopped prior to last admission, Urine was S to cefazolin and NF. She is seeing urology today. Advised that prophlyactic antibiotic may reduce frequency of symptomatic UTI but also can lead to resistance with more difficult to treat infections. Patient wanted to hold off at this time, but can consider.   And would defer to  if want to use NF for prophylaxis would also be reasonable
Ambulatory

## 2023-06-09 NOTE — H&P ADULT - NSCORESITESY/N_GEN_A_CORE_RD
Acute on CKD Stage 4  Sepsis/UTI  L Hydronephrosis  Uremia      -DUNCAN from sepsis/hypotension and renal hypoperfusion; unilateral mild hydro should not cause this degree of DUNCAN  -Abx, renal dosing  -Urology eval noted  -Monitor chemistries and urine output  -Renal indices worsened with uremic symptoms leading to dialysis; still urinating, but still documented < 1L  -Consented for dialysis  -Discussed with patient risks and benefits of dialysis in depth up to and including death, she agrees to dialysis if necessary  -Initiated dialysis 6/6/23; ICU placed access; However, due to collapsing vasculature, dialysis was not able to be done effectively and had about 20 mins on the machine. Now s/p IV albumin and IVF boluses. HD yesterday.   -Dialysis today with poor catheter function per dicsussion with HD RN, D/w IR to schedule Permacath placement, discussed with ID and she is cleared for permacath for infection standpoint  -Will attempt cathflo and see if catheter functions better tomorrow, but will ultimately need permacath. Awaiting IR schedule    D/W primary, d/w IR, D/w ID, d/w HD RN  Thank you No

## 2023-10-31 NOTE — PACU DISCHARGE NOTE - NSPTMEETSDISCHCRITERIADT_GEN_A_CORE
24-Apr-2018 17:37 Terbinafine Counseling: Patient counseling regarding adverse effects of terbinafine including but not limited to headache, diarrhea, rash, upset stomach, liver function test abnormalities, itching, taste/smell disturbance, nausea, abdominal pain, and flatulence.  There is a rare possibility of liver failure that can occur when taking terbinafine.  The patient understands that a baseline LFT and kidney function test may be required. The patient verbalized understanding of the proper use and possible adverse effects of terbinafine.  All of the patient's questions and concerns were addressed.

## 2024-04-03 NOTE — PRE-OP CHECKLIST - IDENTIFICATION BAND VERIFIED
[FreeTextEntry1] : I, Lisa Giles NP, acted as scribe for LILI Ortiz for this patient encounter.
done

## 2024-06-13 NOTE — PHYSICAL THERAPY INITIAL EVALUATION ADULT - ASR WT BEARING STATUS EVAL
Has colonoscopy scheduled  Does have questions regarding prep - has not gotten message from pharmacy that it is ready. Patient has requested prep in tablet form, this has been ordered by GI  Additionally, we did discuss the pre-prep diet that will help ensure that the colonoscopy is able to fully evaluate her colon  Verbal and written information provided  
Right UE

## 2024-09-24 NOTE — PATIENT PROFILE ADULT. - SOURCE OF INFORMATION, PROFILE
patient/chart(s)
Gentle Skin Care Counseling: I recommended use a gentle skin cleanser when washing the skin. I also recommended application of a moisturizer twice daily. Products with fragrances, preservatives and dyes should be avoided.
Detail Level: Detailed